# Patient Record
Sex: FEMALE | Race: WHITE | NOT HISPANIC OR LATINO | Employment: OTHER | URBAN - METROPOLITAN AREA
[De-identification: names, ages, dates, MRNs, and addresses within clinical notes are randomized per-mention and may not be internally consistent; named-entity substitution may affect disease eponyms.]

---

## 2017-04-18 ENCOUNTER — OFFICE VISIT (OUTPATIENT)
Dept: CARDIOLOGY | Facility: MEDICAL CENTER | Age: 72
End: 2017-04-18
Payer: MEDICARE

## 2017-04-18 VITALS
HEART RATE: 84 BPM | HEIGHT: 69 IN | WEIGHT: 242 LBS | DIASTOLIC BLOOD PRESSURE: 90 MMHG | BODY MASS INDEX: 35.84 KG/M2 | OXYGEN SATURATION: 93 % | RESPIRATION RATE: 20 BRPM | SYSTOLIC BLOOD PRESSURE: 140 MMHG

## 2017-04-18 DIAGNOSIS — R60.9 EDEMA, UNSPECIFIED TYPE: ICD-10-CM

## 2017-04-18 DIAGNOSIS — R53.83 OTHER FATIGUE: ICD-10-CM

## 2017-04-18 DIAGNOSIS — E78.00 PURE HYPERCHOLESTEROLEMIA: ICD-10-CM

## 2017-04-18 DIAGNOSIS — R06.02 SHORTNESS OF BREATH: ICD-10-CM

## 2017-04-18 DIAGNOSIS — I48.0 PAROXYSMAL ATRIAL FIBRILLATION (HCC): ICD-10-CM

## 2017-04-18 DIAGNOSIS — I51.89 DIASTOLIC DYSFUNCTION: ICD-10-CM

## 2017-04-18 DIAGNOSIS — G47.33 OSA (OBSTRUCTIVE SLEEP APNEA): ICD-10-CM

## 2017-04-18 DIAGNOSIS — E66.9 OBESITY, UNSPECIFIED OBESITY SEVERITY, UNSPECIFIED OBESITY TYPE: ICD-10-CM

## 2017-04-18 DIAGNOSIS — I10 ESSENTIAL HYPERTENSION: ICD-10-CM

## 2017-04-18 PROCEDURE — 1036F TOBACCO NON-USER: CPT | Performed by: NURSE PRACTITIONER

## 2017-04-18 PROCEDURE — 99214 OFFICE O/P EST MOD 30 MIN: CPT | Performed by: NURSE PRACTITIONER

## 2017-04-18 PROCEDURE — 3017F COLORECTAL CA SCREEN DOC REV: CPT | Mod: 8P | Performed by: NURSE PRACTITIONER

## 2017-04-18 PROCEDURE — G8419 CALC BMI OUT NRM PARAM NOF/U: HCPCS | Performed by: NURSE PRACTITIONER

## 2017-04-18 PROCEDURE — 1101F PT FALLS ASSESS-DOCD LE1/YR: CPT | Mod: 8P | Performed by: NURSE PRACTITIONER

## 2017-04-18 PROCEDURE — 4040F PNEUMOC VAC/ADMIN/RCVD: CPT | Mod: 8P | Performed by: NURSE PRACTITIONER

## 2017-04-18 PROCEDURE — 3014F SCREEN MAMMO DOC REV: CPT | Mod: 8P | Performed by: NURSE PRACTITIONER

## 2017-04-18 PROCEDURE — G8432 DEP SCR NOT DOC, RNG: HCPCS | Performed by: NURSE PRACTITIONER

## 2017-04-18 RX ORDER — LOSARTAN POTASSIUM 100 MG/1
100 TABLET ORAL DAILY
Qty: 90 TAB | Refills: 3 | Status: SHIPPED | OUTPATIENT
Start: 2017-04-18 | End: 2018-11-28

## 2017-04-18 RX ORDER — FUROSEMIDE 40 MG/1
40 TABLET ORAL DAILY
Qty: 30 TAB | Refills: 11 | Status: SHIPPED | OUTPATIENT
Start: 2017-04-18 | End: 2017-11-13

## 2017-04-18 ASSESSMENT — PAIN SCALES - GENERAL: PAINLEVEL: NO PAIN

## 2017-04-18 ASSESSMENT — ENCOUNTER SYMPTOMS
PND: 0
PALPITATIONS: 0
MYALGIAS: 0
ABDOMINAL PAIN: 0
SHORTNESS OF BREATH: 1
DIZZINESS: 0
ORTHOPNEA: 0
FEVER: 0
CLAUDICATION: 0
COUGH: 0

## 2017-04-18 NOTE — MR AVS SNAPSHOT
"        Yvette Dailey   2017 1:40 PM   Office Visit   MRN: [id number]    Department:  [facility]   Dept Phone:  [phone]    Description:  Female : 1945   Provider:  BORIS Pham           Reason for Visit     Follow-Up           Allergies as of 2017     Allergen Noted Reactions    Allopurinol 2016       \"everything went haywire!\"      You were diagnosed with     Essential hypertension   [0489852]       Pure hypercholesterolemia   [272.0.ICD-9-CM]       GLORIA (obstructive sleep apnea)   [071782]       Obesity, unspecified obesity severity, unspecified obesity type   [4569394]       Paroxysmal atrial fibrillation (CMS-HCC)   [765784]       Edema, unspecified type   [3114426]       Shortness of breath   [786.05.ICD-9-CM]       Other fatigue   [8204300]       Diastolic dysfunction   [066879]         Vital Signs     Blood Pressure Pulse Respirations Height Weight Body Mass Index    140/90 mmHg 84 20 1.753 m (5' 9\") 109.77 kg (242 lb) 35.72 kg/m2    Oxygen Saturation Breastfeeding? Smoking Status             93% No Former Smoker         Basic Information     Date Of Birth Sex Race Ethnicity Preferred Language    1945 Female White Non- English      Your appointments     2017  8:40 AM   Follow UP with [facility] (--)    [address] [facility] [address]  [place] [id number] [phone]            May 03, 2017 11:00 AM   FOLLOW UP with [facility] (--)    [address]  [place] [id number] [phone]            May 16, 2017 10:15 AM   ECHO with ECHO [facility] EXAM 10   ECHOCARDIOLOGY [facility] ([facility])    [address] [facility] [address]   [phone]           No prep              Problem List              ICD-10-CM Priority Class Noted - Resolved    HTN (hypertension) I10 Medium  2015 - Present    Hyperlipidemia " E78.5 Medium  5/19/2015 - Present    Psoriasis L40.9 Low  5/19/2015 - Present    Paroxysmal atrial fibrillation (CMS-HCC) I48.0 Medium  7/1/2015 - Present    Gout M10.9 Low  5/4/2016 - Present    GLORIA (obstructive sleep apnea) G47.33 Medium  7/27/2016 - Present    Obesity E66.9 Medium  8/17/2016 - Present    Edema R60.9 Medium  4/18/2017 - Present    Shortness of breath R06.02 Medium  4/18/2017 - Present    Fatigue R53.83 Medium  4/18/2017 - Present    Diastolic dysfunction I51.9 Medium  4/18/2017 - Present      Health Maintenance        Date Due Completion Dates    IMM DTaP/Tdap/Td Vaccine (1 - Tdap) 8/22/1965 ---    PAP SMEAR 8/22/1967 ---    MAMMOGRAM 8/22/1986 ---    COLONOSCOPY 8/22/1996 ---    IMM ZOSTER VACCINE 8/22/2006 ---    BONE DENSITY 8/22/2011 ---    IMM PNEUMOCOCCAL 65+ (ADULT) LOW/MEDIUM RISK SERIES (1 of 2 - PCV13) 8/22/2011 ---            Current Immunizations     No immunizations on file.      Below and/or attached are the medications your provider expects you to take. Review all of your home medications and newly ordered medications with your provider and/or pharmacist. Follow medication instructions as directed by your provider and/or pharmacist. Please keep your medication list with you and share with your provider. Update the information when medications are discontinued, doses are changed, or new medications (including over-the-counter products) are added; and carry medication information at all times in the event of emergency situations     Allergies:  ALLOPURINOL - (reactions not documented)               Medications  Valid as of: April 18, 2017 -  2:14 PM    Generic Name Brand Name Tablet Size Instructions for use    Apixaban (Tab) ELIQUIS 5mg Take 1 Tab by mouth 2 Times a Day.        Bilberry (Vaccinium myrtillus) (Cap) Bilberry 60 MG Take  by mouth as needed.        Clobetasol Propionate (Cream) TEMOVATE 0.05 % Apply  to affected area(s) 2 times a day.        CloNIDine HCl (PATCH WEEKLY)  CATAPRES 0.3 MG/24HR Apply 1 Patch to skin as directed every 7 days.        Flecainide Acetate (Tab) TAMBOCOR 50 MG Take 1 Tab by mouth 2 times a day.        Furosemide (Tab) LASIX 40 MG Take 1 Tab by mouth every day.        Losartan Potassium (Tab) COZAAR 100 MG Take 1 Tab by mouth every day.        Metoprolol Tartrate (Tab) LOPRESSOR 100 MG Take 50 mg by mouth 2 times a day.        Simvastatin (Tab) ZOCOR 80 MG Take 80 mg by mouth every evening.        .                 Medicines prescribed today were sent to:     [facility] - [address]    [address] [facility] [address] [facility] [address]    Phone: [phone] Fax: [phone]    Open 24 Hours?: No    [id number] - [place] - [address]    [address] [place] [address]    Phone: [phone] Fax: [phone]    Open 24 Hours?: No    [id number] - [place] - [address]    [address] [place] [address]    Phone: [phone] Fax: [phone]    Open 24 Hours?: No      Medication refill instructions:       If your prescription bottle indicates you have medication refills left, it is not necessary to call your provider’s office. Please contact your pharmacy and they will refill your medication.    If your prescription bottle indicates you do not have any refills left, you may request refills at any time through one of the following ways: The online ScheduleThing system (except Urgent Care), by calling your provider’s office, or by asking your pharmacy to contact your provider’s office with a refill request. Medication refills are processed only during regular business hours and may not be available until the next business day. Your provider may request additional information or to have a follow-up visit with you prior to refilling your medication.   *Please Note: Medication refills are assigned a new Rx number when refilled electronically. Your pharmacy may indicate that no refills were authorized even though a new  prescription for the same medication is available at the pharmacy. Please request the medicine by name with the pharmacy before contacting your provider for a refill.        Your To Do List     Future Labs/Procedures Complete By Expires    BTYPE NATRIURETIC PEPTIDE  As directed 4/19/2018    COMP METABOLIC PANEL  As directed 4/19/2018    ECHOCARDIOGRAM COMP W/O CONT  As directed 4/19/2018         [employer] Access Code: [email or web address]  Expires: 4/30/2017  9:02 AM    [employer]  A secure, online tool to manage your health information     [employer]’s [employer]® is a secure, online tool that connects you to your personalized health information from the privacy of your home -- day or night - making it very easy for you to manage your healthcare. Once the activation process is completed, you can even access your medical information using the [employer] branden, which is available for free in the Apple Branden store or Google Play store.     [employer] provides the following levels of access (as shown below):   My Chart Features   [facility] Doctor [facility]  Specialists [facility]  Urgent  Care [facility]  Primary Care  Doctor   Email your healthcare team securely and privately 24/7 X X X    Manage appointments: schedule your next appointment; view details of past/upcoming appointments X      Request prescription refills. X      View recent personal medical records, including lab and immunizations X X X X   View health record, including health history, allergies, medications X X X X   Read reports about your outpatient visits, procedures, consult and ER notes X X X X   See your discharge summary, which is a recap of your hospital and/or ER visit that includes your diagnosis, lab results, and care plan. X X       How to register for [employer]:  1. Go to  [email or web address].  2. Click on the Sign Up Now box, which takes you to the New Member Sign Up page. You will need to provide the following information:  a. Enter your [employer]  Access Code exactly as it appears at the top of this page. (You will not need to use this code after you’ve completed the sign-up process. If you do not sign up before the expiration date, you must request a new code.)   b. Enter your date of birth.   c. Enter your home email address.   d. Click Submit, and follow the next screen’s instructions.  3. Create a [employer] ID. This will be your [employer] login ID and cannot be changed, so think of one that is secure and easy to remember.  4. Create a taggat password. You can change your password at any time.  5. Enter your Password Reset Question and Answer. This can be used at a later time if you forget your password.   6. Enter your e-mail address. This allows you to receive e-mail notifications when new information is available in [employer].  7. Click Sign Up. You can now view your health information.    For assistance activating your [employer] account, call [phone]

## 2017-04-18 NOTE — Clinical Note
[facility]   [address] [place] [address]  [place] [address]  Phone: [phone]  Fax: [phone]              Yvette Dailey  1945    Encounter Date: 4/18/2017    BORIS Pham          PROGRESS NOTE:  Subjective:   Yvette Dailey is a 70 y.o. female who presents today for follow up on new onset of edema and shortness of breath.    She is a patient of Dr. Chicas in our office. Hx of PAF with previous successful cardioversion in '15 and '16, HLD, HTN, obesity, and gout.    She just recently within the last month has noticed an increase in weight gain, shortness of breath, and edema in her legs.    She hasn't felt her afib lately. She is managing her GLORIA with a CPAP machine now and regularly follows up with pulmonary.     Her PCP took her off her thyroid medication due to it making her feel poorly.    She has had no episodes of chest pain, palpitations, ordizziness/lightheadedness.    Past Medical History   Diagnosis Date   • PAF (paroxysmal atrial fibrillation) (CMS-HCC)    • Hyperlipidemia    • Psoriasis    • Atrial fibrillation (CMS-HCC)    • Hypertension    • Gout    • Dental disorder      upper plate, lower partial   • Sleep apnea      CPAP   • Paroxysmal atrial fibrillation (CMS-HCC)    • Obesity    • Edema      Past Surgical History   Procedure Laterality Date   • Recovery  7/7/2015     Procedure: CATH LAB-WIEDENBECK-ELECTIVE CARDIOVERSION 40192-XCF GROUP FOR ANESTHESIA-AFIB 427.31;  Surgeon: Recoveryonly Surgery;  [place] [facility]  Service:    • Hysterectomy laparoscopy     • Recovery  7/19/2016     Procedure: CATH LAB CARDIOVERSION WITH ANESTHESIA DR. PAYAN;  Surgeon: Recoveryonly Surgery;  [place] [facility]  Service:      Family History   Problem Relation Age of Onset   • Heart Attack Mother 83   • Cancer Father      lung     History   Smoking status   • Former Smoker --  "1.50 packs/day for 45 years   • Types: Cigarettes   • Quit date: 07/01/2013   Smokeless tobacco   • Never Used     Allergies   Allergen Reactions   • Allopurinol      \"everything went haywire!\"     Outpatient Encounter Prescriptions as of 4/18/2017   Medication Sig Dispense Refill   • losartan (COZAAR) 100 MG Tab Take 1 Tab by mouth every day. 90 Tab 3   • furosemide (LASIX) 40 MG Tab Take 1 Tab by mouth every day. 30 Tab 11   • clobetasol (TEMOVATE) 0.05 % Cream Apply  to affected area(s) 2 times a day.     • apixaban (ELIQUIS) 5mg Tab Take 1 Tab by mouth 2 Times a Day. 180 Tab 3   • flecainide (TAMBOCOR) 50 MG tablet Take 1 Tab by mouth 2 times a day. 60 Tab 11   • metoprolol (LOPRESSOR) 100 MG Tab Take 50 mg by mouth 2 times a day.     • simvastatin (ZOCOR) 80 MG tablet Take 80 mg by mouth every evening.     • Bilberry 60 MG CAPS Take  by mouth as needed.     • clonidine (CATAPRES) 0.3 MG/24HR PTWK Apply 1 Patch to skin as directed every 7 days.     • [DISCONTINUED] levothyroxine (SYNTHROID) 50 MCG Tab Take 50 mcg by mouth Every morning on an empty stomach.     • [DISCONTINUED] losartan-hydrochlorothiazide (HYZAAR) 100-25 MG per tablet Take 1 Tab by mouth every day.       No facility-administered encounter medications on file as of 4/18/2017.     Review of Systems   Constitutional: Negative for fever and malaise/fatigue.   Respiratory: Positive for shortness of breath. Negative for cough.         Exertional shortness of breath   Cardiovascular: Positive for leg swelling. Negative for chest pain, palpitations, orthopnea, claudication and PND.        Legs are 2-3+ pitting edema-new onset in the last 3 weeks   Gastrointestinal: Negative for abdominal pain.   Musculoskeletal: Negative for myalgias.   Neurological: Negative for dizziness.   All other systems reviewed and are negative.       Objective:   /90 mmHg  Pulse 84  Resp 20  Ht 1.753 m (5' 9\")  Wt 109.77 kg (242 lb)  BMI 35.72 kg/m2  SpO2 93%  " Breastfeeding? No    Physical Exam   Constitutional: She is oriented to person, place, and time. She appears well-developed. No distress.   Obese     HENT:   Head: Normocephalic and atraumatic.   Eyes: EOM are normal.   Neck: Normal range of motion. No JVD present.   Cardiovascular: Normal rate, regular rhythm, normal heart sounds and intact distal pulses.    No murmur heard.  Pulses:       Dorsalis pedis pulses are 1+ on the right side, and 1+ on the left side.   Pulmonary/Chest: Effort normal and breath sounds normal. No respiratory distress.   Abdominal: Soft. Bowel sounds are normal.   Musculoskeletal: Normal range of motion. She exhibits edema.   Bilateral lower extremity edema 2-3+ pitting   Neurological: She is alert and oriented to person, place, and time.   Skin: Skin is warm and dry.   Psychiatric: She has a normal mood and affect.   Nursing note and vitals reviewed.    2015 ECHO CONCLUSIONS  Normal left ventricular size and function.  Left ventricular ejection fraction is 59% to 61%.  Mild concentric left ventricular hypertrophy.  Mild mitral regurgitation.  Right ventricular systolic pressure is estimated to be 42-47 mmHg.  Right heart pressures are consistent with moderate pulmonary   hypertension.  Moderately dilated left atrium.  Mildly dilated right atrium.    Lab Results   Component Value Date/Time    CHOLESTEROL, 06/28/2016 09:40 AM    LDL 68 06/28/2016 09:40 AM    HDL 49 06/28/2016 09:40 AM    TRIGLYCERIDES 108 06/28/2016 09:40 AM       Lab Results   Component Value Date/Time    SODIUM 138 07/15/2016 09:48 AM    POTASSIUM 3.8 07/15/2016 09:48 AM    CHLORIDE 102 07/15/2016 09:48 AM    CO2 25 07/15/2016 09:48 AM    GLUCOSE 144* 07/15/2016 09:48 AM    BUN 20 07/15/2016 09:48 AM    CREATININE 0.89 07/15/2016 09:48 AM     Lab Results   Component Value Date/Time    ALKALINE PHOSPHATASE 95 06/28/2016 09:40 AM    AST(SGOT) 26 06/28/2016 09:40 AM    ALT(SGPT) 45 06/28/2016 09:40 AM    TOTAL  BILIRUBIN 1.2 06/28/2016 09:40 AM      Lab Results   Component Value Date/Time    WBC 10.7 07/15/2016 09:48 AM    RBC 4.33 07/15/2016 09:48 AM    HEMOGLOBIN 14.2 07/15/2016 09:48 AM    HEMATOCRIT 44.2 07/15/2016 09:48 AM    .1* 07/15/2016 09:48 AM    MCH 32.8 07/15/2016 09:48 AM    MCHC 32.1* 07/15/2016 09:48 AM    MPV 10.3 07/15/2016 09:48 AM      Assessment:     1. Essential hypertension  losartan (COZAAR) 100 MG Tab   2. Pure hypercholesterolemia     3. GLORIA (obstructive sleep apnea)     4. Obesity, unspecified obesity severity, unspecified obesity type     5. Paroxysmal atrial fibrillation (CMS-HCC)     6. Edema, unspecified type  furosemide (LASIX) 40 MG Tab    COMP METABOLIC PANEL    BTYPE NATRIURETIC PEPTIDE    ECHOCARDIOGRAM COMP W/O CONT   7. Shortness of breath  furosemide (LASIX) 40 MG Tab    COMP METABOLIC PANEL    BTYPE NATRIURETIC PEPTIDE    ECHOCARDIOGRAM COMP W/O CONT   8. Other fatigue  COMP METABOLIC PANEL    BTYPE NATRIURETIC PEPTIDE    ECHOCARDIOGRAM COMP W/O CONT   9. Diastolic dysfunction  COMP METABOLIC PANEL    BTYPE NATRIURETIC PEPTIDE    ECHOCARDIOGRAM COMP W/O CONT     Medical Decision Making:  Today's Assessment / Status / Plan:     1. HTN, moderate control but a little higher than normal. Continue metoprolol 50 mg BID, clonidine patch 0.3 mg patch Q7 days, and now changes with splitting up losartan to 100 mg QD and starting furosemide 40 mg QD for worsening edema/fluid overload.    2. PAF, rate controlled and asymptomatic, Continue metoprolol 50 mg BID and Eliquis 5 mg BID. Sleep apnea being managed now, thyroid levels okay with most recent labs-managed by PCP. No bleeding on Eliquis.    3. HLD, statin. No lipid panel this year. FU with next set of labs. LDL goal <100.    4. New onset of SIMMONS, fatigue, and edema, check CMP and BNP, alongside echocardiogram. Start lasix 40 mg QD and FU with symptoms in 2 weeks for review. If worsen, to call our office. Consider lasix PRN if  edema/weight/breathing improve or to cut back on dosing. Watch K on labs with no K supplementation.    FU in clinic in 2 weeks with APRN to review symptoms and medication changes.    Patient verbalizes understanding and agrees with the plan of care.     Collaborating MD: Juan Francisco BORRERO    Spent 45 minutes in face-to-face patient contact in which greater than 50% of the visit was spent in counseling/coordination of care of medication management, symptom management, re-assurance, discussion of afib causes and symptoms, labs and echo to be ordered.          No Recipients

## 2017-04-24 ENCOUNTER — SLEEP CENTER VISIT (OUTPATIENT)
Dept: SLEEP MEDICINE | Facility: MEDICAL CENTER | Age: 72
End: 2017-04-24
Payer: MEDICARE

## 2017-04-24 VITALS
HEART RATE: 89 BPM | DIASTOLIC BLOOD PRESSURE: 80 MMHG | RESPIRATION RATE: 16 BRPM | OXYGEN SATURATION: 96 % | TEMPERATURE: 97.5 F | HEIGHT: 69 IN | BODY MASS INDEX: 35.84 KG/M2 | WEIGHT: 242 LBS | SYSTOLIC BLOOD PRESSURE: 126 MMHG

## 2017-04-24 DIAGNOSIS — I48.0 PAROXYSMAL ATRIAL FIBRILLATION (HCC): ICD-10-CM

## 2017-04-24 DIAGNOSIS — G47.33 OSA (OBSTRUCTIVE SLEEP APNEA): ICD-10-CM

## 2017-04-24 DIAGNOSIS — I10 ESSENTIAL HYPERTENSION: ICD-10-CM

## 2017-04-24 PROCEDURE — 99213 OFFICE O/P EST LOW 20 MIN: CPT | Performed by: NURSE PRACTITIONER

## 2017-04-24 RX ORDER — METHYLPREDNISOLONE 4 MG/1
TABLET ORAL
Refills: 1 | COMMUNITY
Start: 2017-03-13 | End: 2017-04-01

## 2017-04-24 NOTE — MR AVS SNAPSHOT
"        Yvette Dailey   2017 8:40 AM   Sleep Center Visit   MRN: [id number]    Department:  [facility]   Dept Phone:  [phone]    Description:  Female : 1945   Provider:  NISHANT Fajardo           Reason for Visit     Apnea CPAP 10-15      Allergies as of 2017     Allergen Noted Reactions    Allopurinol 2016       \"everything went haywire!\"      You were diagnosed with     GLORIA (obstructive sleep apnea)   [722937]       Paroxysmal atrial fibrillation (CMS-HCC)   [755635]       Essential hypertension   [2651138]       BMI 35.0-35.9,adult   [334078]         Vital Signs     Blood Pressure Pulse Temperature Respirations Height Weight    126/80 mmHg 89 36.4 °C (97.5 °F) 16 1.753 m (5' 9\") 109.77 kg (242 lb)    Body Mass Index Oxygen Saturation Smoking Status             35.72 kg/m2 96% Former Smoker         Basic Information     Date Of Birth Sex Race Ethnicity Preferred Language    1945 Female White Non- English      Your appointments     May 03, 2017 11:00 AM   [facility] ([facility]    [address]  [place] [id number] [phone]            May 16, 2017 10:15 AM   ECHO with ECHO [facility] EXAM 10   ECHOCARDIOLOGY [facility] ([facility])    [address] [facility]  [place] [address]   [phone]           No prep            May 07, 2018  9:00 AM   Follow UP with [facility] (--)    [address]  [place] [address] [phone]              Problem List              ICD-10-CM Priority Class Noted - Resolved    HTN (hypertension) I10 Medium  2015 - Present    Hyperlipidemia E78.5 Medium  2015 - Present    Psoriasis L40.9 Low  2015 - Present    Paroxysmal atrial fibrillation (CMS-HCC) I48.0 Medium  2015 - Present    Gout M10.9 Low  2016 - Present    GLORIA (obstructive sleep apnea) G47.33 " Medium  7/27/2016 - Present    Obesity E66.9 Medium  8/17/2016 - Present    Edema R60.9 Medium  4/18/2017 - Present    Shortness of breath R06.02 Medium  4/18/2017 - Present    Fatigue R53.83 Medium  4/18/2017 - Present    Diastolic dysfunction I51.9 Medium  4/18/2017 - Present      Health Maintenance        Date Due Completion Dates    IMM DTaP/Tdap/Td Vaccine (1 - Tdap) 8/22/1965 ---    PAP SMEAR 8/22/1967 ---    MAMMOGRAM 8/22/1986 ---    COLONOSCOPY 8/22/1996 ---    IMM ZOSTER VACCINE 8/22/2006 ---    BONE DENSITY 8/22/2011 ---    IMM PNEUMOCOCCAL 65+ (ADULT) LOW/MEDIUM RISK SERIES (1 of 2 - PCV13) 8/22/2011 ---            Current Immunizations     No immunizations on file.      Below and/or attached are the medications your provider expects you to take. Review all of your home medications and newly ordered medications with your provider and/or pharmacist. Follow medication instructions as directed by your provider and/or pharmacist. Please keep your medication list with you and share with your provider. Update the information when medications are discontinued, doses are changed, or new medications (including over-the-counter products) are added; and carry medication information at all times in the event of emergency situations     Allergies:  ALLOPURINOL - (reactions not documented)               Medications  Valid as of: April 24, 2017 -  9:14 AM    Generic Name Brand Name Tablet Size Instructions for use    Apixaban (Tab) ELIQUIS 5mg Take 1 Tab by mouth 2 Times a Day.        Bilberry (Vaccinium myrtillus) (Cap) Bilberry 60 MG Take  by mouth as needed.        Clobetasol Propionate (Cream) TEMOVATE 0.05 % Apply  to affected area(s) 2 times a day.        CloNIDine HCl (PATCH WEEKLY) CATAPRES 0.3 MG/24HR Apply 1 Patch to skin as directed every 7 days.        Flecainide Acetate (Tab) TAMBOCOR 50 MG Take 1 Tab by mouth 2 times a day.        Furosemide (Tab) LASIX 40 MG Take 1 Tab by mouth every day.        Losartan  Potassium (Tab) COZAAR 100 MG Take 1 Tab by mouth every day.        Metoprolol Tartrate (Tab) LOPRESSOR 100 MG Take 50 mg by mouth 2 times a day.        Simvastatin (Tab) ZOCOR 80 MG Take 80 mg by mouth every evening.        .                 Medicines prescribed today were sent to:     [facility] - [address]    [address] [facility] [address]    Phone: [phone] Fax: [phone]    Open 24 Hours?: No    [id number] - [place], [place] - [address]    [address] [place] [address]    Phone: [phone] Fax: [phone]    Open 24 Hours?: No    [id number] - [place] - [address]    [address] [place] [address]    Phone: [phone] Fax: [phone]    Open 24 Hours?: No      Medication refill instructions:       If your prescription bottle indicates you have medication refills left, it is not necessary to call your provider’s office. Please contact your pharmacy and they will refill your medication.    If your prescription bottle indicates you do not have any refills left, you may request refills at any time through one of the following ways: The online Scalado system (except Urgent Care), by calling your provider’s office, or by asking your pharmacy to contact your provider’s office with a refill request. Medication refills are processed only during regular business hours and may not be available until the next business day. Your provider may request additional information or to have a follow-up visit with you prior to refilling your medication.   *Please Note: Medication refills are assigned a new Rx number when refilled electronically. Your pharmacy may indicate that no refills were authorized even though a new prescription for the same medication is available at the pharmacy. Please request the medicine by name with the pharmacy before contacting your provider for a refill.           Scalado Access Code: [email or web address]  Expires: 4/30/2017   9:02 AM    [employer]ursht  A secure, online tool to manage your health information     [employer]’s Yer[employer] Software® is a secure, online tool that connects you to your personalized health information from the privacy of your home -- day or night - making it very easy for you to manage your healthcare. Once the activation process is completed, you can even access your medical information using the Yerbabuena Software branden, which is available for free in the Apple Branden store or Google Play store.     Yerbabuena Software provides the following levels of access (as shown below):   My Chart Features   [facility] Doctor [facility]  Specialists [facility]  Urgent  Care Non-[facility]  Primary Care  Doctor   Email your healthcare team securely and privately 24/7 X X X    Manage appointments: schedule your next appointment; view details of past/upcoming appointments X      Request prescription refills. X      View recent personal medical records, including lab and immunizations X X X X   View health record, including health history, allergies, medications X X X X   Read reports about your outpatient visits, procedures, consult and ER notes X X X X   See your discharge summary, which is a recap of your hospital and/or ER visit that includes your diagnosis, lab results, and care plan. X X       How to register for Yerbabuena Software:  1. Go to  [email or web address].  2. Click on the Sign Up Now box, which takes you to the New Member Sign Up page. You will need to provide the following information:  a. Enter your Yerbabuena Software Access Code exactly as it appears at the top of this page. (You will not need to use this code after you’ve completed the sign-up process. If you do not sign up before the expiration date, you must request a new code.)   b. Enter your date of birth.   c. Enter your home email address.   d. Click Submit, and follow the next screen’s instructions.  3. Create a Yerbabuena Software ID. This will be your Yerbabuena Software login ID and cannot be changed, so think of one that is  secure and easy to remember.  4. Create a [employer] password. You can change your password at any time.  5. Enter your Password Reset Question and Answer. This can be used at a later time if you forget your password.   6. Enter your e-mail address. This allows you to receive e-mail notifications when new information is available in [employer].  7. Click Sign Up. You can now view your health information.    For assistance activating your [employer] account, call [phone]

## 2017-04-24 NOTE — PROGRESS NOTES
Chief Complaint   Patient presents with   • Apnea     CPAP 10-15       HPI:  Yvette Dailey is a 71 y.o. year old female here today for follow-up on her obstructive sleep apnea. She has a history of paroxysmal atrial fibrillation. She had an overnight oximetry which demonstrated hypoxemia. She had spent 237 minutes with saturations less than 88%. Her basal saturation was 89.6%. Polysomnogram 8/3/2016 indicates evidence of severe sleep apnea with an AHI of 79.8 with a low 02 saturation of 83%. She had an incomplete titration to CPAP pressures tried of 4-13 CM H20. On a CPAP pressure of 13 CM her AHI was reduced to 13 and her mean 02 saturation was 92.2%. She was started on Auto CPAP 12-18 CM H20. Her compliance card download indicates an AHI of 4.1 with an average use of 6.5 hours at night. She has been the pressures were a little high. She was decreased at her last office visit to 10-15 CM H20. Her repeat compliance card download indicates an AHI of 2.7 with an average use of just under 8 hours at night. She has a nasal mask. She has had some mask comfort issues. She tolerates the pressure. She feels her energy levels have improved. She denies any morning headaches.   She is on Metoprolol and Flecainide. She is followed by Cardiology. She is also on anticoagulation with Eliquis.        Past Medical History   Diagnosis Date   • PAF (paroxysmal atrial fibrillation) (CMS-[facility])    • Hyperlipidemia    • Psoriasis    • Atrial fibrillation (CMS-[facility])    • Hypertension    • Gout    • Dental disorder      upper plate, lower partial   • Sleep apnea      CPAP   • Paroxysmal atrial fibrillation (CMS-[facility])    • Obesity    • Edema        Past Surgical History   Procedure Laterality Date   • Recovery  7/7/2015     Procedure: CATH LAB-WIPRINCEENBECK-ELECTIVE CARDIOVERSION 39464-AOB GROUP FOR ANESTHESIA-AFIB 427.31;  Surgeon: Recoveryonly Surgery;  [place] [facility]  Service:    • Hysterectomy laparoscopy      • Recovery  7/19/2016     Procedure: CATH LAB CARDIOVERSION WITH ANESTHESIA DR. PAYAN;  Surgeon: Recoveryonkevin Surgery;  [place] [facility] [place]  Service:        Family History   Problem Relation Age of Onset   • Heart Attack Mother 83   • Cancer Father      lung       Social History     Social History   • Marital Status:      Spouse Name: N/A   • Number of Children: N/A   • Years of Education: N/A     Occupational History   • Not on file.     Social History Main Topics   • Smoking status: Former Smoker -- 1.50 packs/day for 45 years     Types: Cigarettes     Quit date: 07/01/2013   • Smokeless tobacco: Never Used   • Alcohol Use: 4.2 - 8.4 oz/week     7-14 Shots of liquor per week   • Drug Use: No   • Sexual Activity: Not on file     Other Topics Concern   • Not on file     Social History Narrative       ROS:  Constitutional: Denies fevers, chills, sweats, fatigue, weight loss  Eyes: Denies glasses, vision loss, pain, drainage, double vision  Ears/Nose/Mouth/Throat: Denies rhinitis, nasal congestion, ear ache, difficulty hearing, sore throat, persistent hoarseness, decayed teeth/toothache  Cardiovascular: Denies chest pain, tightness, palpitations, swelling in feet/legs, fainting, difficulty breathing when laying down  Respiratory: Denies shortness of breath, cough, sputum, wheezing, painful breathing, coughing up blood  GI: Denies heartburn, difficulty swallowing, nausea, vomiting, abdominal pain, diarrhea, constipation  : Denies frequent urination, painful urination  Integumentary: Denies rashes, lumps or color changes  MSK: Right foot pain, Gout flare on medication  Neurological: Denies frequent headaches, dizziness, weakness  Sleep: See HPI       Current Outpatient Prescriptions on File Prior to Visit   Medication Sig Dispense Refill   • losartan (COZAAR) 100 MG Tab Take 1 Tab by mouth every day. 90 Tab 3   • furosemide (LASIX) 40 MG Tab Take 1 Tab by mouth every day. 30 Tab 11  "  • clobetasol (TEMOVATE) 0.05 % Cream Apply  to affected area(s) 2 times a day.     • apixaban (ELIQUIS) 5mg Tab Take 1 Tab by mouth 2 Times a Day. 180 Tab 3   • flecainide (TAMBOCOR) 50 MG tablet Take 1 Tab by mouth 2 times a day. 60 Tab 11   • metoprolol (LOPRESSOR) 100 MG Tab Take 50 mg by mouth 2 times a day.     • simvastatin (ZOCOR) 80 MG tablet Take 80 mg by mouth every evening.     • Bilberry 60 MG CAPS Take  by mouth as needed.     • clonidine (CATAPRES) 0.3 MG/24HR PTWK Apply 1 Patch to skin as directed every 7 days.       No current facility-administered medications on file prior to visit.     Allopurinol    Blood pressure 126/80, pulse 89, temperature 36.4 °C (97.5 °F), resp. rate 16, height 1.753 m (5' 9\"), weight 109.77 kg (242 lb), SpO2 96 %.  PE:   Appearance: Well developed, well nourished, no acute distress  Eyes: PERRL, EOM intact, sclera white, conjunctiva moist  Ears: no lesions or deformities  Hearing: grossly intact  Nose: no lesions or deformities  Oropharynx: tongue normal, posterior pharynx without erythema or exudate  Mallampati Classification: class 4  Neck: supple, trachea midline, no masses   Respiratory effort: no intercostal retractions or use of accessory muscles  Lung auscultation: no rales, rhonchi or wheezes  Heart auscultation: no murmur rub or gallop  Extremities: no cyanosis. Bilateral non pitting pedal edema   Abdomen: soft ,non tender, no masses  Gait and Station: normal  Digits and nails: no clubbing, cyanosis, petechiae or nodes.  Cranial nerves: grossly intact  Skin: no rashes, lesions or ulcers noted  Orientation: Oriented to time, person and place  Mood and affect: mood and affect appropriate, normal interaction with examiner  Judgement: Intact          Assessment:  1. GLORIA (obstructive sleep apnea)     2. Paroxysmal atrial fibrillation (CMS-HCC)     3. Essential hypertension     4. BMI 35.0-35.9,adult           Plan:    1) Continue Auto CPAP @ 10-15 CM H20. Offered mask " fit today in the office. She declines as she feels she is finally tolerating with the mask and machine all night. She is encouraged to contact her DME, [employer] if she continues to have comfort issues with her current nasal mask.  Encourage routine cleaning of mask and CPAP equipment.  2) Sleep hygiene discussed.   3) Weight loss recommended.  4) Continue to follow with Cardiology.   5) Follow up on an annual basis with compliance card download, sooner if needed.

## 2017-04-24 NOTE — PATIENT INSTRUCTIONS
Plan:    1) Continue Auto CPAP @ 10-15 CM H20. Offered mask fit today in the office. She declines as she feels she is finally tolerating with the mask and machine all night. She is encouraged to contact her DME, [employer] if she continues to have comfort issues with her current nasal mask.  Encourage routine cleaning of mask and CPAP equipment.  2) Sleep hygiene discussed.   3) Weight loss recommended.  4) Continue to follow with Cardiology.   5) Follow up on an annual basis with compliance card download, sooner if needed.

## 2017-04-28 ENCOUNTER — TELEPHONE (OUTPATIENT)
Dept: CARDIOLOGY | Facility: MEDICAL CENTER | Age: 72
End: 2017-04-28

## 2017-04-28 NOTE — TELEPHONE ENCOUNTER
LVM asking patient to call back into office to find out if she ever had the blood work done that was ordered by [facility] on 4/18/17. Patient has a FV with  on 5/3/17 @ 11:00am*ALIE

## 2017-05-09 DIAGNOSIS — I48.0 PAROXYSMAL ATRIAL FIBRILLATION (HCC): ICD-10-CM

## 2017-05-09 RX ORDER — FLECAINIDE ACETATE 50 MG/1
50 TABLET ORAL 2 TIMES DAILY
Qty: 180 TAB | Refills: 3 | Status: SHIPPED | OUTPATIENT
Start: 2017-05-09 | End: 2017-10-23

## 2017-05-26 DIAGNOSIS — I48.0 PAROXYSMAL ATRIAL FIBRILLATION (HCC): ICD-10-CM

## 2017-06-12 ENCOUNTER — APPOINTMENT (OUTPATIENT)
Dept: CARDIOLOGY | Facility: MEDICAL CENTER | Age: 72
End: 2017-06-12
Attending: NURSE PRACTITIONER
Payer: MEDICARE

## 2017-09-13 ENCOUNTER — TELEPHONE (OUTPATIENT)
Dept: CARDIOLOGY | Facility: MEDICAL CENTER | Age: 72
End: 2017-09-13

## 2017-09-13 NOTE — TELEPHONE ENCOUNTER
Left voicemail for patient to call office back in regards of any recent lab work or cardiac testing done for 9/15/17 appointment.

## 2017-09-15 ENCOUNTER — OFFICE VISIT (OUTPATIENT)
Dept: CARDIOLOGY | Facility: MEDICAL CENTER | Age: 72
End: 2017-09-15
Payer: MEDICARE

## 2017-09-15 VITALS
OXYGEN SATURATION: 90 % | DIASTOLIC BLOOD PRESSURE: 120 MMHG | BODY MASS INDEX: 35.81 KG/M2 | WEIGHT: 241.8 LBS | HEIGHT: 69 IN | SYSTOLIC BLOOD PRESSURE: 158 MMHG | HEART RATE: 84 BPM

## 2017-09-15 DIAGNOSIS — I48.0 PAROXYSMAL ATRIAL FIBRILLATION (HCC): ICD-10-CM

## 2017-09-15 DIAGNOSIS — I51.89 DIASTOLIC DYSFUNCTION: ICD-10-CM

## 2017-09-15 PROBLEM — R06.02 SHORTNESS OF BREATH: Status: RESOLVED | Noted: 2017-04-18 | Resolved: 2017-09-15

## 2017-09-15 PROBLEM — R53.83 FATIGUE: Status: RESOLVED | Noted: 2017-04-18 | Resolved: 2017-09-15

## 2017-09-15 LAB — EKG IMPRESSION: NORMAL

## 2017-09-15 PROCEDURE — 93000 ELECTROCARDIOGRAM COMPLETE: CPT | Performed by: INTERNAL MEDICINE

## 2017-09-15 PROCEDURE — 99214 OFFICE O/P EST MOD 30 MIN: CPT | Performed by: INTERNAL MEDICINE

## 2017-09-15 ASSESSMENT — ENCOUNTER SYMPTOMS
NAUSEA: 0
TINGLING: 0
SPUTUM PRODUCTION: 0
WHEEZING: 0
PND: 0
LOSS OF CONSCIOUSNESS: 0
PHOTOPHOBIA: 0
DEPRESSION: 0
NECK PAIN: 0
SHORTNESS OF BREATH: 0
MEMORY LOSS: 0
HEADACHES: 0
PALPITATIONS: 0
COUGH: 0
DOUBLE VISION: 0
NERVOUS/ANXIOUS: 0
INSOMNIA: 0
BRUISES/BLEEDS EASILY: 0
BLURRED VISION: 0
HEARTBURN: 0
WEIGHT LOSS: 0
DIZZINESS: 0
MYALGIAS: 0

## 2017-09-15 NOTE — LETTER
[facility]   [address]  [place] [address]  Phone: [phone]  Fax: [phone]              Yvette Dailey  1945    Encounter Date: 9/15/2017    Anaya Colunga M.D.          PROGRESS NOTE:  Subjective:   Yvette Dailey is a 72 y.o. female who presents today in f/u in regards To her persistent atrial fibrillation in the setting of sleep apnea, hypertension and obesity    Compliant with her medications including her anticoagulation  Has had 2 falls. She does not think she loses consciousness. They have both been at night after she's had a couple cocktails. Her  worries about her, says her vision is good, she has had a lot of leg swelling. Does not take her diuretic because she has to urinate so much    Compliant with CPAP    Past Medical History:   Diagnosis Date   • Atrial fibrillation (CMS-[facility])    • Dental disorder     upper plate, lower partial   • Edema    • Gout    • Hyperlipidemia    • Hypertension    • Obesity    • PAF (paroxysmal atrial fibrillation) (CMS-HCC)    • Paroxysmal atrial fibrillation (CMS-HCC)    • Psoriasis    • Sleep apnea     CPAP     Past Surgical History:   Procedure Laterality Date   • RECOVERY  7/19/2016    Procedure: CATH LAB CARDIOVERSION WITH ANESTHESIA DR. PAYAN;  Surgeon: Recoveryonly Surgery;  [place] [facility] [place]  Service:    • RECOVERY  7/7/2015    Procedure: CATH LAB-WIEDENBECK-ELECTIVE CARDIOVERSION 80628-WTH GROUP FOR ANESTHESIA-AFIB 427.31;  Surgeon: Recoveryonly Surgery;  [place] [facility] [place]  Service:    • HYSTERECTOMY LAPAROSCOPY       Family History   Problem Relation Age of Onset   • Heart Attack Mother 83   • Cancer Father      lung     History   Smoking Status   • Former Smoker   • Packs/day: 1.50   • Years: 45.00   • Types: Cigarettes   • Quit date: 7/1/2013   Smokeless Tobacco   • Never Used     Allergies   Allergen Reactions   •  "Allopurinol      \"everything went haywire!\"     Outpatient Encounter Prescriptions as of 9/15/2017   Medication Sig Dispense Refill   • apixaban (ELIQUIS) 5mg Tab Take 1 Tab by mouth 2 Times a Day. 180 Tab 3   • flecainide (TAMBOCOR) 50 MG tablet Take 1 Tab by mouth 2 times a day. 180 Tab 3   • losartan (COZAAR) 100 MG Tab Take 1 Tab by mouth every day. 90 Tab 3   • furosemide (LASIX) 40 MG Tab Take 1 Tab by mouth every day. 30 Tab 11   • clobetasol (TEMOVATE) 0.05 % Cream Apply  to affected area(s) 2 times a day.     • metoprolol (LOPRESSOR) 100 MG Tab Take 50 mg by mouth 2 times a day.     • simvastatin (ZOCOR) 80 MG tablet Take 80 mg by mouth every evening.     • Bilberry 60 MG CAPS Take  by mouth as needed.     • clonidine (CATAPRES) 0.3 MG/24HR PTWK Apply 1 Patch to skin as directed every 7 days.       No facility-administered encounter medications on file as of 9/15/2017.      Review of Systems   Constitutional: Positive for malaise/fatigue. Negative for weight loss.   HENT: Negative for hearing loss.    Eyes: Negative for blurred vision, double vision and photophobia.   Respiratory: Negative for cough, sputum production, shortness of breath and wheezing.    Cardiovascular: Positive for leg swelling. Negative for chest pain, palpitations and PND.   Gastrointestinal: Negative for heartburn and nausea.   Genitourinary: Negative.    Musculoskeletal: Negative for myalgias and neck pain.   Skin: Negative for rash.   Neurological: Negative for dizziness, tingling, loss of consciousness and headaches.   Endo/Heme/Allergies: Does not bruise/bleed easily.   Psychiatric/Behavioral: Negative for depression and memory loss. The patient is not nervous/anxious and does not have insomnia.    All other systems reviewed and are negative.       Objective:   /120   Pulse 84   Ht 1.753 m (5' 9\")   Wt 109.7 kg (241 lb 12.8 oz)   SpO2 90%   BMI 35.71 kg/m²      Physical Exam   Constitutional: She is oriented to person, " place, and time.   Plethoric, obese   Eyes: EOM are normal. Pupils are equal, round, and reactive to light. No scleral icterus.   Neck: No JVD present. No thyromegaly present.   Cardiovascular: Normal rate, regular rhythm and intact distal pulses.    No murmur heard.  Pulmonary/Chest: Breath sounds normal. No respiratory distress.   Abdominal: Bowel sounds are normal. She exhibits no distension.   Musculoskeletal: She exhibits edema (rubor, stasis changes - no open sores, 1+ edema). She exhibits no tenderness.   Neurological: She is oriented to person, place, and time. Coordination normal.   Skin: Skin is warm and dry. No rash noted.   Psychiatric: She has a normal mood and affect. Her behavior is normal.       Assessment:     1. Paroxysmal atrial fibrillation (CMS-[facility])  Echocardiogram Comp w/o [facility] EKG (Clinic Performed)   2. Diastolic dysfunction         Medical Decision Making:  Today's Assessment / Status / Plan:       Atrial fibrillation, persistent. Rhythm control strategy with flecainide and metoprolol. She had a normal echo in the past and has no evidence of structural heart disease. Repeat echo. I would have a low threshold to change her antiarrhythmic therapy and do a three-week monitor if she has recurrent episode. She voices understanding. She will continue anticoagulation. We check an annual GFR as well as CBC. The medication is somewhat expensive, I offered her samples at our sample clinic in the new year    Hypertension, compliant on her CPAP. Discuss altering her medications but is persistently high. She states she has been anxious because she never met me before. She will do home monitoring. We discussed goals and diet. I would add a calcium channel blocker if needed    Falling. As above. We also discussed referral to physical therapy and neurology if recurs. She would like to hold off    RTC 6 months, sooner if symptoms. Echo as above. EKG today      Karthikeyan Mack M.D.  [address]  [place] [id number]  VIA Facsimile: [phone]

## 2017-09-15 NOTE — PROGRESS NOTES
"Subjective:   Yvette Dailey is a 72 y.o. female who presents today in f/u in regards To her persistent atrial fibrillation in the setting of sleep apnea, hypertension and obesity    Compliant with her medications including her anticoagulation  Has had 2 falls. She does not think she loses consciousness. They have both been at night after she's had a couple cocktails. Her  worries about her, says her vision is good, she has had a lot of leg swelling. Does not take her diuretic because she has to urinate so much    Compliant with CPAP    Past Medical History:   Diagnosis Date   • Atrial fibrillation (CMS-HCC)    • Dental disorder     upper plate, lower partial   • Edema    • Gout    • Hyperlipidemia    • Hypertension    • Obesity    • PAF (paroxysmal atrial fibrillation) (CMS-HCC)    • Paroxysmal atrial fibrillation (CMS-HCC)    • Psoriasis    • Sleep apnea     CPAP     Past Surgical History:   Procedure Laterality Date   • RECOVERY  7/19/2016    Procedure: CATH LAB CARDIOVERSION WITH ANESTHESIA DR. PAYAN;  Surgeon: Recoveryonly Surgery;  [place] PRE[place] UN[place] [facility] [place];  Service:    • RECOVERY  7/7/2015    Procedure: CATH LAB-[place]-ELECTIVE CARDIOVERSION [id number]-[id number] GROUP FOR ANESTHESIA-AFIB 427.31;  Surgeon: Recoveryonly Surgery;  Location: SURGERY PRE-POST PROC UNIT [facility] [place];  Service:    • HYSTERECTOMY LAPAROSCOPY       Family History   Problem Relation Age of Onset   • Heart Attack Mother 83   • Cancer Father      lung     History   Smoking Status   • Former Smoker   • Packs/day: 1.50   • Years: 45.00   • Types: Cigarettes   • Quit date: 7/1/2013   Smokeless Tobacco   • Never Used     Allergies   Allergen Reactions   • Allopurinol      \"everything went haywire!\"     Outpatient Encounter Prescriptions as of 9/15/2017   Medication Sig Dispense Refill   • apixaban (ELIQUIS) 5mg Tab Take 1 Tab by mouth 2 Times a Day. 180 Tab 3   • flecainide (TAMBOCOR) 50 MG tablet Take 1 Tab by mouth 2 " "times a day. 180 Tab 3   • losartan (COZAAR) 100 MG Tab Take 1 Tab by mouth every day. 90 Tab 3   • furosemide (LASIX) 40 MG Tab Take 1 Tab by mouth every day. 30 Tab 11   • clobetasol (TEMOVATE) 0.05 % Cream Apply  to affected area(s) 2 times a day.     • metoprolol (LOPRESSOR) 100 MG Tab Take 50 mg by mouth 2 times a day.     • simvastatin (ZOCOR) 80 MG tablet Take 80 mg by mouth every evening.     • Bilberry 60 MG CAPS Take  by mouth as needed.     • clonidine (CATAPRES) 0.3 MG/24HR PTWK Apply 1 Patch to skin as directed every 7 days.       No facility-administered encounter medications on file as of 9/15/2017.      Review of Systems   Constitutional: Positive for malaise/fatigue. Negative for weight loss.   HENT: Negative for hearing loss.    Eyes: Negative for blurred vision, double vision and photophobia.   Respiratory: Negative for cough, sputum production, shortness of breath and wheezing.    Cardiovascular: Positive for leg swelling. Negative for chest pain, palpitations and PND.   Gastrointestinal: Negative for heartburn and nausea.   Genitourinary: Negative.    Musculoskeletal: Negative for myalgias and neck pain.   Skin: Negative for rash.   Neurological: Negative for dizziness, tingling, loss of consciousness and headaches.   Endo/Heme/Allergies: Does not bruise/bleed easily.   Psychiatric/Behavioral: Negative for depression and memory loss. The patient is not nervous/anxious and does not have insomnia.    All other systems reviewed and are negative.       Objective:   /120   Pulse 84   Ht 1.753 m (5' 9\")   Wt 109.7 kg (241 lb 12.8 oz)   SpO2 90%   BMI 35.71 kg/m²     Physical Exam   Constitutional: She is oriented to person, place, and time.   Plethoric, obese   Eyes: EOM are normal. Pupils are equal, round, and reactive to light. No scleral icterus.   Neck: No JVD present. No thyromegaly present.   Cardiovascular: Normal rate, regular rhythm and intact distal pulses.    No murmur " heard.  Pulmonary/Chest: Breath sounds normal. No respiratory distress.   Abdominal: Bowel sounds are normal. She exhibits no distension.   Musculoskeletal: She exhibits edema (rubor, stasis changes - no open sores, 1+ edema). She exhibits no tenderness.   Neurological: She is oriented to person, place, and time. Coordination normal.   Skin: Skin is warm and dry. No rash noted.   Psychiatric: She has a normal mood and affect. Her behavior is normal.       Assessment:     1. Paroxysmal atrial fibrillation (CMS-[facility])  Echocardiogram Comp w/o Cont    [facility]   2. Diastolic dysfunction         Medical Decision Making:  Today's Assessment / Status / Plan:       Atrial fibrillation, persistent. I did her EKG today and I read it myself. She is in atrial fibrillation with rates in the 80s.   Her previous cardiologist had her on a rhythm control strategy with flecainide and metoprolol. She had a normal echo in the past and has no evidence of structural heart disease. Repeat echo. I would like to stop the flecainide, we'll look at the echo 1st. She will continue anticoagulation. We check an annual GFR as well as CBC. The medication is somewhat expensive, I offered her samples at our sample clinic in the new year    Hypertension, compliant on her CPAP. Discuss altering her medications but is persistently high. She states she has been anxious because she never met me before. She will do home monitoring. We discussed goals and diet. I would add a calcium channel blocker if needed    Falling. As above. We also discussed referral to physical therapy and neurology if recurs. She would like to hold off    RTC 6 months, sooner if symptoms. Echo as above. EKG today

## 2017-10-04 ENCOUNTER — APPOINTMENT (OUTPATIENT)
Dept: CARDIOLOGY | Facility: MEDICAL CENTER | Age: 72
End: 2017-10-04
Attending: INTERNAL MEDICINE
Payer: MEDICARE

## 2017-10-06 ENCOUNTER — TELEPHONE (OUTPATIENT)
Dept: CARDIOLOGY | Facility: MEDICAL CENTER | Age: 72
End: 2017-10-06

## 2017-10-06 DIAGNOSIS — I48.0 PAROXYSMAL ATRIAL FIBRILLATION (HCC): ICD-10-CM

## 2017-10-06 NOTE — TELEPHONE ENCOUNTER
Called patient. She wanted to ask about Eliquis samples, not a patient assistance program. Patient states that she does not qualify for patient assistance programs due to her income.    Advised patient that samples are now sent to [facility], and they can only supply 30 days worth of medication at a time. Gave patient the pharmacy's address and phone number.    Patient states that she does not want samples yet, but she will call back if she decides that she would like Eliquis samples.    VENECIA LESTER

## 2017-10-06 NOTE — TELEPHONE ENCOUNTER
----- Message from Twila Gu sent at 10/6/2017  9:31 AM PDT -----  Regarding: Patient wants to find out about program for [place]    Patient wants a call back to find out about a program for assistance to get Eliquis and can be reached at [phone].

## 2017-10-20 ENCOUNTER — HOSPITAL ENCOUNTER (OUTPATIENT)
Dept: CARDIOLOGY | Facility: MEDICAL CENTER | Age: 72
End: 2017-10-20
Attending: INTERNAL MEDICINE
Payer: MEDICARE

## 2017-10-20 DIAGNOSIS — I48.0 PAROXYSMAL ATRIAL FIBRILLATION (HCC): ICD-10-CM

## 2017-10-20 PROCEDURE — 93306 TTE W/DOPPLER COMPLETE: CPT

## 2017-10-20 PROCEDURE — 93306 TTE W/DOPPLER COMPLETE: CPT | Mod: 26 | Performed by: INTERNAL MEDICINE

## 2017-10-23 ENCOUNTER — TELEPHONE (OUTPATIENT)
Dept: CARDIOLOGY | Facility: MEDICAL CENTER | Age: 72
End: 2017-10-23

## 2017-10-23 LAB
LV EJECT FRACT  99904: 70
LV EJECT FRACT MOD 2C 99903: 69.03
LV EJECT FRACT MOD 4C 99902: 70.68
LV EJECT FRACT MOD BP 99901: 70.37

## 2017-10-23 NOTE — TELEPHONE ENCOUNTER
----- Message from Anaya Colunga M.D. sent at 10/23/2017 12:15 PM PDT -----  Regarding: RE: echo question  Yes please!  If she has more palps - we can have her check in w EP      ----- Message -----  From: Jenny Rocha R.N.  Sent: 10/23/2017   9:50 AM  To: Anaya Colunga M.D.  Subject: echo question                                    Hi Dr. Colunga,    Can Yvette Dailey stop her Flecainide based on her echocardiogram results, or does she need to continue it?    VENECIA LESTER

## 2017-10-23 NOTE — TELEPHONE ENCOUNTER
Called patient, advised her of Dr. Colunga's echocardiogram interpretation and instructions. Patient verbalized understanding and agrees with plan.    Patient will stop Flecainide, and will call the office if she has palpitations.    VENECIA LESTER

## 2017-10-23 NOTE — TELEPHONE ENCOUNTER
Echocardiogram Comp w/o Cont   Order: [id number]   Status:  Final result   Visible to patient:  No (Not Released) Dx:  Paroxysmal atrial fibrillation (CMS-[facility])   Notes Recorded by Anaya Colunga M.D. on 10/23/2017 at 9:16 AM PDT  Normal heart function on echo  F/u as planned     ================================================================    Left patient a voicemail with instructions to call the office for test results (echocardiogram 10/20/2017).    VENECIA LESTER

## 2017-10-29 ENCOUNTER — HOSPITAL ENCOUNTER (EMERGENCY)
Facility: MEDICAL CENTER | Age: 72
End: 2017-10-30
Attending: EMERGENCY MEDICINE
Payer: MEDICARE

## 2017-10-29 DIAGNOSIS — Z79.01 CHRONIC ANTICOAGULATION: ICD-10-CM

## 2017-10-29 DIAGNOSIS — R04.0 EPISTAXIS: ICD-10-CM

## 2017-10-29 PROCEDURE — 99283 EMERGENCY DEPT VISIT LOW MDM: CPT

## 2017-10-30 VITALS
TEMPERATURE: 99 F | BODY MASS INDEX: 35.25 KG/M2 | DIASTOLIC BLOOD PRESSURE: 78 MMHG | HEART RATE: 85 BPM | RESPIRATION RATE: 16 BRPM | SYSTOLIC BLOOD PRESSURE: 165 MMHG | OXYGEN SATURATION: 91 % | WEIGHT: 238 LBS | HEIGHT: 69 IN

## 2017-10-30 NOTE — ED PROVIDER NOTES
"CHIEF COMPLAINT  Chief Complaint   Patient presents with   • Epistaxis     BIB EMS with above cc. Epistaxis has occured for approx last hour. Nose clamp placed by EMS. VSS. -Dizziness. +blood thinners       [facility]  Yvette Dailey is a 72 y.o. female who presents with epistaxis for the past hour prior to arrival. Patient is on blood thinners, Eliquis for chronic A. fib. States that she had a bloody nose a few days ago that she was able to get under control herself. Had recurrence this morning spontaneously without trauma to the face. No digital trauma either. No blowing of the nose. The patient did blow her nose a few days ago. Also uses C Pap at night.    Denies prior history of epistaxis. Upon my bedside evaluation of the patient, no longer with epistaxis.    REVIEW OF SYSTEMS  See HPI for further details. All other systems are negative.     PAST MEDICAL HISTORY   has a past medical history of Atrial fibrillation (CMS-HCC); Dental disorder; Edema; Gout; Hyperlipidemia; Hypertension; Obesity; PAF (paroxysmal atrial fibrillation) (CMS-HCC); Paroxysmal atrial fibrillation (CMS-HCC); Psoriasis; and Sleep apnea.    SOCIAL HISTORY  Social History     Social History Main Topics   • Smoking status: Former Smoker     Packs/day: 1.50     Years: 45.00     Types: Cigarettes     Quit date: 7/1/2013   • Smokeless tobacco: Never Used   • Alcohol use 4.2 - 8.4 oz/week     7 - 14 Shots of liquor per week   • Drug use: No   • Sexual activity: Not on file       SURGICAL HISTORY   has a past surgical history that includes recovery (7/7/2015); hysterectomy laparoscopy; and recovery (7/19/2016).    CURRENT MEDICATIONS  Home Medications    **Home medications have not yet been reviewed for this encounter**         ALLERGIES  Allergies   Allergen Reactions   • Allopurinol      \"everything went haywire!\"       PHYSICAL EXAM  VITAL SIGNS: BP (!) 165/78   Pulse 100   Resp 16   Ht 1.753 m (5' 9\")   Wt 108 kg (238 lb)   SpO2 93%   BMI " "35.15 kg/m²   Pulse ox interpretation: I interpret this pulse ox as normal.  Constitutional: Alert in no apparent distress.  HENT: No signs of trauma, Bilateral external ears normal, Nose normal.    Eyes: Pupils are equal and reactive, Conjunctiva normal, Non-icteric.    Cardiovascular:Irregularly irregular rate and rhythm  Thorax & Lungs: No respiratory distress  Skin: Warm, Dry, No erythema, No rash.       DIAGNOSTIC STUDIES / PROCEDURES    COURSE & MEDICAL DECISION MAKING  Pertinent Labs & Imaging studies reviewed. (See chart for details)  72 y.o. female presenting with epistaxis. She is on chronic anticoagulation for A. fib. No recent trauma though did have epistaxis a few days ago which she was able to control herself. The patient did have a nasal clamp off prior to my evaluation. When I A arrival at the patient's bedside, she had control of the bleeding. Under direct visualization of the nares, had dried blood in the left.  No obvious source of bleeding that would be amenable to cautery.     Discussed nasal packing with this patient. Given that she has had resolution of her epistaxis, I do not believe it is indicated at this time however it is always an option in case that there is difficulty obtaining hemostasis. The patient was instructed regarding nasal saline sprays to help keep her mucosal surfaces moist and to follow-up with ENT as needed. To return for further difficulty with epistaxis. At that time, will consider nasal packing. The patient reports understanding and is agreeable with the discharge plan.    The patient will return for worsening symptoms or failure of improvement and is stable at the time of discharge. The patient verbalizes understanding in their own words.    BP (!) 165/78   Pulse 85   Temp 37.2 °C (99 °F)   Resp 16   Ht 1.753 m (5' 9\")   Wt 108 kg (238 lb)   SpO2 91%   BMI 35.15 kg/m²     The patient was referred to primary care where they will receive further BP management.  "     Karthikeyan Mack M.D.  [address] [facility] [address]  [phone]          [facility] Emergency Dept  [address] [address] [phone]    As needed, If symptoms worsen    John Kim M.D.  [address] [facility] [address]  [phone]    Schedule an appointment as soon as possible for a visit        FINAL IMPRESSION  1. Epistaxis    2. Chronic anticoagulation            Electronically signed by: Danish Thrasher, 10/29/2017 10:36 PM

## 2017-10-30 NOTE — ED NOTES
Pt discharged home. Assessment complete. Epistaxis has stopped. Pt ambulates self, with steady gait. Pt verbalized discharge instructions.

## 2017-10-30 NOTE — ED NOTES
Chief Complaint   Patient presents with   • Epistaxis     BIB EMS with above cc. Epistaxis has occured for approx last hour. Nose clamp placed by EMS. VSS. -Dizziness. +blood thinners

## 2017-11-13 ENCOUNTER — RESOLUTE PROFESSIONAL BILLING HOSPITAL PROF FEE (OUTPATIENT)
Dept: HOSPITALIST | Facility: MEDICAL CENTER | Age: 72
End: 2017-11-13
Payer: MEDICARE

## 2017-11-13 ENCOUNTER — HOSPITAL ENCOUNTER (OUTPATIENT)
Facility: MEDICAL CENTER | Age: 72
End: 2017-11-15
Attending: EMERGENCY MEDICINE | Admitting: HOSPITALIST
Payer: MEDICARE

## 2017-11-13 DIAGNOSIS — R04.0 EPISTAXIS: ICD-10-CM

## 2017-11-13 LAB
ALBUMIN SERPL BCP-MCNC: 4.5 G/DL (ref 3.2–4.9)
ALBUMIN/GLOB SERPL: 1.5 G/DL
ALP SERPL-CCNC: 74 U/L (ref 30–99)
ALT SERPL-CCNC: 18 U/L (ref 2–50)
ANION GAP SERPL CALC-SCNC: 12 MMOL/L (ref 0–11.9)
APTT PPP: 30.2 SEC (ref 24.7–36)
AST SERPL-CCNC: 26 U/L (ref 12–45)
BASOPHILS # BLD AUTO: 0.5 % (ref 0–1.8)
BASOPHILS # BLD: 0.07 K/UL (ref 0–0.12)
BILIRUB SERPL-MCNC: 0.9 MG/DL (ref 0.1–1.5)
BUN SERPL-MCNC: 22 MG/DL (ref 8–22)
CALCIUM SERPL-MCNC: 10.4 MG/DL (ref 8.5–10.5)
CHLORIDE SERPL-SCNC: 104 MMOL/L (ref 96–112)
CO2 SERPL-SCNC: 21 MMOL/L (ref 20–33)
CREAT SERPL-MCNC: 0.78 MG/DL (ref 0.5–1.4)
EOSINOPHIL # BLD AUTO: 0.1 K/UL (ref 0–0.51)
EOSINOPHIL NFR BLD: 0.8 % (ref 0–6.9)
ERYTHROCYTE [DISTWIDTH] IN BLOOD BY AUTOMATED COUNT: 51 FL (ref 35.9–50)
GFR SERPL CREATININE-BSD FRML MDRD: >60 ML/MIN/1.73 M 2
GLOBULIN SER CALC-MCNC: 3.1 G/DL (ref 1.9–3.5)
GLUCOSE SERPL-MCNC: 176 MG/DL (ref 65–99)
HCT VFR BLD AUTO: 48.6 % (ref 37–47)
HGB BLD-MCNC: 14.3 G/DL (ref 12–16)
HGB BLD-MCNC: 14.9 G/DL (ref 12–16)
HGB BLD-MCNC: 15.8 G/DL (ref 12–16)
IMM GRANULOCYTES # BLD AUTO: 0.1 K/UL (ref 0–0.11)
IMM GRANULOCYTES NFR BLD AUTO: 0.8 % (ref 0–0.9)
INR PPP: 1.02 (ref 0.87–1.13)
LYMPHOCYTES # BLD AUTO: 1.31 K/UL (ref 1–4.8)
LYMPHOCYTES NFR BLD: 10.1 % (ref 22–41)
MCH RBC QN AUTO: 32.8 PG (ref 27–33)
MCHC RBC AUTO-ENTMCNC: 32.5 G/DL (ref 33.6–35)
MCV RBC AUTO: 100.8 FL (ref 81.4–97.8)
MONOCYTES # BLD AUTO: 0.88 K/UL (ref 0–0.85)
MONOCYTES NFR BLD AUTO: 6.8 % (ref 0–13.4)
NEUTROPHILS # BLD AUTO: 10.48 K/UL (ref 2–7.15)
NEUTROPHILS NFR BLD: 81 % (ref 44–72)
NRBC # BLD AUTO: 0 K/UL
NRBC BLD AUTO-RTO: 0 /100 WBC
PLATELET # BLD AUTO: 286 K/UL (ref 164–446)
PMV BLD AUTO: 10.9 FL (ref 9–12.9)
POTASSIUM SERPL-SCNC: 3.7 MMOL/L (ref 3.6–5.5)
PROT SERPL-MCNC: 7.6 G/DL (ref 6–8.2)
PROTHROMBIN TIME: 13.1 SEC (ref 12–14.6)
RBC # BLD AUTO: 4.82 M/UL (ref 4.2–5.4)
SODIUM SERPL-SCNC: 137 MMOL/L (ref 135–145)
WBC # BLD AUTO: 12.9 K/UL (ref 4.8–10.8)

## 2017-11-13 PROCEDURE — 700102 HCHG RX REV CODE 250 W/ 637 OVERRIDE(OP): Performed by: INTERNAL MEDICINE

## 2017-11-13 PROCEDURE — 303620 HCHG EPISTAXIS CONTROL

## 2017-11-13 PROCEDURE — 96376 TX/PRO/DX INJ SAME DRUG ADON: CPT

## 2017-11-13 PROCEDURE — 85018 HEMOGLOBIN: CPT | Mod: 91

## 2017-11-13 PROCEDURE — 700105 HCHG RX REV CODE 258: Performed by: HOSPITALIST

## 2017-11-13 PROCEDURE — 700102 HCHG RX REV CODE 250 W/ 637 OVERRIDE(OP): Performed by: HOSPITALIST

## 2017-11-13 PROCEDURE — 96375 TX/PRO/DX INJ NEW DRUG ADDON: CPT

## 2017-11-13 PROCEDURE — 700101 HCHG RX REV CODE 250: Performed by: FAMILY MEDICINE

## 2017-11-13 PROCEDURE — 80053 COMPREHEN METABOLIC PANEL: CPT

## 2017-11-13 PROCEDURE — 85730 THROMBOPLASTIN TIME PARTIAL: CPT

## 2017-11-13 PROCEDURE — A9270 NON-COVERED ITEM OR SERVICE: HCPCS | Performed by: INTERNAL MEDICINE

## 2017-11-13 PROCEDURE — A9270 NON-COVERED ITEM OR SERVICE: HCPCS | Performed by: EMERGENCY MEDICINE

## 2017-11-13 PROCEDURE — G0378 HOSPITAL OBSERVATION PER HR: HCPCS

## 2017-11-13 PROCEDURE — 96374 THER/PROPH/DIAG INJ IV PUSH: CPT

## 2017-11-13 PROCEDURE — 36415 COLL VENOUS BLD VENIPUNCTURE: CPT

## 2017-11-13 PROCEDURE — 700102 HCHG RX REV CODE 250 W/ 637 OVERRIDE(OP): Performed by: EMERGENCY MEDICINE

## 2017-11-13 PROCEDURE — 700111 HCHG RX REV CODE 636 W/ 250 OVERRIDE (IP): Performed by: EMERGENCY MEDICINE

## 2017-11-13 PROCEDURE — 85025 COMPLETE CBC W/AUTO DIFF WBC: CPT

## 2017-11-13 PROCEDURE — 99285 EMERGENCY DEPT VISIT HI MDM: CPT

## 2017-11-13 PROCEDURE — A9270 NON-COVERED ITEM OR SERVICE: HCPCS | Performed by: HOSPITALIST

## 2017-11-13 PROCEDURE — 85610 PROTHROMBIN TIME: CPT

## 2017-11-13 PROCEDURE — 99220 PR INITIAL OBSERVATION CARE,LEVL III: CPT | Performed by: HOSPITALIST

## 2017-11-13 RX ORDER — SIMVASTATIN 40 MG
80 TABLET ORAL NIGHTLY
Status: DISCONTINUED | OUTPATIENT
Start: 2017-11-13 | End: 2017-11-15 | Stop reason: HOSPADM

## 2017-11-13 RX ORDER — METOPROLOL TARTRATE 50 MG/1
50 TABLET, FILM COATED ORAL 2 TIMES DAILY
Status: DISCONTINUED | OUTPATIENT
Start: 2017-11-13 | End: 2017-11-14

## 2017-11-13 RX ORDER — OXYCODONE HYDROCHLORIDE 5 MG/1
5 TABLET ORAL EVERY 6 HOURS PRN
Status: DISCONTINUED | OUTPATIENT
Start: 2017-11-13 | End: 2017-11-15 | Stop reason: HOSPADM

## 2017-11-13 RX ORDER — MORPHINE SULFATE 4 MG/ML
4 INJECTION, SOLUTION INTRAMUSCULAR; INTRAVENOUS ONCE
Status: COMPLETED | OUTPATIENT
Start: 2017-11-13 | End: 2017-11-13

## 2017-11-13 RX ORDER — AMOXICILLIN 250 MG
2 CAPSULE ORAL 2 TIMES DAILY
Status: DISCONTINUED | OUTPATIENT
Start: 2017-11-13 | End: 2017-11-15 | Stop reason: HOSPADM

## 2017-11-13 RX ORDER — BISACODYL 10 MG
10 SUPPOSITORY, RECTAL RECTAL
Status: DISCONTINUED | OUTPATIENT
Start: 2017-11-13 | End: 2017-11-15 | Stop reason: HOSPADM

## 2017-11-13 RX ORDER — LOSARTAN POTASSIUM 50 MG/1
100 TABLET ORAL DAILY
Status: DISCONTINUED | OUTPATIENT
Start: 2017-11-13 | End: 2017-11-15 | Stop reason: HOSPADM

## 2017-11-13 RX ORDER — METOPROLOL TARTRATE 1 MG/ML
5 INJECTION, SOLUTION INTRAVENOUS
Status: COMPLETED | OUTPATIENT
Start: 2017-11-13 | End: 2017-11-13

## 2017-11-13 RX ORDER — ACETAMINOPHEN 325 MG/1
650 TABLET ORAL EVERY 4 HOURS PRN
Status: DISCONTINUED | OUTPATIENT
Start: 2017-11-13 | End: 2017-11-15 | Stop reason: HOSPADM

## 2017-11-13 RX ORDER — ACETAMINOPHEN 325 MG/1
650 TABLET ORAL ONCE
Status: COMPLETED | OUTPATIENT
Start: 2017-11-13 | End: 2017-11-13

## 2017-11-13 RX ORDER — ONDANSETRON 2 MG/ML
4 INJECTION INTRAMUSCULAR; INTRAVENOUS ONCE
Status: COMPLETED | OUTPATIENT
Start: 2017-11-13 | End: 2017-11-13

## 2017-11-13 RX ORDER — POLYETHYLENE GLYCOL 3350 17 G/17G
1 POWDER, FOR SOLUTION ORAL
Status: DISCONTINUED | OUTPATIENT
Start: 2017-11-13 | End: 2017-11-15 | Stop reason: HOSPADM

## 2017-11-13 RX ORDER — SODIUM CHLORIDE 9 MG/ML
2000 INJECTION, SOLUTION INTRAVENOUS ONCE
Status: COMPLETED | OUTPATIENT
Start: 2017-11-13 | End: 2017-11-13

## 2017-11-13 RX ORDER — CEPHALEXIN 500 MG/1
500 CAPSULE ORAL ONCE
Status: COMPLETED | OUTPATIENT
Start: 2017-11-13 | End: 2017-11-13

## 2017-11-13 RX ORDER — CEPHALEXIN 500 MG/1
500 CAPSULE ORAL 4 TIMES DAILY
Qty: 28 CAP | Refills: 0 | Status: SHIPPED | OUTPATIENT
Start: 2017-11-13 | End: 2018-06-19

## 2017-11-13 RX ADMIN — SIMVASTATIN 80 MG: 40 TABLET, FILM COATED ORAL at 19:46

## 2017-11-13 RX ADMIN — ACETAMINOPHEN 650 MG: 325 TABLET, FILM COATED ORAL at 02:21

## 2017-11-13 RX ADMIN — MORPHINE SULFATE 4 MG: 4 INJECTION INTRAVENOUS at 05:11

## 2017-11-13 RX ADMIN — OXYCODONE HYDROCHLORIDE 5 MG: 5 TABLET ORAL at 11:46

## 2017-11-13 RX ADMIN — METOPROLOL TARTRATE 50 MG: 50 TABLET, FILM COATED ORAL at 06:52

## 2017-11-13 RX ADMIN — OXYCODONE HYDROCHLORIDE 5 MG: 5 TABLET ORAL at 17:44

## 2017-11-13 RX ADMIN — ACETAMINOPHEN 650 MG: 325 TABLET, FILM COATED ORAL at 16:24

## 2017-11-13 RX ADMIN — CEPHALEXIN 500 MG: 500 CAPSULE ORAL at 02:48

## 2017-11-13 RX ADMIN — METOPROLOL TARTRATE 5 MG: 5 INJECTION INTRAVENOUS at 07:28

## 2017-11-13 RX ADMIN — LOSARTAN POTASSIUM 100 MG: 50 TABLET, FILM COATED ORAL at 06:52

## 2017-11-13 RX ADMIN — ONDANSETRON 4 MG: 2 INJECTION INTRAMUSCULAR; INTRAVENOUS at 05:10

## 2017-11-13 RX ADMIN — SODIUM CHLORIDE 2000 ML: 9 INJECTION, SOLUTION INTRAVENOUS at 06:33

## 2017-11-13 RX ADMIN — METOPROLOL TARTRATE 50 MG: 50 TABLET, FILM COATED ORAL at 19:47

## 2017-11-13 RX ADMIN — METOPROLOL TARTRATE 5 MG: 5 INJECTION INTRAVENOUS at 08:00

## 2017-11-13 RX ADMIN — METOPROLOL TARTRATE 5 MG: 5 INJECTION INTRAVENOUS at 06:59

## 2017-11-13 ASSESSMENT — ENCOUNTER SYMPTOMS
COUGH: 0
DIARRHEA: 0
WHEEZING: 0
SHORTNESS OF BREATH: 0
DIZZINESS: 0
TINGLING: 0
PALPITATIONS: 0
NAUSEA: 0
ABDOMINAL PAIN: 0
VOMITING: 0
FEVER: 0
MYALGIAS: 0
PHOTOPHOBIA: 0
SORE THROAT: 0
DEPRESSION: 0
HEADACHES: 0
CHILLS: 0
FOCAL WEAKNESS: 0

## 2017-11-13 ASSESSMENT — PAIN SCALES - GENERAL
PAINLEVEL_OUTOF10: 3
PAINLEVEL_OUTOF10: 7
PAINLEVEL_OUTOF10: 9
PAINLEVEL_OUTOF10: 7
PAINLEVEL_OUTOF10: 0
PAINLEVEL_OUTOF10: 0

## 2017-11-13 ASSESSMENT — LIFESTYLE VARIABLES
TOTAL SCORE: 0
ALCOHOL_USE: YES
EVER FELT BAD OR GUILTY ABOUT YOUR DRINKING: NO
HAVE YOU EVER FELT YOU SHOULD CUT DOWN ON YOUR DRINKING: NO
ON A TYPICAL DAY WHEN YOU DRINK ALCOHOL HOW MANY DRINKS DO YOU HAVE: 2
TOTAL SCORE: 0
HAVE PEOPLE ANNOYED YOU BY CRITICIZING YOUR DRINKING: NO
EVER_SMOKED: YES
TOTAL SCORE: 0
AVERAGE NUMBER OF DAYS PER WEEK YOU HAVE A DRINK CONTAINING ALCOHOL: 3
CONSUMPTION TOTAL: NEGATIVE
HOW MANY TIMES IN THE PAST YEAR HAVE YOU HAD 5 OR MORE DRINKS IN A DAY: 0
EVER HAD A DRINK FIRST THING IN THE MORNING TO STEADY YOUR NERVES TO GET RID OF A HANGOVER: NO

## 2017-11-13 ASSESSMENT — PATIENT HEALTH QUESTIONNAIRE - PHQ9
SUM OF ALL RESPONSES TO PHQ QUESTIONS 1-9: 0
2. FEELING DOWN, DEPRESSED, IRRITABLE, OR HOPELESS: NOT AT ALL
1. LITTLE INTEREST OR PLEASURE IN DOING THINGS: NOT AT ALL
SUM OF ALL RESPONSES TO PHQ9 QUESTIONS 1 AND 2: 0

## 2017-11-13 NOTE — PROGRESS NOTES
Pt resting in bed at this time.  Ambulated pt to the bathroom.  Report received at this time.  Pt requests a cup and cold washcloth.  Assessment is ongoing.

## 2017-11-13 NOTE — PROGRESS NOTES
2 rn skin note. Patient skin is intact. She has scabs to bilateral lower feet. Patient also has calloused areas to bilateral. No redness to buttock clonidine patch to left buttock that patient would not let me remove. No redness to abdominal fold

## 2017-11-13 NOTE — ED NOTES
Patient to ED via EMS from home for nose bleed that started at 2215 this PM. Hx eliquis use. Left nostril is bleeding heavily. Gauze changed multiple times since arrival.

## 2017-11-13 NOTE — CARE PLAN
Problem: Safety  Goal: Will remain free from injury  Outcome: PROGRESSING AS EXPECTED  Pt bed in low locked position.  Alarm armed at this time.  Pt has been educated to call staff before getting out of bed.    Problem: Knowledge Deficit  Goal: Knowledge of disease process/condition, treatment plan, diagnostic tests, and medications will improve  Outcome: PROGRESSING AS EXPECTED  Managing pt pain from balloon in nose.  Pain med's given.  Educated pt on the importance of leaving balloon in place.

## 2017-11-13 NOTE — H&P
" Hospital Medicine History and Physical    Date of Service  11/13/2017    Chief Complaint  Chief Complaint   Patient presents with   • Nose Bleed       History of Presenting Illness  72 y.o. female who presented on 11/13/2017 with Nosebleed. The patient carries a history of paroxysmal atrial fibrillation and has been on eliquis for long-term anticoagulation and stroke risk reduction. She reports that about a week ago, she had a new nosebleed and was seen in the emergency room. At that time, the bleeding was controlled and she was able to be discharged home in stable condition. Since then, she has had at least 2 more bleeds which she controlled on her own at home. She continues to take eliquis.  She was scheduled to see Dr. Kim of ENT on Tuesday morning but she had resumption of her nosebleed. She awoke at around 10:00 overnight with what she called \"a gusher\". Unfortunately she was unable to establish hemostasis on her own therefore she brought herself to the hospital for further evaluation. In the emergency room, the patient was noted to be tachycardic. This has since improved since her left nares was packed and the bleeding has nearly stopped. Otherwise prior to this the patient reports that she was in her usual state of health, no fevers chills, nausea vomiting, no headaches no chest pain no shortness of breath.    Primary Care Physician  Kristofer Cortes M.D.    Consultants  None    Code Status  Full    Review of Systems  Review of Systems   Constitutional: Negative for chills and fever.   HENT: Positive for nosebleeds. Negative for congestion and sore throat.    Eyes: Negative for photophobia.   Respiratory: Negative for cough, shortness of breath and wheezing.    Cardiovascular: Negative for chest pain and palpitations.   Gastrointestinal: Negative for abdominal pain, diarrhea, nausea and vomiting.   Genitourinary: Negative for dysuria.   Musculoskeletal: Negative for myalgias.   Skin: Negative.  "   Neurological: Negative for dizziness, tingling, focal weakness and headaches.   Psychiatric/Behavioral: Negative for depression and suicidal ideas.        Past Medical History  Past Medical History:   Diagnosis Date   • Atrial fibrillation (CMS-HCC)    • Dental disorder     upper plate, lower partial   • Edema    • Gout    • Hyperlipidemia    • Hypertension    • Obesity    • PAF (paroxysmal atrial fibrillation) (CMS-HCC)    • Paroxysmal atrial fibrillation (CMS-HCC)    • Psoriasis    • Sleep apnea     CPAP       Surgical History  Past Surgical History:   Procedure Laterality Date   • RECOVERY  7/19/2016    Procedure: CATH LAB CARDIOVERSION WITH ANESTHESIA DR. PAYAN;  Surgeon: Recoveryonly Surgery;  Location: SURGERY PRE-POST PROC UNIT [facility];  Service:    • RECOVERY  7/7/2015    Procedure: CATH LAB-[facility]-ELECTIVE CARDIOVERSION 39032-[facility] GROUP FOR ANESTHESIA-AFIB 427.31;  Surgeon: Recoveryonly Surgery;  Location: SURGERY PRE-POST PROC UNIT [facility];  Service:    • HYSTERECTOMY LAPAROSCOPY         Medications  No current facility-administered medications on file prior to encounter.      Current Outpatient Prescriptions on File Prior to Encounter   Medication Sig Dispense Refill   • apixaban (ELIQUIS) 5mg Tab Take 1 Tab by mouth 2 Times a Day. 180 Tab 3   • losartan (COZAAR) 100 MG Tab Take 1 Tab by mouth every day. 90 Tab 3   • clobetasol (TEMOVATE) 0.05 % Cream Apply  to affected area(s) 2 times a day.     • metoprolol (LOPRESSOR) 100 MG Tab Take 50 mg by mouth 2 times a day.     • simvastatin (ZOCOR) 80 MG tablet Take 80 mg by mouth every evening.     • Bilberry 60 MG CAPS Take  by mouth as needed.     • clonidine (CATAPRES) 0.3 MG/24HR PTWK Apply 1 Patch to skin as directed every 7 days.         Family History  Family History   Problem Relation Age of Onset   • Heart Attack Mother 83   • Cancer Father      lung       Social History  Social History   Substance Use Topics   • Smoking status: Former Smoker      "Packs/day: 1.50     Years: 45.00     Types: Cigarettes     Quit date: 2013   • Smokeless tobacco: Never Used   • Alcohol use 4.2 - 8.4 oz/week     7 - 14 Shots of liquor per week       Allergies  Allergies   Allergen Reactions   • Allopurinol      \"everything went haywire!\"        Physical Exam  Laboratory   Hemodynamics  Temp (24hrs), Av.6 °C (97.9 °F), Min:36.6 °C (97.9 °F), Max:36.6 °C (97.9 °F)   Temperature: 36.6 °C (97.9 °F)  Pulse  Av  Min: 64  Max: 98    NIBP: (!) 165/97      Respiratory      Respiration: 16, Pulse Oximetry: 96 %             Physical Exam   Constitutional: She is oriented to person, place, and time. No distress.   Obese   HENT:   Head: Normocephalic and atraumatic.   Right Ear: External ear normal.   Left Ear: External ear normal.   Nasal packing in the left nares   Eyes: EOM are normal. Right eye exhibits no discharge. Left eye exhibits no discharge.   Neck: Neck supple. No JVD present.   Cardiovascular: Normal rate, regular rhythm and normal heart sounds.    Pulmonary/Chest: Effort normal and breath sounds normal. No respiratory distress. She exhibits no tenderness.   Abdominal: Soft. Bowel sounds are normal. She exhibits no distension. There is no tenderness.   Musculoskeletal: Normal range of motion. She exhibits no edema.   Neurological: She is alert and oriented to person, place, and time. No cranial nerve deficit.   Skin: Skin is warm and dry. She is not diaphoretic. No erythema.   Psychiatric: She has a normal mood and affect. Her behavior is normal.   Nursing note and vitals reviewed.      Recent Labs      17   WBC  12.9*   RBC  4.82   HEMOGLOBIN  15.8   HEMATOCRIT  48.6*   MCV  100.8*   MCH  32.8   MCHC  32.5*   RDW  51.0*   PLATELETCT  286   MPV  10.9     Recent Labs      17   SODIUM  137   POTASSIUM  3.7   CHLORIDE  104   CO2  21   GLUCOSE  176*   BUN  22   CREATININE  0.78   CALCIUM  10.4     Recent Labs      17   ALTSGPT  18 "   ASTSGOT  26   ALKPHOSPHAT  74   TBILIRUBIN  0.9   GLUCOSE  176*     Recent Labs      17   0150   APTT  30.2   INR  1.02             Lab Results   Component Value Date    TROPONINI <0.01 2016       Imaging  Echocardiogram Comp W/o Cont    Result Date: 10/23/2017  Transthoracic Echo Report Echocardiography Laboratory CONCLUSIONS Hypertension Left ventricular ejection fraction is visually estimated to be 70%. Mild mitral regurgitation. Estimated right ventricular systolic pressure is 60 mmHg. No prior study is available for comparison. MAKENNA JOHNS Exam Date:         10/20/2017                    10:04 Exam Location:     Out Patient Priority:          Routine Ordering Physician:        MARJORIE REYES Referring Physician:       AMY Culp Sonographer:               Azam Umaña RDCS Age:    72     Gender:    F MRN:    [id number] :    1945 BSA:    2.24   Ht (in):    69     Wt (lb):    241 Exam Type:     Complete Indications:     Atrial fibrillation ICD Codes:       427.31 CPT Codes:       95288 BP:   158    /   120    HR: Technical Quality:       Good MEASUREMENTS  (Male / Female) Normal Values 2D ECHO LV Diastolic Diameter PLAX        4.3 cm                4.2 - 5.9 / 3.9 - 5.3 cm LV Systolic Diameter PLAX         3 cm                  2.1 - 4.0 cm IVS Diastolic Thickness           1.6 cm                LVPW Diastolic Thickness          1.8 cm                LVOT Diameter                     2 cm                  RA Diameter                       4.6 cm                Estimated LV Ejection Fraction    70 %                  LV Ejection Fraction MOD BP       70.4 %                >= 55  % LV Ejection Fraction MOD 4C       70.7 %                LV Ejection Fraction MOD 2C       69 %                  LA Volume Index                   40.6 cm³/m²           16 - 28 cm³/m² IVC Diameter                      2.3 cm                M-MODE Aortic Root Diameter MM           3.5 cm                 DOPPLER AV Peak Velocity                  1.1 m/s               AV Peak Gradient                  4.5 mmHg              AV Mean Gradient                  2.4 mmHg              LVOT Peak Velocity                0.56 m/s              AV Area Cont Eq vti               1.7 cm²               TV Peak E Velocity                0.62 m/s              TR Peak Velocity                  312 cm/s              PV Peak Velocity                  1.8 m/s               PV Peak Gradient                  12.9 mmHg             RVOT Peak Velocity                0.45 m/s              * Indicates values subject to auto-interpretation LV EF:  70    % FINDINGS Left Ventricle Normal left ventricular chamber size. Moderate left ventricular hypertrophy. Normal left ventricular systolic function. Left ventricular ejection fraction is visually estimated to be 70%.  Normal regional wall motion. Diastolic function is difficult to assess with atrial fibrillation. Right Ventricle Right ventricle not well visualized. Right Atrium Enlarged right atrium. Dilated inferior vena cava without inspiratory collapse. Left Atrium Mildly dilated left atrium. Left atrial volume index is 40.6   mL/sq m. Mitral Valve Mitral annular calcification. Mild mitral regurgitation. Aortic Valve Aortic sclerosis without stenosis. No aortic insufficiency. Tricuspid Valve Mild tricuspid regurgitation. Estimated right ventricular systolic pressure is 60 mmHg. Pulmonic Valve The pulmonic valve is not well visualized. Pericardium No effusion. Aorta Aortic root not well visualized. Anaya Colunga M.D. (Electronically Signed) Final Date:     23 October 2017                 09:00     Assessment/Plan     Anticipate that patient will needLess than 2 midnights for management of the discussed medical issues.    This dictation was created using voice recognition software. The accuracy of the dictation is limited to the abilities of the software. I expect there may be some errors of  grammar and possibly content.    * Epistaxis- (present on admission)   Assessment & Plan    Patient is on long-term anticoagulation for history of atrial fibrillation. She has had multiple episodes of epistaxis in the last several weeks. A anterior nasal packing has been aced by the ER physician and bleeding has primarily stopped although she still has occasional tripping. She will be monitored closely with serial hemoglobin levels to ensure that she does not require any transfusion. Should her hemoglobin dropped below 7, then we will plan to transfuse packed red blood cells. I will place her on IV fluids for volume expansion. We will plan to consult ENT in the morning to see this patient is a candidate for cauterization in light of her frequent nosebleeds in the past several weeks or if she can be followed up on an outpatient basis.        Paroxysmal atrial fibrillation (CMS-HCC)- (present on admission)   Assessment & Plan    Review of the monitors at bedside showed that the patient is in sinus rhythm although her rates are variable from 60s to 110s. She is not sustaining heart rates greater than 130. I will continue her home metoprolol but I'm holding her Raghav was for now. We will resume once her bleeding has definitively stopped        Hyperlipidemia- (present on admission)   Assessment & Plan    This is chronic and stable, continue home Zocor.        HTN (hypertension)- (present on admission)   Assessment & Plan    This is chronic and stable, continue home losartan, clonidine, and metoprolol.          Prophylaxis: Sequential compression devices for DVT prophylaxis, no PPI indicated, stool softeners ordered

## 2017-11-13 NOTE — PROGRESS NOTES
Pt seen and examined admitted early this Am by Dr. Lechuga. For epistaxis, holding her eliquis. Also she was supposed to see Dr. kim for her frequent epistaxis episode. Consulted ENT,discussed with Dr. Kim who will , Dr. Gamboa who is  oncall know to see pt while inpatient appreciate rec.

## 2017-11-13 NOTE — ED PROVIDER NOTES
ED Provider Note    ED Provider Note      Primary care provider: Kristofer Cortes M.D.    CHIEF COMPLAINT  Chief Complaint   Patient presents with   • Nose Bleed       HPI  Yvette Dailey is a 72 y.o. female who presents to the Emergency Department chief complaint of epistaxis. Patient was at this facility 2 weeks ago for the same complaint. She does take a look was for atrial fibrillation. This evening she had acute onset of profuse bleeding from the left nares and also some oozing from the right nares. She states that since the last visit here she's had intermittent nosebleeds however they seem to resolve fairly quickly tonight's is continued on for several hours and she concerned with the amount of blood loss. She reports minor headache and states some minor feeling of weakness. She states no hemoptysis no hematemesis no dysuria hematuria no melena no hematochezia no other abnormal bleeding. She is scheduled to see otolaryngologist tomorrow morning.    REVIEW OF SYSTEMS  10 systems reviewed and otherwise negative, pertinent positives and negatives listed in the history of present illness.    PAST MEDICAL HISTORY   has a past medical history of Atrial fibrillation (CMS-HCC); Dental disorder; Edema; Gout; Hyperlipidemia; Hypertension; Obesity; PAF (paroxysmal atrial fibrillation) (CMS-HCC); Paroxysmal atrial fibrillation (CMS-HCC); Psoriasis; and Sleep apnea.    SURGICAL HISTORY   has a past surgical history that includes recovery (7/7/2015); hysterectomy laparoscopy; and recovery (7/19/2016).    SOCIAL HISTORY  Social History   Substance Use Topics   • Smoking status: Former Smoker     Packs/day: 1.50     Years: 45.00     Types: Cigarettes     Quit date: 7/1/2013   • Smokeless tobacco: Never Used   • Alcohol use 4.2 - 8.4 oz/week     7 - 14 Shots of liquor per week      History   Drug Use No       FAMILY HISTORY  Non-Contributory    CURRENT MEDICATIONS  Home Medications     Reviewed by Kelin Yuen  "GEETA (Registered Nurse) on 11/13/17 at 0018  Med List Status: Complete   Medication Last Dose Status   apixaban (ELIQUIS) 5mg Tab  Active   Bilberry 60 MG CAPS prn for gout Active   clobetasol (TEMOVATE) 0.05 % Cream  Active   clonidine (CATAPRES) 0.3 MG/24HR PTWK  Active   losartan (COZAAR) 100 MG Tab  Active   metoprolol (LOPRESSOR) 100 MG Tab  Active   simvastatin (ZOCOR) 80 MG tablet  Active                ALLERGIES  Allergies   Allergen Reactions   • Allopurinol      \"everything went haywire!\"       PHYSICAL EXAM  VITAL SIGNS: Pulse 98   Temp 36.6 °C (97.9 °F)   Resp 16   Ht 1.753 m (5' 9\")   Wt 108 kg (238 lb)   BMI 35.15 kg/m²   Pulse ox interpretation: I interpret this pulse ox as normal.  Constitutional: Alert and oriented x 3,Moderate Distress  HEENT: Atraumatic normocephalic, pupils are equal round reactive to light extraocular movements are intact. Shows temporary packing placed by EMS patient's bleeding around this packing. Last night Strohl greater than right.  Packing removed revealing brisk bleeding from the left nares and using from the right nares no identifiable vessel, external ears are normal, mouth shows moist mucous membranes  Neck: Supple, no JVD no tracheal deviation  Cardiovascular: Tachycardic no murmur rub or gallop 2+ pulses peripherally x4  Thorax & Lungs: No respiratory distress, no wheezes rales or rhonchi, No chest tenderness.   GI: Soft nontender nondistended positive bowel sounds, no peritoneal signs  Skin: Warm dry no acute rash or lesion  Musculoskeletal: Moving all extremities with full range and 5 of 5 strength, no acute  deformity  Neurologic: Cranial nerves III through XII are grossly intact, no sensory deficit, no cerebellar dysfunction   Psychiatric: Appropriate affect for situation at this time      DIAGNOSTIC STUDIES / PROCEDURES  LABS  Results for orders placed or performed during the hospital encounter of 11/13/17   CBC WITH DIFFERENTIAL   Result Value Ref Range    " WBC 12.9 (H) 4.8 - 10.8 K/uL    RBC 4.82 4.20 - 5.40 M/uL    Hemoglobin 15.8 12.0 - 16.0 g/dL    Hematocrit 48.6 (H) 37.0 - 47.0 %    .8 (H) 81.4 - 97.8 fL    MCH 32.8 27.0 - 33.0 pg    MCHC 32.5 (L) 33.6 - 35.0 g/dL    RDW 51.0 (H) 35.9 - 50.0 fL    Platelet Count 286 164 - 446 K/uL    MPV 10.9 9.0 - 12.9 fL    Neutrophils-Polys 81.00 (H) 44.00 - 72.00 %    Lymphocytes 10.10 (L) 22.00 - 41.00 %    Monocytes 6.80 0.00 - 13.40 %    Eosinophils 0.80 0.00 - 6.90 %    Basophils 0.50 0.00 - 1.80 %    Immature Granulocytes 0.80 0.00 - 0.90 %    Nucleated RBC 0.00 /100 WBC    Neutrophils (Absolute) 10.48 (H) 2.00 - 7.15 K/uL    Lymphs (Absolute) 1.31 1.00 - 4.80 K/uL    Monos (Absolute) 0.88 (H) 0.00 - 0.85 K/uL    Eos (Absolute) 0.10 0.00 - 0.51 K/uL    Baso (Absolute) 0.07 0.00 - 0.12 K/uL    Immature Granulocytes (abs) 0.10 0.00 - 0.11 K/uL    NRBC (Absolute) 0.00 K/uL   COMP METABOLIC PANEL   Result Value Ref Range    Sodium 137 135 - 145 mmol/L    Potassium 3.7 3.6 - 5.5 mmol/L    Chloride 104 96 - 112 mmol/L    Co2 21 20 - 33 mmol/L    Anion Gap 12.0 (H) 0.0 - 11.9    Glucose 176 (H) 65 - 99 mg/dL    Bun 22 8 - 22 mg/dL    Creatinine 0.78 0.50 - 1.40 mg/dL    Calcium 10.4 8.5 - 10.5 mg/dL    AST(SGOT) 26 12 - 45 U/L    ALT(SGPT) 18 2 - 50 U/L    Alkaline Phosphatase 74 30 - 99 U/L    Total Bilirubin 0.9 0.1 - 1.5 mg/dL    Albumin 4.5 3.2 - 4.9 g/dL    Total Protein 7.6 6.0 - 8.2 g/dL    Globulin 3.1 1.9 - 3.5 g/dL    A-G Ratio 1.5 g/dL   APTT   Result Value Ref Range    APTT 30.2 24.7 - 36.0 sec   PROTHROMBIN TIME   Result Value Ref Range    PT 13.1 12.0 - 14.6 sec    INR 1.02 0.87 - 1.13   ESTIMATED GFR   Result Value Ref Range    GFR If African American >60 >60 mL/min/1.73 m 2    GFR If Non African American >60 >60 mL/min/1.73 m 2       All labs reviewed by me.        COURSE & MEDICAL DECISION MAKING  Pertinent Labs & Imaging studies reviewed. (See chart for details)    12:57 AM - Patient seen and examined  "at bedside.     Epistaxis Procedure Note    Indication: Bleeding    Pre-medication: afrin    Procedure: The patient was positioned appropriately and the nares were cleared as well as possible. The bleeding site was localized to the left nare unidentifiable region and tamponaded with an anterior rapid Rhino nasal pack .  Hemostasis was obtained.    The patient tolerated the procedure well.    Complications: None      Medical Decision Making: Patient presents with brisk nasal bleeding. Procedure performed as above and hemostasis was obtained however on repeat examinations patient's having minor oozing around the anterior left nasal packing. She's had recurrence of these nasal with several times a day and feels is appropriate to discharge at this time will be admitted for further evaluation and observation possible ENT consultation in the a.m. if oozing continues. Discussed with hospitalist  her help with this patient is greatly appreciated.  /92   Pulse 64   Temp 36.7 °C (98 °F)   Resp 18   Ht 1.753 m (5' 9.02\")   Wt 107.1 kg (236 lb 1.8 oz)   SpO2 96%   Breastfeeding? No   BMI 34.85 kg/m²       FINAL IMPRESSION  1. Epistaxis           This dictation has been created using voice recognition software and/or scribes. The accuracy of the dictation is limited by the abilities of the software and the expertise of the scribes. I expect there may be some errors of grammar and possibly content. I made every attempt to manually correct the errors within my dictation. However, errors related to voice recognition software and/or scribes may still exist and should be interpreted within the appropriate context.            "

## 2017-11-14 PROBLEM — D72.829 LEUKOCYTOSIS: Status: ACTIVE | Noted: 2017-11-14

## 2017-11-14 PROBLEM — N28.9 RENAL INSUFFICIENCY: Status: ACTIVE | Noted: 2017-11-14

## 2017-11-14 PROBLEM — E87.6 HYPOKALEMIA: Status: ACTIVE | Noted: 2017-11-14

## 2017-11-14 LAB
ANION GAP SERPL CALC-SCNC: 12 MMOL/L (ref 0–11.9)
BUN SERPL-MCNC: 32 MG/DL (ref 8–22)
CALCIUM SERPL-MCNC: 9.6 MG/DL (ref 8.5–10.5)
CHLORIDE SERPL-SCNC: 104 MMOL/L (ref 96–112)
CO2 SERPL-SCNC: 23 MMOL/L (ref 20–33)
CREAT SERPL-MCNC: 1.06 MG/DL (ref 0.5–1.4)
GFR SERPL CREATININE-BSD FRML MDRD: 51 ML/MIN/1.73 M 2
GLUCOSE SERPL-MCNC: 149 MG/DL (ref 65–99)
HGB BLD-MCNC: 13.9 G/DL (ref 12–16)
POTASSIUM SERPL-SCNC: 3.4 MMOL/L (ref 3.6–5.5)
SODIUM SERPL-SCNC: 139 MMOL/L (ref 135–145)

## 2017-11-14 PROCEDURE — A9270 NON-COVERED ITEM OR SERVICE: HCPCS | Performed by: FAMILY MEDICINE

## 2017-11-14 PROCEDURE — 700102 HCHG RX REV CODE 250 W/ 637 OVERRIDE(OP): Performed by: INTERNAL MEDICINE

## 2017-11-14 PROCEDURE — 96376 TX/PRO/DX INJ SAME DRUG ADON: CPT

## 2017-11-14 PROCEDURE — 99226 PR SUBSEQUENT OBSERVATION CARE,LEVEL III: CPT | Performed by: FAMILY MEDICINE

## 2017-11-14 PROCEDURE — 700102 HCHG RX REV CODE 250 W/ 637 OVERRIDE(OP): Performed by: FAMILY MEDICINE

## 2017-11-14 PROCEDURE — 700105 HCHG RX REV CODE 258: Performed by: FAMILY MEDICINE

## 2017-11-14 PROCEDURE — G0378 HOSPITAL OBSERVATION PER HR: HCPCS

## 2017-11-14 PROCEDURE — 36415 COLL VENOUS BLD VENIPUNCTURE: CPT

## 2017-11-14 PROCEDURE — A9270 NON-COVERED ITEM OR SERVICE: HCPCS | Performed by: INTERNAL MEDICINE

## 2017-11-14 PROCEDURE — 85018 HEMOGLOBIN: CPT

## 2017-11-14 PROCEDURE — 700101 HCHG RX REV CODE 250: Performed by: FAMILY MEDICINE

## 2017-11-14 PROCEDURE — 80048 BASIC METABOLIC PNL TOTAL CA: CPT

## 2017-11-14 PROCEDURE — 700102 HCHG RX REV CODE 250 W/ 637 OVERRIDE(OP): Performed by: HOSPITALIST

## 2017-11-14 PROCEDURE — A9270 NON-COVERED ITEM OR SERVICE: HCPCS | Performed by: HOSPITALIST

## 2017-11-14 RX ORDER — METOPROLOL TARTRATE 1 MG/ML
2.5 INJECTION, SOLUTION INTRAVENOUS ONCE
Status: COMPLETED | OUTPATIENT
Start: 2017-11-14 | End: 2017-11-14

## 2017-11-14 RX ORDER — CEPHALEXIN 500 MG/1
500 CAPSULE ORAL EVERY 6 HOURS
Status: DISCONTINUED | OUTPATIENT
Start: 2017-11-14 | End: 2017-11-15 | Stop reason: HOSPADM

## 2017-11-14 RX ORDER — SODIUM CHLORIDE 9 MG/ML
250 INJECTION, SOLUTION INTRAVENOUS ONCE
Status: DISCONTINUED | OUTPATIENT
Start: 2017-11-14 | End: 2017-11-14

## 2017-11-14 RX ORDER — METOPROLOL TARTRATE 50 MG/1
100 TABLET, FILM COATED ORAL 2 TIMES DAILY
Status: DISCONTINUED | OUTPATIENT
Start: 2017-11-14 | End: 2017-11-15 | Stop reason: HOSPADM

## 2017-11-14 RX ORDER — METOPROLOL TARTRATE 1 MG/ML
INJECTION, SOLUTION INTRAVENOUS
Status: ACTIVE
Start: 2017-11-14 | End: 2017-11-14

## 2017-11-14 RX ORDER — SODIUM CHLORIDE 9 MG/ML
INJECTION, SOLUTION INTRAVENOUS CONTINUOUS
Status: DISCONTINUED | OUTPATIENT
Start: 2017-11-14 | End: 2017-11-15

## 2017-11-14 RX ORDER — SODIUM CHLORIDE 9 MG/ML
INJECTION, SOLUTION INTRAVENOUS CONTINUOUS
Status: DISCONTINUED | OUTPATIENT
Start: 2017-11-14 | End: 2017-11-14

## 2017-11-14 RX ORDER — SODIUM CHLORIDE 9 MG/ML
INJECTION, SOLUTION INTRAVENOUS
Status: ACTIVE
Start: 2017-11-14 | End: 2017-11-14

## 2017-11-14 RX ADMIN — SODIUM CHLORIDE: 9 INJECTION, SOLUTION INTRAVENOUS at 04:14

## 2017-11-14 RX ADMIN — METOPROLOL TARTRATE 50 MG: 50 TABLET, FILM COATED ORAL at 10:25

## 2017-11-14 RX ADMIN — METOPROLOL TARTRATE 100 MG: 50 TABLET, FILM COATED ORAL at 19:25

## 2017-11-14 RX ADMIN — OXYCODONE HYDROCHLORIDE 5 MG: 5 TABLET ORAL at 00:35

## 2017-11-14 RX ADMIN — CEPHALEXIN 500 MG: 500 CAPSULE ORAL at 17:27

## 2017-11-14 RX ADMIN — SODIUM CHLORIDE: 9 INJECTION, SOLUTION INTRAVENOUS at 17:26

## 2017-11-14 RX ADMIN — SIMVASTATIN 80 MG: 40 TABLET, FILM COATED ORAL at 19:25

## 2017-11-14 RX ADMIN — OXYCODONE HYDROCHLORIDE 5 MG: 5 TABLET ORAL at 12:31

## 2017-11-14 RX ADMIN — METOPROLOL TARTRATE 2.5 MG: 5 INJECTION INTRAVENOUS at 04:14

## 2017-11-14 RX ADMIN — LOSARTAN POTASSIUM 100 MG: 50 TABLET, FILM COATED ORAL at 10:25

## 2017-11-14 RX ADMIN — OXYCODONE HYDROCHLORIDE 5 MG: 5 TABLET ORAL at 18:40

## 2017-11-14 RX ADMIN — OXYCODONE HYDROCHLORIDE 5 MG: 5 TABLET ORAL at 06:17

## 2017-11-14 RX ADMIN — STANDARDIZED SENNA CONCENTRATE AND DOCUSATE SODIUM 2 TABLET: 8.6; 5 TABLET, FILM COATED ORAL at 19:25

## 2017-11-14 ASSESSMENT — ENCOUNTER SYMPTOMS
HEADACHES: 0
CHILLS: 0
BLURRED VISION: 0
WHEEZING: 0
HEARTBURN: 0
COUGH: 0
SHORTNESS OF BREATH: 0
VOMITING: 0
NAUSEA: 0
FEVER: 0
SORE THROAT: 0
ABDOMINAL PAIN: 0
DIZZINESS: 0
NERVOUS/ANXIOUS: 1
DIARRHEA: 0

## 2017-11-14 ASSESSMENT — PAIN SCALES - GENERAL
PAINLEVEL_OUTOF10: 8
PAINLEVEL_OUTOF10: 8
PAINLEVEL_OUTOF10: 2
PAINLEVEL_OUTOF10: 0
PAINLEVEL_OUTOF10: 1
PAINLEVEL_OUTOF10: 0

## 2017-11-14 NOTE — PROGRESS NOTES
[facility] Hospitalist Progress Note    Date of Service: 2017    Chief Complaint  72 y.o. female admitted 2017 with Epistaxis, known Paroxysmal Afib.     Interval Problem Update  Epistaxis - still with some bleeding  Afib - rate controlled  HTN - mostly controlled    Consultants/Specialty  ENT - [place]    Disposition  Observe bleeding        Review of Systems   Constitutional: Negative for chills and fever.   HENT: Positive for nosebleeds. Negative for sore throat.    Eyes: Negative for blurred vision.   Respiratory: Negative for cough, shortness of breath and wheezing.    Cardiovascular: Negative for chest pain and leg swelling.   Gastrointestinal: Negative for abdominal pain, diarrhea, heartburn, nausea and vomiting.   Genitourinary: Negative for dysuria.   Neurological: Negative for dizziness and headaches.   Psychiatric/Behavioral: The patient is nervous/anxious.       Physical Exam  Laboratory/Imaging   Hemodynamics  Temp (24hrs), Av.3 °C (97.4 °F), Min:36.1 °C (97 °F), Max:36.6 °C (97.8 °F)   Temperature: 36.6 °C (97.8 °F)  Pulse  Av.4  Min: 51  Max: 134    Blood Pressure : 128/85      Respiratory      Respiration: 18, Pulse Oximetry: 92 %             Fluids    Intake/Output Summary (Last 24 hours) at 17 1300  Last data filed at 17 1700   Gross per 24 hour   Intake                0 ml   Output              200 ml   Net             -200 ml       Nutrition  Orders Placed This Encounter   Procedures   • Diet Order     Standing Status:   Standing     Number of Occurrences:   1     Order Specific Question:   Diet:     Answer:   Regular [1]     Physical Exam   Constitutional: She is oriented to person, place, and time. She appears well-developed and well-nourished.   HENT:   Head: Normocephalic and atraumatic.   Nasal packing on left   Eyes: Conjunctivae are normal. Pupils are equal, round, and reactive to light.   Neck: No tracheal deviation present. No thyromegaly present.    Cardiovascular: An irregularly irregular rhythm present.   Pulmonary/Chest: Effort normal and breath sounds normal.   Abdominal: Soft. Bowel sounds are normal. She exhibits no distension. There is no tenderness.   Lymphadenopathy:     She has no cervical adenopathy.   Neurological: She is alert and oriented to person, place, and time.   Skin: Skin is warm and dry.   Nursing note and vitals reviewed.      Recent Labs      11/13/17   0150  11/13/17   1139  11/13/17   1808  11/14/17   0010   WBC  12.9*   --    --    --    RBC  4.82   --    --    --    HEMOGLOBIN  15.8  14.9  14.3  13.9   HEMATOCRIT  48.6*   --    --    --    MCV  100.8*   --    --    --    MCH  32.8   --    --    --    MCHC  32.5*   --    --    --    RDW  51.0*   --    --    --    PLATELETCT  286   --    --    --    MPV  10.9   --    --    --      Recent Labs      11/13/17   0150  11/14/17   0010   SODIUM  137  139   POTASSIUM  3.7  3.4*   CHLORIDE  104  104   CO2  21  23   GLUCOSE  176*  149*   BUN  22  32*   CREATININE  0.78  1.06   CALCIUM  10.4  9.6     Recent Labs      11/13/17   0150   APTT  30.2   INR  1.02                  Assessment/Plan     * Epistaxis- (present on admission)   Assessment & Plan    ENT consulted  Recommendations: Afrin, Keflex        Hypokalemia- (present on admission)   Assessment & Plan    Start Kdur, follow bmp, Mg, Ph        Leukocytosis- (present on admission)   Assessment & Plan    Reactive? follow cbc        Renal insufficiency   Assessment & Plan    IVF NS, follow bmp, check PTH        GLORIA (obstructive sleep apnea)- (present on admission)   Assessment & Plan    Unable to use CPAP due to Epistaxis        Paroxysmal atrial fibrillation (CMS-HCC)- (present on admission)   Assessment & Plan    Metoprolol, hold Eliquis        HTN (hypertension)- (present on admission)   Assessment & Plan    Increase Metoprolol, continue Losartan, stop clonidine         Hyperlipidemia- (present on admission)   Assessment & Plan     Zocor.            Reviewed items::  EKG reviewed, Radiology images reviewed, Labs reviewed and Medications reviewed  Orellana catheter::  No Orellana  DVT prophylaxis pharmacological::  Contraindicated - High bleeding risk  Ulcer Prophylaxis::  No

## 2017-11-14 NOTE — CARE PLAN
Problem: Safety  Goal: Will remain free from injury  Outcome: PROGRESSING AS EXPECTED    Intervention: Provide assistance with mobility  Patient uses call light for assistance. Gait steady, denies dizziness        Problem: Knowledge Deficit  Goal: Knowledge of the prescribed therapeutic regimen will improve  Outcome: PROGRESSING AS EXPECTED  Educated on plan of care, encouraged to ask questions and make suggestions

## 2017-11-14 NOTE — CARE PLAN
Problem: Communication  Goal: The ability to communicate needs accurately and effectively will improve  Outcome: PROGRESSING AS EXPECTED  Pt resting in bed at this time.  Able to communicate without difficulty    Problem: Safety  Goal: Will remain free from injury  Outcome: PROGRESSING AS EXPECTED  No falls this shift.  Bed in low locked position.  Assessment is ongoing

## 2017-11-14 NOTE — PROGRESS NOTES
Called and spoke with Dr Gamboa for ENT.  Dr advised that is pt is not currently bleeding there is not need to see her at this time.  Pt can be seen out pt in 3-5 days unless conations worsen.      If primary physician wishes to contact Dr Christensen home number is   [phone]    Dr Bolden notified of this and advised that if pt h&h stable over night pt will be discharged in the AM.      Assessment is ongoing.

## 2017-11-14 NOTE — PROGRESS NOTES
Patient heart rate went up to 160s, went back down into the 120s-130s, patient was sitting up at bedside, denies any chest pain, 02 sat initially was 90% on room air. It dropped down to 83%. Md notified. Patient now on 1.5 liters o2 via oxy mask. 250 ml bolus ordered because patient BP 84/67. 2.5 mg iv lopressor ordered. Assessment ongoing    0427- heart rate afib 103-110, patient resting quietly at this time

## 2017-11-15 ENCOUNTER — PATIENT OUTREACH (OUTPATIENT)
Dept: HEALTH INFORMATION MANAGEMENT | Facility: OTHER | Age: 72
End: 2017-11-15

## 2017-11-15 VITALS
DIASTOLIC BLOOD PRESSURE: 73 MMHG | RESPIRATION RATE: 18 BRPM | SYSTOLIC BLOOD PRESSURE: 127 MMHG | BODY MASS INDEX: 35.82 KG/M2 | TEMPERATURE: 97.9 F | WEIGHT: 241.84 LBS | OXYGEN SATURATION: 94 % | HEART RATE: 90 BPM | HEIGHT: 69 IN

## 2017-11-15 LAB
ANION GAP SERPL CALC-SCNC: 10 MMOL/L (ref 0–11.9)
BASOPHILS # BLD AUTO: 0.4 % (ref 0–1.8)
BASOPHILS # BLD: 0.04 K/UL (ref 0–0.12)
BUN SERPL-MCNC: 30 MG/DL (ref 8–22)
CALCIUM SERPL-MCNC: 8.9 MG/DL (ref 8.5–10.5)
CHLORIDE SERPL-SCNC: 104 MMOL/L (ref 96–112)
CO2 SERPL-SCNC: 25 MMOL/L (ref 20–33)
CREAT SERPL-MCNC: 1.05 MG/DL (ref 0.5–1.4)
EOSINOPHIL # BLD AUTO: 0.13 K/UL (ref 0–0.51)
EOSINOPHIL NFR BLD: 1.2 % (ref 0–6.9)
ERYTHROCYTE [DISTWIDTH] IN BLOOD BY AUTOMATED COUNT: 55.4 FL (ref 35.9–50)
GFR SERPL CREATININE-BSD FRML MDRD: 52 ML/MIN/1.73 M 2
GLUCOSE SERPL-MCNC: 123 MG/DL (ref 65–99)
HCT VFR BLD AUTO: 41.4 % (ref 37–47)
HGB BLD-MCNC: 12.9 G/DL (ref 12–16)
IMM GRANULOCYTES # BLD AUTO: 0.04 K/UL (ref 0–0.11)
IMM GRANULOCYTES NFR BLD AUTO: 0.4 % (ref 0–0.9)
LYMPHOCYTES # BLD AUTO: 1.5 K/UL (ref 1–4.8)
LYMPHOCYTES NFR BLD: 13.5 % (ref 22–41)
MAGNESIUM SERPL-MCNC: 1.4 MG/DL (ref 1.5–2.5)
MCH RBC QN AUTO: 32.6 PG (ref 27–33)
MCHC RBC AUTO-ENTMCNC: 31.2 G/DL (ref 33.6–35)
MCV RBC AUTO: 104.5 FL (ref 81.4–97.8)
MONOCYTES # BLD AUTO: 1 K/UL (ref 0–0.85)
MONOCYTES NFR BLD AUTO: 9 % (ref 0–13.4)
NEUTROPHILS # BLD AUTO: 8.41 K/UL (ref 2–7.15)
NEUTROPHILS NFR BLD: 75.5 % (ref 44–72)
NRBC # BLD AUTO: 0 K/UL
NRBC BLD AUTO-RTO: 0 /100 WBC
PHOSPHATE SERPL-MCNC: 3.4 MG/DL (ref 2.5–4.5)
PLATELET # BLD AUTO: 249 K/UL (ref 164–446)
PMV BLD AUTO: 11.2 FL (ref 9–12.9)
POTASSIUM SERPL-SCNC: 3.7 MMOL/L (ref 3.6–5.5)
PTH-INTACT SERPL-MCNC: 209.6 PG/ML (ref 14–72)
RBC # BLD AUTO: 3.96 M/UL (ref 4.2–5.4)
SODIUM SERPL-SCNC: 139 MMOL/L (ref 135–145)
WBC # BLD AUTO: 11.1 K/UL (ref 4.8–10.8)

## 2017-11-15 PROCEDURE — 85025 COMPLETE CBC W/AUTO DIFF WBC: CPT

## 2017-11-15 PROCEDURE — 80048 BASIC METABOLIC PNL TOTAL CA: CPT

## 2017-11-15 PROCEDURE — 83970 ASSAY OF PARATHORMONE: CPT

## 2017-11-15 PROCEDURE — 700102 HCHG RX REV CODE 250 W/ 637 OVERRIDE(OP): Performed by: FAMILY MEDICINE

## 2017-11-15 PROCEDURE — A9270 NON-COVERED ITEM OR SERVICE: HCPCS | Performed by: FAMILY MEDICINE

## 2017-11-15 PROCEDURE — A9270 NON-COVERED ITEM OR SERVICE: HCPCS | Performed by: INTERNAL MEDICINE

## 2017-11-15 PROCEDURE — 700102 HCHG RX REV CODE 250 W/ 637 OVERRIDE(OP): Performed by: HOSPITALIST

## 2017-11-15 PROCEDURE — A9270 NON-COVERED ITEM OR SERVICE: HCPCS | Performed by: HOSPITALIST

## 2017-11-15 PROCEDURE — G0378 HOSPITAL OBSERVATION PER HR: HCPCS

## 2017-11-15 PROCEDURE — 700102 HCHG RX REV CODE 250 W/ 637 OVERRIDE(OP): Performed by: INTERNAL MEDICINE

## 2017-11-15 PROCEDURE — 83735 ASSAY OF MAGNESIUM: CPT

## 2017-11-15 PROCEDURE — 99217 PR OBSERVATION CARE DISCHARGE: CPT | Performed by: FAMILY MEDICINE

## 2017-11-15 PROCEDURE — 36415 COLL VENOUS BLD VENIPUNCTURE: CPT

## 2017-11-15 PROCEDURE — 84100 ASSAY OF PHOSPHORUS: CPT

## 2017-11-15 RX ORDER — OXYMETAZOLINE HYDROCHLORIDE 0.05 G/100ML
1 SPRAY NASAL 2 TIMES DAILY
Qty: 1 BOTTLE | Refills: 0 | Status: SHIPPED | OUTPATIENT
Start: 2017-11-15 | End: 2018-06-19

## 2017-11-15 RX ORDER — CEPHALEXIN 500 MG/1
500 CAPSULE ORAL EVERY 6 HOURS
Qty: 30 CAP | Refills: 0 | Status: SHIPPED | OUTPATIENT
Start: 2017-11-15 | End: 2018-06-19

## 2017-11-15 RX ORDER — METOPROLOL TARTRATE 100 MG/1
100 TABLET ORAL 2 TIMES DAILY
Qty: 60 TAB | Refills: 1 | Status: SHIPPED | OUTPATIENT
Start: 2017-11-15 | End: 2018-11-30 | Stop reason: SDUPTHER

## 2017-11-15 RX ORDER — OXYCODONE HYDROCHLORIDE 5 MG/1
5 TABLET ORAL EVERY 6 HOURS PRN
Qty: 30 TAB | Refills: 0 | Status: SHIPPED | OUTPATIENT
Start: 2017-11-15 | End: 2018-06-19

## 2017-11-15 RX ADMIN — METOPROLOL TARTRATE 100 MG: 50 TABLET, FILM COATED ORAL at 08:59

## 2017-11-15 RX ADMIN — MAGNESIUM GLUCONATE 500 MG ORAL TABLET 400 MG: 500 TABLET ORAL at 09:00

## 2017-11-15 RX ADMIN — CEPHALEXIN 500 MG: 500 CAPSULE ORAL at 06:20

## 2017-11-15 RX ADMIN — OXYCODONE HYDROCHLORIDE 5 MG: 5 TABLET ORAL at 11:39

## 2017-11-15 RX ADMIN — CEPHALEXIN 500 MG: 500 CAPSULE ORAL at 11:31

## 2017-11-15 RX ADMIN — OXYCODONE HYDROCHLORIDE 5 MG: 5 TABLET ORAL at 06:20

## 2017-11-15 RX ADMIN — OXYCODONE HYDROCHLORIDE 5 MG: 5 TABLET ORAL at 00:27

## 2017-11-15 RX ADMIN — CEPHALEXIN 500 MG: 500 CAPSULE ORAL at 00:27

## 2017-11-15 RX ADMIN — LOSARTAN POTASSIUM 100 MG: 50 TABLET, FILM COATED ORAL at 09:00

## 2017-11-15 ASSESSMENT — PAIN SCALES - GENERAL
PAINLEVEL_OUTOF10: 7
PAINLEVEL_OUTOF10: 5
PAINLEVEL_OUTOF10: 1

## 2017-11-15 NOTE — DISCHARGE INSTRUCTIONS
Nosebleed  Nosebleeds are common. They are due to a crack in the inside lining of your nose (mucous membrane) or from a small blood vessel that starts to bleed. Nosebleeds can be caused by many conditions, such as injury, infections, dry mucous membranes or dry climate, medicines, nose picking, and home heating and cooling systems. Most nosebleeds come from blood vessels in the front of your nose.  HOME CARE INSTRUCTIONS   · Try controlling your nosebleed by pinching your nostrils gently and continuously for at least 10 minutes.  · Avoid blowing or sniffing your nose for a number of hours after having a nosebleed.  · Do not put gauze inside your nose yourself. If your nose was packed by your health care provider, try to maintain the pack inside of your nose until your health care provider removes it.  ¨ If a gauze pack was used and it starts to fall out, gently replace it or cut off the end of it.  ¨ If a balloon catheter was used to pack your nose, do not cut or remove it unless your health care provider has instructed you to do that.  · Avoid lying down while you are having a nosebleed. Sit up and lean forward.  · Use a nasal spray decongestant to help with a nosebleed as directed by your health care provider.  · Do not use petroleum jelly or mineral oil in your nose. These can drip into your lungs.  · Maintain humidity in your home by using less air conditioning or by using a humidifier.  · Aspirin and blood thinners make bleeding more likely. If you are prescribed these medicines and you suffer from nosebleeds, ask your health care provider if you should stop taking the medicines or adjust the dose. Do not stop medicines unless directed by your health care provider  · Resume your normal activities as you are able, but avoid straining, lifting, or bending at the waist for several days.  · If your nosebleed was caused by dry mucous membranes, use over-the-counter saline nasal spray or gel. This will keep the  mucous membranes moist and allow them to heal. If you must use a lubricant, choose the water-soluble variety. Use it only sparingly, and do not use it within several hours of lying down.  · Keep all follow-up visits as directed by your health care provider. This is important.  SEEK MEDICAL CARE IF:  · You have a fever.  · You get frequent nosebleeds.  · You are getting nosebleeds more often.  SEEK IMMEDIATE MEDICAL CARE IF:  · Your nosebleed lasts longer than 20 minutes.  · Your nosebleed occurs after an injury to your face, and your nose looks crooked or broken.  · You have unusual bleeding from other parts of your body.  · You have unusual bruising on other parts of your body.  · You feel light-headed or you faint.  · You become sweaty.  · You vomit blood.  · Your nosebleed occurs after a head injury.     This information is not intended to replace advice given to you by your health care provider. Make sure you discuss any questions you have with your health care provider.     Document Released: 09/27/2006 Document Revised: 01/08/2016 Document Reviewed: 08/03/2015  [employer] Interactive Patient Education ©2016 [employer]  Discharge Instructions    Discharged to home by car with relative. Discharged via wheelchair, hospital escort: Yes.  Special equipment needed: Not Applicable    Be sure to schedule a follow-up appointment with your primary care doctor or any specialists as instructed.     Discharge Plan:   Diet Plan: Discussed  Activity Level: Discussed  Confirmed Follow up Appointment: Appointment Scheduled  Confirmed Symptoms Management: Discussed  Medication Reconciliation Updated: Yes  Influenza Vaccine Indication: Patient Refuses    I understand that a diet low in cholesterol, fat, and sodium is recommended for good health. Unless I have been given specific instructions below for another diet, I accept this instruction as my diet prescription.   Other diet: Cardiac, low sodium diet    Special Instructions:  None    · Is patient discharged on Warfarin / Coumadin?   No     · Is patient Post Blood Transfusion?  No    Depression / Suicide Risk    As you are discharged from this [facility] facility, it is important to learn how to keep safe from harming yourself.    Recognize the warning signs:  · Abrupt changes in personality, positive or negative- including increase in energy   · Giving away possessions  · Change in eating patterns- significant weight changes-  positive or negative  · Change in sleeping patterns- unable to sleep or sleeping all the time   · Unwillingness or inability to communicate  · Depression  · Unusual sadness, discouragement and loneliness  · Talk of wanting to die  · Neglect of personal appearance   · Rebelliousness- reckless behavior  · Withdrawal from people/activities they love  · Confusion- inability to concentrate     If you or a loved one observes any of these behaviors or has concerns about self-harm, here's what you can do:  · Talk about it- your feelings and reasons for harming yourself  · Remove any means that you might use to hurt yourself (examples: pills, rope, extension cords, firearm)  · Get professional help from the community (Mental Health, Substance Abuse, psychological counseling)  · Do not be alone:Call your Safe Contact- someone whom you trust who will be there for you.  · Call your local CRISIS HOTLINE 105-2343 or 387-052-4260  · Call your local Children's Mobile Crisis Response Team [place] (504) 886-2857 or www.[employer]  · Call the toll free National Suicide Prevention Hotlines   · National Suicide Prevention Lifeline 220-672-XWXH (2613)  · National Hope Line Network 800-SUICIDE (286-9405)

## 2017-11-15 NOTE — CARE PLAN
Problem: Infection  Goal: Will remain free from infection  Outcome: PROGRESSING AS EXPECTED    Intervention: Assess signs and symptoms of infection  Patient on antibiotic therapy by mouth, will continue to monitor labs and vss for any changes

## 2017-11-15 NOTE — PROGRESS NOTES
Discharge instructions given and explained to pt. No further questions. Med scripts given to pt. IV and Tele monitor taken off. All belongings with pt. RN transported pt down to car via wheelchair.  taking pt home.

## 2017-11-15 NOTE — PROGRESS NOTES
Pt observed. Pt c/o slight headache pain at this time  Pt AAOx4. Nasal balloon in place. Minimal/small amount blood when pt wipes her nose on tissue. No other signs or symptoms of distress, fall precautions in place, call light within reach, all questions answered, will continue to monitor.

## 2017-11-15 NOTE — PROGRESS NOTES
Bedside shift report completed.no needs at this time. Bed low locked, SRx2, call bell within reach, Non skid socks on, Bed alarm activated.

## 2017-11-15 NOTE — PROGRESS NOTES
Patient sitting up eating dinner, denies needs at this time, balloon  In left nostril, patient in no distress at this time, no sob, was called by monitor room because patient heart rate frequently goes into the 130s.  P.O metoprolol has been increased. Patient medicated accordingly. Denies chest pain or sob

## 2017-11-15 NOTE — CONSULTS
DATE OF SERVICE:  11/14/2017    REQUESTING PHYSICIAN:  Chino Holder MD    CHIEF COMPLAINT:  Epistaxis.    HISTORY OF PRESENT ILLNESS:  The patient is a 72-year-old female with a   history of AFib, on Eliquis, who has struggled recently with recurrent   nosebleeds.  She had a significant episode of epistaxis 2 days ago requiring   presentation to the emergency room and a Rhino Rocket was placed at that time.    She has been admitted since and has been taken off her Eliquis; however, she   has had some intermittent epistaxis since then and I was asked to evaluate   her for this continued nosebleed.  She has had some dark blood that she is   able to wipe off with the anterior pack and has coughed up intermittent clots   since the pack has been placed.  She did have 1 episode early this morning   where she had some blood drip down her nose, but the bleeding has been rather   Intermittent and mild in nature.    PAST MEDICAL HISTORY:  Significant for AFib on anticoagulation and sleep apnea   on CPAP.    PAST SURGICAL HISTORY: Reviewed and noncontributory.    FAMILY HISTORY:  Reviewed and noncontributory.    SOCIAL HISTORY:  Reviewed and noncontributory.    PHYSICAL EXAMINATION:  GENERAL:  Reveals a well-appearing female in no apparent distress.  VITAL SIGNS:  Reviewed and are stable.  She is breathing comfortably on room   air without any stridor or stertor.  HEAD AND NECK:  Her neck is flat without any masses or lymphadenopathy.  Her   voice is strong without any hoarseness.  Her oral cavity and oropharynx are   clear.  There is no blood dripping down her posterior pharynx.  There is a   Rhino Rocket in place in the left naris without any active bleeding from the   pack.  There is a small clot sitting on the anterior edge of the pack that was   suctioned and the underlying pack does not appear to be saturated with blood   nor is there any active bleeding coming from around this.  The right nasal   cavity was  examined carefully and there is no evidence of blood within the   nasal cavity.  Her bilateral pinna are abnormal appearance and her cranial   nerves are intact.    LABORATORY DATA:  Laboratory results were reviewed showing a mild   leukocytosis.  Hemoglobin of 15.8 and platelets of 286.    ASSESSMENT AND PLAN:  Epistaxis on anticoagulation.  I feel that her pack is   actually adequate given the minimal amount of bleeding that has occurred   around this and given it has largely been blood clots.  We did discuss that it is not worrisome to have some intermittent   oozing from the pack itself, particularly given she still may have the effects of   Eliquis in her system.  I do   recommend the use of Afrin nasal spray to the pack as needed for any recurrent   oozing.  I would like to avoid recurrent trauma to this area by replacing the   pack at this time.  I also recommend the addition of antibiotics to prevent   the toxic shock syndrome.  As long as her bleeding continues to be stable, she   is okay for discharge home from an ENT standpoint and would recommend to   followup with me on Friday for removal of the pack and evaluation of the nose.    If she continues to have difficulty with nasal bleeding, please feel free to   call.    Thank you very much for the kind referral.       ____________________________________     MD MARIANA Fritz / JASBIR    DD:  11/14/2017 17:49:11  DT:  11/14/2017 20:07:38    D#:  [id number]  Job#:  [id number]

## 2017-11-15 NOTE — CARE PLAN
Problem: Safety  Goal: Will remain free from falls  Outcome: PROGRESSING AS EXPECTED  Patient uses call light for assistance out of bed, able to voice needs. Steady on feet. Denies history of falls

## 2017-11-15 NOTE — CARE PLAN
Problem: Safety  Goal: Will remain free from injury  Outcome: PROGRESSING AS EXPECTED  No falls or injury during shift. Universal fall precautions maintained.    Problem: Infection  Goal: Will remain free from infection  Outcome: PROGRESSING AS EXPECTED  No new signs or symptoms of infection during shift

## 2017-11-16 NOTE — DISCHARGE SUMMARY
Hospital Medicine Discharge Note     Admit Date:  11/13/2017       Discharge Date:   11/15/2017    Attending Physician:  Chino Holder     Diagnoses (includes active and resolved):   Principal Problem:    Epistaxis POA: Yes  Active Problems:    HTN (hypertension) POA: Yes    Paroxysmal atrial fibrillation (CMS-HCC) POA: Yes    GLORIA (obstructive sleep apnea) POA: Yes    Renal insufficiency POA: No    Leukocytosis POA: Yes    Hypokalemia POA: Yes    Hyperlipidemia POA: Yes  Resolved Problems:    * No resolved hospital problems. *       Hospital Summary (Brief Narrative):       72-year-old white female who came in with epistaxis. Nasal packing was done of the left nostril at the emergency room. She has known history of paroxysmal atrial fibrillation and is on anticoagulation with L Fay. Adequacies was already stopped however she continues to have some mild bleeding. ENT was consulted. She will follow-up with episodic patient. Recommendation is to continue holding eloquence. Patient understands the reason why alkalosis being held. For hypertension was noted that she was on clonidine she was weaned off this her metoprolol was increased with good blood pressure control. Her losartan was continued. ENT also recommended that she be placed on Keflex while the nasal packing was in place.     Consultants:      ENT - [place]    Procedures:        None    Discharge Medications:        Medication Reconciliation Completed     Medication List      START taking these medications      Instructions   * cephALEXin 500 MG Caps  Commonly known as:  KEFLEX   Take 1 Cap by mouth 4 times a day.  Dose:  500 mg     * cephALEXin 500 MG Caps  Commonly known as:  KEFLEX   Take 1 Cap by mouth every 6 hours.  Dose:  500 mg     magnesium oxide 400 (241.3 Mg) MG Tabs tablet  Commonly known as:  MAG-OX   Take 1 Tab by mouth 2 Times a Day.  Dose:  400 mg     oxycodone immediate-release 5 MG Tabs  Commonly known as:  ROXICODONE   Take 1 Tab by mouth  every 6 hours as needed.  Dose:  5 mg     oxymetazoline 0.05 % Soln  Commonly known as:  AFRIN NASAL SPRAY   [place] 1 Spray in nose 2 times a day.  Dose:  1 Spray        * This list has 2 medication(s) that are the same as other medications prescribed for you. Read the directions carefully, and ask your doctor or other care provider to review them with you.            CHANGE how you take these medications      Instructions   metoprolol 100 MG Tabs  What changed:  · how much to take  · when to take this  Commonly known as:  LOPRESSOR   Take 1 Tab by mouth 2 Times a Day.  Dose:  100 mg        CONTINUE taking these medications      Instructions   clobetasol 0.05 % Crea  Commonly known as:  TEMOVATE   Apply  to affected area(s) 2 times a day.     losartan 100 MG Tabs  Commonly known as:  COZAAR   Take 1 Tab by mouth every day.  Dose:  100 mg     simvastatin 80 MG tablet  Commonly known as:  ZOCOR   Take 80 mg by mouth every evening.  Dose:  80 mg        STOP taking these medications    apixaban 5mg Tabs  Commonly known as:  ELIQUIS     Bil[place] 60 MG Caps     clonidine 0.3 MG/24HR Ptwk  Commonly known as:  CATAPRES              Disposition:  Home    Diet:   Regular    Activity:   As tolerated    Code status:  Full    Primary Care Provider:    Kristofer Cortes M.D.    Follow up appointment details :        [facility], Emergency Dept  [facility] [facility] [address] [phone]    immediately if symptoms worsen    Swapna Gamboa M.D.  [address] [place] [id number]  [phone]    Go on 11/17/2017  Please arrive at 11:00 am for a 11:15 am appointment. Thank you.    Future Appointments  Date Time Provider Department Center   5/1/2018 9:00 AM NISHANT Fajardo [facility] None       Pending Studies:        None        #################################################      Most Recent Labs:    Lab Results   Component Value Date/Time    WBC 11.1 (H) 11/15/2017 02:57 AM    RBC 3.96 (L) 11/15/2017  02:57 AM    HEMOGLOBIN 12.9 11/15/2017 02:57 AM    HEMATOCRIT 41.4 11/15/2017 02:57 AM    .5 (H) 11/15/2017 02:57 AM    MCH 32.6 11/15/2017 02:57 AM    MCHC 31.2 (L) 11/15/2017 02:57 AM    MPV 11.2 11/15/2017 02:57 AM    NEUTSPOLYS 75.50 (H) 11/15/2017 02:57 AM    LYMPHOCYTES 13.50 (L) 11/15/2017 02:57 AM    MONOCYTES 9.00 11/15/2017 02:57 AM    EOSINOPHILS 1.20 11/15/2017 02:57 AM    BASOPHILS 0.40 11/15/2017 02:57 AM      Lab Results   Component Value Date/Time    SODIUM 139 11/15/2017 02:57 AM    POTASSIUM 3.7 11/15/2017 02:57 AM    CHLORIDE 104 11/15/2017 02:57 AM    CO2 25 11/15/2017 02:57 AM    GLUCOSE 123 (H) 11/15/2017 02:57 AM    BUN 30 (H) 11/15/2017 02:57 AM    CREATININE 1.05 11/15/2017 02:57 AM      Lab Results   Component Value Date/Time    ALTSGPT 18 11/13/2017 01:50 AM    ASTSGOT 26 11/13/2017 01:50 AM    ALKPHOSPHAT 74 11/13/2017 01:50 AM    TBILIRUBIN 0.9 11/13/2017 01:50 AM    ALBUMIN 4.5 11/13/2017 01:50 AM    GLOBULIN 3.1 11/13/2017 01:50 AM    INR 1.02 11/13/2017 01:50 AM     Lab Results   Component Value Date/Time    PROTHROMBTM 13.1 11/13/2017 01:50 AM    INR 1.02 11/13/2017 01:50 AM        Imaging/ Testing:      No orders to display       Instructions:      The patient was instructed to return to the ER in the event of worsening symptoms. I have counseled the patient on the importance of compliance and the patient has agreed to proceed with all medical recommendations and follow up plan indicated above.   The patient understands that all medications come with benefits and risks. Risks may include permanent injury or death and these risks can be minimized with close reassessment and monitoring.

## 2017-11-17 ENCOUNTER — TELEPHONE (OUTPATIENT)
Dept: CARDIOLOGY | Facility: MEDICAL CENTER | Age: 72
End: 2017-11-17

## 2017-11-18 NOTE — TELEPHONE ENCOUNTER
----- Message from Cleo Almaguer sent at 11/17/2017  3:58 PM PST -----  Regarding: Discuss recent ER visit   Contact: [phone]  LA/Miryam Mallory is calling to discuss recent ER visit. Please call [phone] for all details.

## 2017-11-18 NOTE — TELEPHONE ENCOUNTER
She has paroxysmal fibrillation and guidelines tell her she needs anticoagulation because of this. If she is safe, she should go back on this. However if she is having falls or terrible nosebleeds consistently, we need to talk to her again. She can make an appointment with the nurse practitioner if needed. Thanks again, chart reviewed

## 2017-11-18 NOTE — TELEPHONE ENCOUNTER
S/w pt, pt reports she was hospitalized for severe nose bleed 11/13/17-11/15/17 and she had a balloon/packing on it until today when she had FV with ENT and packing was removed,  during the hosp. She was obviously taken off Eliquis and was told by ENT doctor that she can restart Eliquis 48hrs from today, but she was instructed by them also to check with Dr Colunga if she really needs to be on it, she would like to let Dr Colunga know that the hospitalist-Dr Holder, took her off Clonidine patch and increased her Metoprolol to 100mg PO BID.    To Dr Colunga for advice

## 2017-11-20 NOTE — TELEPHONE ENCOUNTER
Yodit Elias, Med Ass't  Miryam Mckeon R.N.   Phone Number: [phone]             Yvette Kruger called and had some questions. She did not elaborate on what it was about.     She would like a call back at: [phone].     Thank you so much.      =================================================================================    S/w pt, she really preferred not to be started on Eliquis again and would prefer discussing this w/ Dr Colunga as she don't want to have a major nose bleed again that would cause her to go back in the hospital to be admitted, pt reports as this happened more than once, despite of extensive education provided to pt, pt request to schedule appt to be seen in the clinic to discussed this further.     To Dr Colunga

## 2017-11-20 NOTE — TELEPHONE ENCOUNTER
S/w pt, discussed Dr Vallecillo recommendations, pt agreed and verbalizes understanding.    Pt wants to know also if she should continue Mag Oxide prescribed by her when she got discharged from the hosp, they've only given her 10 tabs and completely out of it, last Mag level noted on the chart dated 11/14/17, Mag-1.4    To Dr Vallecillo

## 2017-11-20 NOTE — TELEPHONE ENCOUNTER
Anaya Colunga M.D.  Miryam Mckeon R.N.   Caller: Unspecified (3 days ago,  4:06 PM)             thats fine   And thx    Previous Messages      ----- Message -----   From: Miryam Mckeon R.N.   Sent: 11/20/2017   1:17 PM   To: Anaya Colunga M.D.     ----- Message from Miryam Mckeon R.N. sent at 11/20/2017  1:17 PM PST -----   Hi Dr Colunga,     You have opening tomorrow at 1130, are you okay if I schedule Yvette?     Thanks!           =============================================================================    Pt scheduled to see Dr Colunga tomorrow 11/21/17 at 1130.

## 2017-11-21 ENCOUNTER — OFFICE VISIT (OUTPATIENT)
Dept: CARDIOLOGY | Facility: MEDICAL CENTER | Age: 72
End: 2017-11-21
Payer: MEDICARE

## 2017-11-21 VITALS
HEIGHT: 69 IN | DIASTOLIC BLOOD PRESSURE: 110 MMHG | BODY MASS INDEX: 34.24 KG/M2 | WEIGHT: 231.2 LBS | SYSTOLIC BLOOD PRESSURE: 158 MMHG | HEART RATE: 114 BPM | OXYGEN SATURATION: 96 %

## 2017-11-21 DIAGNOSIS — R29.6 FALLS FREQUENTLY: ICD-10-CM

## 2017-11-21 DIAGNOSIS — R04.0 EPISTAXIS: ICD-10-CM

## 2017-11-21 PROBLEM — R60.9 EDEMA: Status: RESOLVED | Noted: 2017-04-18 | Resolved: 2017-11-21

## 2017-11-21 PROCEDURE — 99214 OFFICE O/P EST MOD 30 MIN: CPT | Performed by: INTERNAL MEDICINE

## 2017-11-21 ASSESSMENT — ENCOUNTER SYMPTOMS
HEARTBURN: 0
NERVOUS/ANXIOUS: 0
LOSS OF CONSCIOUSNESS: 0
HEADACHES: 1
SHORTNESS OF BREATH: 0
NAUSEA: 0
EYES NEGATIVE: 1
DIZZINESS: 0
DEPRESSION: 0
INSOMNIA: 0
COUGH: 0
PALPITATIONS: 1
SEIZURES: 0
MUSCULOSKELETAL NEGATIVE: 1
WEIGHT LOSS: 0

## 2017-11-21 NOTE — LETTER
"     [facility]   [address]  [place] [address]  Phone: [phone]  Fax: [phone]              Yvette Dailey  1945    Encounter Date: 11/21/2017    Anaya Colunga M.D.          PROGRESS NOTE:  Subjective:   Yvette Dailey is a 72 y.o. female who presents today On an urgent basis after her hospitalization last week for nosebleeds    Very frustrated. Wishes somebody would do something. Couldn't get into her primary off the blood thinner, does not want to have a stroke. Blood pressures are too high. They stopped her clonidine and increased her beta blocker    Went to see the ENT, no procedures planned    Past Medical History:   Diagnosis Date   • Atrial fibrillation (CMS-HCC)    • Dental disorder     upper plate, lower partial   • Edema    • Gout    • Hyperlipidemia    • Hypertension    • Obesity    • PAF (paroxysmal atrial fibrillation) (CMS-HCC)    • Paroxysmal atrial fibrillation (CMS-HCC)    • Psoriasis    • Sleep apnea     CPAP     Past Surgical History:   Procedure Laterality Date   • RECOVERY  7/19/2016    Procedure: CATH LAB CARDIOVERSION WITH ANESTHESIA DR. PAYAN;  Surgeon: Recoveryonly Surgery;  Location[place] SURG[place] PRE[place] [facility] [place]  Service:    • RECOVERY  7/7/2015    Procedure: CATH LAB-WIEDENBECK-ELECTIVE CARDIOVERSION 85319-PFE GROUP FOR ANESTHESIA-AFIB 427.31;  Surgeon: Recoveryonly Surgery;  Location: SURGERY PRE-POST PROC [place] [facility] [place];  Service:    • HYSTERECTOMY LAPAROSCOPY       Family History   Problem Relation Age of Onset   • Heart Attack Mother 83   • Cancer Father      lung     History   Smoking Status   • Former Smoker   • Packs/day: 1.50   • Years: 45.00   • Types: Cigarettes   • Quit date: 7/1/2013   Smokeless Tobacco   • Never Used     Allergies   Allergen Reactions   • Allopurinol      \"everything went haywire!\"     Outpatient Encounter Prescriptions as of 11/21/2017   Medication Sig Dispense Refill  " "  • metoprolol (LOPRESSOR) 100 MG Tab Take 1 Tab by mouth 2 Times a Day. 60 Tab 1   • oxymetazoline (AFRIN NASAL SPRAY) 0.05 % Solution Spray 1 Spray in nose 2 times a day. 1 Bottle 0   • losartan (COZAAR) 100 MG Tab Take 1 Tab by mouth every day. 90 Tab 3   • clobetasol (TEMOVATE) 0.05 % Cream Apply  to affected area(s) 2 times a day.     • simvastatin (ZOCOR) 80 MG tablet Take 80 mg by mouth every evening.     • oxycodone immediate-release (ROXICODONE) 5 MG Tab Take 1 Tab by mouth every 6 hours as needed. 30 Tab 0   • cephALEXin (KEFLEX) 500 MG Cap Take 1 Cap by mouth every 6 hours. 30 Cap 0   • magnesium oxide (MAG-OX) 400 (241.3 Mg) MG Tab tablet Take 1 Tab by mouth 2 Times a Day. 10 Tab 0   • cephALEXin (KEFLEX) 500 MG Cap Take 1 Cap by mouth 4 times a day. 28 Cap 0     No facility-administered encounter medications on file as of 11/21/2017.      Review of Systems   Constitutional: Negative for malaise/fatigue and weight loss.   HENT: Positive for nosebleeds. Negative for ear pain, hearing loss and tinnitus.    Eyes: Negative.    Respiratory: Negative for cough and shortness of breath.    Cardiovascular: Positive for palpitations. Negative for chest pain and leg swelling.   Gastrointestinal: Negative for heartburn and nausea.   Musculoskeletal: Negative.    Neurological: Positive for headaches. Negative for dizziness, seizures and loss of consciousness.   Psychiatric/Behavioral: Negative for depression. The patient is not nervous/anxious and does not have insomnia.    All other systems reviewed and are negative.       Objective:   /110   Pulse (!) 114   Ht 1.753 m (5' 9\")   Wt 104.9 kg (231 lb 3.2 oz)   SpO2 96%   BMI 34.14 kg/m²      Physical Exam   Constitutional: She is oriented to person, place, and time.   Plethoric, obese   Eyes: EOM are normal. Pupils are equal, round, and reactive to light. No scleral icterus.   Neck: No JVD present. No thyromegaly present.   Cardiovascular: Normal rate, " regular rhythm and intact distal pulses.    No murmur heard.  Pulmonary/Chest: Breath sounds normal. No respiratory distress.   Abdominal: Bowel sounds are normal. She exhibits no distension.   Musculoskeletal: She exhibits edema (rubor, stasis changes - no open sores, 1+ edema). She exhibits no tenderness.   Neurological: She is oriented to person, place, and time. Coordination normal.   Skin: Skin is warm and dry. No rash noted.   Psychiatric: She has a normal mood and affect. Her behavior is normal.       Assessment:     1. Falls frequently     2. Epistaxis         Medical Decision Making:  Today's Assessment / Status / Plan:       We reviewed her hospital course, I understand her frustration and I told her cell    Nosebleeds, per ENT she can resume her anticoagulation. She is not anemic. I told her again if she needs the surgery unfortunately I do not have decision-making power for this.     Atrial fibrillation/palpitations  I absolutely did not like her on flecainide, she was kept on this by Dr. Chicas. She was in fibrillation on the EKG when I saw her in September and on the monitor at the hospital  We discussed rhythm control versus rate control  EP referral if needed for symptoms  Anticoagulation as above  She has had no more falls, we discussed this    Hypertension, increasing stressors  Sleep apnea workup as she is done  I would add back in the clonidine, agree with increased beta blocker  We'll call her in Monday, if blood pressures are still slightly high, would add Norvasc 5 mg    RTC 8 week, she must follow-up with her primary is well            Kristofer Cortes M.D.  [address] [id number]  VIA Facsimile: [phone]

## 2017-11-21 NOTE — PROGRESS NOTES
"Subjective:   Yvette Dailey is a 72 y.o. female who presents today On an urgent basis after her hospitalization last week for nosebleeds    Very frustrated. Wishes somebody would do something. Couldn't get into her primary off the blood thinner, does not want to have a stroke. Blood pressures are too high. They stopped her clonidine and increased her beta blocker    Went to see the ENT, no procedures planned    Past Medical History:   Diagnosis Date   • Atrial fibrillation (CMS-HCC)    • Dental disorder     upper plate, lower partial   • Edema    • Gout    • Hyperlipidemia    • Hypertension    • Obesity    • PAF (paroxysmal atrial fibrillation) (CMS-HCC)    • Paroxysmal atrial fibrillation (CMS-HCC)    • Psoriasis    • Sleep apnea     CPAP     Past Surgical History:   Procedure Laterality Date   • RECOVERY  7/19/2016    Procedure: CATH LAB CARDIOVERSION WITH ANESTHESIA DR. PAYAN;  Surgeon: Recoveryonly Surgery;  Location: SURGERY PRE-POST PROC UNIT [facility] [place];  Service:    • RECOVERY  7/7/2015    Procedure: CATH LAB-WIEDENBECK-ELECTIVE CARDIOVERSION 06670-VAX GROUP FOR ANESTHESIA-AFIB 427.31;  Surgeon: Recoveryonly Surgery;  Location: SURGERY PRE-POST PROC UNIT [facility] [place];  Service:    • HYSTERECTOMY LAPAROSCOPY       Family History   Problem Relation Age of Onset   • Heart Attack Mother 83   • Cancer Father      lung     History   Smoking Status   • Former Smoker   • Packs/day: 1.50   • Years: 45.00   • Types: Cigarettes   • Quit date: 7/1/2013   Smokeless Tobacco   • Never Used     Allergies   Allergen Reactions   • Allopurinol      \"everything went haywire!\"     Outpatient Encounter Prescriptions as of 11/21/2017   Medication Sig Dispense Refill   • metoprolol (LOPRESSOR) 100 MG Tab Take 1 Tab by mouth 2 Times a Day. 60 Tab 1   • oxymetazoline (AFRIN NASAL SPRAY) 0.05 % Solution Spray 1 Spray in nose 2 times a day. 1 Bottle 0   • losartan (COZAAR) 100 MG Tab Take 1 Tab by mouth every day. 90 Tab 3   • clobetasol " "(TEMOVATE) 0.05 % Cream Apply  to affected area(s) 2 times a day.     • simvastatin (ZOCOR) 80 MG tablet Take 80 mg by mouth every evening.     • oxycodone immediate-release (ROXICODONE) 5 MG Tab Take 1 Tab by mouth every 6 hours as needed. 30 Tab 0   • cephALEXin (KEFLEX) 500 MG Cap Take 1 Cap by mouth every 6 hours. 30 Cap 0   • magnesium oxide (MAG-OX) 400 (241.3 Mg) MG Tab tablet Take 1 Tab by mouth 2 Times a Day. 10 Tab 0   • cephALEXin (KEFLEX) 500 MG Cap Take 1 Cap by mouth 4 times a day. 28 Cap 0     No facility-administered encounter medications on file as of 11/21/2017.      Review of Systems   Constitutional: Negative for malaise/fatigue and weight loss.   HENT: Positive for nosebleeds. Negative for ear pain, hearing loss and tinnitus.    Eyes: Negative.    Respiratory: Negative for cough and shortness of breath.    Cardiovascular: Positive for palpitations. Negative for chest pain and leg swelling.   Gastrointestinal: Negative for heartburn and nausea.   Musculoskeletal: Negative.    Neurological: Positive for headaches. Negative for dizziness, seizures and loss of consciousness.   Psychiatric/Behavioral: Negative for depression. The patient is not nervous/anxious and does not have insomnia.    All other systems reviewed and are negative.       Objective:   /110   Pulse (!) 114   Ht 1.753 m (5' 9\")   Wt 104.9 kg (231 lb 3.2 oz)   SpO2 96%   BMI 34.14 kg/m²     Physical Exam   Constitutional: She is oriented to person, place, and time.   Plethoric, obese   Eyes: EOM are normal. Pupils are equal, round, and reactive to light. No scleral icterus.   Neck: No JVD present. No thyromegaly present.   Cardiovascular: Normal rate, regular rhythm and intact distal pulses.    No murmur heard.  Pulmonary/Chest: Breath sounds normal. No respiratory distress.   Abdominal: Bowel sounds are normal. She exhibits no distension.   Musculoskeletal: She exhibits edema (rubor, stasis changes - no open sores, 1+ " edema). She exhibits no tenderness.   Neurological: She is oriented to person, place, and time. Coordination normal.   Skin: Skin is warm and dry. No rash noted.   Psychiatric: She has a normal mood and affect. Her behavior is normal.       Assessment:     1. Falls frequently     2. Epistaxis         Medical Decision Making:  Today's Assessment / Status / Plan:       We reviewed her hospital course, I understand her frustration and I told her cell    Nosebleeds, per ENT she can resume her anticoagulation. She is not anemic. I told her again if she needs the surgery unfortunately I do not have decision-making power for this.     Atrial fibrillation/palpitations  I absolutely did not like her on flecainide, she was kept on this by Dr. Chicas. She was in fibrillation on the EKG when I saw her in September and on the monitor at the hospital  We discussed rhythm control versus rate control  EP referral if needed for symptoms  Anticoagulation as above  She has had no more falls, we discussed this    Hypertension, increasing stressors  Sleep apnea workup as she is done  I would add back in the clonidine, agree with increased beta blocker  We'll call her in Monday, if blood pressures are still slightly high, would add Norvasc 5 mg    RTC 8 week, she must follow-up with her primary is well

## 2017-11-27 ENCOUNTER — TELEPHONE (OUTPATIENT)
Dept: CARDIOLOGY | Facility: MEDICAL CENTER | Age: 72
End: 2017-11-27

## 2017-11-27 NOTE — TELEPHONE ENCOUNTER
Not bad at all - if bottom # stays above 80 though - would recc increase clonidine from 0.3 to 0.4 weekly    thx!

## 2017-11-27 NOTE — TELEPHONE ENCOUNTER
Left patient a voicemail with instructions to call the office with an update on her blood pressure readings.    VENECIA LESTER

## 2017-11-27 NOTE — TELEPHONE ENCOUNTER
Called patient again, her blood pressure readings are as follows:    11/22 @ 1503: 136/88, HR 91  11/23 @ 1823: 131/84, HR 85  11/24 @ 1317: 136/87, HR 93  11/25 @ 1446: 132/88, HR 79  11/26 @ 1608: 137/101, HR 82  11/27 @ 0930: 113/79, HR 82    Patient is currently taking Metoprolol Tartrate 100 mg BID, Losartan 100 mg daily, and is also using the Clonidine patch.    Forwarded to Dr. Colunga to review.    VENECIA LESTER

## 2017-11-27 NOTE — TELEPHONE ENCOUNTER
Called patient and advised her of Dr. Anaya Colunga's recommendations. Patient verbalized understanding and agrees with plan.    Patient will call the office in one week with an update on her blood pressure readings.    VENECIA LESTER

## 2017-11-27 NOTE — TELEPHONE ENCOUNTER
----- Message from Anaya Colunga M.D. sent at 11/21/2017 12:13 PM PST -----  Regarding: future task  Please see my note from 11/21

## 2017-12-04 ENCOUNTER — TELEPHONE (OUTPATIENT)
Dept: CARDIOLOGY | Facility: MEDICAL CENTER | Age: 72
End: 2017-12-04

## 2017-12-04 NOTE — TELEPHONE ENCOUNTER
Called patient, her blood pressure readings are as follows:    11/28: 107/79, HR 87  11/29: 132/85, HR 84  11/30: 130/81, HR 80  12/01: 104/87, HR 90  12/02: 149/94, HR 87  12/03: 159/86, HR 82  12/04: 137/92, HR 90    Forwarded to Dr. Colunga to review.    VENECIA RN

## 2017-12-04 NOTE — TELEPHONE ENCOUNTER
Called patient and advised her of Dr. Colunga's note. Patient verbalized understanding and will call the office if her systolic blood pressure is consistently over 140.    VENECIA LESTER

## 2017-12-04 NOTE — TELEPHONE ENCOUNTER
----- Message from Marcy Tracy sent at 12/4/2017 10:21 AM PST -----  Regarding: following up with call about her blood pressure readings  LA/Jenny      Patient spoke to you last week, and you told her to call back in a week about her blood pressure readings. She can be reached at [phone].

## 2018-05-01 ENCOUNTER — APPOINTMENT (OUTPATIENT)
Dept: SLEEP MEDICINE | Facility: MEDICAL CENTER | Age: 73
End: 2018-05-01
Payer: MEDICARE

## 2018-06-04 ENCOUNTER — TELEPHONE (OUTPATIENT)
Dept: CARDIOLOGY | Facility: MEDICAL CENTER | Age: 73
End: 2018-06-04

## 2018-06-04 DIAGNOSIS — I48.0 PAROXYSMAL ATRIAL FIBRILLATION (HCC): ICD-10-CM

## 2018-06-04 NOTE — TELEPHONE ENCOUNTER
----- Message from Aury Duarte, Med Ass't sent at 6/4/2018 11:21 AM PDT -----  Regarding: FW: Rx refill request   Not listed on med list, okay to fill?    ----- Message -----  From: Ian Tejada, Med Ass't  Sent: 6/4/2018   9:26 AM  To: Aury Duarte, Med Ass't  Subject: Rx refill request                                Hi Aury    Pt would like to request a refill for her Eliquis.     Thanks!  Ian [phone]    ===================================================    Upon chart review, pt is previously on Eliquis 5mg PO BID and was held during her hospital stay because of Epistaxis but was seen by Dr Colunga after that also on 11/21/17 and per notes:    Nosebleeds, per ENT she can resume her anticoagulation. She is not anemic.   Anticoagulation as above.     Refill Rx for Eliquis 5mg PO BID sent to [facility].

## 2018-06-19 ENCOUNTER — OFFICE VISIT (OUTPATIENT)
Dept: CARDIOLOGY | Facility: MEDICAL CENTER | Age: 73
End: 2018-06-19
Payer: MEDICARE

## 2018-06-19 ENCOUNTER — TELEPHONE (OUTPATIENT)
Dept: CARDIOLOGY | Facility: MEDICAL CENTER | Age: 73
End: 2018-06-19

## 2018-06-19 VITALS
HEIGHT: 69 IN | HEART RATE: 110 BPM | DIASTOLIC BLOOD PRESSURE: 96 MMHG | WEIGHT: 243 LBS | OXYGEN SATURATION: 92 % | SYSTOLIC BLOOD PRESSURE: 169 MMHG | BODY MASS INDEX: 35.99 KG/M2

## 2018-06-19 DIAGNOSIS — I51.89 DIASTOLIC DYSFUNCTION: ICD-10-CM

## 2018-06-19 DIAGNOSIS — I10 ESSENTIAL HYPERTENSION: ICD-10-CM

## 2018-06-19 DIAGNOSIS — G47.33 OSA (OBSTRUCTIVE SLEEP APNEA): ICD-10-CM

## 2018-06-19 DIAGNOSIS — I48.0 PAROXYSMAL ATRIAL FIBRILLATION (HCC): ICD-10-CM

## 2018-06-19 DIAGNOSIS — E78.00 PURE HYPERCHOLESTEROLEMIA: ICD-10-CM

## 2018-06-19 LAB — EKG IMPRESSION: NORMAL

## 2018-06-19 PROCEDURE — 99214 OFFICE O/P EST MOD 30 MIN: CPT | Performed by: NURSE PRACTITIONER

## 2018-06-19 PROCEDURE — 93000 ELECTROCARDIOGRAM COMPLETE: CPT | Performed by: NURSE PRACTITIONER

## 2018-06-19 RX ORDER — POTASSIUM CHLORIDE 1.5 G/1.58G
20 POWDER, FOR SOLUTION ORAL DAILY
Qty: 30 PACKET | Refills: 11 | Status: SHIPPED | OUTPATIENT
Start: 2018-06-19 | End: 2019-09-24 | Stop reason: SDUPTHER

## 2018-06-19 RX ORDER — POTASSIUM CHLORIDE 20 MEQ/1
20 TABLET, EXTENDED RELEASE ORAL DAILY
Qty: 30 TAB | Refills: 11 | Status: SHIPPED | OUTPATIENT
Start: 2018-06-19 | End: 2018-06-19

## 2018-06-19 RX ORDER — FUROSEMIDE 40 MG/1
40 TABLET ORAL DAILY
Qty: 30 TAB | Refills: 11 | Status: SHIPPED | OUTPATIENT
Start: 2018-06-19 | End: 2019-07-09 | Stop reason: SDUPTHER

## 2018-06-19 ASSESSMENT — ENCOUNTER SYMPTOMS
SHORTNESS OF BREATH: 1
PND: 0
ORTHOPNEA: 0
COUGH: 0
ABDOMINAL PAIN: 0
CLAUDICATION: 0
MYALGIAS: 0
DIZZINESS: 0
PALPITATIONS: 1
FEVER: 0

## 2018-06-19 NOTE — PROGRESS NOTES
"Subjective:   Yvette Dailey is a 70 y.o. female who presents today for follow up on worsening BP readings and edema in her legs alongside shortness of breath.    She is a patient of Dr. Anaya Colunga in our office.     Hx of PAF with prior successful cardioversion in '15 and '16, HLD, HTN, diastolic dysfunction, obesity, and gout.    She just recently within the last month has noticed an increase in weight gain, shortness of breath, and edema in her legs.    She has been feeling her afib coming back these last couple weeks, so she made an apt.    She is not wearing her CPAP machine now due to her history of nosebleeds and is worried about recurrence. We discussed this in detail today. She does follow up regularly with pulmonary.    Past Medical History:   Diagnosis Date   • Atrial fibrillation (HCC)    • Dental disorder     upper plate, lower partial   • Edema    • Gout    • Hyperlipidemia    • Hypertension    • Obesity    • PAF (paroxysmal atrial fibrillation) (HCC)    • Paroxysmal atrial fibrillation (HCC)    • Psoriasis    • Sleep apnea     CPAP     Past Surgical History:   Procedure Laterality Date   • RECOVERY  7/19/2016    Procedure: CATH LAB CARDIOVERSION WITH ANESTHESIA DR. PAYAN;  Surgeon: Recoveryonly Surgery;  Location: SURGERY PRE-POST PROC UNIT [facility] [place];  Service:    • RECOVERY  7/7/2015    Procedure: CATH LAB-WIEDENBECK-ELECTIVE CARDIOVERSION [id number]-BQO GROUP FOR ANESTHESIA-AFIB 427.31;  Surgeon: Recoveryonly Surgery;  Location: SURGERY PRE-POST PROC UNIT [facility] [place];  Service:    • HYSTERECTOMY LAPAROSCOPY       Family History   Problem Relation Age of Onset   • Heart Attack Mother 83   • Cancer Father      lung     History   Smoking Status   • Former Smoker   • Packs/day: 1.50   • Years: 45.00   • Types: Cigarettes   • Quit date: 7/1/2013   Smokeless Tobacco   • Never Used     Allergies   Allergen Reactions   • Allopurinol      \"everything went haywire!\"     Outpatient Encounter Prescriptions as of " 6/19/2018   Medication Sig Dispense Refill   • cloNIDine (CATAPRES) 0.3 MG/24HR PATCH WEEKLY Apply 1 Patch to skin as directed every 7 days. 4 Patch    • furosemide (LASIX) 40 MG Tab Take 1 Tab by mouth every day. 30 Tab 11   • potassium chloride SA (KDUR) 20 MEQ Tab CR Take 1 Tab by mouth every day. 30 Tab 11   • apixaban (ELIQUIS) 5mg Tab Take 1 Tab by mouth 2 Times a Day. 180 Tab 2   • metoprolol (LOPRESSOR) 100 MG Tab Take 1 Tab by mouth 2 Times a Day. 60 Tab 1   • losartan (COZAAR) 100 MG Tab Take 1 Tab by mouth every day. 90 Tab 3   • clobetasol (TEMOVATE) 0.05 % Cream Apply  to affected area(s) 2 times a day.     • simvastatin (ZOCOR) 80 MG tablet Take 80 mg by mouth every evening.     • [DISCONTINUED] clonidine (CATAPRES) 0.3 MG/24HR PATCH WEEKLY Apply 1 Patch to skin as directed every 7 days.     • [DISCONTINUED] oxycodone immediate-release (ROXICODONE) 5 MG Tab Take 1 Tab by mouth every 6 hours as needed. 30 Tab 0   • [DISCONTINUED] cephALEXin (KEFLEX) 500 MG Cap Take 1 Cap by mouth every 6 hours. 30 Cap 0   • [DISCONTINUED] magnesium oxide (MAG-OX) 400 (241.3 Mg) MG Tab tablet Take 1 Tab by mouth 2 Times a Day. 10 Tab 0   • [DISCONTINUED] oxymetazoline (AFRIN NASAL SPRAY) 0.05 % Solution Spray 1 Spray in nose 2 times a day. 1 Bottle 0   • [DISCONTINUED] cephALEXin (KEFLEX) 500 MG Cap Take 1 Cap by mouth 4 times a day. 28 Cap 0     No facility-administered encounter medications on file as of 6/19/2018.      Review of Systems   Constitutional: Positive for malaise/fatigue. Negative for fever.   Respiratory: Positive for shortness of breath. Negative for cough.         Exertional shortness of breath   Cardiovascular: Positive for palpitations and leg swelling. Negative for chest pain, orthopnea, claudication and PND.        Swelling worsening, shoes tight and weight gain of 10 lbs   Gastrointestinal: Negative for abdominal pain.   Musculoskeletal: Negative for myalgias.   Neurological: Negative for  "dizziness.   All other systems reviewed and are negative.       Objective:   BP (!) 169/96   Pulse (!) 110   Ht 1.753 m (5' 9\")   Wt 110.2 kg (243 lb)   SpO2 92%   BMI 35.88 kg/m²     Physical Exam   Constitutional: She is oriented to person, place, and time. She appears well-developed and well-nourished. No distress.   Obese     HENT:   Head: Normocephalic and atraumatic.   Eyes: EOM are normal.   Neck: Normal range of motion. No JVD present.   Cardiovascular: Normal rate, regular rhythm, normal heart sounds and intact distal pulses.    No murmur heard.  Pulses:       Dorsalis pedis pulses are 1+ on the right side, and 1+ on the left side.   Pulmonary/Chest: Effort normal. No respiratory distress. She has decreased breath sounds in the right lower field and the left lower field.   Abdominal: Soft. Bowel sounds are normal.   Musculoskeletal: Normal range of motion. She exhibits edema.   Bilateral lower extremity edema 2-3+ pitting   Neurological: She is alert and oriented to person, place, and time.   Skin: Skin is warm and dry.   Psychiatric: She has a normal mood and affect.   Nursing note and vitals reviewed.    2015 ECHO CONCLUSIONS  Normal left ventricular size and function.  Left ventricular ejection fraction is 59% to 61%.  Mild concentric left ventricular hypertrophy.  Mild mitral regurgitation.  Right ventricular systolic pressure is estimated to be 42-47 mmHg.  Right heart pressures are consistent with moderate pulmonary   hypertension.  Moderately dilated left atrium.  Mildly dilated right atrium.    Lab Results   Component Value Date/Time    CHOLSTRLTOT 139 06/28/2016 09:40 AM    LDL 68 06/28/2016 09:40 AM    HDL 49 06/28/2016 09:40 AM    TRIGLYCERIDE 108 06/28/2016 09:40 AM       Lab Results   Component Value Date/Time    SODIUM 139 11/15/2017 02:57 AM    POTASSIUM 3.7 11/15/2017 02:57 AM    CHLORIDE 104 11/15/2017 02:57 AM    CO2 25 11/15/2017 02:57 AM    GLUCOSE 123 (H) 11/15/2017 02:57 AM    BUN " 30 (H) 11/15/2017 02:57 AM    CREATININE 1.05 11/15/2017 02:57 AM     Lab Results   Component Value Date/Time    ALKPHOSPHAT 74 11/13/2017 01:50 AM    ASTSGOT 26 11/13/2017 01:50 AM    ALTSGPT 18 11/13/2017 01:50 AM    TBILIRUBIN 0.9 11/13/2017 01:50 AM      Lab Results   Component Value Date/Time    WBC 11.1 (H) 11/15/2017 02:57 AM    RBC 3.96 (L) 11/15/2017 02:57 AM    HEMOGLOBIN 12.9 11/15/2017 02:57 AM    HEMATOCRIT 41.4 11/15/2017 02:57 AM    .5 (H) 11/15/2017 02:57 AM    MCH 32.6 11/15/2017 02:57 AM    MCHC 31.2 (L) 11/15/2017 02:57 AM    MPV 11.2 11/15/2017 02:57 AM      Assessment:     1. Paroxysmal atrial fibrillation (CMS-HCC)  EKG   2. GLORIA (obstructive sleep apnea)     3. Class 2 obesity with serious comorbidity and body mass index (BMI) of 35.0 to 35.9 in adult, unspecified obesity type     4. Essential hypertension     5. Diastolic dysfunction     6. Pure hypercholesterolemia       Medical Decision Making:  Today's Assessment / Status / Plan:     1. HTN  -not controlled  -start lasix 40 mg QD  -cont clonidine patch 0.3 mg Q7 days, losartan, and metoprolol    2. PAF with prior DCCV in '16  -rate controlled but now symptomatic with recurrence  -she is feeling afib more now, recently stopped using CPAP  -recommend re-starting CPAP therapy  -cont metoprolol and eliquis   -no bleeding on Eliquis but patient is weary of nosebleeds that is why she stopped her CPAP machine-discussed ways to help prevent nosebleeds    3. HLD  -statin  -cmp and lipid yearly  -LDL goal <100    4. New onset of SIMMONS, fatigue, and edema  -concern for diastolic dysfunction with afib  -start lasix 40 mg QD and potassium 20 mEq QD  -FU with BP reading and weight in 2 weeks  -call for worsening symptoms    FU in clinic in 2 weeks with SC for review of BP, weight, and plan of care with med changes    Patient verbalizes understanding and agrees with the plan of care.     Collaborating MD: Carola BORRERO      Physical Exam    Constitutional: She is oriented to person, place, and time. She appears well-developed and well-nourished. No distress.   Obese     HENT:   Head: Normocephalic and atraumatic.   Eyes: EOM are normal.   Neck: Normal range of motion. No JVD present.   Cardiovascular: Normal rate, regular rhythm, normal heart sounds and intact distal pulses.    No murmur heard.  Pulses:       Dorsalis pedis pulses are 1+ on the right side, and 1+ on the left side.   Pulmonary/Chest: Effort normal. No respiratory distress. She has decreased breath sounds in the right lower field and the left lower field.   Abdominal: Soft. Bowel sounds are normal.   Musculoskeletal: Normal range of motion. She exhibits edema.   Bilateral lower extremity edema 2-3+ pitting   Neurological: She is alert and oriented to person, place, and time.   Skin: Skin is warm and dry.   Psychiatric: She has a normal mood and affect.   Nursing note and vitals reviewed.

## 2018-06-19 NOTE — TELEPHONE ENCOUNTER
New Rx for KCL in powder form or whatever insurance preferred sent to pharmacy.    Called pt and notified, pt verbalizes understanding

## 2018-06-19 NOTE — LETTER
[facility]   [address] [facility] [address]  [place] [address]  Phone: [phone]  Fax: [phone]              Yvette Dailey  1945    Encounter Date: 6/19/2018    BORIS Pham          PROGRESS NOTE:  Subjective:   Yvette Dailey is a 70 y.o. female who presents today for follow up on worsening BP readings and edema in her legs alongside shortness of breath.    She is a patient of Dr. Anaya Colunga in our office.     Hx of PAF with prior successful cardioversion in '15 and '16, HLD, HTN, diastolic dysfunction, obesity, and gout.    She just recently within the last month has noticed an increase in weight gain, shortness of breath, and edema in her legs.    She has been feeling her afib coming back these last couple weeks, so she made an apt.    She is not wearing her CPAP machine now due to her history of nosebleeds and is worried about recurrence. We discussed this in detail today. She does follow up regularly with pulmonary.    Past Medical History:   Diagnosis Date   • Atrial fibrillation (HCC)    • Dental disorder     upper plate, lower partial   • Edema    • Gout    • Hyperlipidemia    • Hypertension    • Obesity    • PAF (paroxysmal atrial fibrillation) (HCC)    • Paroxysmal atrial fibrillation (HCC)    • Psoriasis    • Sleep apnea     CPAP     Past Surgical History:   Procedure Laterality Date   • RECOVERY  7/19/2016    Procedure: CATH LAB CARDIOVERSION WITH ANESTHESIA DR. PAYAN;  Surgeon: Recoveryonly Surgery;  Location: SURGERY PRE-POST PROC UNIT [facility];  Service:    • RECOVERY  7/7/2015    Procedure: CATH LAB-WIEDENBECK-ELECTIVE CARDIOVERSION 50110-UBX GROUP FOR ANESTHESIA-AFIB 427.31;  Surgeon: Recoveryonly Surgery;  Location: SURGERY PRE-POST PROC UNIT [facility];  Service:    • HYSTERECTOMY LAPAROSCOPY       Family History   Problem Relation Age of Onset   • Heart Attack Mother 83   • Cancer Father      lung     History    "  Smoking Status   • Former Smoker   • Packs/day: 1.50   • Years: 45.00   • Types: Cigarettes   • Quit date: 7/1/2013   Smokeless Tobacco   • Never Used     Allergies   Allergen Reactions   • Allopurinol      \"everything went haywire!\"     Outpatient Encounter Prescriptions as of 6/19/2018   Medication Sig Dispense Refill   • cloNIDine (CATAPRES) 0.3 MG/24HR PATCH WEEKLY Apply 1 Patch to skin as directed every 7 days. 4 Patch    • furosemide (LASIX) 40 MG Tab Take 1 Tab by mouth every day. 30 Tab 11   • potassium chloride SA (KDUR) 20 MEQ Tab CR Take 1 Tab by mouth every day. 30 Tab 11   • apixaban (ELIQUIS) 5mg Tab Take 1 Tab by mouth 2 Times a Day. 180 Tab 2   • metoprolol (LOPRESSOR) 100 MG Tab Take 1 Tab by mouth 2 Times a Day. 60 Tab 1   • losartan (COZAAR) 100 MG Tab Take 1 Tab by mouth every day. 90 Tab 3   • clobetasol (TEMOVATE) 0.05 % Cream Apply  to affected area(s) 2 times a day.     • simvastatin (ZOCOR) 80 MG tablet Take 80 mg by mouth every evening.     • [DISCONTINUED] clonidine (CATAPRES) 0.3 MG/24HR PATCH WEEKLY Apply 1 Patch to skin as directed every 7 days.     • [DISCONTINUED] oxycodone immediate-release (ROXICODONE) 5 MG Tab Take 1 Tab by mouth every 6 hours as needed. 30 Tab 0   • [DISCONTINUED] cephALEXin (KEFLEX) 500 MG Cap Take 1 Cap by mouth every 6 hours. 30 Cap 0   • [DISCONTINUED] magnesium oxide (MAG-OX) 400 (241.3 Mg) MG Tab tablet Take 1 Tab by mouth 2 Times a Day. 10 Tab 0   • [DISCONTINUED] oxymetazoline (AFRIN NASAL SPRAY) 0.05 % Solution Spray 1 Spray in nose 2 times a day. 1 Bottle 0   • [DISCONTINUED] cephALEXin (KEFLEX) 500 MG Cap Take 1 Cap by mouth 4 times a day. 28 Cap 0     No facility-administered encounter medications on file as of 6/19/2018.      Review of Systems   Constitutional: Positive for malaise/fatigue. Negative for fever.   Respiratory: Positive for shortness of breath. Negative for cough.         Exertional shortness of breath   Cardiovascular: Positive for " "palpitations and leg swelling. Negative for chest pain, orthopnea, claudication and PND.        Swelling worsening, shoes tight and weight gain of 10 lbs   Gastrointestinal: Negative for abdominal pain.   Musculoskeletal: Negative for myalgias.   Neurological: Negative for dizziness.   All other systems reviewed and are negative.       Objective:   BP (!) 169/96   Pulse (!) 110   Ht 1.753 m (5' 9\")   Wt 110.2 kg (243 lb)   SpO2 92%   BMI 35.88 kg/m²      Physical Exam   Constitutional: She is oriented to person, place, and time. She appears well-developed and well-nourished. No distress.   Obese     HENT:   Head: Normocephalic and atraumatic.   Eyes: EOM are normal.   Neck: Normal range of motion. No JVD present.   Cardiovascular: Normal rate, regular rhythm, normal heart sounds and intact distal pulses.    No murmur heard.  Pulses:       Dorsalis pedis pulses are 1+ on the right side, and 1+ on the left side.   Pulmonary/Chest: Effort normal. No respiratory distress. She has decreased breath sounds in the right lower field and the left lower field.   Abdominal: Soft. Bowel sounds are normal.   Musculoskeletal: Normal range of motion. She exhibits edema.   Bilateral lower extremity edema 2-3+ pitting   Neurological: She is alert and oriented to person, place, and time.   Skin: Skin is warm and dry.   Psychiatric: She has a normal mood and affect.   Nursing note and vitals reviewed.    2015 ECHO CONCLUSIONS  Normal left ventricular size and function.  Left ventricular ejection fraction is 59% to 61%.  Mild concentric left ventricular hypertrophy.  Mild mitral regurgitation.  Right ventricular systolic pressure is estimated to be 42-47 mmHg.  Right heart pressures are consistent with moderate pulmonary   hypertension.  Moderately dilated left atrium.  Mildly dilated right atrium.    Lab Results   Component Value Date/Time    CHOLSTRLTOT 139 06/28/2016 09:40 AM    LDL 68 06/28/2016 09:40 AM    HDL 49 06/28/2016 " 09:40 AM    TRIGLYCERIDE 108 06/28/2016 09:40 AM       Lab Results   Component Value Date/Time    SODIUM 139 11/15/2017 02:57 AM    POTASSIUM 3.7 11/15/2017 02:57 AM    CHLORIDE 104 11/15/2017 02:57 AM    CO2 25 11/15/2017 02:57 AM    GLUCOSE 123 (H) 11/15/2017 02:57 AM    BUN 30 (H) 11/15/2017 02:57 AM    CREATININE 1.05 11/15/2017 02:57 AM     Lab Results   Component Value Date/Time    ALKPHOSPHAT 74 11/13/2017 01:50 AM    ASTSGOT 26 11/13/2017 01:50 AM    ALTSGPT 18 11/13/2017 01:50 AM    TBILIRUBIN 0.9 11/13/2017 01:50 AM      Lab Results   Component Value Date/Time    WBC 11.1 (H) 11/15/2017 02:57 AM    RBC 3.96 (L) 11/15/2017 02:57 AM    HEMOGLOBIN 12.9 11/15/2017 02:57 AM    HEMATOCRIT 41.4 11/15/2017 02:57 AM    .5 (H) 11/15/2017 02:57 AM    MCH 32.6 11/15/2017 02:57 AM    MCHC 31.2 (L) 11/15/2017 02:57 AM    MPV 11.2 11/15/2017 02:57 AM      Assessment:     1. Paroxysmal atrial fibrillation (CMS-HCC)  EKG   2. GLORIA (obstructive sleep apnea)     3. Class 2 obesity with serious comorbidity and body mass index (BMI) of 35.0 to 35.9 in adult, unspecified obesity type     4. Essential hypertension     5. Diastolic dysfunction     6. Pure hypercholesterolemia       Medical Decision Making:  Today's Assessment / Status / Plan:     1. HTN  -not controlled  -start lasix 40 mg QD  -cont clonidine patch 0.3 mg Q7 days, losartan, and metoprolol    2. PAF with prior DCCV in '16  -rate controlled but now symptomatic with recurrence  -she is feeling afib more now, recently stopped using CPAP  -recommend re-starting CPAP therapy  -cont metoprolol and eliquis   -no bleeding on Eliquis but patient is weary of nosebleeds that is why she stopped her CPAP machine-discussed ways to help prevent nosebleeds    3. HLD  -statin  -cmp and lipid yearly  -LDL goal <100    4. New onset of SIMMONS, fatigue, and edema  -concern for diastolic dysfunction with afib  -start lasix 40 mg QD and potassium 20 mEq QD  -FU with BP reading and  weight in 2 weeks  -call for worsening symptoms    FU in clinic in 2 weeks with SC for review of BP, weight, and plan of care with med changes    Patient verbalizes understanding and agrees with the plan of care.     Collaborating MD: Carola BORRERO      Physical Exam   Constitutional: She is oriented to person, place, and time. She appears well-developed and well-nourished. No distress.   Obese     HENT:   Head: Normocephalic and atraumatic.   Eyes: EOM are normal.   Neck: Normal range of motion. No JVD present.   Cardiovascular: Normal rate, regular rhythm, normal heart sounds and intact distal pulses.    No murmur heard.  Pulses:       Dorsalis pedis pulses are 1+ on the right side, and 1+ on the left side.   Pulmonary/Chest: Effort normal. No respiratory distress. She has decreased breath sounds in the right lower field and the left lower field.   Abdominal: Soft. Bowel sounds are normal.   Musculoskeletal: Normal range of motion. She exhibits edema.   Bilateral lower extremity edema 2-3+ pitting   Neurological: She is alert and oriented to person, place, and time.   Skin: Skin is warm and dry.   Psychiatric: She has a normal mood and affect.   Nursing note and vitals reviewed.          No Recipients

## 2018-06-19 NOTE — TELEPHONE ENCOUNTER
----- Message from Marcy Tracy sent at 6/19/2018  1:41 PM PDT -----  Regarding: patient says her Potassium pills are too large to swallow  SC/Miryam      Patient said her Potassium medication is too large to swallow. She can be reached at [phone]    ======================================================================    Called pt, per pt she is unable to swallow the KCL tablet because it is too big for her, informed pt we could ask SC if we could switch her to a powder form, pt would like her Rx to be changed to powder form.     To SC, ok to change? Thank You!

## 2018-07-06 ENCOUNTER — OFFICE VISIT (OUTPATIENT)
Dept: CARDIOLOGY | Facility: MEDICAL CENTER | Age: 73
End: 2018-07-06
Payer: MEDICARE

## 2018-07-06 ENCOUNTER — TELEPHONE (OUTPATIENT)
Dept: CARDIOLOGY | Facility: MEDICAL CENTER | Age: 73
End: 2018-07-06

## 2018-07-06 VITALS
WEIGHT: 237 LBS | BODY MASS INDEX: 35.1 KG/M2 | HEIGHT: 69 IN | DIASTOLIC BLOOD PRESSURE: 82 MMHG | SYSTOLIC BLOOD PRESSURE: 124 MMHG | HEART RATE: 91 BPM | OXYGEN SATURATION: 95 %

## 2018-07-06 DIAGNOSIS — I48.0 PAROXYSMAL ATRIAL FIBRILLATION (HCC): ICD-10-CM

## 2018-07-06 DIAGNOSIS — E78.00 PURE HYPERCHOLESTEROLEMIA: ICD-10-CM

## 2018-07-06 DIAGNOSIS — I51.89 DIASTOLIC DYSFUNCTION: ICD-10-CM

## 2018-07-06 DIAGNOSIS — I10 ESSENTIAL HYPERTENSION: ICD-10-CM

## 2018-07-06 DIAGNOSIS — G47.33 OSA (OBSTRUCTIVE SLEEP APNEA): ICD-10-CM

## 2018-07-06 LAB — EKG IMPRESSION: NORMAL

## 2018-07-06 PROCEDURE — 93000 ELECTROCARDIOGRAM COMPLETE: CPT | Performed by: NURSE PRACTITIONER

## 2018-07-06 PROCEDURE — 99214 OFFICE O/P EST MOD 30 MIN: CPT | Performed by: NURSE PRACTITIONER

## 2018-07-06 ASSESSMENT — ENCOUNTER SYMPTOMS
MYALGIAS: 0
ORTHOPNEA: 0
DIZZINESS: 0
SHORTNESS OF BREATH: 1
PALPITATIONS: 1
FEVER: 0
PND: 0
CLAUDICATION: 0
ABDOMINAL PAIN: 0
COUGH: 0

## 2018-07-06 NOTE — LETTER
[facility]   [address] [facility] [facility] [address]  [place] [address]  Phone: [phone]  Fax: [phone]              Yvette Dailey  1945    Encounter Date: 7/6/2018    BORIS Pham          PROGRESS NOTE:  Subjective:   Yvette Dailey is a 70 y.o. female who presents today for follow up on worsening BP readings and edema in her legs alongside shortness of breath.    She is a patient of Dr. Anaya Colunga in our office.     Hx of PAF with prior successful cardioversion in '15 and '16, HLD, HTN, diastolic dysfunction, obesity, and gout.    Since her last apt, we started her on diuretic therapy and this has improved her edema in her legs, BP readings are now within normal range, and her weight has gone down 10 lbs on her home scale.    She is back in afib today but is asymptomatic to this.    She is now wearing her CPAP machine regularly and feels much less tired throughout the day.    Past Medical History:   Diagnosis Date   • Atrial fibrillation (HCC)    • Dental disorder     upper plate, lower partial   • Edema    • Gout    • Hyperlipidemia    • Hypertension    • Obesity    • PAF (paroxysmal atrial fibrillation) (HCC)    • Paroxysmal atrial fibrillation (HCC)    • Psoriasis    • Sleep apnea     CPAP     Past Surgical History:   Procedure Laterality Date   • RECOVERY  7/19/2016    Procedure: CATH LAB CARDIOVERSION WITH ANESTHESIA DR. PAYAN;  Surgeon: Recoveryonly Surgery;  [facility];  Service:    • RECOVERY  7/7/2015    Procedure: CATH LAB-WIEDENBECK-ELECTIVE CARDIOVERSION [id number]-[id number] GROUP FOR ANESTHESIA-AFIB 427.31;  Surgeon: Recoveryonly Surgery;  Location: [facility] [place];  Service:    • HYSTERECTOMY LAPAROSCOPY       Family History   Problem Relation Age of Onset   • Heart Attack Mother 83   • Cancer Father      lung     History   Smoking Status   • Former Smoker   • Packs/day: 1.50   •  "Years: 45.00   • Types: Cigarettes   • Quit date: 7/1/2013   Smokeless Tobacco   • Never Used     Allergies   Allergen Reactions   • Allopurinol      \"everything went haywire!\"     Outpatient Encounter Prescriptions as of 7/6/2018   Medication Sig Dispense Refill   • cloNIDine (CATAPRES) 0.3 MG/24HR PATCH WEEKLY Apply 1 Patch to skin as directed every 7 days. 4 Patch    • furosemide (LASIX) 40 MG Tab Take 1 Tab by mouth every day. 30 Tab 11   • potassium chloride (KLOR-CON) 20 MEQ Pack Take 1 Packet by mouth every day. 30 Packet 11   • apixaban (ELIQUIS) 5mg Tab Take 1 Tab by mouth 2 Times a Day. 180 Tab 2   • metoprolol (LOPRESSOR) 100 MG Tab Take 1 Tab by mouth 2 Times a Day. 60 Tab 1   • losartan (COZAAR) 100 MG Tab Take 1 Tab by mouth every day. 90 Tab 3   • clobetasol (TEMOVATE) 0.05 % Cream Apply  to affected area(s) 2 times a day.     • simvastatin (ZOCOR) 80 MG tablet Take 80 mg by mouth every evening.       No facility-administered encounter medications on file as of 7/6/2018.      Review of Systems   Constitutional: Positive for malaise/fatigue. Negative for fever.   Respiratory: Positive for shortness of breath. Negative for cough.         Exertional shortness of breath   Cardiovascular: Positive for palpitations and leg swelling. Negative for chest pain, orthopnea, claudication and PND.   Gastrointestinal: Negative for abdominal pain.   Musculoskeletal: Negative for myalgias.   Neurological: Negative for dizziness.   All other systems reviewed and are negative.       Objective:   /82   Pulse 91   Ht 1.753 m (5' 9\")   Wt 107.5 kg (237 lb)   SpO2 95%   BMI 35.00 kg/m²      Physical Exam   Constitutional: She is oriented to person, place, and time. She appears well-developed and well-nourished. No distress.   Obese     HENT:   Head: Normocephalic and atraumatic.   Eyes: EOM are normal.   Neck: Normal range of motion. No JVD present.   Cardiovascular: Normal rate, regular rhythm, normal heart " sounds and intact distal pulses.    No murmur heard.  Pulses:       Dorsalis pedis pulses are 1+ on the right side, and 1+ on the left side.   Pulmonary/Chest: Effort normal. No respiratory distress. She has decreased breath sounds in the right lower field and the left lower field.   Abdominal: Soft. Bowel sounds are normal.   Musculoskeletal: Normal range of motion. She exhibits edema.   Bilateral lower extremity edema 2-3+ pitting   Neurological: She is alert and oriented to person, place, and time.   Skin: Skin is warm and dry.   Psychiatric: She has a normal mood and affect.   Nursing note and vitals reviewed.    2015 ECHO CONCLUSIONS  Normal left ventricular size and function.  Left ventricular ejection fraction is 59% to 61%.  Mild concentric left ventricular hypertrophy.  Mild mitral regurgitation.  Right ventricular systolic pressure is estimated to be 42-47 mmHg.  Right heart pressures are consistent with moderate pulmonary   hypertension.  Moderately dilated left atrium.  Mildly dilated right atrium.    Lab Results   Component Value Date/Time    CHOLSTRLTOT 139 06/28/2016 09:40 AM    LDL 68 06/28/2016 09:40 AM    HDL 49 06/28/2016 09:40 AM    TRIGLYCERIDE 108 06/28/2016 09:40 AM       Lab Results   Component Value Date/Time    SODIUM 139 11/15/2017 02:57 AM    POTASSIUM 3.7 11/15/2017 02:57 AM    CHLORIDE 104 11/15/2017 02:57 AM    CO2 25 11/15/2017 02:57 AM    GLUCOSE 123 (H) 11/15/2017 02:57 AM    BUN 30 (H) 11/15/2017 02:57 AM    CREATININE 1.05 11/15/2017 02:57 AM     Lab Results   Component Value Date/Time    ALKPHOSPHAT 74 11/13/2017 01:50 AM    ASTSGOT 26 11/13/2017 01:50 AM    ALTSGPT 18 11/13/2017 01:50 AM    TBILIRUBIN 0.9 11/13/2017 01:50 AM      Lab Results   Component Value Date/Time    WBC 11.1 (H) 11/15/2017 02:57 AM    RBC 3.96 (L) 11/15/2017 02:57 AM    HEMOGLOBIN 12.9 11/15/2017 02:57 AM    HEMATOCRIT 41.4 11/15/2017 02:57 AM    .5 (H) 11/15/2017 02:57 AM    MCH 32.6 11/15/2017  02:57 AM    MCHC 31.2 (L) 11/15/2017 02:57 AM    MPV 11.2 11/15/2017 02:57 AM      Assessment:     1. Paroxysmal atrial fibrillation (CMS-HCC)  EKG   2. Diastolic dysfunction     3. Essential hypertension     4. Pure hypercholesterolemia     5. Class 2 obesity with serious comorbidity and body mass index (BMI) of 35.0 to 35.9 in adult, unspecified obesity type     6. GLORIA (obstructive sleep apnea)       Medical Decision Making:  Today's Assessment / Status / Plan:     1. HTN  -great control now  -cont lasix 40 mg QD then go to QOD once weight stable for one week  -cont clonidine patch 0.3 mg Q7 days, losartan, and metoprolol    2. PAF with prior DCCV in '16  -now back in afib persistently it seems  -rate controlled and asymptomatic  -cont metoprolol and eliquis     3. HLD  -statin  -cmp and lipid yearly  -LDL goal <100    4. SIMMONS, fatigue, and edema  -concern for diastolic dysfunction with afib  -cont lasix 40 mg QD and potassium 20 mEq QD until weight stable then go to QOD  -FU with BP readings/weight daily  -call for worsening symptoms    FU in clinic in 4 months with LA; labs next year (CMP, lipid)    Patient verbalizes understanding and agrees with the plan of care.     Collaborating MD: Estephanie BORRERO    Physical Exam   Constitutional: She is oriented to person, place, and time. She appears well-developed and well-nourished. No distress.   Obese     HENT:   Head: Normocephalic and atraumatic.   Eyes: EOM are normal.   Neck: Normal range of motion. No JVD present.   Cardiovascular: Normal rate, regular rhythm, normal heart sounds and intact distal pulses.    No murmur heard.  Pulses:       Dorsalis pedis pulses are 1+ on the right side, and 1+ on the left side.   Pulmonary/Chest: Effort normal. No respiratory distress. She has decreased breath sounds in the right lower field and the left lower field.   Abdominal: Soft. Bowel sounds are normal.   Musculoskeletal: Normal range of motion. She exhibits edema.   Bilateral  lower extremity edema 2-3+ pitting   Neurological: She is alert and oriented to person, place, and time.   Skin: Skin is warm and dry.   Psychiatric: She has a normal mood and affect.   Nursing note and vitals reviewed.          No Recipients

## 2018-07-06 NOTE — PROGRESS NOTES
"Subjective:   Yvette Dailey is a 70 y.o. female who presents today for follow up on worsening BP readings and edema in her legs alongside shortness of breath.    She is a patient of Dr. Anaya Colunga in our office.     Hx of PAF with prior successful cardioversion in '15 and '16, HLD, HTN, diastolic dysfunction, obesity, and gout.    Since her last apt, we started her on diuretic therapy and this has improved her edema in her legs, BP readings are now within normal range, and her weight has gone down 10 lbs on her home scale.    She is back in afib today but is asymptomatic to this.    She is now wearing her CPAP machine regularly and feels much less tired throughout the day.    Past Medical History:   Diagnosis Date   • Atrial fibrillation (HCC)    • Dental disorder     upper plate, lower partial   • Edema    • Gout    • Hyperlipidemia    • Hypertension    • Obesity    • PAF (paroxysmal atrial fibrillation) (HCC)    • Paroxysmal atrial fibrillation (HCC)    • Psoriasis    • Sleep apnea     CPAP     Past Surgical History:   Procedure Laterality Date   • RECOVERY  7/19/2016    Procedure: CATH LAB CARDIOVERSION WITH ANESTHESIA DR. PAYAN;  Surgeon: Recoveryonly Surgery;  Location[place] PRE[place] PROC [place] [facility] [place];  Service:    • RECOVERY  7/7/2015    Procedure: CATH LAB-WIEDENBECK-ELECTIVE CARDIOVERSION [id number] GROUP FOR ANESTHESIA-AFIB 427.31;  Surgeon: Recoveryonly Surgery;  Location: SURG[place] PRE-POST PROC UNIT [facility] [place];  Service:    • HYSTERECTOMY LAPAROSCOPY       Family History   Problem Relation Age of Onset   • Heart Attack Mother 83   • Cancer Father      lung     History   Smoking Status   • Former Smoker   • Packs/day: 1.50   • Years: 45.00   • Types: Cigarettes   • Quit date: 7/1/2013   Smokeless Tobacco   • Never Used     Allergies   Allergen Reactions   • Allopurinol      \"everything went haywire!\"     Outpatient Encounter Prescriptions as of 7/6/2018   Medication Sig Dispense Refill   • " "cloNIDine (CATAPRES) 0.3 MG/24HR PATCH WEEKLY Apply 1 Patch to skin as directed every 7 days. 4 Patch    • furosemide (LASIX) 40 MG Tab Take 1 Tab by mouth every day. 30 Tab 11   • potassium chloride (KLOR-CON) 20 MEQ Pack Take 1 Packet by mouth every day. 30 Packet 11   • apixaban (ELIQUIS) 5mg Tab Take 1 Tab by mouth 2 Times a Day. 180 Tab 2   • metoprolol (LOPRESSOR) 100 MG Tab Take 1 Tab by mouth 2 Times a Day. 60 Tab 1   • losartan (COZAAR) 100 MG Tab Take 1 Tab by mouth every day. 90 Tab 3   • clobetasol (TEMOVATE) 0.05 % Cream Apply  to affected area(s) 2 times a day.     • simvastatin (ZOCOR) 80 MG tablet Take 80 mg by mouth every evening.       No facility-administered encounter medications on file as of 7/6/2018.      Review of Systems   Constitutional: Positive for malaise/fatigue. Negative for fever.   Respiratory: Positive for shortness of breath. Negative for cough.         Exertional shortness of breath   Cardiovascular: Positive for palpitations and leg swelling. Negative for chest pain, orthopnea, claudication and PND.   Gastrointestinal: Negative for abdominal pain.   Musculoskeletal: Negative for myalgias.   Neurological: Negative for dizziness.   All other systems reviewed and are negative.       Objective:   /82   Pulse 91   Ht 1.753 m (5' 9\")   Wt 107.5 kg (237 lb)   SpO2 95%   BMI 35.00 kg/m²     Physical Exam   Constitutional: She is oriented to person, place, and time. She appears well-developed and well-nourished. No distress.   Obese     HENT:   Head: Normocephalic and atraumatic.   Eyes: EOM are normal.   Neck: Normal range of motion. No JVD present.   Cardiovascular: Normal rate, regular rhythm, normal heart sounds and intact distal pulses.    No murmur heard.  Pulses:       Dorsalis pedis pulses are 1+ on the right side, and 1+ on the left side.   Pulmonary/Chest: Effort normal. No respiratory distress. She has decreased breath sounds in the right lower field and the left " lower field.   Abdominal: Soft. Bowel sounds are normal.   Musculoskeletal: Normal range of motion. She exhibits edema.   Bilateral lower extremity edema 2-3+ pitting   Neurological: She is alert and oriented to person, place, and time.   Skin: Skin is warm and dry.   Psychiatric: She has a normal mood and affect.   Nursing note and vitals reviewed.    2015 ECHO CONCLUSIONS  Normal left ventricular size and function.  Left ventricular ejection fraction is 59% to 61%.  Mild concentric left ventricular hypertrophy.  Mild mitral regurgitation.  Right ventricular systolic pressure is estimated to be 42-47 mmHg.  Right heart pressures are consistent with moderate pulmonary   hypertension.  Moderately dilated left atrium.  Mildly dilated right atrium.    Lab Results   Component Value Date/Time    CHOLSTRLTOT 139 06/28/2016 09:40 AM    LDL 68 06/28/2016 09:40 AM    HDL 49 06/28/2016 09:40 AM    TRIGLYCERIDE 108 06/28/2016 09:40 AM       Lab Results   Component Value Date/Time    SODIUM 139 11/15/2017 02:57 AM    POTASSIUM 3.7 11/15/2017 02:57 AM    CHLORIDE 104 11/15/2017 02:57 AM    CO2 25 11/15/2017 02:57 AM    GLUCOSE 123 (H) 11/15/2017 02:57 AM    BUN 30 (H) 11/15/2017 02:57 AM    CREATININE 1.05 11/15/2017 02:57 AM     Lab Results   Component Value Date/Time    ALKPHOSPHAT 74 11/13/2017 01:50 AM    ASTSGOT 26 11/13/2017 01:50 AM    ALTSGPT 18 11/13/2017 01:50 AM    TBILIRUBIN 0.9 11/13/2017 01:50 AM      Lab Results   Component Value Date/Time    WBC 11.1 (H) 11/15/2017 02:57 AM    RBC 3.96 (L) 11/15/2017 02:57 AM    HEMOGLOBIN 12.9 11/15/2017 02:57 AM    HEMATOCRIT 41.4 11/15/2017 02:57 AM    .5 (H) 11/15/2017 02:57 AM    MCH 32.6 11/15/2017 02:57 AM    MCHC 31.2 (L) 11/15/2017 02:57 AM    MPV 11.2 11/15/2017 02:57 AM      Assessment:     1. Paroxysmal atrial fibrillation (CMS-HCC)  EKG   2. Diastolic dysfunction     3. Essential hypertension     4. Pure hypercholesterolemia     5. Class 2 obesity with serious  comorbidity and body mass index (BMI) of 35.0 to 35.9 in adult, unspecified obesity type     6. GLORIA (obstructive sleep apnea)       Medical Decision Making:  Today's Assessment / Status / Plan:     1. HTN  -great control now  -cont lasix 40 mg QD then go to QOD once weight stable for one week  -cont clonidine patch 0.3 mg Q7 days, losartan, and metoprolol    2. PAF with prior DCCV in '16  -now back in afib persistently it seems  -rate controlled and asymptomatic  -cont metoprolol and eliquis     3. HLD  -statin  -cmp and lipid yearly  -LDL goal <100    4. SIMMONS, fatigue, and edema  -concern for diastolic dysfunction with afib  -cont lasix 40 mg QD and potassium 20 mEq QD until weight stable then go to QOD  -FU with BP readings/weight daily  -call for worsening symptoms    FU in clinic in 4 months with LA; labs next year (CMP, lipid)    Patient verbalizes understanding and agrees with the plan of care.     Collaborating MD: Estepahnie BORRERO    Physical Exam   Constitutional: She is oriented to person, place, and time. She appears well-developed and well-nourished. No distress.   Obese     HENT:   Head: Normocephalic and atraumatic.   Eyes: EOM are normal.   Neck: Normal range of motion. No JVD present.   Cardiovascular: Normal rate, regular rhythm, normal heart sounds and intact distal pulses.    No murmur heard.  Pulses:       Dorsalis pedis pulses are 1+ on the right side, and 1+ on the left side.   Pulmonary/Chest: Effort normal. No respiratory distress. She has decreased breath sounds in the right lower field and the left lower field.   Abdominal: Soft. Bowel sounds are normal.   Musculoskeletal: Normal range of motion. She exhibits edema.   Bilateral lower extremity edema 2-3+ pitting   Neurological: She is alert and oriented to person, place, and time.   Skin: Skin is warm and dry.   Psychiatric: She has a normal mood and affect.   Nursing note and vitals reviewed.

## 2018-09-17 ENCOUNTER — SLEEP CENTER VISIT (OUTPATIENT)
Dept: SLEEP MEDICINE | Facility: MEDICAL CENTER | Age: 73
End: 2018-09-17
Payer: MEDICARE

## 2018-09-17 VITALS
DIASTOLIC BLOOD PRESSURE: 80 MMHG | TEMPERATURE: 98.2 F | HEART RATE: 96 BPM | SYSTOLIC BLOOD PRESSURE: 122 MMHG | WEIGHT: 240 LBS | RESPIRATION RATE: 18 BRPM | OXYGEN SATURATION: 90 % | BODY MASS INDEX: 35.55 KG/M2 | HEIGHT: 69 IN

## 2018-09-17 DIAGNOSIS — G47.33 OSA (OBSTRUCTIVE SLEEP APNEA): ICD-10-CM

## 2018-09-17 PROCEDURE — 99213 OFFICE O/P EST LOW 20 MIN: CPT | Performed by: NURSE PRACTITIONER

## 2018-09-17 NOTE — PATIENT INSTRUCTIONS
Plan:    1) Continue Auto CPAP @ 10-15 CM H20. RX for new mask and supplies sent to her DME.  2) Sleep hygiene discussed. Recommend keeping a set sleep/wake schedule. Logging enough hours of sleep. Limiting/Avoiding naps. No caffeine after noon and no heavy meals in the evening. Recommend daily exercise.   3) Weight loss recommended.  4) Annual follow up with compliance card download, sooner if needed.

## 2018-09-17 NOTE — PROGRESS NOTES
Chief Complaint   Patient presents with   • Apnea     LAST SEEN 4/24/17       HPI:  Yvette Dailey is a 73 y.o. year old female here today for follow-up on her obstructive sleep apnea. She has a history of paroxysmal atrial fibrillation. She had an overnight oximetry which demonstrated hypoxemia. She had spent 237 minutes with saturations less than 88%. Her basal saturation was 89.6%. Polysomnogram 8/3/2016 indicated evidence of severe sleep apnea with an AHI of 79.8 with a low 02 saturation of 83%. She had an incomplete titration to CPAP pressures tried of 4-13 CM H20. On a CPAP pressure of 13 CM her AHI was reduced to 13 and her mean 02 saturation was 92.2%. She was started on Auto CPAP 12-18 CM H20. However felt the pressures were too high. She was decreased to 10-15 CM H20.   Repeat compliance card download today in the office indicates an AHI of 5.2 with an average use of over 8 hours at night. Her peak average pressure is 11.8. She had an episode of epistaxis in November 2017 requiring packing an ENT consult. She tolerates the pressure well. She has a nasal mask which is comfortable. She does feel she sleeps better on therapy and wakes more refreshed. She denies morning headaches.     She is on Metoprolol. She is followed by Cardiology. She is also on anticoagulation with Eliquis.       Past Medical History:   Diagnosis Date   • Atrial fibrillation (HCC)    • Dental disorder     upper plate, lower partial   • Edema    • Gout    • Hyperlipidemia    • Hypertension    • Obesity    • PAF (paroxysmal atrial fibrillation) (HCC)    • Paroxysmal atrial fibrillation (HCC)    • Psoriasis    • Sleep apnea     CPAP       Past Surgical History:   Procedure Laterality Date   • RECOVERY  7/19/2016    Procedure: CATH LAB CARDIOVERSION WITH ANESTHESIA DR. PAYAN;  Surgeon: Recoveryonly Surgery;  [place] PROC [place] [facility] [place]  Service:    • RECOVERY  7/7/2015    Procedure: CATH LAB-WIEDENBECK-ELECTIVE  CARDIOVERSION [id number] GROUP FOR ANESTHESIA-AFIB 427.31;  Surgeon: Recoveryonly Surgery;  Location: SURGERY PRE-[place] PROC UNIT [facility] [place];  Service:    • HYSTERECTOMY LAPAROSCOPY         Family History   Problem Relation Age of Onset   • Heart Attack Mother 83   • Cancer Father         lung       Social History     Social History   • Marital status:      Spouse name: N/A   • Number of children: N/A   • Years of education: N/A     Occupational History   • Not on file.     Social History Main Topics   • Smoking status: Former Smoker     Packs/day: 1.50     Years: 45.00     Types: Cigarettes     Quit date: 7/1/2013   • Smokeless tobacco: Never Used   • Alcohol use 4.2 - 8.4 oz/week     7 - 14 Shots of liquor per week   • Drug use: No   • Sexual activity: Not on file     Other Topics Concern   • Not on file     Social History Narrative   • No narrative on file         ROS:  Constitutional: Denies fevers, chills, sweats, weight loss  Eyes: Denies glasses, vision loss, pain, drainage, double vision  Ears/Nose/Mouth/Throat: Denies rhinitis, nasal congestion, ear ache, difficulty hearing, sore throat, persistent hoarseness, decayed teeth/toothache  Cardiovascular: Denies chest pain, tightness, palpitations, swelling in feet/legs, fainting, difficulty breathing when laying down  Respiratory: Denies shortness of breath, cough, sputum, wheezing, painful breathing, coughing up blood  GI: Denies heartburn, difficulty swallowing, nausea, vomiting, abdominal pain, diarrhea, constipation  : Denies frequent urination, painful urination  Integumentary: Denies rashes, lumps or color changes  MSK: Denies painful joints, sore muscles, and back pain.   Neurological: Denies frequent headaches, dizziness, weakness  Sleep: See HPI       Current Outpatient Prescriptions   Medication Sig Dispense Refill   • furosemide (LASIX) 40 MG Tab Take 1 Tab by mouth every day. 30 Tab 11   • potassium chloride (KLOR-CON) 20 MEQ Pack Take 1 Packet by  "mouth every day. 30 Packet 11   • apixaban (ELIQUIS) 5mg Tab Take 1 Tab by mouth 2 Times a Day. 180 Tab 2   • metoprolol (LOPRESSOR) 100 MG Tab Take 1 Tab by mouth 2 Times a Day. 60 Tab 1   • losartan (COZAAR) 100 MG Tab Take 1 Tab by mouth every day. 90 Tab 3   • clobetasol (TEMOVATE) 0.05 % Cream Apply  to affected area(s) 2 times a day.     • simvastatin (ZOCOR) 80 MG tablet Take 80 mg by mouth every evening.     • cloNIDine (CATAPRES) 0.3 MG/24HR PATCH WEEKLY Apply 1 Patch to skin as directed every 7 days. 4 Patch      No current facility-administered medications for this visit.        Allergies   Allergen Reactions   • Allopurinol      \"everything went haywire!\"       Blood pressure 122/80, pulse 96, temperature 36.8 °C (98.2 °F), resp. rate 18, height 1.753 m (5' 9\"), weight 108.9 kg (240 lb), SpO2 90 %.    PE:   Appearance: Well developed, well nourished, no acute distress  Eyes: PERRL, EOM intact, sclera white, conjunctiva moist  Ears: no lesions or deformities  Hearing: grossly intact  Nose: no lesions or deformities  Oropharynx: tongue normal, posterior pharynx without erythema or exudate  Mallampati Classification: Class 4  Neck: supple, trachea midline, no masses   Respiratory effort: no intercostal retractions or use of accessory muscles  Lung auscultation: no rales, rhonchi or wheezes  Heart auscultation: no murmur rub or gallop  Extremities: no cyanosis or edema  Abdomen: soft ,non tender, no masses  Gait and Station: normal  Digits and nails: no clubbing, cyanosis, petechiae or nodes.  Cranial nerves: grossly intact  Skin: no rashes, lesions or ulcers noted  Orientation: Oriented to time, person and place  Mood and affect: mood and affect appropriate, normal interaction with examiner  Judgement: Intact          Assessment:    1. GLORIA (obstructive sleep apnea)  DME MASK AND SUPPLIES   2. BMI 35.0-35.9,adult  Patient identified as having weight management issue.  Appropriate orders and counseling given. "         Plan:    1) Continue Auto CPAP @ 10-15 CM H20. RX for new mask and supplies sent to her DME.  2) Sleep hygiene discussed. Recommend keeping a set sleep/wake schedule. Logging enough hours of sleep. Limiting/Avoiding naps. No caffeine after noon and no heavy meals in the evening. Recommend daily exercise.   3) Weight loss recommended.  4) Annual follow up with compliance card download, sooner if needed.

## 2018-11-28 ENCOUNTER — OFFICE VISIT (OUTPATIENT)
Dept: CARDIOLOGY | Facility: MEDICAL CENTER | Age: 73
End: 2018-11-28
Payer: MEDICARE

## 2018-11-28 VITALS
WEIGHT: 245 LBS | SYSTOLIC BLOOD PRESSURE: 160 MMHG | BODY MASS INDEX: 36.29 KG/M2 | HEIGHT: 69 IN | HEART RATE: 80 BPM | DIASTOLIC BLOOD PRESSURE: 80 MMHG | OXYGEN SATURATION: 92 %

## 2018-11-28 DIAGNOSIS — I48.0 PAROXYSMAL ATRIAL FIBRILLATION (HCC): ICD-10-CM

## 2018-11-28 DIAGNOSIS — E78.00 PURE HYPERCHOLESTEROLEMIA: ICD-10-CM

## 2018-11-28 DIAGNOSIS — I10 ESSENTIAL HYPERTENSION: ICD-10-CM

## 2018-11-28 DIAGNOSIS — I51.89 DIASTOLIC DYSFUNCTION: ICD-10-CM

## 2018-11-28 PROCEDURE — 99214 OFFICE O/P EST MOD 30 MIN: CPT | Performed by: INTERNAL MEDICINE

## 2018-11-28 RX ORDER — LOSARTAN POTASSIUM AND HYDROCHLOROTHIAZIDE 25; 100 MG/1; MG/1
1 TABLET ORAL DAILY
COMMUNITY
End: 2020-07-09 | Stop reason: SDUPTHER

## 2018-11-28 ASSESSMENT — ENCOUNTER SYMPTOMS
SHORTNESS OF BREATH: 1
INSOMNIA: 0
BLURRED VISION: 0
CHILLS: 0
MYALGIAS: 0
SPUTUM PRODUCTION: 0
MEMORY LOSS: 0
NERVOUS/ANXIOUS: 0
ABDOMINAL PAIN: 0
WEAKNESS: 1
COUGH: 1
DOUBLE VISION: 0
PND: 0
PALPITATIONS: 0
WEIGHT LOSS: 0
FEVER: 0
DEPRESSION: 0
BACK PAIN: 0
NAUSEA: 0
HEARTBURN: 0
LOSS OF CONSCIOUSNESS: 0
DIZZINESS: 0

## 2018-11-28 NOTE — PROGRESS NOTES
Chief Complaint   Patient presents with   • Atrial Fibrillation     follow up       Subjective:   Yvette Dailey is a 73 y.o. female who presents today in follow-up in regards to her hypertension hyperlipidemia probable permanent or at least persistent atrial fibrillation on systemic anticoagulation in the setting of long-standing sleep apnea    Struggles with her weight.  Tired all the time.  Always has to go to the bathroom because of her Lasix.  She takes it every other day    Remembers when she was only on metoprolol once a day she did not feel so tired.  Off flecainide now for about a year    Also has been has been sick.  She is compliant with her CPAP.  Sister has also been sick    Past Medical History:   Diagnosis Date   • Atrial fibrillation (HCC)    • Dental disorder     upper plate, lower partial   • Edema    • Gout    • Hyperlipidemia    • Hypertension    • Obesity    • PAF (paroxysmal atrial fibrillation) (HCC)    • Paroxysmal atrial fibrillation (HCC)    • Psoriasis    • Sleep apnea     CPAP     Past Surgical History:   Procedure Laterality Date   • RECOVERY  7/19/2016    Procedure: CATH LAB CARDIOVERSION WITH ANESTHESIA DR. PAYAN;  Surgeon: Recoveryonly Surgery;  Location: SURGERY PRE-POST PROC UNIT [facility] [place];  Service:    • RECOVERY  7/7/2015    Procedure: CATH LAB-WIEDENBECK-ELECTIVE CARDIOVERSION [id number] GROUP FOR ANESTHESIA-AFIB 427.31;  Surgeon: Recoveryonly Surgery;  Location: SURGERY PRE-POST PROC UNIT [facility] [place];  Service:    • HYSTERECTOMY LAPAROSCOPY       Family History   Problem Relation Age of Onset   • Heart Attack Mother 83   • Cancer Father         lung     Social History     Social History   • Marital status:      Spouse name: N/A   • Number of children: N/A   • Years of education: N/A     Occupational History   • Not on file.     Social History Main Topics   • Smoking status: Former Smoker     Packs/day: 1.50     Years: 45.00     Types: Cigarettes     Quit date: 7/1/2013   •  "Smokeless tobacco: Never Used   • Alcohol use 4.2 - 8.4 oz/week     7 - 14 Shots of liquor per week   • Drug use: No   • Sexual activity: Not on file     Other Topics Concern   • Not on file     Social History Narrative   • No narrative on file     Allergies   Allergen Reactions   • Allopurinol      \"everything went haywire!\"     Outpatient Encounter Prescriptions as of 11/28/2018   Medication Sig Dispense Refill   • losartan-hydrochlorothiazide (HYZAAR) 100-25 MG per tablet Take 1 Tab by mouth every day.     • cloNIDine (CATAPRES) 0.3 MG/24HR PATCH WEEKLY Apply 1 Patch to skin as directed every 7 days. 4 Patch    • furosemide (LASIX) 40 MG Tab Take 1 Tab by mouth every day. (Patient taking differently: Take 40 mg by mouth every day. EVERY OTHER DAY) 30 Tab 11   • potassium chloride (KLOR-CON) 20 MEQ Pack Take 1 Packet by mouth every day. 30 Packet 11   • apixaban (ELIQUIS) 5mg Tab Take 1 Tab by mouth 2 Times a Day. 180 Tab 2   • metoprolol (LOPRESSOR) 100 MG Tab Take 1 Tab by mouth 2 Times a Day. 60 Tab 1   • clobetasol (TEMOVATE) 0.05 % Cream Apply  to affected area(s) 2 times a day.     • simvastatin (ZOCOR) 80 MG tablet Take 80 mg by mouth every evening.     • [DISCONTINUED] losartan (COZAAR) 100 MG Tab Take 1 Tab by mouth every day. (Patient not taking: Reported on 11/28/2018) 90 Tab 3     No facility-administered encounter medications on file as of 11/28/2018.      Review of Systems   Constitutional: Positive for malaise/fatigue. Negative for chills, fever and weight loss.   HENT: Negative for hearing loss and tinnitus.    Eyes: Negative for blurred vision and double vision.   Respiratory: Positive for cough and shortness of breath. Negative for sputum production.    Cardiovascular: Negative for chest pain, palpitations, leg swelling and PND.   Gastrointestinal: Negative for abdominal pain, heartburn and nausea.   Genitourinary: Positive for dysuria, frequency and urgency.   Musculoskeletal: Negative for back " "pain and myalgias.   Skin: Negative for rash.   Neurological: Positive for weakness. Negative for dizziness and loss of consciousness.   Psychiatric/Behavioral: Negative for depression and memory loss. The patient is not nervous/anxious and does not have insomnia.    All other systems reviewed and are negative.       Objective:   /80 (BP Location: Left arm, Patient Position: Sitting)   Pulse 80   Ht 1.753 m (5' 9\")   Wt 111.1 kg (245 lb)   SpO2 92%   BMI 36.18 kg/m²     Physical Exam   Constitutional: She is oriented to person, place, and time.   Plethoric no acute distress   HENT:   Head: Atraumatic.   Neck: No JVD present. No thyromegaly present.   Cardiovascular: Normal rate and intact distal pulses.    No murmur heard.  Pulmonary/Chest: Breath sounds normal. No respiratory distress. She exhibits no tenderness.   Abdominal: Bowel sounds are normal. She exhibits no distension.   Musculoskeletal: She exhibits edema (1+ rubor no ecchymosis chronic stasis changes warm with 1 out of 2 dorsalis pedis bilaterally).   Lymphadenopathy:     She has no cervical adenopathy.   Neurological: She is alert and oriented to person, place, and time. She exhibits normal muscle tone. Coordination normal.   Skin: Skin is warm and dry.   Psychiatric: She has a normal mood and affect. Her behavior is normal.       Assessment:     1. Paroxysmal atrial fibrillation (CMS-HCC)  BTYPE NATRIURETIC PEPTIDE    COMP METABOLIC PANEL    TSH WITH REFLEX TO FT4    CBC WITH DIFFERENTIAL   2. Pure hypercholesterolemia     3. Essential hypertension     4. Diastolic dysfunction         Medical Decision Making:  Today's Assessment / Status / Plan:     Atrial fibrillation/breathlessness  These are likely correlated, also compounded by sleep apnea  If anything she looks quite dry on exam today  She is handicapped by taking her Lasix.  She will try to take 80 mg if she gains 3 pounds.  She will continue to weigh herself every day.  We looked at " her charts blood pressures have also been good    Pulmonary hypertension  Talked at length about CPAP and sleep apnea  Lasix as above  Would be reasonable to try Bumex if Lasix is not effective  Talked about potassium supplementation  I did order blood work including BNP CBC  Looked at images of echo from October 2017 pulmonary pressures at least 60 with no significant valve disease and normal left heart function    Anticoagulation  CBC as above  We will check GFR again but normal last year and appropriately does  Talked about eventuality of bleeding and progression of issues as above    We will call her in 1 week  Would recommend reducing metoprolol to just once daily and adding digoxin 125 if laboratories all normal and she is still symptomatic

## 2018-11-29 ENCOUNTER — HOSPITAL ENCOUNTER (OUTPATIENT)
Dept: LAB | Facility: MEDICAL CENTER | Age: 73
End: 2018-11-29
Attending: INTERNAL MEDICINE
Payer: MEDICARE

## 2018-11-29 DIAGNOSIS — I48.0 PAROXYSMAL ATRIAL FIBRILLATION (HCC): ICD-10-CM

## 2018-11-29 LAB
ALBUMIN SERPL BCP-MCNC: 4.7 G/DL (ref 3.2–4.9)
ALBUMIN/GLOB SERPL: 1.5 G/DL
ALP SERPL-CCNC: 66 U/L (ref 30–99)
ALT SERPL-CCNC: 16 U/L (ref 2–50)
ANION GAP SERPL CALC-SCNC: 13 MMOL/L (ref 0–11.9)
AST SERPL-CCNC: 19 U/L (ref 12–45)
BASOPHILS # BLD AUTO: 0.7 % (ref 0–1.8)
BASOPHILS # BLD: 0.06 K/UL (ref 0–0.12)
BILIRUB SERPL-MCNC: 1.3 MG/DL (ref 0.1–1.5)
BNP SERPL-MCNC: 236 PG/ML (ref 0–100)
BUN SERPL-MCNC: 32 MG/DL (ref 8–22)
CALCIUM SERPL-MCNC: 9.7 MG/DL (ref 8.5–10.5)
CHLORIDE SERPL-SCNC: 100 MMOL/L (ref 96–112)
CO2 SERPL-SCNC: 26 MMOL/L (ref 20–33)
CREAT SERPL-MCNC: 1.32 MG/DL (ref 0.5–1.4)
EOSINOPHIL # BLD AUTO: 0.11 K/UL (ref 0–0.51)
EOSINOPHIL NFR BLD: 1.2 % (ref 0–6.9)
ERYTHROCYTE [DISTWIDTH] IN BLOOD BY AUTOMATED COUNT: 58.7 FL (ref 35.9–50)
FASTING STATUS PATIENT QL REPORTED: NORMAL
GLOBULIN SER CALC-MCNC: 3.2 G/DL (ref 1.9–3.5)
GLUCOSE SERPL-MCNC: 205 MG/DL (ref 65–99)
HCT VFR BLD AUTO: 46.9 % (ref 37–47)
HGB BLD-MCNC: 14.5 G/DL (ref 12–16)
IMM GRANULOCYTES # BLD AUTO: 0.06 K/UL (ref 0–0.11)
IMM GRANULOCYTES NFR BLD AUTO: 0.7 % (ref 0–0.9)
LYMPHOCYTES # BLD AUTO: 1.39 K/UL (ref 1–4.8)
LYMPHOCYTES NFR BLD: 15.1 % (ref 22–41)
MCH RBC QN AUTO: 33.6 PG (ref 27–33)
MCHC RBC AUTO-ENTMCNC: 30.9 G/DL (ref 33.6–35)
MCV RBC AUTO: 108.6 FL (ref 81.4–97.8)
MONOCYTES # BLD AUTO: 0.71 K/UL (ref 0–0.85)
MONOCYTES NFR BLD AUTO: 7.7 % (ref 0–13.4)
NEUTROPHILS # BLD AUTO: 6.9 K/UL (ref 2–7.15)
NEUTROPHILS NFR BLD: 74.6 % (ref 44–72)
NRBC # BLD AUTO: 0 K/UL
NRBC BLD-RTO: 0 /100 WBC
PLATELET # BLD AUTO: 279 K/UL (ref 164–446)
PMV BLD AUTO: 10.7 FL (ref 9–12.9)
POTASSIUM SERPL-SCNC: 3.9 MMOL/L (ref 3.6–5.5)
PROT SERPL-MCNC: 7.9 G/DL (ref 6–8.2)
RBC # BLD AUTO: 4.32 M/UL (ref 4.2–5.4)
SODIUM SERPL-SCNC: 139 MMOL/L (ref 135–145)
TSH SERPL DL<=0.005 MIU/L-ACNC: 3.98 UIU/ML (ref 0.38–5.33)
WBC # BLD AUTO: 9.2 K/UL (ref 4.8–10.8)

## 2018-11-29 PROCEDURE — 84443 ASSAY THYROID STIM HORMONE: CPT

## 2018-11-29 PROCEDURE — 36415 COLL VENOUS BLD VENIPUNCTURE: CPT | Mod: GA

## 2018-11-29 PROCEDURE — 80053 COMPREHEN METABOLIC PANEL: CPT

## 2018-11-29 PROCEDURE — 83880 ASSAY OF NATRIURETIC PEPTIDE: CPT | Mod: GA

## 2018-11-29 PROCEDURE — 85025 COMPLETE CBC W/AUTO DIFF WBC: CPT

## 2018-11-30 ENCOUNTER — TELEPHONE (OUTPATIENT)
Dept: CARDIOLOGY | Facility: MEDICAL CENTER | Age: 73
End: 2018-11-30

## 2018-11-30 DIAGNOSIS — I48.0 PAROXYSMAL ATRIAL FIBRILLATION (HCC): ICD-10-CM

## 2018-11-30 DIAGNOSIS — I51.89 DIASTOLIC DYSFUNCTION: ICD-10-CM

## 2018-11-30 DIAGNOSIS — Z79.899 ENCOUNTER FOR MONITORING DIGOXIN THERAPY: ICD-10-CM

## 2018-11-30 DIAGNOSIS — Z51.81 ENCOUNTER FOR MONITORING DIGOXIN THERAPY: ICD-10-CM

## 2018-11-30 RX ORDER — METOPROLOL TARTRATE 100 MG/1
100 TABLET ORAL DAILY
COMMUNITY
Start: 2018-11-30 | End: 2019-01-08 | Stop reason: SDUPTHER

## 2018-11-30 RX ORDER — DIGOXIN 125 MCG
125 TABLET ORAL DAILY
Qty: 90 TAB | Refills: 3 | Status: SHIPPED | OUTPATIENT
Start: 2018-11-30 | End: 2019-01-08

## 2018-11-30 NOTE — TELEPHONE ENCOUNTER
"TIME RECORD     Date:03/06/2017    Start Time: 445  Stop Time: 535     PROCEDURES:     TIMED  Procedure Min.    MT 10         TE 40       UNTIMED  Procedure Min.                Total Timed Minutes: 50  Total Timed Units: 3  Total Untimed Units: -  Charges Billed/# of units:TA3     OCCUPATIONAL THERAPY PROGRESS NOTE     Patient Name: Alfie Holly  Physician Name: Collins Dow  Primary Diagnosis: L shoulder dislocation  Treatment Diagnosis: Pain in limb, weakness, instability  Onset Date: 1 month ago  Eval Date: 02/01/2017  Certification Period: 02/01/2017 to 4/1/2017  Past Medical History: No past medical history on file.  Precautions: universal  Prior Therapy: None for shoulder  Signs of Abuse: no  Medications: Alfie Holly currently has no medications in their medication list.  Nutrition: Good  Social Cultural Assessment: Doesn't work  Prior Level of Function: Independent  Social History: Lives with Dad  Place of Residence (steps/adaptations): N/A  Functional Deficits Leading to Referral/Nature of Injury: Pt reports h/o multiple dislocations LUE however the least time he was reaching overhead to pull shirt off to take a shower and dislocated L shoulder and couldn't get it to go back in had to go to the ER; Pt states he thinks this started when he was younger and he was bocing and he went to hit with is LUE and felt a pop. With increased incidence of dislocation after that.   Patient Therapy Goals: To decrease pain and increase functional use LUE  Hand dominance: Right  X-Rays/Tests: There is left anterior glenohumeral dislocation which appears reduced on final transscapular Y-view. Hill-Sachs lesion noted. Acromioclavicular joint is maintained.     Subjective:     Pain: 2/10  "I feel better."    Objective:     Pt on UBE for/rev @120rpms 6 minutes prior to session. MT x 10 consisting of patient supine for L shoulder lateral telescoping, UT STM, pec lift, subscapularis MFR and stretch, PROM with " Pt called back, discussed lab results per Dr Colunga and her recommendations, pt reports she still feeling tired and fatigue, she is sleeping well though but she feels about the same when she see Dr Colunga last week, she agreed on trying to decreased her Metoprolol and adding up Digoxin. Discussed fluid overload w/ pt, she will take her Lasix and KCL if starting to have any symptoms of fluid overload.     New Rx for Digoxin 125 PO daily sent to [facility] per pt's request, MAR updated.     FYI to Dr Colunga    "endrange stretching, GHJ inferior anterior posterior glides grade I-III, gentle shoulder oscillations. Pt participated in OT therex for ROM,  stabilization and strengthening LUE per tx log:  Exercises Date 03/06/2017    Visit #5 FOTO @ 10   PROM (L) Shoulder FLEX/ABD/IR/ER 10x   Supine dowel Flex 5  2/15   SL ABD 3  2/15   SL ER 3  2/15       Sleeper Stretch 3/30"   IR pulley stretch 3/30"   Ball on wall stab 30x ea dir   Cable cross Lats 3#  2/15   Cable cross Rows 3#  2/15   Cable cross IR/ER 3#  2/15   Body blade 30"x 2 ea dir     FOTO:40%    Declined CP this date.     Assessment:     Pt participated well this date. Able to complete all therex in a pain free range and demonstrating good technique as well as tolerate progression of treatment well. No clicking noted this date. Minimal tenderness and tightness noted with STM. No change in FOTO score however pt reports increased functional use LUE. Better endurance noted with therex. Pt motivated, progressing well to goals.  Pt would continue to benefit from skilled OT to address deficits.     Patient Education/Response:     Cont with HEP     Plans and Goals:     Cont with OT POC    Goals to be met in 4 weeks: (3/2/17)  1) Initiate Hep   2) Pt will increase L shoulder AROM by 10 degrees grossly for improved performance with overhead ADL's  3) Pt will report 1/10 pain in (L)shoulder at worst  4) Pt will demonstrate increased MMT to 5/5 grossly L shoulder  5) Patient will be able to achieve less than or equal to 25% demonstrating overall improved functional ability with upper extremity.      Goals to be met by discharge:  1) Independent with HEP  2) Pt will demonstrate (L) shoulder AROM WNL grossly for [place] with ADL's  3) Pt will demonstrate (L) shoulder MMT WNL grossly for [place] with functional activities  4) Independent and pain free with ADL's and IADL's  5) Patient will be able to achieve less than or equal to 19% demonstrating overall improved " functional ability with upper extremity.

## 2018-11-30 NOTE — TELEPHONE ENCOUNTER
FASTING STATUS   Notes recorded by Anaya Colunga M.D. on 11/29/2018 at 10:29 PM PST  No concerning findings on blood work, no anemia.  Fluid blood test shows that she is likely still retaining fluid, see my note for further details… We are planning to call her on Monday I think     Upon chart review last OV notes from Dr Colunga 11/28/18:    We will call her in 1 week  Would recommend reducing metoprolol to just once daily and adding digoxin 125 if laboratories all normal and she is still symptomatic.     Attempted to call pt, no answer, left vm to call back

## 2018-12-01 NOTE — TELEPHONE ENCOUNTER
Anaya Colunga M.D.   You 3 hours ago (1:14 PM)      Thank you !     Can we do a dig level and bnp bmp in1-2w for fu?     Tx!! (Routing comment)        Digoxin and BMP ordered, lab slip mailed to pt.     Called pt and notified, pt verbalizes understanding

## 2018-12-05 NOTE — TELEPHONE ENCOUNTER
Twila Mckeon R.N.             LA/Miryam     Patient received new medication and has some questions about it. She wants a call back at [phone].      Called pt, she wants to know if she needs to take her Digoxin in the morning or at night, advise that she could take it either morning or night but would advise for her to take it different time from her Metoprolol, she also would like to know if she will received her lab order on the mail, confirmed with pt that I've mailed the lab order to her already and should be on her way, pt appreciative of information and verbalizes understanding

## 2019-01-07 ENCOUNTER — TELEPHONE (OUTPATIENT)
Dept: CARDIOLOGY | Facility: MEDICAL CENTER | Age: 74
End: 2019-01-07

## 2019-01-07 DIAGNOSIS — I51.89 DIASTOLIC DYSFUNCTION: ICD-10-CM

## 2019-01-07 DIAGNOSIS — I48.0 PAROXYSMAL ATRIAL FIBRILLATION (HCC): ICD-10-CM

## 2019-01-07 NOTE — TELEPHONE ENCOUNTER
----- Message from Twila Gu sent at 1/7/2019 12:55 PM PST -----  Regarding: Patient wants call back about medication  LA/Katherine    Patient spoke to Miryam on 12/5 about a new medication. She just got back from vacation and wants a call back at [phone] about the medication.

## 2019-01-08 RX ORDER — METOPROLOL TARTRATE 100 MG/1
100 TABLET ORAL 2 TIMES DAILY
Qty: 180 TAB | Refills: 3 | Status: SHIPPED | OUTPATIENT
Start: 2019-01-08 | End: 2020-07-09 | Stop reason: SDUPTHER

## 2019-01-08 NOTE — TELEPHONE ENCOUNTER
Pt called back, pt reports she didn't take the new meds we prescribed-Digoxin, the very reason is she was going on vacation and won't be able to get blood test done, she is calling now to check if she still needs to changed out on this medication, discussed reason why we try to add Digoxin and decreased Metoprolol dose, pt reports tiredness and fatigue is tolerable and she'd rather stay on Metoprolol 100mg PO BID rather than adding a new meds, informed pt that it would not be a problem but will check w/ Dr Colunga, pt appreciative of the call and verbalizes understanding    To Dr Colunga

## 2019-01-08 NOTE — TELEPHONE ENCOUNTER
That sounds okay.  She can stand what she has as long as she is feeling well.  Thank you so much for all that work

## 2019-01-08 NOTE — TELEPHONE ENCOUNTER
GEETA Friedman/Miryam     Patient is returning Katherine's call from yesterday and can be reached at [phone].      Attempted to call pt, no answer, left vm to call back

## 2019-01-09 NOTE — TELEPHONE ENCOUNTER
Attempted to call pt, no answer, detailed vm left regarding Dr Colunga recommendations.    New Rx for Metoprolol 100mg PO BID sent to [facility], MAR updated.

## 2019-02-27 DIAGNOSIS — I48.0 PAROXYSMAL ATRIAL FIBRILLATION (HCC): ICD-10-CM

## 2019-02-28 RX ORDER — APIXABAN 5 MG/1
TABLET, FILM COATED ORAL
Qty: 180 TAB | Refills: 3 | Status: SHIPPED | OUTPATIENT
Start: 2019-02-28 | End: 2020-04-13 | Stop reason: SDUPTHER

## 2019-04-15 ENCOUNTER — HOSPITAL ENCOUNTER (OUTPATIENT)
Dept: LAB | Facility: MEDICAL CENTER | Age: 74
End: 2019-04-15
Attending: FAMILY MEDICINE
Payer: MEDICARE

## 2019-04-15 LAB
ALBUMIN SERPL BCP-MCNC: 5 G/DL (ref 3.2–4.9)
ALBUMIN/GLOB SERPL: 1.8 G/DL
ALP SERPL-CCNC: 62 U/L (ref 30–99)
ALT SERPL-CCNC: 18 U/L (ref 2–50)
ANION GAP SERPL CALC-SCNC: 11 MMOL/L (ref 0–11.9)
AST SERPL-CCNC: 20 U/L (ref 12–45)
BILIRUB SERPL-MCNC: 1.1 MG/DL (ref 0.1–1.5)
BUN SERPL-MCNC: 29 MG/DL (ref 8–22)
CALCIUM SERPL-MCNC: 10.3 MG/DL (ref 8.5–10.5)
CHLORIDE SERPL-SCNC: 103 MMOL/L (ref 96–112)
CHOLEST SERPL-MCNC: 135 MG/DL (ref 100–199)
CO2 SERPL-SCNC: 27 MMOL/L (ref 20–33)
CREAT SERPL-MCNC: 1.18 MG/DL (ref 0.5–1.4)
CREAT UR-MCNC: 123.5 MG/DL
EST. AVERAGE GLUCOSE BLD GHB EST-MCNC: 154 MG/DL
FASTING STATUS PATIENT QL REPORTED: NORMAL
GLOBULIN SER CALC-MCNC: 2.8 G/DL (ref 1.9–3.5)
GLUCOSE SERPL-MCNC: 145 MG/DL (ref 65–99)
HBA1C MFR BLD: 7 % (ref 0–5.6)
HDLC SERPL-MCNC: 51 MG/DL
LDLC SERPL CALC-MCNC: 54 MG/DL
MICROALBUMIN UR-MCNC: 15.3 MG/DL
MICROALBUMIN/CREAT UR: 124 MG/G (ref 0–30)
POTASSIUM SERPL-SCNC: 3.7 MMOL/L (ref 3.6–5.5)
PROT SERPL-MCNC: 7.8 G/DL (ref 6–8.2)
SODIUM SERPL-SCNC: 141 MMOL/L (ref 135–145)
TRIGL SERPL-MCNC: 152 MG/DL (ref 0–149)
URATE SERPL-MCNC: 10.3 MG/DL (ref 1.9–8.2)

## 2019-04-15 PROCEDURE — 83036 HEMOGLOBIN GLYCOSYLATED A1C: CPT | Mod: GA

## 2019-04-15 PROCEDURE — 82043 UR ALBUMIN QUANTITATIVE: CPT

## 2019-04-15 PROCEDURE — 84550 ASSAY OF BLOOD/URIC ACID: CPT

## 2019-04-15 PROCEDURE — 80053 COMPREHEN METABOLIC PANEL: CPT

## 2019-04-15 PROCEDURE — 36415 COLL VENOUS BLD VENIPUNCTURE: CPT | Mod: GA

## 2019-04-15 PROCEDURE — 82570 ASSAY OF URINE CREATININE: CPT

## 2019-04-15 PROCEDURE — 80061 LIPID PANEL: CPT

## 2019-07-03 ENCOUNTER — OFFICE VISIT (OUTPATIENT)
Dept: CARDIOLOGY | Facility: MEDICAL CENTER | Age: 74
End: 2019-07-03
Payer: MEDICARE

## 2019-07-03 VITALS
WEIGHT: 241 LBS | BODY MASS INDEX: 35.7 KG/M2 | OXYGEN SATURATION: 95 % | HEIGHT: 69 IN | SYSTOLIC BLOOD PRESSURE: 132 MMHG | DIASTOLIC BLOOD PRESSURE: 80 MMHG | HEART RATE: 92 BPM

## 2019-07-03 DIAGNOSIS — I15.0 RENOVASCULAR HYPERTENSION: ICD-10-CM

## 2019-07-03 DIAGNOSIS — E78.2 MIXED HYPERLIPIDEMIA: ICD-10-CM

## 2019-07-03 DIAGNOSIS — I48.0 PAROXYSMAL ATRIAL FIBRILLATION (HCC): ICD-10-CM

## 2019-07-03 PROBLEM — I51.89 DIASTOLIC DYSFUNCTION: Status: RESOLVED | Noted: 2017-04-18 | Resolved: 2019-07-03

## 2019-07-03 PROBLEM — R04.0 EPISTAXIS: Status: RESOLVED | Noted: 2017-11-13 | Resolved: 2019-07-03

## 2019-07-03 PROCEDURE — 99214 OFFICE O/P EST MOD 30 MIN: CPT | Performed by: INTERNAL MEDICINE

## 2019-07-03 ASSESSMENT — ENCOUNTER SYMPTOMS
EYE DISCHARGE: 0
LOSS OF CONSCIOUSNESS: 0
BLURRED VISION: 0
WHEEZING: 0
ORTHOPNEA: 0
COUGH: 0
NAUSEA: 0
FEVER: 0
CHILLS: 0
MYALGIAS: 0
DEPRESSION: 0
NERVOUS/ANXIOUS: 0
HEARTBURN: 0
EYE PAIN: 0
WEIGHT LOSS: 1
BRUISES/BLEEDS EASILY: 0
SHORTNESS OF BREATH: 0
PALPITATIONS: 0
DIZZINESS: 0
CLAUDICATION: 0
PND: 0

## 2019-07-03 NOTE — PROGRESS NOTES
Chief Complaint   Patient presents with   • Atrial Fibrillation       Subjective:   Yvette Dailey is a 73 y.o. female who presents today in follow-up in regards to her pulmonary hypertension and atrial fibrillation in the setting of increased BMI and sleep apnea    Doing much better this year.  Had eye surgery which really helped.  Still taking Lasix as needed.  Meticulous about her daily weights.  Intent on weight loss with a diabetic diet    Compliant on her medications including anticoagulation    Past Medical History:   Diagnosis Date   • Atrial fibrillation (HCC)    • Dental disorder     upper plate, lower partial   • Edema    • Gout    • Hyperlipidemia    • Hypertension    • Obesity    • PAF (paroxysmal atrial fibrillation) (HCC)    • Paroxysmal atrial fibrillation (HCC)    • Psoriasis    • Sleep apnea     CPAP     Past Surgical History:   Procedure Laterality Date   • RECOVERY  7/19/2016    Procedure: CATH LAB CARDIOVERSION WITH ANESTHESIA DR. PAYAN;  Surgeon: Recoveryonly Surgery;  [place] [facility] [place]:    • RECOVERY  7/7/2015    Procedure: CATH LAB-WIEDENBECK-ELECTIVE CARDIOVERSION 52753-PDZ GROUP FOR ANESTHESIA-AFIB 427.31;  Surgeon: Recoveryonly Surgery;  [place] [facility] [place]  Service:    • HYSTERECTOMY LAPAROSCOPY       Family History   Problem Relation Age of Onset   • Heart Attack Mother 83   • Cancer Father         lung     Social History     Social History   • Marital status:      Spouse name: N/A   • Number of children: N/A   • Years of education: N/A     Occupational History   • Not on file.     Social History Main Topics   • Smoking status: Former Smoker     Packs/day: 1.50     Years: 45.00     Types: Cigarettes     Quit date: 7/1/2013   • Smokeless tobacco: Never Used   • Alcohol use 4.2 - 8.4 oz/week     7 - 14 Shots of liquor per week   • Drug use: No   • Sexual activity: Not on file     Other Topics Concern   • Not on file  "    Social History Narrative   • No narrative on file     Allergies   Allergen Reactions   • Allopurinol      \"everything went haywire!\"     Outpatient Encounter Prescriptions as of 7/3/2019   Medication Sig Dispense Refill   • ELIQUIS 5 MG Tab TAKE 1 TABLET BY MOUTH TWO  TIMES DAILY 180 Tab 3   • metoprolol (LOPRESSOR) 100 MG Tab Take 1 Tab by mouth 2 times a day. 180 Tab 3   • losartan-hydrochlorothiazide (HYZAAR) 100-25 MG per tablet Take 1 Tab by mouth every day.     • cloNIDine (CATAPRES) 0.3 MG/24HR PATCH WEEKLY Apply 1 Patch to skin as directed every 7 days. 4 Patch    • furosemide (LASIX) 40 MG Tab Take 1 Tab by mouth every day. (Patient taking differently: Take 40 mg by mouth every day. EVERY OTHER DAY) 30 Tab 11   • potassium chloride (KLOR-CON) 20 MEQ Pack Take 1 Packet by mouth every day. 30 Packet 11   • clobetasol (TEMOVATE) 0.05 % Cream Apply  to affected area(s) 2 times a day.     • simvastatin (ZOCOR) 80 MG tablet Take 80 mg by mouth every evening.       No facility-administered encounter medications on file as of 7/3/2019.      Review of Systems   Constitutional: Positive for weight loss. Negative for chills, fever and malaise/fatigue.   HENT: Negative for congestion and hearing loss.    Eyes: Negative for blurred vision, pain and discharge.   Respiratory: Negative for cough, shortness of breath and wheezing.    Cardiovascular: Negative for chest pain, palpitations, orthopnea, claudication and PND.   Gastrointestinal: Negative for heartburn and nausea.   Musculoskeletal: Negative for joint pain and myalgias.   Skin: Negative for itching and rash.   Neurological: Negative for dizziness and loss of consciousness.   Endo/Heme/Allergies: Negative for environmental allergies. Does not bruise/bleed easily.   Psychiatric/Behavioral: Negative for depression. The patient is not nervous/anxious.    All other systems reviewed and are negative.       Objective:   /80 (BP Location: Left arm, Patient " "Position: Sitting, BP Cuff Size: Adult)   Pulse 92   Ht 1.753 m (5' 9\")   Wt 109.3 kg (241 lb)   SpO2 95%   BMI 35.59 kg/m²     Physical Exam   Constitutional: She is oriented to person, place, and time.   … Patient seen and examined again today changes noted.  Still plethoric mildly    HENT:   Head: Atraumatic.   Eyes: No scleral icterus.   Neck: No JVD present. No thyromegaly present.   Cardiovascular: Normal rate and intact distal pulses.    No murmur heard.  Pulmonary/Chest: Breath sounds normal. No respiratory distress.   Abdominal: Bowel sounds are normal. She exhibits no distension.   Musculoskeletal: She exhibits edema (1+ bilaterally, chronic stasis changes warm with 2 out of 2 dorsalis pedis bilaterally).   Neurological: She is alert and oriented to person, place, and time. She exhibits normal muscle tone. Coordination normal.   Skin: Skin is warm and dry.   Psychiatric: She has a normal mood and affect. Her behavior is normal.       Assessment:     1. Renovascular hypertension     2. Mixed hyperlipidemia     3. Paroxysmal atrial fibrillation (CMS-HCC)         Medical Decision Making:  Today's Assessment / Status / Plan:     Pulmonary hypertension  Impressed with her weight loss  Compliance on CPAP  Follows with pulmonary, watches her home oxygen  We looked at the images of her echo from last year  Edema secondary to this no evidence of heart failure    Edema  Talked about weights elevation  With her diabetes talked about follow with her primary care.  Her hemoglobin A1c is 7, she is on no medicines for it    Cardiac risk  Very aware especially with her elevated sugars  Continue high-dose statin, lipids at goal  Reasonable to consider risk stratification with nuclear imaging, discussed    Atrial fibrillation  Asymptomatic and functional  Anticoagulation as above, if dental procedures or surgery would be okay to hold without a bridge discussed    RTC 6 months sooner if concerns  "

## 2019-07-09 DIAGNOSIS — I48.0 PAF (PAROXYSMAL ATRIAL FIBRILLATION) (HCC): ICD-10-CM

## 2019-07-09 DIAGNOSIS — I10 ESSENTIAL HYPERTENSION: ICD-10-CM

## 2019-07-09 DIAGNOSIS — E78.49 OTHER HYPERLIPIDEMIA: ICD-10-CM

## 2019-07-10 DIAGNOSIS — E78.49 OTHER HYPERLIPIDEMIA: ICD-10-CM

## 2019-07-10 DIAGNOSIS — I48.0 PAF (PAROXYSMAL ATRIAL FIBRILLATION) (HCC): ICD-10-CM

## 2019-07-10 DIAGNOSIS — I10 ESSENTIAL HYPERTENSION: ICD-10-CM

## 2019-07-10 RX ORDER — FUROSEMIDE 40 MG/1
40 TABLET ORAL DAILY
Qty: 30 TAB | Refills: 11 | Status: SHIPPED | OUTPATIENT
Start: 2019-07-10 | End: 2019-07-10 | Stop reason: SDUPTHER

## 2019-07-10 RX ORDER — FUROSEMIDE 40 MG/1
40 TABLET ORAL DAILY
Qty: 30 TAB | Refills: 11 | Status: SHIPPED | OUTPATIENT
Start: 2019-07-10 | End: 2020-07-09 | Stop reason: SDUPTHER

## 2019-09-17 ENCOUNTER — APPOINTMENT (OUTPATIENT)
Dept: SLEEP MEDICINE | Facility: MEDICAL CENTER | Age: 74
End: 2019-09-17
Payer: MEDICARE

## 2019-09-24 DIAGNOSIS — I51.89 DIASTOLIC DYSFUNCTION: ICD-10-CM

## 2019-09-26 RX ORDER — POTASSIUM CHLORIDE 1.5 G/1.58G
POWDER, FOR SOLUTION ORAL
Qty: 30 PACKET | Refills: 11 | Status: SHIPPED | OUTPATIENT
Start: 2019-09-26 | End: 2020-01-16 | Stop reason: CLARIF

## 2020-01-16 ENCOUNTER — TELEPHONE (OUTPATIENT)
Dept: CARDIOLOGY | Facility: MEDICAL CENTER | Age: 75
End: 2020-01-16

## 2020-01-16 DIAGNOSIS — I51.89 DIASTOLIC DYSFUNCTION: ICD-10-CM

## 2020-01-16 RX ORDER — POTASSIUM CHLORIDE 20 MEQ/1
20 TABLET, EXTENDED RELEASE ORAL DAILY
Qty: 90 TAB | Refills: 1 | Status: SHIPPED | OUTPATIENT
Start: 2020-01-16 | End: 2020-07-09 | Stop reason: SDUPTHER

## 2020-01-16 NOTE — TELEPHONE ENCOUNTER
Received request from [facility] pharmacy for Rx for potassium 20meq tabs. They are cheaper for pt than currently prescribed packets. New Rx sent.

## 2020-02-03 ENCOUNTER — SLEEP CENTER VISIT (OUTPATIENT)
Dept: SLEEP MEDICINE | Facility: MEDICAL CENTER | Age: 75
End: 2020-02-03
Payer: MEDICARE

## 2020-02-03 VITALS
HEART RATE: 68 BPM | HEIGHT: 69 IN | DIASTOLIC BLOOD PRESSURE: 68 MMHG | BODY MASS INDEX: 35.59 KG/M2 | OXYGEN SATURATION: 95 % | SYSTOLIC BLOOD PRESSURE: 124 MMHG | RESPIRATION RATE: 16 BRPM

## 2020-02-03 DIAGNOSIS — I48.0 PAROXYSMAL ATRIAL FIBRILLATION (HCC): ICD-10-CM

## 2020-02-03 DIAGNOSIS — G47.33 OSA (OBSTRUCTIVE SLEEP APNEA): ICD-10-CM

## 2020-02-03 DIAGNOSIS — Z87.891 FORMER SMOKER: ICD-10-CM

## 2020-02-03 DIAGNOSIS — I15.0 RENOVASCULAR HYPERTENSION: ICD-10-CM

## 2020-02-03 PROCEDURE — 99214 OFFICE O/P EST MOD 30 MIN: CPT | Performed by: NURSE PRACTITIONER

## 2020-02-03 NOTE — PROGRESS NOTES
Chief Complaint   Patient presents with   • Apnea     Last Seen 9/17/18        HPI:  Yvette Dailey is a 74 y.o. year old female here today for follow-up on GLORIA.  Last office visit 9/17/2018 ARIANNE [place].    History of paroxysmal atrial fibrillation remains on Eliquis.  She had overnight oximetry which demonstrated hypoxemia and referred.  BMI 35.    PSG 8/3/2016 indicated severe sleep apnea with an AHI of 79.8/h and a kirstin of 83%.  She had incomplete titration to CPAP.  And a final pressure of CPAP 13 cm she had a reduced AHI of 13/h and a mean SPO2 92.2%.  She was initiated on auto CPAP 12 to 18 cm nightly.  She is currently using auto CPAP 10 to 15 cm nightly.  Compliance card 1/4/2023 2/2/2020 indicates 100% compliance, average nightly use of 9 hours 39 minutes, mean pressure 10.7 cm, moderate mask leak of 11.5 minutes per night with an overall a AHI of 5.1/h.  I reviewed finds with patient.  She tolerates mask and pressure well.  She denies morning headaches.  She feels rested on therapy.  She notes being more sedentary due to her atrial fibrillation.  She is on metoprolol but needs an office visit with a cardiologist to review her current treatment plan.  Advised her to call for a new provider.  She denies respiratory symptoms today.  She has a history of chronic pedal edema but does have Lasix as needed.  Today it is minimal on exam.  She is requesting her supplies to be switched to [employer].        ROS: As per HPI and otherwise negative if not stated.    Past Medical History:   Diagnosis Date   • Atrial fibrillation (HCC)    • Dental disorder     upper plate, lower partial   • Edema    • Gout    • Hyperlipidemia    • Hypertension    • Obesity    • PAF (paroxysmal atrial fibrillation) (HCC)    • Paroxysmal atrial fibrillation (HCC)    • Psoriasis    • Sleep apnea     CPAP       Past Surgical History:   Procedure Laterality Date   • RECOVERY  7/19/2016    Procedure: CATH LAB  CARDIOVERSION WITH ANESTHESIA DR. PAYAN;  Surgeon: Recoveryonly Surgery;  Location: [place] PRE[place] [facility] [place];  Service:    • RECOVERY  2015    Procedure: CATH LAB-WIEDENBECK-ELECTIVE CARDIOVERSION 16095-XYL GROUP FOR ANESTHESIA-AFIB 427.31;  Surgeon: Recoveryonly Surgery;  Location: SURGERY PRE-POST PROC [place] [facility] [place];  Service:    • HYSTERECTOMY LAPAROSCOPY         Family History   Problem Relation Age of Onset   • Heart Attack Mother 83   • Cancer Father         lung       Social History     Socioeconomic History   • Marital status:      Spouse name: Not on file   • Number of children: Not on file   • Years of education: Not on file   • Highest education level: Not on file   Occupational History   • Not on file   Social Needs   • Financial resource strain: Not on file   • Food insecurity:     Worry: Not on file     Inability: Not on file   • Transportation needs:     Medical: Not on file     Non-medical: Not on file   Tobacco Use   • Smoking status: Former Smoker     Packs/day: 1.50     Years: 45.00     Pack years: 67.50     Types: Cigarettes     Last attempt to quit: 2013     Years since quittin.5   • Smokeless tobacco: Never Used   Substance and Sexual Activity   • Alcohol use: Yes     Alcohol/week: 4.2 - 8.4 oz     Types: 7 - 14 Shots of liquor per week   • Drug use: No   • Sexual activity: Not on file   Lifestyle   • Physical activity:     Days per week: Not on file     Minutes per session: Not on file   • Stress: Not on file   Relationships   • Social connections:     Talks on phone: Not on file     Gets together: Not on file     Attends [employer] service: Not on file     Active member of club or organization: Not on file     Attends meetings of clubs or organizations: Not on file     Relationship status: Not on file   • Intimate partner violence:     Fear of current or ex partner: Not on file     Emotionally abused: Not on file     Physically abused: Not on file     Forced sexual  "activity: Not on file   Other Topics Concern   • Not on file   Social History Narrative   • Not on file       Allergies as of 02/03/2020 - Reviewed 02/03/2020   Allergen Reaction Noted   • Allopurinol  04/09/2016        Vitals:  /68 (BP Location: Left arm, Patient Position: Sitting, BP Cuff Size: Adult)   Pulse 68   Resp 16   Ht 1.753 m (5' 9\")   SpO2 95%     Current medications as of today   Current Outpatient Medications   Medication Sig Dispense Refill   • potassium chloride SA (KDUR) 20 MEQ Tab CR Take 1 Tab by mouth every day. 90 Tab 1   • furosemide (LASIX) 40 MG Tab Take 1 Tab by mouth every day. 30 Tab 11   • ELIQUIS 5 MG Tab TAKE 1 TABLET BY MOUTH TWO  TIMES DAILY 180 Tab 3   • metoprolol (LOPRESSOR) 100 MG Tab Take 1 Tab by mouth 2 times a day. 180 Tab 3   • losartan-hydrochlorothiazide (HYZAAR) 100-25 MG per tablet Take 1 Tab by mouth every day.     • cloNIDine (CATAPRES) 0.3 MG/24HR PATCH WEEKLY Apply 1 Patch to skin as directed every 7 days. 4 Patch    • clobetasol (TEMOVATE) 0.05 % Cream Apply  to affected area(s) 2 times a day.     • simvastatin (ZOCOR) 80 MG tablet Take 80 mg by mouth every evening.       No current facility-administered medications for this visit.          Physical Exam:   Gen:           Alert and oriented, No apparent distress. Mood and affect appropriate, normal interaction with examiner.  Eyes:          PERRL, EOM intact, sclere white, conjunctive moist.  Ears:          Not examined.   Hearing:     Grossly intact.  Nose:          Normal, no lesions or deformities.  Dentition:    Good dentition.  Oropharynx:   Tongue normal, posterior pharynx without erythema or exudate.  Mallampati Classification: not examined.  Neck:        Supple, trachea midline, no masses.  Respiratory Effort: No intercostal retractions or use of accessory muscles.   Lung Auscultation:     Diminished t/o; no rales, rhonchi or wheezing.  CV:            A. Fib. No murmurs, rubs or gallops.  Abd:       "     Not examined.   Lymphadenopathy: Not examined.  Gait and Station: Normal.  Digits and Nails: No clubbing, cyanosis, petechiae, or nodes.   Cranial Nerves: II-XII grossly intact.  Skin:        No rashes, lesions or ulcers noted.               Ext:           No cyanosis but mild BLE edema.      Assessment:  1. GLORIA (obstructive sleep apnea)     2. Paroxysmal atrial fibrillation (CMS-HCC)     3. Renovascular hypertension     4. Former smoker     5. BMI 35.0-35.9,adult  Height And Weight       Immunizations:    Flu:recommend  Pneumovax 23:recommend  Prevnar 13:recommend    Plan:  1.  GLORIA is clinically stable.  Continue auto CPAP nightly.  DME mask/supplies.  2.  Discussed sleep hygiene.  3.  Encouraged weight loss.  Encouraged daily weights due to potential fluid retention.  4.  Encourage patient to set up a new cardiology appointment.  Physicians names given to patient.  5.  Follow-up in 1 year's compliance report, sooner if needed.    Please note that this dictation was created using voice recognition software. I have made every reasonable attempt to correct obvious errors, but it is possible there are errors of grammar and possibly content that I did not discover before finalizing the note.

## 2020-02-03 NOTE — LETTER
BORIS Gomes  [facility]   [address] [place] [address]  Phone: [phone] - Fax: [phone]           Encounter Date: 2/3/2020  Provider: BORIS Gomes  Location of Care: [facility]      Patient:   Yvette Dailey   MR Number: [id number]   YOB: 1945     PROGRESS NOTE:  Chief Complaint   Patient presents with   • Apnea     Last Seen 9/17/18        HPI:  Yvette Dailey is a 74 y.o. year old female here today for follow-up on GLORIA.  Last office visit 9/17/2018 ARIANNE Ogden.    History of paroxysmal atrial fibrillation remains on Eliquis.  She had overnight oximetry which demonstrated hypoxemia and referred.  BMI 35.    PSG 8/3/2016 indicated severe sleep apnea with an AHI of 79.8/h and a kirstin of 83%.  She had incomplete titration to CPAP.  And a final pressure of CPAP 13 cm she had a reduced AHI of 13/h and a mean SPO2 92.2%.  She was initiated on auto CPAP 12 to 18 cm nightly.  She is currently using auto CPAP 10 to 15 cm nightly.  Compliance card 1/4/2023 2/2/2020 indicates 100% compliance, average nightly use of 9 hours 39 minutes, mean pressure 10.7 cm, moderate mask leak of 11.5 minutes per night with an overall a AHI of 5.1/h.  I reviewed finds with patient.  She tolerates mask and pressure well.  She denies morning headaches.  She feels rested on therapy.  She notes being more sedentary due to her atrial fibrillation.  She is on metoprolol but needs an office visit with a cardiologist to review her current treatment plan.  Advised her to call for a new provider.  She denies respiratory symptoms today.  She has a history of chronic pedal edema but does have Lasix as needed.  Today it is minimal on exam.  She is requesting her supplies to be switched to [employer].        ROS: As per HPI and otherwise negative if not stated.    Past Medical History:      Diagnosis Date   • Atrial fibrillation (HCC)    • Dental disorder     upper plate, lower partial   • Edema    • Gout    • Hyperlipidemia    • Hypertension    • Obesity    • PAF (paroxysmal atrial fibrillation) (HCC)    • Paroxysmal atrial fibrillation (HCC)    • Psoriasis    • Sleep apnea     CPAP       Past Surgical History:   Procedure Laterality Date   • RECOVERY  2016    Procedure: CATH LAB CARDIOVERSION WITH ANESTHESIA DR. PAYAN;  Surgeon: Recoveryonly Surgery;  Location: SURG[place] PRE-[place] PROC UNIT [facility] [place];  Service:    • RECOVERY  2015    Procedure: CATH LAB-WIEDENBECK-ELECTIVE CARDIOVERSION 10663-[id number] GROUP FOR ANESTHESIA-AFIB 427.31;  Surgeon: Recoveryonly Surgery;  Location: SURGERY PRE-POST PROC UNIT [facility] [place];  Service:    • HYSTERECTOMY LAPAROSCOPY         Family History   Problem Relation Age of Onset   • Heart Attack Mother 83   • Cancer Father         lung       Social History     Socioeconomic History   • Marital status:      Spouse name: Not on file   • Number of children: Not on file   • Years of education: Not on file   • Highest education level: Not on file   Occupational History   • Not on file   Social Needs   • Financial resource strain: Not on file   • Food insecurity:     Worry: Not on file     Inability: Not on file   • Transportation needs:     Medical: Not on file     Non-medical: Not on file   Tobacco Use   • Smoking status: Former Smoker     Packs/day: 1.50     Years: 45.00     Pack years: 67.50     Types: Cigarettes     Last attempt to quit: 2013     Years since quittin.5   • Smokeless tobacco: Never Used   Substance and Sexual Activity   • Alcohol use: Yes     Alcohol/week: 4.2 - 8.4 oz     Types: 7 - 14 Shots of liquor per week   • Drug use: No   • Sexual activity: Not on file   Lifestyle   • Physical activity:     Days per week: Not on file     Minutes per session: Not on file   • Stress: Not on file   Relationships   • Social connections:     Talks on phone: Not  "on file     Gets together: Not on file     Attends Judaism service: Not on file     Active member of club or organization: Not on file     Attends meetings of clubs or organizations: Not on file     Relationship status: Not on file   • Intimate partner violence:     Fear of current or ex partner: Not on file     Emotionally abused: Not on file     Physically abused: Not on file     Forced sexual activity: Not on file   Other Topics Concern   • Not on file   Social History Narrative   • Not on file       Allergies as of 02/03/2020 - Reviewed 02/03/2020   Allergen Reaction Noted   • Allopurinol  04/09/2016        Vitals:  /68 (BP Location: Left arm, Patient Position: Sitting, BP Cuff Size: Adult)   Pulse 68   Resp 16   Ht 1.753 m (5' 9\")   SpO2 95%     Current medications as of today   Current Outpatient Medications   Medication Sig Dispense Refill   • potassium chloride SA (KDUR) 20 MEQ Tab CR Take 1 Tab by mouth every day. 90 Tab 1   • furosemide (LASIX) 40 MG Tab Take 1 Tab by mouth every day. 30 Tab 11   • ELIQUIS 5 MG Tab TAKE 1 TABLET BY MOUTH TWO  TIMES DAILY 180 Tab 3   • metoprolol (LOPRESSOR) 100 MG Tab Take 1 Tab by mouth 2 times a day. 180 Tab 3   • losartan-hydrochlorothiazide (HYZAAR) 100-25 MG per tablet Take 1 Tab by mouth every day.     • cloNIDine (CATAPRES) 0.3 MG/24HR PATCH WEEKLY Apply 1 Patch to skin as directed every 7 days. 4 Patch    • clobetasol (TEMOVATE) 0.05 % Cream Apply  to affected area(s) 2 times a day.     • simvastatin (ZOCOR) 80 MG tablet Take 80 mg by mouth every evening.       No current facility-administered medications for this visit.          Physical Exam:   Gen:           Alert and oriented, No apparent distress. Mood and affect appropriate, normal interaction with examiner.  Eyes:          PERRL, EOM intact, sclere white, conjunctive moist.  Ears:          Not examined.   Hearing:     Grossly intact.  Nose:          Normal, no lesions or deformities.  Dentition:  "   Good dentition.  Oropharynx:   Tongue normal, posterior pharynx without erythema or exudate.  Mallampati Classification: not examined.  Neck:        Supple, trachea midline, no masses.  Respiratory Effort: No intercostal retractions or use of accessory muscles.   Lung Auscultation:     Diminished t/o; no rales, rhonchi or wheezing.  CV:            A. Fib. No murmurs, rubs or gallops.  Abd:           Not examined.   Lymphadenopathy: Not examined.  Gait and Station: Normal.  Digits and Nails: No clubbing, cyanosis, petechiae, or nodes.   Cranial Nerves: II-XII grossly intact.  Skin:        No rashes, lesions or ulcers noted.               Ext:           No cyanosis but mild BLE edema.      Assessment:  1. GLORIA (obstructive sleep apnea)     2. Paroxysmal atrial fibrillation (CMS-HCC)     3. Renovascular hypertension     4. Former smoker     5. BMI 35.0-35.9,adult  Height And Weight       Immunizations:    Flu:recommend  Pneumovax 23:recommend  Prevnar 13:recommend    Plan:  1.  GLORIA is clinically stable.  Continue auto CPAP nightly.  DME mask/supplies.  2.  Discussed sleep hygiene.  3.  Encouraged weight loss.  Encouraged daily weights due to potential fluid retention.  4.  Encourage patient to set up a new cardiology appointment.  Physicians names given to patient.  5.  Follow-up in 1 year's compliance report, sooner if needed.    Please note that this dictation was created using voice recognition software. I have made every reasonable attempt to correct obvious errors, but it is possible there are errors of grammar and possibly content that I did not discover before finalizing the note.      Electronically signed by BARBRA [email or web address]  on 02/03/20    Kristofer Cortes M.D.  [address] [address]  VIA Facsimile: [phone]

## 2020-04-13 DIAGNOSIS — I48.0 PAROXYSMAL ATRIAL FIBRILLATION (HCC): ICD-10-CM

## 2020-04-20 ENCOUNTER — TELEPHONE (OUTPATIENT)
Dept: CARDIOLOGY | Facility: MEDICAL CENTER | Age: 75
End: 2020-04-20

## 2020-04-20 NOTE — TELEPHONE ENCOUNTER
Called pt to schedule 6mos fv (shouldve been January per LA's notes in July. Pt does want to see SC but doesn't want to schedule until after pandemic is over. Let her know to call if she needs us-dl

## 2020-07-09 ENCOUNTER — OFFICE VISIT (OUTPATIENT)
Dept: CARDIOLOGY | Facility: MEDICAL CENTER | Age: 75
End: 2020-07-09
Payer: MEDICARE

## 2020-07-09 VITALS
DIASTOLIC BLOOD PRESSURE: 82 MMHG | SYSTOLIC BLOOD PRESSURE: 140 MMHG | WEIGHT: 246 LBS | HEIGHT: 69 IN | BODY MASS INDEX: 36.43 KG/M2 | OXYGEN SATURATION: 92 % | HEART RATE: 96 BPM

## 2020-07-09 DIAGNOSIS — E78.2 MIXED HYPERLIPIDEMIA: ICD-10-CM

## 2020-07-09 DIAGNOSIS — I10 HTN (HYPERTENSION), MALIGNANT: ICD-10-CM

## 2020-07-09 DIAGNOSIS — I48.11 LONGSTANDING PERSISTENT ATRIAL FIBRILLATION (HCC): ICD-10-CM

## 2020-07-09 DIAGNOSIS — Z79.899 HIGH RISK MEDICATION USE: ICD-10-CM

## 2020-07-09 PROCEDURE — 99214 OFFICE O/P EST MOD 30 MIN: CPT | Performed by: INTERNAL MEDICINE

## 2020-07-09 RX ORDER — LOSARTAN POTASSIUM AND HYDROCHLOROTHIAZIDE 25; 100 MG/1; MG/1
1 TABLET ORAL DAILY
Qty: 90 TAB | Refills: 4 | Status: SHIPPED | OUTPATIENT
Start: 2020-07-09

## 2020-07-09 RX ORDER — METOPROLOL TARTRATE 100 MG/1
100 TABLET ORAL 2 TIMES DAILY
Qty: 180 TAB | Refills: 4 | Status: SHIPPED | OUTPATIENT
Start: 2020-07-09

## 2020-07-09 RX ORDER — POTASSIUM CHLORIDE 20 MEQ/1
20 TABLET, EXTENDED RELEASE ORAL DAILY
Qty: 90 TAB | Refills: 4 | Status: SHIPPED | OUTPATIENT
Start: 2020-07-09 | End: 2020-07-31

## 2020-07-09 RX ORDER — CLONIDINE 0.3 MG/24H
PATCH, EXTENDED RELEASE TRANSDERMAL
COMMUNITY
Start: 2020-06-01 | End: 2020-07-09 | Stop reason: SDUPTHER

## 2020-07-09 RX ORDER — FUROSEMIDE 40 MG/1
40 TABLET ORAL DAILY
Qty: 90 TAB | Refills: 4 | Status: SHIPPED | OUTPATIENT
Start: 2020-07-09 | End: 2020-07-31

## 2020-07-09 RX ORDER — SIMVASTATIN 80 MG
80 TABLET ORAL
Qty: 90 TAB | Refills: 4 | Status: SHIPPED | OUTPATIENT
Start: 2020-07-09

## 2020-07-09 RX ORDER — CLONIDINE 0.3 MG/24H
1 PATCH, EXTENDED RELEASE TRANSDERMAL
Qty: 12 PATCH | Refills: 4 | Status: SHIPPED | OUTPATIENT
Start: 2020-07-09 | End: 2020-07-31

## 2020-07-09 ASSESSMENT — ENCOUNTER SYMPTOMS
EYE DISCHARGE: 0
SPEECH CHANGE: 0
FALLS: 0
VOMITING: 0
MYALGIAS: 0
SENSORY CHANGE: 0
NAUSEA: 0
WEIGHT LOSS: 0
PND: 0
CHILLS: 0
EYE PAIN: 0
BLOOD IN STOOL: 0
HALLUCINATIONS: 0
LOSS OF CONSCIOUSNESS: 0
PALPITATIONS: 0
HEADACHES: 0
ORTHOPNEA: 0
ABDOMINAL PAIN: 0
DEPRESSION: 0
BRUISES/BLEEDS EASILY: 0
COUGH: 0
DIZZINESS: 0
SHORTNESS OF BREATH: 0
FEVER: 0
DOUBLE VISION: 0
CLAUDICATION: 0
BLURRED VISION: 0

## 2020-07-09 ASSESSMENT — FIBROSIS 4 INDEX: FIB4 SCORE: 1.23

## 2020-07-09 NOTE — PROGRESS NOTES
Chief Complaint   Patient presents with   • Atrial Fibrillation   • HTN (Controlled)   • Hyperlipidemia       Subjective:   Yvette Dailey is a 74 y.o. female who presents today for cardiac care of PAF, HTN, HLP.    In the interim, patient has been doing well without having any symptoms. Patient denies having chest pain, dyspnea, palpitation, presyncope, syncope episodes.    Past Medical History:   Diagnosis Date   • Atrial fibrillation (HCC)    • Dental disorder     upper plate, lower partial   • Edema    • Gout    • Hyperlipidemia    • Hypertension    • Obesity    • PAF (paroxysmal atrial fibrillation) (HCC)    • Paroxysmal atrial fibrillation (HCC)    • Psoriasis    • Sleep apnea     CPAP     Past Surgical History:   Procedure Laterality Date   • RECOVERY  2016    Procedure: CATH LAB CARDIOVERSION WITH ANESTHESIA DR. PAYAN;  Surgeon: Recoveryonly Surgery;  Location: SURGERY PRE-POST PROC UNIT [facility] [place];  Service:    • RECOVERY  2015    Procedure: CATH LAB-WIEDENBECK-ELECTIVE CARDIOVERSION 13179-QJU GROUP FOR ANESTHESIA-AFIB 427.31;  Surgeon: Recoveryonly Surgery;  Location: SURGERY PRE-POST PROC UNIT [facility] [place];  Service:    • HYSTERECTOMY LAPAROSCOPY       Family History   Problem Relation Age of Onset   • Heart Attack Mother 83   • Cancer Father         lung     Social History     Socioeconomic History   • Marital status:      Spouse name: Not on file   • Number of children: Not on file   • Years of education: Not on file   • Highest education level: Not on file   Occupational History   • Not on file   Social Needs   • Financial resource strain: Not on file   • Food insecurity     Worry: Not on file     Inability: Not on file   • Transportation needs     Medical: Not on file     Non-medical: Not on file   Tobacco Use   • Smoking status: Former Smoker     Packs/day: 1.50     Years: 45.00     Pack years: 67.50     Types: Cigarettes     Last attempt to quit: 2013     Years since quittin.0  "  • Smokeless tobacco: Never Used   Substance and Sexual Activity   • Alcohol use: Yes     Alcohol/week: 4.2 - 8.4 oz     Types: 7 - 14 Shots of liquor per week   • Drug use: No   • Sexual activity: Not on file   Lifestyle   • Physical activity     Days per week: Not on file     Minutes per session: Not on file   • Stress: Not on file   Relationships   • Social connections     Talks on phone: Not on file     Gets together: Not on file     Attends Holiness service: Not on file     Active member of club or organization: Not on file     Attends meetings of clubs or organizations: Not on file     Relationship status: Not on file   • Intimate partner violence     Fear of current or ex partner: Not on file     Emotionally abused: Not on file     Physically abused: Not on file     Forced sexual activity: Not on file   Other Topics Concern   • Not on file   Social History Narrative   • Not on file     Allergies   Allergen Reactions   • Allopurinol      \"everything went haywire!\"     Outpatient Encounter Medications as of 7/9/2020   Medication Sig Dispense Refill   • apixaban (ELIQUIS) 5mg Tab Take 1 Tab by mouth 2 Times a Day. For further refills please contact new cardiologist. Thank you 180 Tab 4   • metoprolol (LOPRESSOR) 100 MG Tab Take 1 Tab by mouth 2 times a day. 180 Tab 4   • furosemide (LASIX) 40 MG Tab Take 1 Tab by mouth every day. 90 Tab 4   • simvastatin (ZOCOR) 80 MG tablet Take 1 Tab by mouth every bedtime. 90 Tab 4   • potassium chloride SA (KDUR) 20 MEQ Tab CR Take 1 Tab by mouth every day. 90 Tab 4   • losartan-hydrochlorothiazide (HYZAAR) 100-25 MG per tablet Take 1 Tab by mouth every day. 90 Tab 4   • cloNIDine (CATAPRES) 0.3 MG/24HR PATCH WEEKLY patch Apply 1 Patch to skin as directed every 7 days. 12 Patch 4   • cloNIDine (CATAPRES) 0.3 MG/24HR PATCH WEEKLY Apply 1 Patch to skin as directed every 7 days. 4 Patch    • clobetasol (TEMOVATE) 0.05 % Cream Apply  to affected area(s) 2 times a day.     • " "[DISCONTINUED] cloNIDine (CATAPRES) 0.3 MG/24HR PATCH WEEKLY patch APPLY ONE PATCH EVERY WEEK     • [DISCONTINUED] apixaban (ELIQUIS) 5mg Tab Take 1 Tab by mouth 2 Times a Day. For further refills please contact new cardiologist. Thank you 180 Tab 0   • [DISCONTINUED] potassium chloride SA (KDUR) 20 MEQ Tab CR Take 1 Tab by mouth every day. 90 Tab 1   • [DISCONTINUED] furosemide (LASIX) 40 MG Tab Take 1 Tab by mouth every day. 30 Tab 11   • [DISCONTINUED] metoprolol (LOPRESSOR) 100 MG Tab Take 1 Tab by mouth 2 times a day. 180 Tab 3   • [DISCONTINUED] losartan-hydrochlorothiazide (HYZAAR) 100-25 MG per tablet Take 1 Tab by mouth every day.     • [DISCONTINUED] simvastatin (ZOCOR) 80 MG tablet Take 80 mg by mouth every evening.       No facility-administered encounter medications on file as of 7/9/2020.      Review of Systems   Constitutional: Negative for chills, fever, malaise/fatigue and weight loss.   HENT: Negative for ear discharge, ear pain, hearing loss and nosebleeds.    Eyes: Negative for blurred vision, double vision, pain and discharge.   Respiratory: Negative for cough and shortness of breath.    Cardiovascular: Negative for chest pain, palpitations, orthopnea, claudication, leg swelling and PND.   Gastrointestinal: Negative for abdominal pain, blood in stool, melena, nausea and vomiting.   Genitourinary: Negative for dysuria and hematuria.   Musculoskeletal: Negative for falls, joint pain and myalgias.   Skin: Negative for itching and rash.   Neurological: Negative for dizziness, sensory change, speech change, loss of consciousness and headaches.   Endo/Heme/Allergies: Negative for environmental allergies. Does not bruise/bleed easily.   Psychiatric/Behavioral: Negative for depression, hallucinations and suicidal ideas.        Objective:   /82 (BP Location: Left arm, Patient Position: Sitting, BP Cuff Size: Adult)   Pulse 96   Ht 1.753 m (5' 9\")   Wt 111.6 kg (246 lb)   SpO2 92%   BMI 36.33 " kg/m²     Physical Exam   Constitutional: She is oriented to person, place, and time. No distress.   HENT:   Head: Normocephalic and atraumatic.   Right Ear: External ear normal.   Left Ear: External ear normal.   Eyes: Right eye exhibits no discharge. Left eye exhibits no discharge.   Neck: No JVD present. No thyromegaly present.   Cardiovascular: Normal rate, normal heart sounds and intact distal pulses. Exam reveals no gallop and no friction rub.   No murmur heard.  There is presence of an irregularly irregular heartbeats.     Pulmonary/Chest: Breath sounds normal. No respiratory distress.   Abdominal: Bowel sounds are normal. She exhibits no distension. There is no abdominal tenderness.   Musculoskeletal:         General: No tenderness or edema.   Neurological: She is alert and oriented to person, place, and time. No cranial nerve deficit.   Skin: Skin is warm and dry. She is not diaphoretic.   Psychiatric: She has a normal mood and affect. Her behavior is normal.   Nursing note and vitals reviewed.      Assessment:     1. Longstanding persistent atrial fibrillation (HCC)  apixaban (ELIQUIS) 5mg Tab   2. HTN (hypertension), malignant     3. Mixed hyperlipidemia     4. High risk medication use         Medical Decision Making:  Today's Assessment / Status / Plan:   Today, based on physical examination findings, patient is euvolemic. No JVD, lungs are clear to auscultation, no pitting edema in bilateral lower extremities, no ascites.    Dry weight is 246 lbs.    Blood pressure is well controlled.    Continue current medications at current dose as above. Refills were prescribed today and patient was instructed with plan of care.    Rate control for persistent atrial fibrillation. Continue Eliquis 5 mg po bid for stroke risk reduction.

## 2020-07-31 ENCOUNTER — APPOINTMENT (OUTPATIENT)
Dept: RADIOLOGY | Facility: MEDICAL CENTER | Age: 75
DRG: 853 | End: 2020-07-31
Attending: EMERGENCY MEDICINE
Payer: MEDICARE

## 2020-07-31 ENCOUNTER — ANESTHESIA (OUTPATIENT)
Dept: SURGERY | Facility: MEDICAL CENTER | Age: 75
DRG: 853 | End: 2020-07-31
Payer: MEDICARE

## 2020-07-31 ENCOUNTER — HOSPITAL ENCOUNTER (INPATIENT)
Facility: MEDICAL CENTER | Age: 75
LOS: 19 days | DRG: 853 | End: 2020-08-19
Attending: EMERGENCY MEDICINE | Admitting: SURGERY
Payer: MEDICARE

## 2020-07-31 ENCOUNTER — ANESTHESIA EVENT (OUTPATIENT)
Dept: SURGERY | Facility: MEDICAL CENTER | Age: 75
DRG: 853 | End: 2020-07-31
Payer: MEDICARE

## 2020-07-31 ENCOUNTER — APPOINTMENT (OUTPATIENT)
Dept: RADIOLOGY | Facility: MEDICAL CENTER | Age: 75
DRG: 853 | End: 2020-07-31
Attending: SURGERY
Payer: MEDICARE

## 2020-07-31 DIAGNOSIS — Z79.01 ANTICOAGULATED: ICD-10-CM

## 2020-07-31 DIAGNOSIS — I48.20 CHRONIC ATRIAL FIBRILLATION (HCC): ICD-10-CM

## 2020-07-31 DIAGNOSIS — A41.9 SEPSIS WITH ACUTE ORGAN DYSFUNCTION AND SEPTIC SHOCK, DUE TO UNSPECIFIED ORGANISM, UNSPECIFIED TYPE: ICD-10-CM

## 2020-07-31 DIAGNOSIS — R65.21 SEPSIS WITH ACUTE ORGAN DYSFUNCTION AND SEPTIC SHOCK, DUE TO UNSPECIFIED ORGANISM, UNSPECIFIED TYPE: ICD-10-CM

## 2020-07-31 DIAGNOSIS — E87.29 HIGH ANION GAP METABOLIC ACIDOSIS: ICD-10-CM

## 2020-07-31 DIAGNOSIS — R10.31 RIGHT LOWER QUADRANT ABDOMINAL PAIN: ICD-10-CM

## 2020-07-31 DIAGNOSIS — R79.89 ELEVATED TROPONIN: ICD-10-CM

## 2020-07-31 DIAGNOSIS — N28.9 RENAL INSUFFICIENCY: ICD-10-CM

## 2020-07-31 DIAGNOSIS — K35.32 RUPTURED APPENDICITIS: ICD-10-CM

## 2020-07-31 DIAGNOSIS — R74.01 TRANSAMINITIS: ICD-10-CM

## 2020-07-31 PROBLEM — J95.821 RESPIRATORY FAILURE FOLLOWING TRAUMA AND SURGERY (HCC): Status: ACTIVE | Noted: 2020-07-31

## 2020-07-31 LAB
ABO + RH BLD: NORMAL
ABO GROUP BLD: NORMAL
ACTION RANGE TRIGGERED IACRT: NO
ALBUMIN SERPL BCP-MCNC: 3.4 G/DL (ref 3.2–4.9)
ALBUMIN/GLOB SERPL: 0.9 G/DL
ALP SERPL-CCNC: 182 U/L (ref 30–99)
ALT SERPL-CCNC: 53 U/L (ref 2–50)
ANION GAP SERPL CALC-SCNC: 21 MMOL/L (ref 7–16)
ANION GAP SERPL CALC-SCNC: 25 MMOL/L (ref 7–16)
ANISOCYTOSIS BLD QL SMEAR: ABNORMAL
AST SERPL-CCNC: 155 U/L (ref 12–45)
BASE EXCESS BLDA CALC-SCNC: -5 MMOL/L (ref -4–3)
BASOPHILS # BLD AUTO: 0 % (ref 0–1.8)
BASOPHILS # BLD AUTO: 0 % (ref 0–1.8)
BASOPHILS # BLD: 0 K/UL (ref 0–0.12)
BASOPHILS # BLD: 0 K/UL (ref 0–0.12)
BILIRUB SERPL-MCNC: 0.8 MG/DL (ref 0.1–1.5)
BLD GP AB SCN SERPL QL: NORMAL
BLOOD CULTURE HOLD CXBCH: NORMAL
BODY TEMPERATURE: ABNORMAL DEGREES
BUN SERPL-MCNC: 60 MG/DL (ref 8–22)
BUN SERPL-MCNC: 60 MG/DL (ref 8–22)
CALCIUM SERPL-MCNC: 8.1 MG/DL (ref 8.5–10.5)
CALCIUM SERPL-MCNC: 9.3 MG/DL (ref 8.5–10.5)
CHLORIDE SERPL-SCNC: 93 MMOL/L (ref 96–112)
CHLORIDE SERPL-SCNC: 97 MMOL/L (ref 96–112)
CO2 BLDA-SCNC: 22 MMOL/L (ref 20–33)
CO2 SERPL-SCNC: 16 MMOL/L (ref 20–33)
CO2 SERPL-SCNC: 17 MMOL/L (ref 20–33)
COVID ORDER STATUS COVID19: NORMAL
CREAT SERPL-MCNC: 1.57 MG/DL (ref 0.5–1.4)
CREAT SERPL-MCNC: 1.9 MG/DL (ref 0.5–1.4)
EKG IMPRESSION: NORMAL
EOSINOPHIL # BLD AUTO: 0 K/UL (ref 0–0.51)
EOSINOPHIL # BLD AUTO: 0 K/UL (ref 0–0.51)
EOSINOPHIL NFR BLD: 0 % (ref 0–6.9)
EOSINOPHIL NFR BLD: 0 % (ref 0–6.9)
ERYTHROCYTE [DISTWIDTH] IN BLOOD BY AUTOMATED COUNT: 59.9 FL (ref 35.9–50)
ERYTHROCYTE [DISTWIDTH] IN BLOOD BY AUTOMATED COUNT: 60.4 FL (ref 35.9–50)
GIANT PLATELETS BLD QL SMEAR: NORMAL
GLOBULIN SER CALC-MCNC: 3.8 G/DL (ref 1.9–3.5)
GLUCOSE BLD-MCNC: 138 MG/DL (ref 65–99)
GLUCOSE SERPL-MCNC: 157 MG/DL (ref 65–99)
GLUCOSE SERPL-MCNC: 219 MG/DL (ref 65–99)
HCO3 BLDA-SCNC: 20.8 MMOL/L (ref 17–25)
HCT VFR BLD AUTO: 40.9 % (ref 37–47)
HCT VFR BLD AUTO: 46.1 % (ref 37–47)
HGB BLD-MCNC: 12.7 G/DL (ref 12–16)
HGB BLD-MCNC: 14.2 G/DL (ref 12–16)
HOROWITZ INDEX BLDA+IHG-RTO: 391 MM[HG]
INR PPP: 1.81 (ref 0.87–1.13)
INST. QUALIFIED PATIENT IIQPT: YES
LACTATE BLD-SCNC: 1.8 MMOL/L (ref 0.5–2)
LACTATE BLD-SCNC: 2.1 MMOL/L (ref 0.5–2)
LACTATE BLD-SCNC: 2.8 MMOL/L (ref 0.5–2)
LG PLATELETS BLD QL SMEAR: NORMAL
LYMPHOCYTES # BLD AUTO: 0.47 K/UL (ref 1–4.8)
LYMPHOCYTES # BLD AUTO: 0.69 K/UL (ref 1–4.8)
LYMPHOCYTES NFR BLD: 1.7 % (ref 22–41)
LYMPHOCYTES NFR BLD: 3.4 % (ref 22–41)
MACROCYTES BLD QL SMEAR: ABNORMAL
MANUAL DIFF BLD: NORMAL
MANUAL DIFF BLD: NORMAL
MCH RBC QN AUTO: 32.3 PG (ref 27–33)
MCH RBC QN AUTO: 32.6 PG (ref 27–33)
MCHC RBC AUTO-ENTMCNC: 30.8 G/DL (ref 33.6–35)
MCHC RBC AUTO-ENTMCNC: 31.1 G/DL (ref 33.6–35)
MCV RBC AUTO: 105 FL (ref 81.4–97.8)
MCV RBC AUTO: 105.1 FL (ref 81.4–97.8)
MONOCYTES # BLD AUTO: 0.25 K/UL (ref 0–0.85)
MONOCYTES # BLD AUTO: 0.69 K/UL (ref 0–0.85)
MONOCYTES NFR BLD AUTO: 0.9 % (ref 0–13.4)
MONOCYTES NFR BLD AUTO: 3.4 % (ref 0–13.4)
MORPHOLOGY BLD-IMP: NORMAL
MORPHOLOGY BLD-IMP: NORMAL
NEUTROPHILS # BLD AUTO: 18.83 K/UL (ref 2–7.15)
NEUTROPHILS # BLD AUTO: 27.08 K/UL (ref 2–7.15)
NEUTROPHILS NFR BLD: 90.6 % (ref 44–72)
NEUTROPHILS NFR BLD: 97.4 % (ref 44–72)
NEUTS BAND NFR BLD MANUAL: 2.6 % (ref 0–10)
NRBC # BLD AUTO: 0 K/UL
NRBC # BLD AUTO: 0 K/UL
NRBC BLD-RTO: 0 /100 WBC
NRBC BLD-RTO: 0 /100 WBC
O2/TOTAL GAS SETTING VFR VENT: 100 %
PCO2 BLDA: 40.8 MMHG (ref 26–37)
PCO2 TEMP ADJ BLDA: 40.6 MMHG (ref 26–37)
PH BLDA: 7.32 [PH] (ref 7.4–7.5)
PH TEMP ADJ BLDA: 7.32 [PH] (ref 7.4–7.5)
PLATELET # BLD AUTO: 419 K/UL (ref 164–446)
PLATELET # BLD AUTO: 503 K/UL (ref 164–446)
PLATELET BLD QL SMEAR: NORMAL
PLATELET BLD QL SMEAR: NORMAL
PMV BLD AUTO: 10.5 FL (ref 9–12.9)
PMV BLD AUTO: 10.9 FL (ref 9–12.9)
PO2 BLDA: 391 MMHG (ref 64–87)
PO2 TEMP ADJ BLDA: 391 MMHG (ref 64–87)
POTASSIUM SERPL-SCNC: 4.1 MMOL/L (ref 3.6–5.5)
POTASSIUM SERPL-SCNC: 4.4 MMOL/L (ref 3.6–5.5)
PROT SERPL-MCNC: 7.2 G/DL (ref 6–8.2)
PROTHROMBIN TIME: 21.5 SEC (ref 12–14.6)
RBC # BLD AUTO: 3.89 M/UL (ref 4.2–5.4)
RBC # BLD AUTO: 4.39 M/UL (ref 4.2–5.4)
RBC BLD AUTO: PRESENT
RH BLD: NORMAL
SAO2 % BLDA: 100 % (ref 93–99)
SARS-COV-2 RNA RESP QL NAA+PROBE: NOTDETECTED
SODIUM SERPL-SCNC: 134 MMOL/L (ref 135–145)
SODIUM SERPL-SCNC: 135 MMOL/L (ref 135–145)
SPECIMEN DRAWN FROM PATIENT: ABNORMAL
SPECIMEN SOURCE: NORMAL
TRIGL SERPL-MCNC: 118 MG/DL (ref 0–149)
TROPONIN T SERPL-MCNC: 65 NG/L (ref 6–19)
TROPONIN T SERPL-MCNC: 83 NG/L (ref 6–19)
WBC # BLD AUTO: 20.2 K/UL (ref 4.8–10.8)
WBC # BLD AUTO: 27.8 K/UL (ref 4.8–10.8)

## 2020-07-31 PROCEDURE — 501452 HCHG STAPLES, GIA MULTIFIRE 60/80: Performed by: SURGERY

## 2020-07-31 PROCEDURE — 160009 HCHG ANES TIME/MIN: Performed by: SURGERY

## 2020-07-31 PROCEDURE — 700101 HCHG RX REV CODE 250: Performed by: ANESTHESIOLOGY

## 2020-07-31 PROCEDURE — 74177 CT ABD & PELVIS W/CONTRAST: CPT

## 2020-07-31 PROCEDURE — 84478 ASSAY OF TRIGLYCERIDES: CPT

## 2020-07-31 PROCEDURE — 88307 TISSUE EXAM BY PATHOLOGIST: CPT

## 2020-07-31 PROCEDURE — 700111 HCHG RX REV CODE 636 W/ 250 OVERRIDE (IP): Performed by: SURGERY

## 2020-07-31 PROCEDURE — 700105 HCHG RX REV CODE 258: Performed by: ANESTHESIOLOGY

## 2020-07-31 PROCEDURE — 160042 HCHG SURGERY MINUTES - EA ADDL 1 MIN LEVEL 5: Performed by: SURGERY

## 2020-07-31 PROCEDURE — 94760 N-INVAS EAR/PLS OXIMETRY 1: CPT

## 2020-07-31 PROCEDURE — 700105 HCHG RX REV CODE 258: Performed by: EMERGENCY MEDICINE

## 2020-07-31 PROCEDURE — 700101 HCHG RX REV CODE 250: Performed by: EMERGENCY MEDICINE

## 2020-07-31 PROCEDURE — 0DTH0ZZ RESECTION OF CECUM, OPEN APPROACH: ICD-10-PCS | Performed by: SURGERY

## 2020-07-31 PROCEDURE — 83605 ASSAY OF LACTIC ACID: CPT | Mod: 91

## 2020-07-31 PROCEDURE — 93005 ELECTROCARDIOGRAM TRACING: CPT | Performed by: EMERGENCY MEDICINE

## 2020-07-31 PROCEDURE — 700111 HCHG RX REV CODE 636 W/ 250 OVERRIDE (IP): Performed by: EMERGENCY MEDICINE

## 2020-07-31 PROCEDURE — 501435 HCHG STAPLER, LINEAR 60: Performed by: SURGERY

## 2020-07-31 PROCEDURE — 160048 HCHG OR STATISTICAL LEVEL 1-5: Performed by: SURGERY

## 2020-07-31 PROCEDURE — 0DTJ0ZZ RESECTION OF APPENDIX, OPEN APPROACH: ICD-10-PCS | Performed by: SURGERY

## 2020-07-31 PROCEDURE — U0003 INFECTIOUS AGENT DETECTION BY NUCLEIC ACID (DNA OR RNA); SEVERE ACUTE RESPIRATORY SYNDROME CORONAVIRUS 2 (SARS-COV-2) (CORONAVIRUS DISEASE [COVID-19]), AMPLIFIED PROBE TECHNIQUE, MAKING USE OF HIGH THROUGHPUT TECHNOLOGIES AS DESCRIBED BY CMS-2020-01-R: HCPCS

## 2020-07-31 PROCEDURE — P9045 ALBUMIN (HUMAN), 5%, 250 ML: HCPCS | Mod: JG | Performed by: SURGERY

## 2020-07-31 PROCEDURE — 36415 COLL VENOUS BLD VENIPUNCTURE: CPT

## 2020-07-31 PROCEDURE — A6402 STERILE GAUZE <= 16 SQ IN: HCPCS | Performed by: SURGERY

## 2020-07-31 PROCEDURE — 0DBK0ZZ EXCISION OF ASCENDING COLON, OPEN APPROACH: ICD-10-PCS | Performed by: SURGERY

## 2020-07-31 PROCEDURE — 160031 HCHG SURGERY MINUTES - 1ST 30 MINS LEVEL 5: Performed by: SURGERY

## 2020-07-31 PROCEDURE — 700111 HCHG RX REV CODE 636 W/ 250 OVERRIDE (IP): Performed by: ANESTHESIOLOGY

## 2020-07-31 PROCEDURE — 96367 TX/PROPH/DG ADDL SEQ IV INF: CPT

## 2020-07-31 PROCEDURE — 501445 HCHG STAPLER, SKIN DISP: Performed by: SURGERY

## 2020-07-31 PROCEDURE — 700101 HCHG RX REV CODE 250: Performed by: SURGERY

## 2020-07-31 PROCEDURE — 85007 BL SMEAR W/DIFF WBC COUNT: CPT | Mod: 91

## 2020-07-31 PROCEDURE — 99291 CRITICAL CARE FIRST HOUR: CPT | Performed by: SURGERY

## 2020-07-31 PROCEDURE — 86900 BLOOD TYPING SEROLOGIC ABO: CPT

## 2020-07-31 PROCEDURE — 700102 HCHG RX REV CODE 250 W/ 637 OVERRIDE(OP): Performed by: SURGERY

## 2020-07-31 PROCEDURE — 770022 HCHG ROOM/CARE - ICU (200)

## 2020-07-31 PROCEDURE — 501433 HCHG STAPLER, GIA MULTIFIRE 60/80: Performed by: SURGERY

## 2020-07-31 PROCEDURE — 85610 PROTHROMBIN TIME: CPT

## 2020-07-31 PROCEDURE — 700105 HCHG RX REV CODE 258: Performed by: SURGERY

## 2020-07-31 PROCEDURE — 94002 VENT MGMT INPAT INIT DAY: CPT

## 2020-07-31 PROCEDURE — 82803 BLOOD GASES ANY COMBINATION: CPT

## 2020-07-31 PROCEDURE — 86901 BLOOD TYPING SEROLOGIC RH(D): CPT

## 2020-07-31 PROCEDURE — 37799 UNLISTED PX VASCULAR SURGERY: CPT

## 2020-07-31 PROCEDURE — 0DB80ZZ EXCISION OF SMALL INTESTINE, OPEN APPROACH: ICD-10-PCS | Performed by: SURGERY

## 2020-07-31 PROCEDURE — C9803 HOPD COVID-19 SPEC COLLECT: HCPCS | Performed by: EMERGENCY MEDICINE

## 2020-07-31 PROCEDURE — 87641 MR-STAPH DNA AMP PROBE: CPT

## 2020-07-31 PROCEDURE — 94770 HCHG CO2 EXPIRED GAS DETERMINATION: CPT

## 2020-07-31 PROCEDURE — 80053 COMPREHEN METABOLIC PANEL: CPT

## 2020-07-31 PROCEDURE — 80048 BASIC METABOLIC PNL TOTAL CA: CPT

## 2020-07-31 PROCEDURE — 82962 GLUCOSE BLOOD TEST: CPT

## 2020-07-31 PROCEDURE — 500389 HCHG DRAIN, RESERVOIR SUCT JP 100CC: Performed by: SURGERY

## 2020-07-31 PROCEDURE — 502704 HCHG DEVICE, LIGASURE IMPACT: Performed by: SURGERY

## 2020-07-31 PROCEDURE — 96365 THER/PROPH/DIAG IV INF INIT: CPT

## 2020-07-31 PROCEDURE — 700117 HCHG RX CONTRAST REV CODE 255: Performed by: EMERGENCY MEDICINE

## 2020-07-31 PROCEDURE — 71045 X-RAY EXAM CHEST 1 VIEW: CPT

## 2020-07-31 PROCEDURE — 99291 CRITICAL CARE FIRST HOUR: CPT

## 2020-07-31 PROCEDURE — 501838 HCHG SUTURE GENERAL: Performed by: SURGERY

## 2020-07-31 PROCEDURE — 96376 TX/PRO/DX INJ SAME DRUG ADON: CPT

## 2020-07-31 PROCEDURE — 84484 ASSAY OF TROPONIN QUANT: CPT

## 2020-07-31 PROCEDURE — 96368 THER/DIAG CONCURRENT INF: CPT

## 2020-07-31 PROCEDURE — 86850 RBC ANTIBODY SCREEN: CPT

## 2020-07-31 PROCEDURE — 5A1955Z RESPIRATORY VENTILATION, GREATER THAN 96 CONSECUTIVE HOURS: ICD-10-PCS | Performed by: SURGERY

## 2020-07-31 PROCEDURE — 96375 TX/PRO/DX INJ NEW DRUG ADDON: CPT

## 2020-07-31 PROCEDURE — 700111 HCHG RX REV CODE 636 W/ 250 OVERRIDE (IP): Mod: JG | Performed by: SURGERY

## 2020-07-31 PROCEDURE — 85027 COMPLETE CBC AUTOMATED: CPT | Mod: 91

## 2020-07-31 RX ORDER — ACETAMINOPHEN 325 MG/1
650 TABLET ORAL EVERY 6 HOURS
Status: DISPENSED | OUTPATIENT
Start: 2020-07-31 | End: 2020-08-05

## 2020-07-31 RX ORDER — SODIUM CHLORIDE, SODIUM LACTATE, POTASSIUM CHLORIDE, CALCIUM CHLORIDE 600; 310; 30; 20 MG/100ML; MG/100ML; MG/100ML; MG/100ML
1000 INJECTION, SOLUTION INTRAVENOUS ONCE
Status: COMPLETED | OUTPATIENT
Start: 2020-07-31 | End: 2020-07-31

## 2020-07-31 RX ORDER — HYDROMORPHONE HYDROCHLORIDE 1 MG/ML
0.5 INJECTION, SOLUTION INTRAMUSCULAR; INTRAVENOUS; SUBCUTANEOUS
Status: DISCONTINUED | OUTPATIENT
Start: 2020-07-31 | End: 2020-08-18

## 2020-07-31 RX ORDER — SODIUM CHLORIDE 9 MG/ML
1000 INJECTION, SOLUTION INTRAVENOUS ONCE
Status: COMPLETED | OUTPATIENT
Start: 2020-07-31 | End: 2020-07-31

## 2020-07-31 RX ORDER — NOREPINEPHRINE BITARTRATE 0.03 MG/ML
0-30 INJECTION, SOLUTION INTRAVENOUS CONTINUOUS
Status: DISCONTINUED | OUTPATIENT
Start: 2020-07-31 | End: 2020-08-06

## 2020-07-31 RX ORDER — PHENYLEPHRINE HCL IN 0.9% NACL 0.5 MG/5ML
SYRINGE (ML) INTRAVENOUS PRN
Status: DISCONTINUED | OUTPATIENT
Start: 2020-07-31 | End: 2020-07-31 | Stop reason: SURG

## 2020-07-31 RX ORDER — KETOROLAC TROMETHAMINE 30 MG/ML
15 INJECTION, SOLUTION INTRAMUSCULAR; INTRAVENOUS ONCE
Status: COMPLETED | OUTPATIENT
Start: 2020-07-31 | End: 2020-07-31

## 2020-07-31 RX ORDER — ONDANSETRON 2 MG/ML
4 INJECTION INTRAMUSCULAR; INTRAVENOUS EVERY 4 HOURS PRN
Status: DISCONTINUED | OUTPATIENT
Start: 2020-07-31 | End: 2020-08-18

## 2020-07-31 RX ORDER — MORPHINE SULFATE 4 MG/ML
4 INJECTION, SOLUTION INTRAMUSCULAR; INTRAVENOUS ONCE
Status: COMPLETED | OUTPATIENT
Start: 2020-07-31 | End: 2020-07-31

## 2020-07-31 RX ORDER — OXYCODONE HYDROCHLORIDE 10 MG/1
10 TABLET ORAL
Status: DISCONTINUED | OUTPATIENT
Start: 2020-07-31 | End: 2020-08-18

## 2020-07-31 RX ORDER — SODIUM CHLORIDE, SODIUM LACTATE, POTASSIUM CHLORIDE, AND CALCIUM CHLORIDE .6; .31; .03; .02 G/100ML; G/100ML; G/100ML; G/100ML
500 INJECTION, SOLUTION INTRAVENOUS ONCE
Status: DISCONTINUED | OUTPATIENT
Start: 2020-07-31 | End: 2020-07-31

## 2020-07-31 RX ORDER — ROCURONIUM BROMIDE 10 MG/ML
INJECTION, SOLUTION INTRAVENOUS PRN
Status: DISCONTINUED | OUTPATIENT
Start: 2020-07-31 | End: 2020-07-31 | Stop reason: SURG

## 2020-07-31 RX ORDER — FUROSEMIDE 40 MG/1
40 TABLET ORAL
COMMUNITY

## 2020-07-31 RX ORDER — FAMOTIDINE 20 MG/1
20 TABLET, FILM COATED ORAL 2 TIMES DAILY
Status: DISCONTINUED | OUTPATIENT
Start: 2020-07-31 | End: 2020-08-01

## 2020-07-31 RX ORDER — AMOXICILLIN 250 MG
1 CAPSULE ORAL NIGHTLY
Status: DISCONTINUED | OUTPATIENT
Start: 2020-07-31 | End: 2020-08-18

## 2020-07-31 RX ORDER — DOCUSATE SODIUM 50 MG/5ML
100 LIQUID ORAL 2 TIMES DAILY
Status: DISCONTINUED | OUTPATIENT
Start: 2020-07-31 | End: 2020-08-18

## 2020-07-31 RX ORDER — SUCCINYLCHOLINE/SOD CL,ISO/PF 200MG/10ML
SYRINGE (ML) INTRAVENOUS PRN
Status: DISCONTINUED | OUTPATIENT
Start: 2020-07-31 | End: 2020-07-31 | Stop reason: SURG

## 2020-07-31 RX ORDER — ENEMA 19; 7 G/133ML; G/133ML
1 ENEMA RECTAL
Status: DISCONTINUED | OUTPATIENT
Start: 2020-07-31 | End: 2020-08-18

## 2020-07-31 RX ORDER — KETOROLAC TROMETHAMINE 30 MG/ML
INJECTION, SOLUTION INTRAMUSCULAR; INTRAVENOUS
Status: DISCONTINUED
Start: 2020-07-31 | End: 2020-07-31

## 2020-07-31 RX ORDER — POTASSIUM CHLORIDE 1.5 G/1.58G
20 POWDER, FOR SOLUTION ORAL
COMMUNITY

## 2020-07-31 RX ORDER — SODIUM CHLORIDE, SODIUM LACTATE, POTASSIUM CHLORIDE, AND CALCIUM CHLORIDE .6; .31; .03; .02 G/100ML; G/100ML; G/100ML; G/100ML
500 INJECTION, SOLUTION INTRAVENOUS ONCE
Status: COMPLETED | OUTPATIENT
Start: 2020-07-31 | End: 2020-07-31

## 2020-07-31 RX ORDER — AMOXICILLIN 250 MG
1 CAPSULE ORAL
Status: DISCONTINUED | OUTPATIENT
Start: 2020-07-31 | End: 2020-08-18

## 2020-07-31 RX ORDER — ALBUMIN, HUMAN INJ 5% 5 %
12.5 SOLUTION INTRAVENOUS ONCE
Status: COMPLETED | OUTPATIENT
Start: 2020-07-31 | End: 2020-07-31

## 2020-07-31 RX ORDER — CEFOTETAN DISODIUM 2 G/20ML
INJECTION, POWDER, FOR SOLUTION INTRAMUSCULAR; INTRAVENOUS PRN
Status: DISCONTINUED | OUTPATIENT
Start: 2020-07-31 | End: 2020-07-31 | Stop reason: SURG

## 2020-07-31 RX ORDER — SODIUM CHLORIDE, SODIUM LACTATE, POTASSIUM CHLORIDE, CALCIUM CHLORIDE 600; 310; 30; 20 MG/100ML; MG/100ML; MG/100ML; MG/100ML
INJECTION, SOLUTION INTRAVENOUS CONTINUOUS
Status: DISCONTINUED | OUTPATIENT
Start: 2020-07-31 | End: 2020-08-10

## 2020-07-31 RX ORDER — DEXTROSE MONOHYDRATE 25 G/50ML
50 INJECTION, SOLUTION INTRAVENOUS
Status: DISCONTINUED | OUTPATIENT
Start: 2020-07-31 | End: 2020-08-16

## 2020-07-31 RX ORDER — OXYCODONE HYDROCHLORIDE 5 MG/1
5 TABLET ORAL
Status: DISCONTINUED | OUTPATIENT
Start: 2020-07-31 | End: 2020-08-18

## 2020-07-31 RX ORDER — ONDANSETRON 2 MG/ML
4 INJECTION INTRAMUSCULAR; INTRAVENOUS ONCE
Status: COMPLETED | OUTPATIENT
Start: 2020-07-31 | End: 2020-07-31

## 2020-07-31 RX ORDER — BISACODYL 10 MG
10 SUPPOSITORY, RECTAL RECTAL
Status: DISCONTINUED | OUTPATIENT
Start: 2020-07-31 | End: 2020-08-18

## 2020-07-31 RX ORDER — SODIUM CHLORIDE, SODIUM LACTATE, POTASSIUM CHLORIDE, CALCIUM CHLORIDE 600; 310; 30; 20 MG/100ML; MG/100ML; MG/100ML; MG/100ML
INJECTION, SOLUTION INTRAVENOUS
Status: DISCONTINUED | OUTPATIENT
Start: 2020-07-31 | End: 2020-07-31 | Stop reason: SURG

## 2020-07-31 RX ADMIN — FAMOTIDINE 20 MG: 10 INJECTION, SOLUTION INTRAVENOUS at 18:47

## 2020-07-31 RX ADMIN — ONDANSETRON 4 MG: 2 INJECTION INTRAMUSCULAR; INTRAVENOUS at 10:50

## 2020-07-31 RX ADMIN — PIPERACILLIN SODIUM, TAZOBACTAM SODIUM 4.5 G: 4; .5 INJECTION, POWDER, LYOPHILIZED, FOR SOLUTION INTRAVENOUS at 22:16

## 2020-07-31 RX ADMIN — ROCURONIUM BROMIDE 50 MG: 10 INJECTION, SOLUTION INTRAVENOUS at 16:28

## 2020-07-31 RX ADMIN — CEFTRIAXONE SODIUM 2 G: 2 INJECTION, POWDER, FOR SOLUTION INTRAMUSCULAR; INTRAVENOUS at 12:02

## 2020-07-31 RX ADMIN — SODIUM CHLORIDE, POTASSIUM CHLORIDE, SODIUM LACTATE AND CALCIUM CHLORIDE 500 ML: 600; 310; 30; 20 INJECTION, SOLUTION INTRAVENOUS at 18:26

## 2020-07-31 RX ADMIN — MORPHINE SULFATE 4 MG: 4 INJECTION INTRAVENOUS at 10:51

## 2020-07-31 RX ADMIN — FENTANYL CITRATE 50 MCG: 50 INJECTION INTRAMUSCULAR; INTRAVENOUS at 12:35

## 2020-07-31 RX ADMIN — HYDROMORPHONE HYDROCHLORIDE 0.5 MG: 1 INJECTION, SOLUTION INTRAMUSCULAR; INTRAVENOUS; SUBCUTANEOUS at 19:38

## 2020-07-31 RX ADMIN — FENTANYL CITRATE 50 MCG: 50 INJECTION INTRAMUSCULAR; INTRAVENOUS at 16:11

## 2020-07-31 RX ADMIN — FENTANYL CITRATE 50 MCG: 50 INJECTION INTRAMUSCULAR; INTRAVENOUS at 15:22

## 2020-07-31 RX ADMIN — SODIUM CHLORIDE, POTASSIUM CHLORIDE, SODIUM LACTATE AND CALCIUM CHLORIDE: 600; 310; 30; 20 INJECTION, SOLUTION INTRAVENOUS at 16:09

## 2020-07-31 RX ADMIN — FENTANYL CITRATE 100 MCG: 50 INJECTION INTRAMUSCULAR; INTRAVENOUS at 17:13

## 2020-07-31 RX ADMIN — SODIUM CHLORIDE 1000 ML: 9 INJECTION, SOLUTION INTRAVENOUS at 12:26

## 2020-07-31 RX ADMIN — IOHEXOL 100 ML: 350 INJECTION, SOLUTION INTRAVENOUS at 12:21

## 2020-07-31 RX ADMIN — FENTANYL CITRATE 50 MCG: 50 INJECTION INTRAMUSCULAR; INTRAVENOUS at 15:14

## 2020-07-31 RX ADMIN — Medication 100 MCG: at 15:28

## 2020-07-31 RX ADMIN — FENTANYL CITRATE 50 MCG: 50 INJECTION INTRAMUSCULAR; INTRAVENOUS at 16:30

## 2020-07-31 RX ADMIN — FENTANYL CITRATE 50 MCG: 50 INJECTION INTRAMUSCULAR; INTRAVENOUS at 16:49

## 2020-07-31 RX ADMIN — SODIUM CHLORIDE, POTASSIUM CHLORIDE, SODIUM LACTATE AND CALCIUM CHLORIDE 500 ML: 600; 310; 30; 20 INJECTION, SOLUTION INTRAVENOUS at 21:25

## 2020-07-31 RX ADMIN — SODIUM CHLORIDE, POTASSIUM CHLORIDE, SODIUM LACTATE AND CALCIUM CHLORIDE: 600; 310; 30; 20 INJECTION, SOLUTION INTRAVENOUS at 18:27

## 2020-07-31 RX ADMIN — CEFOTETAN DISODIUM 2 G: 2 INJECTION, POWDER, FOR SOLUTION INTRAMUSCULAR; INTRAVENOUS at 15:36

## 2020-07-31 RX ADMIN — NOREPINEPHRINE BITARTRATE 5 MCG/MIN: 1 INJECTION INTRAVENOUS at 20:07

## 2020-07-31 RX ADMIN — PIPERACILLIN AND TAZOBACTAM 4.5 G: 4; .5 INJECTION, POWDER, LYOPHILIZED, FOR SOLUTION INTRAVENOUS; PARENTERAL at 18:31

## 2020-07-31 RX ADMIN — FENTANYL CITRATE 50 MCG: 50 INJECTION INTRAMUSCULAR; INTRAVENOUS at 17:36

## 2020-07-31 RX ADMIN — SODIUM CHLORIDE, POTASSIUM CHLORIDE, SODIUM LACTATE AND CALCIUM CHLORIDE 500 ML: 600; 310; 30; 20 INJECTION, SOLUTION INTRAVENOUS at 19:15

## 2020-07-31 RX ADMIN — SODIUM CHLORIDE, POTASSIUM CHLORIDE, SODIUM LACTATE AND CALCIUM CHLORIDE: 600; 310; 30; 20 INJECTION, SOLUTION INTRAVENOUS at 15:13

## 2020-07-31 RX ADMIN — Medication 150 MG: at 15:25

## 2020-07-31 RX ADMIN — HYDROMORPHONE HYDROCHLORIDE 0.5 MG: 1 INJECTION, SOLUTION INTRAMUSCULAR; INTRAVENOUS; SUBCUTANEOUS at 22:05

## 2020-07-31 RX ADMIN — Medication 100 MCG: at 16:28

## 2020-07-31 RX ADMIN — SODIUM CHLORIDE, POTASSIUM CHLORIDE, SODIUM LACTATE AND CALCIUM CHLORIDE 1000 ML: 600; 310; 30; 20 INJECTION, SOLUTION INTRAVENOUS at 11:58

## 2020-07-31 RX ADMIN — KETOROLAC TROMETHAMINE 15 MG: 30 INJECTION, SOLUTION INTRAMUSCULAR at 12:02

## 2020-07-31 RX ADMIN — METRONIDAZOLE 500 MG: 500 INJECTION, SOLUTION INTRAVENOUS at 12:36

## 2020-07-31 RX ADMIN — ALBUMIN (HUMAN) 12.5 G: 2.5 SOLUTION INTRAVENOUS at 14:39

## 2020-07-31 RX ADMIN — Medication 100 MCG: at 15:31

## 2020-07-31 RX ADMIN — FENTANYL CITRATE 50 MCG: 50 INJECTION INTRAMUSCULAR; INTRAVENOUS at 11:59

## 2020-07-31 RX ADMIN — Medication 100 MCG: at 15:34

## 2020-07-31 RX ADMIN — Medication 100 MCG: at 16:48

## 2020-07-31 RX ADMIN — FENTANYL CITRATE 100 MCG: 50 INJECTION INTRAMUSCULAR; INTRAVENOUS at 15:45

## 2020-07-31 RX ADMIN — SODIUM CHLORIDE, POTASSIUM CHLORIDE, SODIUM LACTATE AND CALCIUM CHLORIDE 1000 ML: 600; 310; 30; 20 INJECTION, SOLUTION INTRAVENOUS at 11:15

## 2020-07-31 RX ADMIN — PROPOFOL 100 MG: 10 INJECTION, EMULSION INTRAVENOUS at 15:25

## 2020-07-31 RX ADMIN — PROPOFOL 10 MCG/KG/MIN: 10 INJECTION, EMULSION INTRAVENOUS at 22:35

## 2020-07-31 RX ADMIN — SODIUM CHLORIDE, POTASSIUM CHLORIDE, SODIUM LACTATE AND CALCIUM CHLORIDE: 600; 310; 30; 20 INJECTION, SOLUTION INTRAVENOUS at 22:15

## 2020-07-31 RX ADMIN — ROCURONIUM BROMIDE 50 MG: 10 INJECTION, SOLUTION INTRAVENOUS at 15:31

## 2020-07-31 RX ADMIN — FENTANYL CITRATE 100 MCG: 50 INJECTION INTRAMUSCULAR; INTRAVENOUS at 15:53

## 2020-07-31 ASSESSMENT — ENCOUNTER SYMPTOMS
ABDOMINAL PAIN: 1
DIZZINESS: 0
VOMITING: 0
NAUSEA: 1
BACK PAIN: 0
COUGH: 0
DIARRHEA: 1
HEADACHES: 0
CONSTIPATION: 0
SHORTNESS OF BREATH: 0
CHILLS: 0
FEVER: 0

## 2020-07-31 ASSESSMENT — FIBROSIS 4 INDEX
FIB4 SCORE: 3.09
FIB4 SCORE: 1.23

## 2020-07-31 NOTE — ANESTHESIA PREPROCEDURE EVALUATION
75 y/o female with perforated bowel/free air with h/o afib, on elaquis, HTN, GLORIA, obesity, now with afib with RVR, elevated WBC, free air on xray, probable perforated appendicitis or diverticulitis.     Relevant Problems   ANESTHESIA   (+) GLORIA (obstructive sleep apnea)      CARDIAC   (+) HTN (hypertension)   (+) Paroxysmal atrial fibrillation (CMS-HCC)       Physical Exam    Airway   Mallampati: II  TM distance: >3 FB  Neck ROM: full       Cardiovascular   Rhythm: irregular  Rate: abnormal  (-) murmur     Dental   (+) upper dentures           Pulmonary - normal exam  Breath sounds clear to auscultation     Abdominal   (+) obese     Neurological - normal exam         Other findings: Dyspnea, SOB, in pain          Anesthesia Plan    ASA 4- EMERGENT   ASA physical status 4 criteria: sepsisASA physical status emergent criteria: acute peritonitis and compromised vital organ, limb or tissue    Plan - general       Airway plan will be ETT  (Possible art line, possible post op intubation, possible ICU)      Induction: intravenous    Postoperative Plan: Postoperative administration of opioids is intended.    Pertinent diagnostic labs and testing reviewed    Informed Consent:  Emergent - Consent given by clinician  Anesthetic plan and risks discussed with patient.

## 2020-07-31 NOTE — ANESTHESIA PROCEDURE NOTES
Arterial Line  Performed by: Jenny Flannery M.D.  Authorized by: Jenny Flannery M.D.     Start Time:  7/31/2020 3:28 PM  End Time:  7/31/2020 3:33 PM  Localization: ultrasound guidance and surface landmarks    Patient Location:  OR  Indication: continuous blood pressure monitoring        Catheter Size:  20 G  Seldinger Technique?: Yes    Laterality:  Left  Site:  Radial artery  Line Secured:  Antimicrobial disc, tape and transparent dressing  Events: patient tolerated procedure well with no complications

## 2020-07-31 NOTE — ED PROVIDER NOTES
ED Provider Note    ED Provider Note    Primary care provider: Kristofer Cortes M.D.  Means of arrival: EMS  History obtained from: patient,   History limited by: None    CHIEF COMPLAINT  Chief Complaint   Patient presents with   • Weakness     pt to ED08 from home via EMS./ pt dc/o weakness x3 weeks denies subjective fevers, CP and dizzinessa    • Atrial Fibrillation     when EMS arrived pt was found to be in Afib RVR with rate in 140s.        WYATT Dailey is a 74 y.o. female who presents to the Emergency Department by EMS with a chief complaint of right lower quadrant abdominal pain.  Patient reports history of chronic atrial fibrillation.  She is on Eliquis and metoprolol.  She states her heart rate is always on the high side, 90-1 100s.  Her pain started a few days ago but worsened in the last few days.  This morning, she was on the toilet, and too weak to get up, prompting a call to 911.  She states she has not had an appetite for about 2 weeks and has had little intake in that time.  Her urine output and her stool has been decreased she attributes to her decreased oral intake.  She reports having diarrhea and some mild nausea.  She has not had a fever.  No cough.  No URI symptoms.  She does not have chest pain or shortness of breath.  Her pain she reports, is not necessarily worsening but just persistent, prompting her ED visit.  Patient presents via EMS, she was given Zofran and fentanyl prior to arrival.  Her blood sugar was 195.  She was noted to be in AF with a rapid rate in route.  Patient also notes a history of hypertension, diabetes that is controlled with diet.  Patient took her blood pressure medication, metoprolol as well as her Eliquis, on the way into the hospital today at approximately 8 AM.    REVIEW OF SYSTEMS  Review of Systems   Constitutional: Negative for chills and fever.   HENT: Negative for congestion.    Respiratory: Negative for cough and shortness of breath.     Cardiovascular: Negative for chest pain.   Gastrointestinal: Positive for abdominal pain, diarrhea and nausea. Negative for constipation and vomiting.   Genitourinary: Negative for dysuria.   Musculoskeletal: Negative for back pain.   Neurological: Negative for dizziness and headaches.   All other systems reviewed and are negative.      PAST MEDICAL HISTORY   has a past medical history of Atrial fibrillation (HCC), Dental disorder, Edema, Gout, Hyperlipidemia, Hypertension, Obesity, PAF (paroxysmal atrial fibrillation) (HCC), Paroxysmal atrial fibrillation (HCC), Psoriasis, and Sleep apnea.    SURGICAL HISTORY   has a past surgical history that includes recovery (2015); hysterectomy laparoscopy; recovery (2016); and exploratory of abdomen (2020).    SOCIAL HISTORY  Social History     Tobacco Use   • Smoking status: Former Smoker     Packs/day: 1.50     Years: 45.00     Pack years: 67.50     Types: Cigarettes     Last attempt to quit: 2013     Years since quittin.0   • Smokeless tobacco: Never Used   Substance Use Topics   • Alcohol use: Yes     Alcohol/week: 4.2 - 8.4 oz     Types: 7 - 14 Shots of liquor per week   • Drug use: No      Social History     Substance and Sexual Activity   Drug Use No       FAMILY HISTORY  Family History   Problem Relation Age of Onset   • Heart Attack Mother 83   • Cancer Father         lung       CURRENT MEDICATIONS  Home Medications     Reviewed by Ayleen Burton R.N. (Registered Nurse) on 20 at 1420  Med List Status: Complete   Medication Last Dose Status   albumin human 5% solution 12.5 g  Active   apixaban (ELIQUIS) 5mg Tab 2020 Active   cefoTEtan (CEFOTAN) 2 g in  mL IVPB  Active   clobetasol (TEMOVATE) 0.05 % Cream PRN Active   cloNIDine (CATAPRES) 0.3 MG/24HR PATCH WEEKLY 2020 Active   fentaNYL (SUBLIMAZE) injection 50 mcg  Active   furosemide (LASIX) 40 MG Tab FEW DAYS AGO Active   losartan-hydrochlorothiazide (HYZAAR) 100-25 MG  "per tablet 7/31/2020 Active   metoprolol (LOPRESSOR) 100 MG Tab 7/31/2020 Active   potassium chloride (KLOR-CON) 20 MEQ Pack FEW DAYS AGO Active   simvastatin (ZOCOR) 80 MG tablet 7/30/2020 Active                ALLERGIES  Allergies   Allergen Reactions   • Allopurinol      \"everything went haywire!\"       PHYSICAL EXAM  VITAL SIGNS: BP (!) 98/56   Pulse (!) 112   Temp 36.6 °C (97.8 °F) (Temporal)   Resp 20   Ht 1.753 m (5' 9\")   Wt 111.3 kg (245 lb 6 oz)   SpO2 91%   BMI 36.24 kg/m²   Vitals reviewed.  Constitutional: Patient is oriented to person, place, and time.  Moderate distress.    Head: Normocephalic and atraumatic.   Ears: Normal external ears bilaterally.   Mouth/Throat: Oropharynx is clear, dry mucous membranes, no exudates.   Eyes: Conjunctivae are normal. Pupils are equal, round, and reactive to light.   Neck: Normal range of motion. Neck supple.  Cardiovascular: Tachycardia with irregularly irregular rate. Normal peripheral pulses.  Pulmonary/Chest: Effort normal and breath sounds normal. No respiratory distress, no wheezes, rhonchi, or rales. No chest wall tenderness.  Abdominal: Soft. Bowel sounds are normal. There is RLQ tenderness. No rebound or guarding, or peritoneal signs. No CVA tenderness.  Musculoskeletal: No edema and no tenderness.   Lymphadenopathy: No cervical adenopathy.   Neurological: No focal deficits.   Skin: Skin is warm and dry. No erythema. No pallor.   Psychiatric: Patient has a normal mood and affect.     LABS  Results for orders placed or performed during the hospital encounter of 07/31/20   CBC w/ Differential   Result Value Ref Range    WBC 27.8 (H) 4.8 - 10.8 K/uL    RBC 4.39 4.20 - 5.40 M/uL    Hemoglobin 14.2 12.0 - 16.0 g/dL    Hematocrit 46.1 37.0 - 47.0 %    .0 (H) 81.4 - 97.8 fL    MCH 32.3 27.0 - 33.0 pg    MCHC 30.8 (L) 33.6 - 35.0 g/dL    RDW 60.4 (H) 35.9 - 50.0 fL    Platelet Count 503 (H) 164 - 446 K/uL    MPV 10.5 9.0 - 12.9 fL    Neutrophils-Polys " 97.40 (H) 44.00 - 72.00 %    Lymphocytes 1.70 (L) 22.00 - 41.00 %    Monocytes 0.90 0.00 - 13.40 %    Eosinophils 0.00 0.00 - 6.90 %    Basophils 0.00 0.00 - 1.80 %    Nucleated RBC 0.00 /100 WBC    Neutrophils (Absolute) 27.08 (H) 2.00 - 7.15 K/uL    Lymphs (Absolute) 0.47 (L) 1.00 - 4.80 K/uL    Monos (Absolute) 0.25 0.00 - 0.85 K/uL    Eos (Absolute) 0.00 0.00 - 0.51 K/uL    Baso (Absolute) 0.00 0.00 - 0.12 K/uL    NRBC (Absolute) 0.00 K/uL   Complete Metabolic Panel (CMP)   Result Value Ref Range    Sodium 135 135 - 145 mmol/L    Potassium 4.4 3.6 - 5.5 mmol/L    Chloride 93 (L) 96 - 112 mmol/L    Co2 17 (L) 20 - 33 mmol/L    Anion Gap 25.0 (H) 7.0 - 16.0    Glucose 219 (H) 65 - 99 mg/dL    Bun 60 (H) 8 - 22 mg/dL    Creatinine 1.57 (H) 0.50 - 1.40 mg/dL    Calcium 9.3 8.5 - 10.5 mg/dL    AST(SGOT) 155 (H) 12 - 45 U/L    ALT(SGPT) 53 (H) 2 - 50 U/L    Alkaline Phosphatase 182 (H) 30 - 99 U/L    Total Bilirubin 0.8 0.1 - 1.5 mg/dL    Albumin 3.4 3.2 - 4.9 g/dL    Total Protein 7.2 6.0 - 8.2 g/dL    Globulin 3.8 (H) 1.9 - 3.5 g/dL    A-G Ratio 0.9 g/dL   Troponin STAT   Result Value Ref Range    Troponin T 65 (H) 6 - 19 ng/L   Blood Culture,Hold   Result Value Ref Range    Blood Culture Hold Collected    DIFFERENTIAL MANUAL   Result Value Ref Range    Manual Diff Status PERFORMED    PERIPHERAL SMEAR REVIEW   Result Value Ref Range    Peripheral Smear Review see below    PLATELET ESTIMATE   Result Value Ref Range    Plt Estimation Increased    ESTIMATED GFR   Result Value Ref Range    GFR If  39 (A) >60 mL/min/1.73 m 2    GFR If Non  32 (A) >60 mL/min/1.73 m 2   Lactic Acid -STAT Once   Result Value Ref Range    Lactic Acid 2.8 (H) 0.5 - 2.0 mmol/L   Lactic Acid Every four hours after STAT order   Result Value Ref Range    Lactic Acid 2.1 (H) 0.5 - 2.0 mmol/L   Prothrombin time (INR)   Result Value Ref Range    PT 21.5 (H) 12.0 - 14.6 sec    INR 1.81 (H) 0.87 - 1.13   TROPONIN    Result Value Ref Range    Troponin T 83 (H) 6 - 19 ng/L   COVID/SARS CoV-2 PCR    Specimen: Nasopharyngeal; Respirate   Result Value Ref Range    COVID Order Status Received    SARS-CoV-2, PCR (In-House)   Result Value Ref Range    SARS-CoV-2 Source NP Swab     SARS-CoV-2 by PCR NotDetected    COD - Adult (Type and Screen)   Result Value Ref Range    ABO Grouping Only A     Rh Grouping Only POS     Antibody Screen-Cod NEG    ABO Rh Confirm   Result Value Ref Range    ABO Rh Confirm A POS    Triglycerides Starting now and then Every 3 Days   Result Value Ref Range    Triglycerides 118 0 - 149 mg/dL   CBC WITH DIFFERENTIAL   Result Value Ref Range    WBC 20.2 (H) 4.8 - 10.8 K/uL    RBC 3.89 (L) 4.20 - 5.40 M/uL    Hemoglobin 12.7 12.0 - 16.0 g/dL    Hematocrit 40.9 37.0 - 47.0 %    .1 (H) 81.4 - 97.8 fL    MCH 32.6 27.0 - 33.0 pg    MCHC 31.1 (L) 33.6 - 35.0 g/dL    RDW 59.9 (H) 35.9 - 50.0 fL    Platelet Count 419 164 - 446 K/uL    MPV 10.9 9.0 - 12.9 fL    Neutrophils-Polys 90.60 (H) 44.00 - 72.00 %    Lymphocytes 3.40 (L) 22.00 - 41.00 %    Monocytes 3.40 0.00 - 13.40 %    Eosinophils 0.00 0.00 - 6.90 %    Basophils 0.00 0.00 - 1.80 %    Nucleated RBC 0.00 /100 WBC    Neutrophils (Absolute) 18.83 (H) 2.00 - 7.15 K/uL    Lymphs (Absolute) 0.69 (L) 1.00 - 4.80 K/uL    Monos (Absolute) 0.69 0.00 - 0.85 K/uL    Eos (Absolute) 0.00 0.00 - 0.51 K/uL    Baso (Absolute) 0.00 0.00 - 0.12 K/uL    NRBC (Absolute) 0.00 K/uL    Anisocytosis 2+     Macrocytosis 2+    Basic Metabolic Panel   Result Value Ref Range    Sodium 134 (L) 135 - 145 mmol/L    Potassium 4.1 3.6 - 5.5 mmol/L    Chloride 97 96 - 112 mmol/L    Co2 16 (L) 20 - 33 mmol/L    Glucose 157 (H) 65 - 99 mg/dL    Bun 60 (H) 8 - 22 mg/dL    Creatinine 1.90 (H) 0.50 - 1.40 mg/dL    Calcium 8.1 (L) 8.5 - 10.5 mg/dL    Anion Gap 21.0 (H) 7.0 - 16.0   LACTIC ACID   Result Value Ref Range    Lactic Acid 1.8 0.5 - 2.0 mmol/L   ESTIMATED GFR   Result Value Ref Range     GFR If  31 (A) >60 mL/min/1.73 m 2    GFR If Non African American 26 (A) >60 mL/min/1.73 m 2   DIFFERENTIAL MANUAL   Result Value Ref Range    Bands-Stabs 2.60 0.00 - 10.00 %    Manual Diff Status PERFORMED    PERIPHERAL SMEAR REVIEW   Result Value Ref Range    Peripheral Smear Review see below    PLATELET ESTIMATE   Result Value Ref Range    Plt Estimation Normal    MORPHOLOGY   Result Value Ref Range    RBC Morphology Present     Large Platelets 1+     Giant Platelets 1+    ACCU-CHEK GLUCOSE   Result Value Ref Range    Glucose - Accu-Ck 138 (H) 65 - 99 mg/dL   ACCU-CHEK GLUCOSE   Result Value Ref Range    Glucose - Accu-Ck 128 (H) 65 - 99 mg/dL   EKG (NOW)   Result Value Ref Range    Report       [facility] Emergency Dept.    Test Date:  2020  Pt Name:    MAKENNA JOHNS               Department: ER  MRN:        [id number]                      Room:       [facility]  Gender:     Female                       Technician: [id number]  :        [id number]                   Requested By:VANDANA SABILLON  Order #:    [id number]                    Reading MD: BAY JURADO DO    Measurements  Intervals                                Axis  Rate:       133                          P:  NE:                                      QRS:        33  QRSD:       86                           T:          239  QT:         332  QTc:        494    Interpretive Statements  ATRIAL FIBRILLATION  VENTRICULAR PREMATURE COMPLEX  BORDERLINE REPOLARIZATION ABNORMALITY  BORDERLINE PROLONGED QT INTERVAL  Compared to ECG 2018 08:58:05  Ventricular premature complex(es) now present  ST (T wave) deviation no longer present  Electronically Signed On 2020 12:58:29 PDT by BAY JURADO DO     ISTAT ARTERIAL BLOOD GAS   Result Value Ref Range    Ph 7.316 (L) 7.400 - 7.500    Pco2 40.8 (H) 26.0 - 37.0 mmHg    Po2 391 (H) 64 - 87 mmHg    Tco2 22 20 - 33 mmol/L    S02 100 (H) 93 - 99 %    Hco3 20.8 17.0 - 25.0 mmol/L     BE -5 (L) -4 - 3 mmol/L    Body Temp 98.4 F degrees    O2 Therapy 100 %    iPF Ratio 391     Ph Temp Miek 7.318 (L) 7.400 - 7.500    Pco2 Temp Co 40.6 (H) 26.0 - 37.0 mmHg    Po2 Temp Cor 391 (H) 64 - 87 mmHg    Specimen Arterial     Action Range Triggered NO     Inst. Qualified Patient YES        All labs reviewed by me.    EKG Interpretation  Interpreted by me    Rhythm: AF with RVR  Rate: 133  Axis: normal  Ectopy: PVCs  Conduction: normal  ST Segments: no acute change  T Waves: no acute change  Q Waves: none    Clinical Impression: Comparison made to prior EKG from July 2018.  At that time, patient also was noted to be in AF with a controlled rate.     RADIOLOGY  DX-CHEST-FOR LINE PLACEMENT Perform procedure in: Patient's Room   Final Result         A right central venous catheter is seen.  The tip projects appropriately over the expected area of the superior vena cava.      Endotracheal tube with tip projecting over the mid thoracic trachea.      Gastric drainage tube courses below diaphragm, tip is not seen.      DX-CHEST-PORTABLE (1 VIEW)   Final Result      Stable cardiac silhouette enlargement without consolidation identified      CT-ABDOMEN-PELVIS WITH   Final Result         1.  Extensive fluid/phlegmon and free intraperitoneal air within the right lower quadrant. The appendix is not visualized. Findings likely related to ruptured appendicitis. Differential considerations would include perforated cecal diverticulitis or    typhlitis. Free air and inflammatory changes extends to the right mid abdomen peripherally. Surgical consultation is recommended.   2.  Colonic diverticulosis.   3.  Severe atherosclerosis.   4.  Cardiomegaly.   5.  Mild hepatomegaly and mild nodularity of the liver margin suggesting morphologic changes of chronic liver disease.   6.  Mild biliary dilatation in this postcholecystectomy patient.                 The radiologist's interpretation of all radiological studies have been  reviewed by me.    COURSE & MEDICAL DECISION MAKING  Pertinent Labs & Imaging studies reviewed. (See chart for details)    Obtained and reviewed past medical records.  Patient's last encounter was an outpatient visit with her cardiologist.  Patient noted to have a history of AF, hypertension and hyperlipidemia.  Last hospital admission in 2017.  Patient was admitted with epistaxis.    10:43 AM - Patient seen and examined at bedside.  This is a very pleasant 74-year-old female.  She presents in atrial fibrillation with rapid ventricular rate.  She has a history of chronic atrial fibrillation and is on Eliquis.  She reports generalized weakness over the last few days, increasing right lower quadrant abdominal pain.  This morning, she was unable to get off the toilet, due to her weakness, prompting a call to 911.  Patient presents to the ED in AF with RVR.  Though her rate is improved.  Patient reports taking her metoprolol this morning.  She has right lower quadrant abdominal pain and for this reason, a CT scan has been ordered to further evaluate her symptoms.  An IV will be established, labs drawn per nursing protocols.  Patient does appear to be dehydrated and will start with IV fluid resuscitation prior to rate control.  She will be kept n.p.o.    The differential diagnoses include but are not limited to: Appendicitis, colitis, diverticulitis, kidney stone dehydration, electrolyte disturbance    1150am called to patient's bedside she is hypotensive, complaining of increased pain.  After IV morphine given, patient's blood pressure decreased markedly.  She is complaining of persistent right sided lower quadrant abdominal pain.  She has numerous lab abnormalities including an elevated white blood cell count of nearly 28.  H&H 14 and 46.  Platelet count is elevated at 503.  There is a neutrophilic predominance.  Chemistry shows a CO2 of 17.  Anion gap is 25.  Her glucose is elevated to 1 9.  Creatinine is elevated at  157, previously noted to be 1.18 in April of last year.  LFTs are also elevated at 155 and 53 respectively for AST and ALT.  Alkaline phosphatase is elevated at 182.  Troponin was 65.  This patient is critically ill.  I ordered sepsis protocol, additional IV fluids as well as additional pain medication.    12:37 AM I have contacted CT.  Patient's condition seems to have worsened.  She seems to be in much more pain and I have requested that CT, expedite her imaging.  Patient now presenting more with peritoneal signs.  In the setting of hypotension this raises concern for ruptured appendicitis.    1:32 PM discussed with radiology, regardinge CT findings consistent with most likely a ruptured appendicitis with phlegmon.  Possibly, a ruptured cecal diverticulum though this is less likely.  General surgery paged.  Patient and  who is at the bedside, are updated on findings as well as need for emergent operative intervention.  Patient is on Eliquis and she did take it this morning at 8 AM.  She is feeling slightly better.  Systolic blood pressure improved at 100.  Patient is awake, alert, talking even joking, despite being very sick.    12:51 PM discussed with Dr. Harris on-call for general surgery.  I relayed to him, patient CT findings consistent with free fluid in the abdomen, phlegmon, likely consistent with ruptured appendicitis although cecal diverticulum, is not excluded.  He recommends at this time, treatment with antibiotics.  If she fails this therapy, would consider operative intervention.  He is made aware of the patient's abnormal labs including an elevated white blood cell count of 27.  He is also made aware, that the patient.    12:57 PM discussed with Dr. Gonda, intensivist, who to admit patient to their service.  I have relayed to him, recommendations of Dr. Harris regarding antibiotic therapy for now and not surgical intervention.  He will also discuss this with Dr. Harris.    Patient's family is  updated on plan of care.  Patient's maintaining her blood pressure in the high 90s low 100s.  Getting pulsed doses of pain medications.    I am told by Dr. Gonda, that Dr. Harris will be taking the patient to the operating room and as a result, patient will go to the surgical ICU and other than his care, will be under the care of Dr. Mckeon in surgical ICU.    The total critical care time on this patient is 90 minutes, resuscitating patient, speaking with admitting physician, and deciphering test results. This 90 minutes is exclusive of separately billable procedures.    FINAL IMPRESSION  1. Ruptured appendicitis    2. Sepsis with acute organ dysfunction and septic shock, due to unspecified organism, unspecified type (HCC)    3. Renal insufficiency    4. Elevated troponin    5. Chronic atrial fibrillation (HCC)    6. Anticoagulated    7. High anion gap metabolic acidosis    8. Transaminitis    9. Right lower quadrant abdominal pain    Critical care time: 90 minutes.

## 2020-07-31 NOTE — ED TRIAGE NOTES
Pt to ED08 via EMS c/o weakness x 3 weeks. When EMS arrived found pt to be in Afib with RVR rate in 140s. EMS gave pt 4mg zofran, 100 fentanyl and 200cc NSS. Upon arrival to ED pt c/o generalized weakness and R lwr abd.

## 2020-07-31 NOTE — H&P
"    CHIEF COMPLAINT: Right lower quadrant pain.     HISTORY OF PRESENT ILLNESS: The patient is a 74 year-old White elderly woman who presents to the Emergency Department with a several- week history of severe and localized right lower quadrant abdominal pain. The pain is associated with nausea and vomiting. The patient denies any recent or intercurrent illness. The patient denies any history of previous abdominal surgery.     PAST MEDICAL HISTORY:  has a past medical history of Atrial fibrillation (HCC), Dental disorder, Edema, Gout, Hyperlipidemia, Hypertension, Obesity, PAF (paroxysmal atrial fibrillation) (HCC), Paroxysmal atrial fibrillation (HCC), Psoriasis, and Sleep apnea.    PAST SURGICAL HISTORY:  has a past surgical history that includes recovery (7/7/2015); hysterectomy laparoscopy; and recovery (7/19/2016).     ALLERGIES:   Allergies   Allergen Reactions   • Allopurinol      \"everything went haywire!\"        CURRENT MEDICATIONS:   Home Medications     Reviewed by Ayleen Burton R.N. (Registered Nurse) on 07/31/20 at 1420  Med List Status: Complete   Medication Last Dose Status   albumin human 5% solution 12.5 g  Active   apixaban (ELIQUIS) 5mg Tab 7/31/2020 Active   cefoTEtan (CEFOTAN) 2 g in  mL IVPB  Active   clobetasol (TEMOVATE) 0.05 % Cream PRN Active   cloNIDine (CATAPRES) 0.3 MG/24HR PATCH WEEKLY 7/27/2020 Active   fentaNYL (SUBLIMAZE) injection 50 mcg  Active   furosemide (LASIX) 40 MG Tab FEW DAYS AGO Active   losartan-hydrochlorothiazide (HYZAAR) 100-25 MG per tablet 7/31/2020 Active   metoprolol (LOPRESSOR) 100 MG Tab 7/31/2020 Active   potassium chloride (KLOR-CON) 20 MEQ Pack FEW DAYS AGO Active   simvastatin (ZOCOR) 80 MG tablet 7/30/2020 Active                FAMILY HISTORY:   Family History   Problem Relation Age of Onset   • Heart Attack Mother 83   • Cancer Father         lung       SOCIAL HISTORY:   Social History     Tobacco Use   • Smoking status: Former Smoker     Packs/day: " "1.50     Years: 45.00     Pack years: 67.50     Types: Cigarettes     Last attempt to quit: 2013     Years since quittin.0   • Smokeless tobacco: Never Used   Substance and Sexual Activity   • Alcohol use: Yes     Alcohol/week: 4.2 - 8.4 oz     Types: 7 - 14 Shots of liquor per week   • Drug use: No   • Sexual activity: Not on file       REVIEW OF SYSTEMS: Comprehensive review of systems is negative with the exception of the aforementioned HPI, PMH, and PSH bullets in accordance with CMS guidelines.     PHYSICAL EXAMINATION:   GENERAL: alert in no acute distress.   VITAL SIGNS: BP (!) 92/90   Pulse (!) 116   Temp 36.6 °C (97.8 °F) (Temporal)   Resp 18   Ht 1.753 m (5' 9\")   Wt 111.6 kg (246 lb)   SpO2 94%   HEAD AND NECK: Demonstrates symmetric, reactive pupils. Extraocular muscles   are intact. Nares and oropharynx are clear.   NECK: Supple. No adenopathy.  CHEST:Clear to auscultation bilaterally.    CARDIOVASCULAR:   Inspection: The skin is warm and dry.  Auscultation: Irregularly irregular.   Peripheral Pulses: Normal.    ABDOMEN: Inspection:   Abdominal inspection reveals no abrasions, contusions, lacerations or penetrating wounds.   Palpation: Palpation is remarkable for severe tenderness in the right lower quadrant region. No abdominal wall hernias.  EXTREMITIES:   Examination of the upper and lower extremities demonstrates no cyanosis edema or clubbing.  NEUROLOGIC:   Alert & oriented to person, time and place. Normal motor function. Normal sensory function. No focal deficits noted.    LABORATORY VALUES:   Recent Labs     20  0955   WBC 27.8*   RBC 4.39   HEMOGLOBIN 14.2   HEMATOCRIT 46.1   .0*   MCH 32.3   MCHC 30.8*   RDW 60.4*   PLATELETCT 503*   MPV 10.5     Recent Labs     20  0955   SODIUM 135   POTASSIUM 4.4   CHLORIDE 93*   CO2 17*   GLUCOSE 219*   BUN 60*   CREATININE 1.57*   CALCIUM 9.3     Recent Labs     20  0955   ASTSGOT 155*   ALTSGPT 53*   TBILIRUBIN 0.8 "   ALKPHOSPHAT 182*   GLOBULIN 3.8*   INR 1.81*     Recent Labs     07/31/20  0955   INR 1.81*        IMAGING:   DX-CHEST-PORTABLE (1 VIEW)   Final Result      Stable cardiac silhouette enlargement without consolidation identified      CT-ABDOMEN-PELVIS WITH   Final Result         1.  Extensive fluid/phlegmon and free intraperitoneal air within the right lower quadrant. The appendix is not visualized. Findings likely related to ruptured appendicitis. Differential considerations would include perforated cecal diverticulitis or    typhlitis. Free air and inflammatory changes extends to the right mid abdomen peripherally. Surgical consultation is recommended.   2.  Colonic diverticulosis.   3.  Severe atherosclerosis.   4.  Cardiomegaly.   5.  Mild hepatomegaly and mild nodularity of the liver margin suggesting morphologic changes of chronic liver disease.   6.  Mild biliary dilatation in this postcholecystectomy patient.                   IMPRESSION AND PLAN:  Preferrated appendicitis with peritonitis and sepsis    Pryor Score  ACS NSQIP Surgical Risk Calculator    The patient will be taken to the operating room for exploratory laparotomy, right hemicolectomy, possible end ostomy, and other procedures as indicated.  The surgical conduct was discussed in detail. Potential complications including, but not limited to, infection, bleeding, damage to adjacent structures, and anesthetic complications were discussed. Questions were elicited and answered to the patient's satisfaction. Operative consent signed.     ____________________________________     Jake Harris M.D.    DD: 7/31/2020  3:09 PM

## 2020-07-31 NOTE — ANESTHESIA PROCEDURE NOTES
Airway    Date/Time: 7/31/2020 3:26 PM  Performed by: Jenny Flannery M.D.  Authorized by: Jenny Flannery M.D.     Location:  OR  Urgency:  Elective  Difficult Airway: No    Indications for Airway Management:  Anesthesia      Spontaneous Ventilation: absent    Sedation Level:  Deep  Preoxygenated: Yes    Patient Position:  Sniffing  Mask Difficulty Assessment:  0 - not attempted  Final Airway Type:  Endotracheal airway  Final Endotracheal Airway:  ETT  Cuffed: Yes    Technique Used for Successful ETT Placement:  Video laryngoscopy  Devices/Methods Used in Placement:  Cricoid pressure and intubating stylet    Insertion Site:  Oral  Blade Type:  Michael  Laryngoscope Blade/Videolaryngoscope Blade Size:  3  ETT Size (mm):  7.0  Measured from:  Gums  ETT to Gums (cm):  19  Placement Verified by: auscultation and capnometry    Cormack-Lehane Classification:  Grade I - full view of glottis  Number of Attempts at Approach:  1   Tube taped at 21 cm, likely mainstem as O2 sat decreased, decreased breath sounds on left, withdrew tube with good breath sounds on left and improved O2 sat

## 2020-07-31 NOTE — OP REPORT
Operative Report    Date: 7/31/2020    Surgeon: Jake Harris M.D.     Assistant: ARIANNE Anderson-BC    Pre-operative Diagnosis: Perforated appendicitis    Post-operative Diagnosis: Perforated appendicitis with retro-colic abscess and involvement of the terminal ileum    Procedure: #1 exploratory laparotomy, #2 ileocecectomy    ASA Classification: IV.    Indications: This is a 74 y.o. female who presented with symptoms of preferred appendicitis with free air peritonitis taken to the OR emergently.    Findings: Perforated appendicitis with retrocecal abscess involvement of the terminal ileum, primary anastomosis    Wound Classification: Class IV, IV, Dirty or Infected..    Procedure in detail: The patient was seen and examined in the preoperative holding area.  The risks benefits and alternatives of the procedure were discussed with the patient who wished to proceed with the procedure as described.  The patient was transferred to the operating room placed in supine position and all pressure points were properly padded.  General endotracheal anesthesia was induced and preoperative antibiotics were given per SCIP protocol.  Patient's abdomen was prepped with ChloraPrep and draped in the normal sterile fashion.  A timeout was performed confirming correct patient, correct procedure, and that all necessary equipment was in the room.      Began the procedure performing a midline laparotomy incision we sharply incised skin use a combination of blunt electrodissection down the level of the fascia.  The fascia was then sharply incised and then the fascia was extended entirely the skin incision.  The Magali self-retaining retractor was placed and the small bowel was ran from the terminal ileum through to the ligament of Treitz the majority of small bowel was without finding however the terminal ileum was well adhered to the right lower quadrant abscess pocket.  We began by mobilizing the right colon entering this abscess  approximately 200 cc of fecal purulent material was released from the abscess pocket.  We mobilized the entirety of the right colon up to the hepatic flexure and broke up all the loculations in this retro-cecal which extended into the retroperitoneum.  We then inspected the small bowel in the last approximately 40 cm of small bowel was edematous and necrotic secondary to being involved with abscess pocket and so we decided to resect this as well.  We resected the ascending colon in an area which was free from induration and edema with a single fire of the 75 mm blue load COLLEEN stapler.  We then transected the small bowel with single fire of the blue load COLLEEN stapler and use the LigaSure device to dissect free the mesentery.  We stayed close to the bowel to avoid the area of retroperitoneal involvement.  We then performed a side-to-side functional end-to-end isoperistaltic anastomosis with a single fire of the 75 mm blue load COLLEEN stapler and closed the common enterotomy with the 60 mm blue load TA stapler.  We then closed the resulting mesenteric defect using running 2-0 Vicryl suture.  We then copiously irrigated the abdomen placed a 24 Syriac Francisco drain in the right lower quadrant along the retroperitoneum.  This was secured to the skin with a 2-0 nylon suture.  We then closed the fascia using 2 separate running 1 PDS suture closed skin with staples and placed a Shaina wound VAC device over this incision.    The patient was awakened from general anesthetic, and was taken to the recovery room in stable condition.    Sponge and needle counts were correct at the end of the case.     Specimen: Ileum and cecum sent to specimen #1    EBL: 100 mL    Dispo: stable, extubated, to PACU    Jake Harris M.D.  [facility]  [phone]

## 2020-08-01 ENCOUNTER — APPOINTMENT (OUTPATIENT)
Dept: RADIOLOGY | Facility: MEDICAL CENTER | Age: 75
DRG: 853 | End: 2020-08-01
Attending: SURGERY
Payer: MEDICARE

## 2020-08-01 PROBLEM — N17.9 ACUTE RENAL INJURY (HCC): Status: ACTIVE | Noted: 2020-08-01

## 2020-08-01 PROBLEM — Z78.9 NO CONTRAINDICATION TO ANTICOAGULATION THERAPY: Status: ACTIVE | Noted: 2020-08-01

## 2020-08-01 PROBLEM — A41.9 SEPTIC SHOCK (HCC): Status: ACTIVE | Noted: 2020-08-01

## 2020-08-01 PROBLEM — R65.21 SEPTIC SHOCK (HCC): Status: ACTIVE | Noted: 2020-08-01

## 2020-08-01 LAB
ACTION RANGE TRIGGERED IACRT: NO
ALBUMIN SERPL BCP-MCNC: 2.1 G/DL (ref 3.2–4.9)
ALBUMIN/GLOB SERPL: 0.7 G/DL
ALP SERPL-CCNC: 405 U/L (ref 30–99)
ALT SERPL-CCNC: 80 U/L (ref 2–50)
ANION GAP SERPL CALC-SCNC: 16 MMOL/L (ref 7–16)
ANISOCYTOSIS BLD QL SMEAR: ABNORMAL
AST SERPL-CCNC: 171 U/L (ref 12–45)
BASE EXCESS BLDA CALC-SCNC: -6 MMOL/L (ref -4–3)
BASOPHILS # BLD AUTO: 0.9 % (ref 0–1.8)
BASOPHILS # BLD: 0.16 K/UL (ref 0–0.12)
BILIRUB SERPL-MCNC: 2 MG/DL (ref 0.1–1.5)
BODY TEMPERATURE: ABNORMAL DEGREES
BUN SERPL-MCNC: 54 MG/DL (ref 8–22)
BURR CELLS BLD QL SMEAR: NORMAL
CALCIUM SERPL-MCNC: 8 MG/DL (ref 8.5–10.5)
CHLORIDE SERPL-SCNC: 100 MMOL/L (ref 96–112)
CO2 BLDA-SCNC: 20 MMOL/L (ref 20–33)
CO2 SERPL-SCNC: 19 MMOL/L (ref 20–33)
CREAT SERPL-MCNC: 1.7 MG/DL (ref 0.5–1.4)
EOSINOPHIL # BLD AUTO: 0 K/UL (ref 0–0.51)
EOSINOPHIL NFR BLD: 0 % (ref 0–6.9)
ERYTHROCYTE [DISTWIDTH] IN BLOOD BY AUTOMATED COUNT: 60.5 FL (ref 35.9–50)
GLOBULIN SER CALC-MCNC: 3.2 G/DL (ref 1.9–3.5)
GLUCOSE BLD-MCNC: 111 MG/DL (ref 65–99)
GLUCOSE BLD-MCNC: 124 MG/DL (ref 65–99)
GLUCOSE BLD-MCNC: 128 MG/DL (ref 65–99)
GLUCOSE BLD-MCNC: 129 MG/DL (ref 65–99)
GLUCOSE SERPL-MCNC: 137 MG/DL (ref 65–99)
HCO3 BLDA-SCNC: 19.3 MMOL/L (ref 17–25)
HCT VFR BLD AUTO: 38.2 % (ref 37–47)
HGB BLD-MCNC: 11.9 G/DL (ref 12–16)
HOROWITZ INDEX BLDA+IHG-RTO: 347 MM[HG]
INST. QUALIFIED PATIENT IIQPT: YES
LYMPHOCYTES # BLD AUTO: 0.16 K/UL (ref 1–4.8)
LYMPHOCYTES NFR BLD: 0.9 % (ref 22–41)
MACROCYTES BLD QL SMEAR: ABNORMAL
MANUAL DIFF BLD: NORMAL
MCH RBC QN AUTO: 32.7 PG (ref 27–33)
MCHC RBC AUTO-ENTMCNC: 31.2 G/DL (ref 33.6–35)
MCV RBC AUTO: 104.9 FL (ref 81.4–97.8)
MONOCYTES # BLD AUTO: 0.46 K/UL (ref 0–0.85)
MONOCYTES NFR BLD AUTO: 2.6 % (ref 0–13.4)
MORPHOLOGY BLD-IMP: NORMAL
NEUTROPHILS # BLD AUTO: 16.84 K/UL (ref 2–7.15)
NEUTROPHILS NFR BLD: 95.7 % (ref 44–72)
NRBC # BLD AUTO: 0 K/UL
NRBC BLD-RTO: 0 /100 WBC
O2/TOTAL GAS SETTING VFR VENT: 60 %
OVALOCYTES BLD QL SMEAR: NORMAL
PCO2 BLDA: 37.1 MMHG (ref 26–37)
PCO2 TEMP ADJ BLDA: 38.3 MMHG (ref 26–37)
PH BLDA: 7.33 [PH] (ref 7.4–7.5)
PH TEMP ADJ BLDA: 7.31 [PH] (ref 7.4–7.5)
PLATELET # BLD AUTO: 424 K/UL (ref 164–446)
PLATELET BLD QL SMEAR: NORMAL
PMV BLD AUTO: 10 FL (ref 9–12.9)
PO2 BLDA: 208 MMHG (ref 64–87)
PO2 TEMP ADJ BLDA: 212 MMHG (ref 64–87)
POIKILOCYTOSIS BLD QL SMEAR: NORMAL
POLYCHROMASIA BLD QL SMEAR: NORMAL
POTASSIUM SERPL-SCNC: 3.4 MMOL/L (ref 3.6–5.5)
PROT SERPL-MCNC: 5.3 G/DL (ref 6–8.2)
RBC # BLD AUTO: 3.64 M/UL (ref 4.2–5.4)
RBC BLD AUTO: PRESENT
SAO2 % BLDA: 100 % (ref 93–99)
SODIUM SERPL-SCNC: 135 MMOL/L (ref 135–145)
SPECIMEN DRAWN FROM PATIENT: ABNORMAL
WBC # BLD AUTO: 17.6 K/UL (ref 4.8–10.8)

## 2020-08-01 PROCEDURE — A9270 NON-COVERED ITEM OR SERVICE: HCPCS | Performed by: SURGERY

## 2020-08-01 PROCEDURE — 700111 HCHG RX REV CODE 636 W/ 250 OVERRIDE (IP): Performed by: SURGERY

## 2020-08-01 PROCEDURE — 94150 VITAL CAPACITY TEST: CPT

## 2020-08-01 PROCEDURE — 80053 COMPREHEN METABOLIC PANEL: CPT

## 2020-08-01 PROCEDURE — 302136 NUTRITION PUMP: Performed by: SURGERY

## 2020-08-01 PROCEDURE — 37799 UNLISTED PX VASCULAR SURGERY: CPT

## 2020-08-01 PROCEDURE — 82962 GLUCOSE BLOOD TEST: CPT

## 2020-08-01 PROCEDURE — 99291 CRITICAL CARE FIRST HOUR: CPT | Performed by: SURGERY

## 2020-08-01 PROCEDURE — 71045 X-RAY EXAM CHEST 1 VIEW: CPT

## 2020-08-01 PROCEDURE — 700101 HCHG RX REV CODE 250: Performed by: SURGERY

## 2020-08-01 PROCEDURE — 94770 HCHG CO2 EXPIRED GAS DETERMINATION: CPT

## 2020-08-01 PROCEDURE — 82803 BLOOD GASES ANY COMBINATION: CPT

## 2020-08-01 PROCEDURE — 99292 CRITICAL CARE ADDL 30 MIN: CPT | Performed by: SURGERY

## 2020-08-01 PROCEDURE — 700105 HCHG RX REV CODE 258: Performed by: SURGERY

## 2020-08-01 PROCEDURE — 85027 COMPLETE CBC AUTOMATED: CPT

## 2020-08-01 PROCEDURE — 700102 HCHG RX REV CODE 250 W/ 637 OVERRIDE(OP): Performed by: SURGERY

## 2020-08-01 PROCEDURE — 770022 HCHG ROOM/CARE - ICU (200)

## 2020-08-01 PROCEDURE — 85007 BL SMEAR W/DIFF WBC COUNT: CPT

## 2020-08-01 PROCEDURE — 700112 HCHG RX REV CODE 229: Performed by: SURGERY

## 2020-08-01 PROCEDURE — 94003 VENT MGMT INPAT SUBQ DAY: CPT

## 2020-08-01 RX ORDER — DIGOXIN 0.25 MG/ML
500 INJECTION INTRAMUSCULAR; INTRAVENOUS ONCE
Status: COMPLETED | OUTPATIENT
Start: 2020-08-01 | End: 2020-08-01

## 2020-08-01 RX ORDER — METOPROLOL TARTRATE 1 MG/ML
10 INJECTION, SOLUTION INTRAVENOUS ONCE
Status: COMPLETED | OUTPATIENT
Start: 2020-08-01 | End: 2020-08-01

## 2020-08-01 RX ORDER — FAMOTIDINE 20 MG/1
20 TABLET, FILM COATED ORAL DAILY
Status: DISCONTINUED | OUTPATIENT
Start: 2020-08-02 | End: 2020-08-07

## 2020-08-01 RX ORDER — POTASSIUM CHLORIDE 7.45 MG/ML
10 INJECTION INTRAVENOUS
Status: DISCONTINUED | OUTPATIENT
Start: 2020-08-01 | End: 2020-08-01

## 2020-08-01 RX ORDER — NYSTATIN 100000 [USP'U]/G
POWDER TOPICAL 2 TIMES DAILY
Status: DISCONTINUED | OUTPATIENT
Start: 2020-08-01 | End: 2020-08-18

## 2020-08-01 RX ORDER — POTASSIUM CHLORIDE 29.8 MG/ML
40 INJECTION INTRAVENOUS ONCE
Status: COMPLETED | OUTPATIENT
Start: 2020-08-01 | End: 2020-08-01

## 2020-08-01 RX ORDER — METOPROLOL TARTRATE 1 MG/ML
10 INJECTION, SOLUTION INTRAVENOUS EVERY 6 HOURS
Status: COMPLETED | OUTPATIENT
Start: 2020-08-01 | End: 2020-08-02

## 2020-08-01 RX ORDER — SODIUM CHLORIDE, SODIUM LACTATE, POTASSIUM CHLORIDE, AND CALCIUM CHLORIDE .6; .31; .03; .02 G/100ML; G/100ML; G/100ML; G/100ML
250 INJECTION, SOLUTION INTRAVENOUS ONCE
Status: COMPLETED | OUTPATIENT
Start: 2020-08-01 | End: 2020-08-01

## 2020-08-01 RX ORDER — DIGOXIN 0.25 MG/ML
250 INJECTION INTRAMUSCULAR; INTRAVENOUS ONCE
Status: COMPLETED | OUTPATIENT
Start: 2020-08-01 | End: 2020-08-01

## 2020-08-01 RX ORDER — DIGOXIN 125 MCG
125 TABLET ORAL DAILY
Status: DISCONTINUED | OUTPATIENT
Start: 2020-08-02 | End: 2020-08-02

## 2020-08-01 RX ADMIN — HYDROMORPHONE HYDROCHLORIDE 0.5 MG: 1 INJECTION, SOLUTION INTRAMUSCULAR; INTRAVENOUS; SUBCUTANEOUS at 00:32

## 2020-08-01 RX ADMIN — PIPERACILLIN SODIUM, TAZOBACTAM SODIUM 4.5 G: 4; .5 INJECTION, POWDER, LYOPHILIZED, FOR SOLUTION INTRAVENOUS at 21:11

## 2020-08-01 RX ADMIN — ACETAMINOPHEN 650 MG: 325 TABLET, FILM COATED ORAL at 09:09

## 2020-08-01 RX ADMIN — DIGOXIN 500 MCG: 0.25 INJECTION INTRAMUSCULAR; INTRAVENOUS at 12:04

## 2020-08-01 RX ADMIN — DIGOXIN 250 MCG: 0.25 INJECTION INTRAMUSCULAR; INTRAVENOUS at 17:18

## 2020-08-01 RX ADMIN — PIPERACILLIN SODIUM, TAZOBACTAM SODIUM 4.5 G: 4; .5 INJECTION, POWDER, LYOPHILIZED, FOR SOLUTION INTRAVENOUS at 13:07

## 2020-08-01 RX ADMIN — NYSTATIN: 100000 POWDER TOPICAL at 21:11

## 2020-08-01 RX ADMIN — ACETAMINOPHEN 650 MG: 325 TABLET, FILM COATED ORAL at 12:04

## 2020-08-01 RX ADMIN — HYDROMORPHONE HYDROCHLORIDE 0.5 MG: 1 INJECTION, SOLUTION INTRAMUSCULAR; INTRAVENOUS; SUBCUTANEOUS at 14:17

## 2020-08-01 RX ADMIN — DOCUSATE SODIUM 100 MG: 50 LIQUID ORAL at 17:23

## 2020-08-01 RX ADMIN — SODIUM CHLORIDE, POTASSIUM CHLORIDE, SODIUM LACTATE AND CALCIUM CHLORIDE: 600; 310; 30; 20 INJECTION, SOLUTION INTRAVENOUS at 17:35

## 2020-08-01 RX ADMIN — SODIUM CHLORIDE, POTASSIUM CHLORIDE, SODIUM LACTATE AND CALCIUM CHLORIDE 250 ML: 600; 310; 30; 20 INJECTION, SOLUTION INTRAVENOUS at 00:00

## 2020-08-01 RX ADMIN — HYDROMORPHONE HYDROCHLORIDE 0.5 MG: 1 INJECTION, SOLUTION INTRAMUSCULAR; INTRAVENOUS; SUBCUTANEOUS at 21:12

## 2020-08-01 RX ADMIN — METOPROLOL TARTRATE 10 MG: 5 INJECTION, SOLUTION INTRAVENOUS at 12:04

## 2020-08-01 RX ADMIN — NOREPINEPHRINE BITARTRATE 15 MCG/MIN: 1 INJECTION INTRAVENOUS at 07:35

## 2020-08-01 RX ADMIN — SODIUM CHLORIDE, POTASSIUM CHLORIDE, SODIUM LACTATE AND CALCIUM CHLORIDE: 600; 310; 30; 20 INJECTION, SOLUTION INTRAVENOUS at 04:10

## 2020-08-01 RX ADMIN — HYDROMORPHONE HYDROCHLORIDE 0.5 MG: 1 INJECTION, SOLUTION INTRAMUSCULAR; INTRAVENOUS; SUBCUTANEOUS at 05:15

## 2020-08-01 RX ADMIN — POTASSIUM CHLORIDE 40 MEQ: 29.8 INJECTION, SOLUTION INTRAVENOUS at 09:09

## 2020-08-01 RX ADMIN — OXYCODONE HYDROCHLORIDE 10 MG: 10 TABLET ORAL at 14:16

## 2020-08-01 RX ADMIN — ENOXAPARIN SODIUM 40 MG: 40 INJECTION SUBCUTANEOUS at 05:15

## 2020-08-01 RX ADMIN — METOPROLOL TARTRATE 10 MG: 5 INJECTION, SOLUTION INTRAVENOUS at 17:17

## 2020-08-01 RX ADMIN — ACETAMINOPHEN 650 MG: 325 TABLET, FILM COATED ORAL at 17:17

## 2020-08-01 RX ADMIN — OXYCODONE HYDROCHLORIDE 10 MG: 10 TABLET ORAL at 09:09

## 2020-08-01 RX ADMIN — OXYCODONE HYDROCHLORIDE 10 MG: 10 TABLET ORAL at 17:17

## 2020-08-01 RX ADMIN — NOREPINEPHRINE BITARTRATE 10 MCG/MIN: 1 INJECTION INTRAVENOUS at 17:17

## 2020-08-01 RX ADMIN — SODIUM CHLORIDE, POTASSIUM CHLORIDE, SODIUM LACTATE AND CALCIUM CHLORIDE: 600; 310; 30; 20 INJECTION, SOLUTION INTRAVENOUS at 06:42

## 2020-08-01 RX ADMIN — HYDROMORPHONE HYDROCHLORIDE 0.5 MG: 1 INJECTION, SOLUTION INTRAMUSCULAR; INTRAVENOUS; SUBCUTANEOUS at 06:42

## 2020-08-01 RX ADMIN — SODIUM CHLORIDE, POTASSIUM CHLORIDE, SODIUM LACTATE AND CALCIUM CHLORIDE: 600; 310; 30; 20 INJECTION, SOLUTION INTRAVENOUS at 12:09

## 2020-08-01 RX ADMIN — DOCUSATE SODIUM 50 MG AND SENNOSIDES 8.6 MG 1 TABLET: 8.6; 5 TABLET, FILM COATED ORAL at 21:11

## 2020-08-01 RX ADMIN — FAMOTIDINE 20 MG: 10 INJECTION, SOLUTION INTRAVENOUS at 05:15

## 2020-08-01 RX ADMIN — HYDROMORPHONE HYDROCHLORIDE 0.5 MG: 1 INJECTION, SOLUTION INTRAMUSCULAR; INTRAVENOUS; SUBCUTANEOUS at 22:46

## 2020-08-01 RX ADMIN — PIPERACILLIN SODIUM, TAZOBACTAM SODIUM 4.5 G: 4; .5 INJECTION, POWDER, LYOPHILIZED, FOR SOLUTION INTRAVENOUS at 05:15

## 2020-08-01 ASSESSMENT — PULMONARY FUNCTION TESTS
FVC: 1000
FVC: .8

## 2020-08-01 ASSESSMENT — FIBROSIS 4 INDEX: FIB4 SCORE: 3.71

## 2020-08-01 NOTE — ANESTHESIA PROCEDURE NOTES
Central Venous Line  Performed by: Jenny Flannery M.D.  Authorized by: Jenny Flannery M.D.     Start Time:  7/31/2020 5:05 PM  End Time:  7/31/2020 5:17 PM  Patient Location:  OR  Indication: central venous access        provider hand hygiene performed prior to central venous catheter insertion, all 5 sterile barriers used (gloves, gown, cap, mask, large sterile drape) during central venous catheter insertion and skin prep agent completely dried prior to procedure    Medical Reason for Not Performing Maximal Sterile Barrier Technique: No    Patient Position:  Trendelenburg  Laterality:  Right  Site:  Internal jugular  Prep:  Chlorhexidine  Catheter Size:  7 Fr  Catheter Length (cm):  15  Number of Lumens:  Triple lumen  target vein identified, needle advanced into vein and blood aspirated and guidewire advanced into vein    Seldinger Technique?: Yes    Ultrasound-Guided: ultrasound-guided  Image captured, interpreted and electronically stored.  Sterile Gel and Probe Cover Used for Ultrasound?: Yes    Intravenous Verification: verified by ultrasound, venous blood return and chest x-ray pending    all ports aspirated, all ports flushed easily, guidewire was removed intact, biopatch was applied, line was sutured in place and dressing was applied    Events: patient tolerated procedure well with no complications

## 2020-08-01 NOTE — PROGRESS NOTES
Continue to MONITOR CLOSELY to determine the need for RETREATMENT and INCREASE/DECREASE in length of time till next follow up visit. OR RN delivered additional belongings bag of patient's clothing. Belongings placed in closet of patient's room (S117).

## 2020-08-01 NOTE — RESPIRATORY CARE
Ventilator Weaning Update    Patient is on vent day 2.  SBT was tolerated for a minimum of 1 Hour hours on settings of 5/8.    Wean parameters for this SBT were:  $ FVC / Vital Capacity (liters) : 1000 (08/01/20 0900)  NIF (cm H2O) : -28 (08/01/20 0900)  Rapid Shallow Breathing Index (RR/VT): 50 (08/01/20 0900)  RR (bpm): 23 (08/01/20 0900)  Spontaneous VE: 11.2 (08/01/20 0900)  Spontaneous VT: 395 (08/01/20 0900)    Recommendation: yes .      Events/Summary/Plan: Placed back on rest settings (08/01/20 0900)

## 2020-08-01 NOTE — ASSESSMENT & PLAN NOTE
7/31 Perforated appendix with retrocolic abscess. Small bowel and right colon resection.   8/4 Returned to surgery for anastamotic leak. Left in discontinuity.  8/7 Washout and drain placement.  8/9 Abdominal washout with primary ileocolic anastamosis.   8/11 Delayed primary fascial closure with drain placement.  Midline wound VAC.  8/14 Second anastomotic leak. Exploratory laparotomy, washout, ileocolic anastomotic revision, and fascial closure. Wound VAC.  8/17 Zosyn day 18 of 21 day treatment course duration.  [facility].

## 2020-08-01 NOTE — ASSESSMENT & PLAN NOTE
Full ventilatory support following initial surgery. Ensure optimal oxygenation and mitigate secondary injury.  8/2 Extubated.  8/11 Return from operating room on ventilator.  8/12 Extubated.  Continue aggressive pulmonary care and hygiene.

## 2020-08-01 NOTE — ANESTHESIA TIME REPORT
Anesthesia Start and Stop Event Times     Date Time Event    7/31/2020 1506 Ready for Procedure     1513 Anesthesia Start     1745 Anesthesia Stop        Responsible Staff  07/31/20    Name Role Begin End    Jenny Flannery M.D. Anesth 1513 1745        Preop Diagnosis (Free Text):  Pre-op Diagnosis     appendicitis with peritonitis and sepsis        Preop Diagnosis (Codes):    Post op Diagnosis  Peritonitis      Premium Reason  A. 3PM - 7AM    Comments:

## 2020-08-01 NOTE — CARE PLAN
Problem: Venous Thromboembolism (VTW)/Deep Vein Thrombosis (DVT) Prevention:  Goal: Patient will participate in Venous Thrombosis (VTE)/Deep Vein Thrombosis (DVT)Prevention Measures  Outcome: PROGRESSING AS EXPECTED  SCDs on     Problem: Pain Management  Goal: Pain level will decrease to patient's comfort goal  Outcome: PROGRESSING AS EXPECTED  Pt's pain will decrease to comfort goal through rest, repositioning, a quiet environment, and PRN pharmacologic analgesia.

## 2020-08-01 NOTE — PROGRESS NOTES
"    DATE: 8/1/2020    Post Operative Day 1 Open right hemicolectomy with drain placement and right retroperitoneum    Interval Events:  Intubated on pressors in ICU    PHYSICAL EXAMINATION:  Vital Signs: /62   Pulse (!) 40   Temp 36.6 °C (97.8 °F) (Temporal)   Resp (!) 22   Ht 1.753 m (5' 9\")   Wt 113.1 kg (249 lb 5.4 oz)   SpO2 (!) 83%     Abdomen soft previna in place drain with serosanguineous output    ASSESSMENT AND PLAN:   Status post open right hemicolectomy for suspected perforated appendicitis    Can advance tube feeds via NG tube and give oral medication at discretion of critical care team.    Appreciate ICU and consulting physician care.       ____________________________________     Jake Harris M.D.    DD: 8/1/2020  9:19 AM    "

## 2020-08-01 NOTE — PROGRESS NOTES
Trauma / Surgical Daily Progress Note    Date of Service  8/1/2020    Chief Complaint  74 y.o. female admitted 7/31/2020 with Perforation bowel (HCC)    Interval Events  7 liter crystalloid resuscitation  Levophed at 15  Dig and metoprolol for rate control  CVP 9  Vent day 2.    UO:  500 last 12  Continue LR at 200  Zosyn  Drizzle tf  Drain  overnight: serosanguinous  Begin lovenox      Review of Systems  Review of Systems     Vital Signs for last 24 hours  Temp:  [37.5 °C (99.5 °F)] 37.5 °C (99.5 °F)  Pulse:  [] 80  Resp:  [20-22] 20  BP: ()/(49-90) 108/57  SpO2:  [89 %-99 %] 93 %    Hemodynamic parameters for last 24 hours  CVP:  [2 MM HG-16 MM HG] 9 MM HG    Respiratory Data     Respiration: 20, Pulse Oximetry: 93 %     Work Of Breathing / Effort: Vented  RUL Breath Sounds: Diminished;Clear, RML Breath Sounds: Diminished, RLL Breath Sounds: Diminished, ELISSA Breath Sounds: Diminished;Clear, LLL Breath Sounds: Diminished    Physical Exam  Physical Exam  HENT:      Head: Normocephalic.   Eyes:      Pupils: Pupils are equal, round, and reactive to light.   Cardiovascular:      Rate and Rhythm: Tachycardia present.   Pulmonary:      Effort: No respiratory distress.      Breath sounds: No wheezing.   Abdominal:      Palpations: Abdomen is soft.      Tenderness: There is abdominal tenderness.      Comments: RLQ drain   Skin:     General: Skin is warm and dry.   Neurological:      General: No focal deficit present.      Mental Status: She is lethargic.         Laboratory  Recent Results (from the past 24 hour(s))   LACTIC ACID    Collection Time: 07/31/20  9:55 PM   Result Value Ref Range    Lactic Acid 1.8 0.5 - 2.0 mmol/L   ACCU-CHEK GLUCOSE    Collection Time: 08/01/20 12:33 AM   Result Value Ref Range    Glucose - Accu-Ck 128 (H) 65 - 99 mg/dL   ISTAT ARTERIAL BLOOD GAS    Collection Time: 08/01/20  4:35 AM   Result Value Ref Range    Ph 7.325 (L) 7.400 - 7.500    Pco2 37.1 (H) 26.0 - 37.0 mmHg     Po2 208 (H) 64 - 87 mmHg    Tco2 20 20 - 33 mmol/L    S02 100 (H) 93 - 99 %    Hco3 19.3 17.0 - 25.0 mmol/L    BE -6 (L) -4 - 3 mmol/L    Body Temp 99.9 F degrees    O2 Therapy 60 %    iPF Ratio 347     Ph Temp Mike 7.314 (L) 7.400 - 7.500    Pco2 Temp Co 38.3 (H) 26.0 - 37.0 mmHg    Po2 Temp Cor 212 (H) 64 - 87 mmHg    Specimen Arterial     Action Range Triggered NO     Inst. Qualified Patient YES    Comp Metabolic Panel (CMP): Tomorrow AM    Collection Time: 08/01/20  5:00 AM   Result Value Ref Range    Sodium 135 135 - 145 mmol/L    Potassium 3.4 (L) 3.6 - 5.5 mmol/L    Chloride 100 96 - 112 mmol/L    Co2 19 (L) 20 - 33 mmol/L    Anion Gap 16.0 7.0 - 16.0    Glucose 137 (H) 65 - 99 mg/dL    Bun 54 (H) 8 - 22 mg/dL    Creatinine 1.70 (H) 0.50 - 1.40 mg/dL    Calcium 8.0 (L) 8.5 - 10.5 mg/dL    AST(SGOT) 171 (H) 12 - 45 U/L    ALT(SGPT) 80 (H) 2 - 50 U/L    Alkaline Phosphatase 405 (H) 30 - 99 U/L    Total Bilirubin 2.0 (H) 0.1 - 1.5 mg/dL    Albumin 2.1 (L) 3.2 - 4.9 g/dL    Total Protein 5.3 (L) 6.0 - 8.2 g/dL    Globulin 3.2 1.9 - 3.5 g/dL    A-G Ratio 0.7 g/dL   CBC with Differential: Tomorrow AM    Collection Time: 08/01/20  5:00 AM   Result Value Ref Range    WBC 17.6 (H) 4.8 - 10.8 K/uL    RBC 3.64 (L) 4.20 - 5.40 M/uL    Hemoglobin 11.9 (L) 12.0 - 16.0 g/dL    Hematocrit 38.2 37.0 - 47.0 %    .9 (H) 81.4 - 97.8 fL    MCH 32.7 27.0 - 33.0 pg    MCHC 31.2 (L) 33.6 - 35.0 g/dL    RDW 60.5 (H) 35.9 - 50.0 fL    Platelet Count 424 164 - 446 K/uL    MPV 10.0 9.0 - 12.9 fL    Neutrophils-Polys 95.70 (H) 44.00 - 72.00 %    Lymphocytes 0.90 (L) 22.00 - 41.00 %    Monocytes 2.60 0.00 - 13.40 %    Eosinophils 0.00 0.00 - 6.90 %    Basophils 0.90 0.00 - 1.80 %    Nucleated RBC 0.00 /100 WBC    Neutrophils (Absolute) 16.84 (H) 2.00 - 7.15 K/uL    Lymphs (Absolute) 0.16 (L) 1.00 - 4.80 K/uL    Monos (Absolute) 0.46 0.00 - 0.85 K/uL    Eos (Absolute) 0.00 0.00 - 0.51 K/uL    Baso (Absolute) 0.16 (H) 0.00 - 0.12  K/uL    NRBC (Absolute) 0.00 K/uL    Anisocytosis 2+     Macrocytosis 2+    ESTIMATED GFR    Collection Time: 08/01/20  5:00 AM   Result Value Ref Range    GFR If  36 (A) >60 mL/min/1.73 m 2    GFR If Non  29 (A) >60 mL/min/1.73 m 2   DIFFERENTIAL MANUAL    Collection Time: 08/01/20  5:00 AM   Result Value Ref Range    Manual Diff Status PERFORMED    PERIPHERAL SMEAR REVIEW    Collection Time: 08/01/20  5:00 AM   Result Value Ref Range    Peripheral Smear Review see below    PLATELET ESTIMATE    Collection Time: 08/01/20  5:00 AM   Result Value Ref Range    Plt Estimation Normal    MORPHOLOGY    Collection Time: 08/01/20  5:00 AM   Result Value Ref Range    RBC Morphology Present     Polychromia 1+     Poikilocytosis 2+     Ovalocytes 2+     Echinocytes 2+    ACCU-CHEK GLUCOSE    Collection Time: 08/01/20  5:03 AM   Result Value Ref Range    Glucose - Accu-Ck 129 (H) 65 - 99 mg/dL   ACCU-CHEK GLUCOSE    Collection Time: 08/01/20  1:09 PM   Result Value Ref Range    Glucose - Accu-Ck 124 (H) 65 - 99 mg/dL   ACCU-CHEK GLUCOSE    Collection Time: 08/01/20  5:26 PM   Result Value Ref Range    Glucose - Accu-Ck 111 (H) 65 - 99 mg/dL       Fluids    Intake/Output Summary (Last 24 hours) at 8/1/2020 1947  Last data filed at 8/1/2020 1800  Gross per 24 hour   Intake 6801.92 ml   Output 1155 ml   Net 5646.92 ml       Core Measures & Quality Metrics  Labs reviewed, Medications reviewed and Radiology images reviewed  Orellana catheter: Critically Ill - Requiring Accurate Measurement of Urinary Output      DVT Prophylaxis: Enoxaparin (Lovenox)  DVT prophylaxis - mechanical: SCDs  Ulcer prophylaxis: Yes  Antibiotics: Treating active infection/contamination beyond 24 hours perioperative coverage      CAITLIN Score  ETOH Screening    Assessment/Plan  Acute renal injury (HCC)  Assessment & Plan  8/1  Marked oliguria post op.  Improved with Creatinine 1.7     Respiratory failure following trauma and surgery  (HCC)- (present on admission)  Assessment & Plan  Full ventilatory support. Ensure optimal oxygenation and mitigate secondary injury.  Trauma respiratory protocol  8/1: Vasoactive drip to support BP.  Not a wean candidate    Perforated appendicitis- (present on admission)  Assessment & Plan  7/31 perforated appendix with retrocolic abscess.  Small bowel and right colon resection.  RLQ drain  Zosyn day 1.    [facility]  Dr. Harris      Paroxysmal atrial fibrillation (CMS-HCC)- (present on admission)  Assessment & Plan  8/1:  Known history.  Digoxin loaded , Metoprolol for rate control    No contraindication to anticoagulation therapy  Assessment & Plan  8/1 Begin Lovenox    Septic shock (HCC)- (present on admission)  Assessment & Plan  7/31: high lactate, oliguric and hypotensive post op  8/1: lactate and urine output improved following 7 liter resuscitation.    Remains on Levophed.  Lactate 1.8  Zosyn day 2      Discussed patient condition with RN, RT and Pharmacy.  CRITICAL CARE TIME EXCLUDING PROCEDURES: 90    Minutes.Decision making of high complexity.  I reviewed clinical labs, trends and orders for follow up.  Review of imaging,reports, consultant documentation .  Utilization of the information in todays decision making.   I evaluated the patient condition at bedside and discussed the daily plan(s) with available nursing staff,  pharmacists on rounds. Managing multiple fluid bolus, vasoactive drip titration, IV antibiotics, atrial fibrillation with rapid rate/vasoactive drugs, acute renal injury, anticoagulation

## 2020-08-01 NOTE — ANESTHESIA POSTPROCEDURE EVALUATION
Patient: Yvette Dailey    Procedure Summary     Date:  07/31/20 Room / Location:  [place] [address] / SURGERY [facility]    Anesthesia Start:  1513 Anesthesia Stop:  1745    Procedure:  LAPAROTOMY, EXPLORATORY (Abdomen) Diagnosis:  (appendicitis with peritonitis and sepsis)    Surgeon:  Jake Harris M.D. Responsible Provider:  Jenny Flannery M.D.    Anesthesia Type:  general ASA Status:  4 - Emergent          Final Anesthesia Type: general  Last vitals  BP   Blood Pressure : (!) 92/90    Temp   36.6 °C (97.8 °F)    Pulse   Pulse: (!) 124   Resp   (!) 31    SpO2   100 %      Anesthesia Post Evaluation    Patient location during evaluation: ICU  Patient participation: complete - patient participated  Post-procedure mental status: sedated / intubated.    Airway patency: patent  Anesthetic complications: no  Cardiovascular status: hemodynamically stable  Respiratory status: acceptable, ETT and ventilator  Hydration status: euvolemic    PONV: none

## 2020-08-01 NOTE — PROGRESS NOTES
2 RN skin check completed with TREVON Lin:    Unable to take any wound photos due to hospital wide Voalte phone malfunction (reported to IT department)    -Generalized integumentary extremely dry, flaky, fragile  -Lower lip redness/dry/cracking - moisturizer applied, repositioning ETT q2hrs  -Left breast fold redness - dry, no moisture  -Midline abdominal incision with prevena wound vac in place  -RLQ HAYDEE drain in place - fenestrated gauze dressing  -Lower extremity dusky, emilia, purple discoloration from shins down to toes, skin cool with delayed cap refill - warm blankets used for warming  -Left 4th toe nail blackened

## 2020-08-01 NOTE — PROGRESS NOTES
SICU Consult    Date of Service  2020    Chief Complaint  74 y.o. female admitted 2020 with Perforation bowel (HCC)    Interval Events  New icu admit directly from OR  Perforated appendix with sepsis.  Small bowel and Right colon resection  Crystalloid resuscitation 3 liters  Mechanical ventilation.  Currently off pressors.     Review of Systems  Review of Systems     Vital Signs for last 24 hours  Temp:  [36.2 °C (97.1 °F)-36.6 °C (97.8 °F)] 36.6 °C (97.8 °F)  Pulse:  [102-148] 124  Resp:  [11-32] 31  BP: ()/(49-90) 92/90  SpO2:  [92 %-100 %] 100 %    Hemodynamic parameters for last 24 hours       Respiratory Data     Respiration: (!) 31, Pulse Oximetry: 100 %     Work Of Breathing / Effort: Vented  RUL Breath Sounds: Diminished, RML Breath Sounds: Diminished, RLL Breath Sounds: Diminished, ELISSA Breath Sounds: Diminished, LLL Breath Sounds: Diminished    Physical Exam  Physical Exam  HENT:      Head: Normocephalic.   Eyes:      Pupils: Pupils are equal, round, and reactive to light.   Cardiovascular:      Rate and Rhythm: Tachycardia present.   Pulmonary:      Effort: No respiratory distress.      Breath sounds: No wheezing.   Abdominal:      Palpations: Abdomen is soft.      Tenderness: There is abdominal tenderness.   Skin:     General: Skin is warm and dry.   Neurological:      General: No focal deficit present.      Mental Status: She is lethargic.         Laboratory  Recent Results (from the past 24 hour(s))   EKG (NOW)    Collection Time: 20  9:45 AM   Result Value Ref Range    Report       [facility] Emergency Dept.    Test Date:  2020  Pt Name:    MAKENNA JOHNS               Department: ER  MRN:        [id number]                      Room:       [facility]  Gender:     Female                       Technician: [id number]  :        [id number]                   Requested By:VANDANA SABILLON  Order #:    [id number]                    Daniel MD: BAY JURADO,  DO    Measurements  Intervals                                Axis  Rate:       133                          P:  AK:                                      QRS:        33  QRSD:       86                           T:          239  QT:         332  QTc:        494    Interpretive Statements  ATRIAL FIBRILLATION  VENTRICULAR PREMATURE COMPLEX  BORDERLINE REPOLARIZATION ABNORMALITY  BORDERLINE PROLONGED QT INTERVAL  Compared to ECG 07/06/2018 08:58:05  Ventricular premature complex(es) now present  ST (T wave) deviation no longer present  Electronically Signed On 7- 12:58:29 PDT by BAY JURADO DO     CBC w/ Differential    Collection Time: 07/31/20  9:55 AM   Result Value Ref Range    WBC 27.8 (H) 4.8 - 10.8 K/uL    RBC 4.39 4.20 - 5.40 M/uL    Hemoglobin 14.2 12.0 - 16.0 g/dL    Hematocrit 46.1 37.0 - 47.0 %    .0 (H) 81.4 - 97.8 fL    MCH 32.3 27.0 - 33.0 pg    MCHC 30.8 (L) 33.6 - 35.0 g/dL    RDW 60.4 (H) 35.9 - 50.0 fL    Platelet Count 503 (H) 164 - 446 K/uL    MPV 10.5 9.0 - 12.9 fL    Neutrophils-Polys 97.40 (H) 44.00 - 72.00 %    Lymphocytes 1.70 (L) 22.00 - 41.00 %    Monocytes 0.90 0.00 - 13.40 %    Eosinophils 0.00 0.00 - 6.90 %    Basophils 0.00 0.00 - 1.80 %    Nucleated RBC 0.00 /100 WBC    Neutrophils (Absolute) 27.08 (H) 2.00 - 7.15 K/uL    Lymphs (Absolute) 0.47 (L) 1.00 - 4.80 K/uL    Monos (Absolute) 0.25 0.00 - 0.85 K/uL    Eos (Absolute) 0.00 0.00 - 0.51 K/uL    Baso (Absolute) 0.00 0.00 - 0.12 K/uL    NRBC (Absolute) 0.00 K/uL   Complete Metabolic Panel (CMP)    Collection Time: 07/31/20  9:55 AM   Result Value Ref Range    Sodium 135 135 - 145 mmol/L    Potassium 4.4 3.6 - 5.5 mmol/L    Chloride 93 (L) 96 - 112 mmol/L    Co2 17 (L) 20 - 33 mmol/L    Anion Gap 25.0 (H) 7.0 - 16.0    Glucose 219 (H) 65 - 99 mg/dL    Bun 60 (H) 8 - 22 mg/dL    Creatinine 1.57 (H) 0.50 - 1.40 mg/dL    Calcium 9.3 8.5 - 10.5 mg/dL    AST(SGOT) 155 (H) 12 - 45 U/L    ALT(SGPT) 53 (H) 2 - 50 U/L     Alkaline Phosphatase 182 (H) 30 - 99 U/L    Total Bilirubin 0.8 0.1 - 1.5 mg/dL    Albumin 3.4 3.2 - 4.9 g/dL    Total Protein 7.2 6.0 - 8.2 g/dL    Globulin 3.8 (H) 1.9 - 3.5 g/dL    A-G Ratio 0.9 g/dL   Troponin STAT    Collection Time: 07/31/20  9:55 AM   Result Value Ref Range    Troponin T 65 (H) 6 - 19 ng/L   Blood Culture,Hold    Collection Time: 07/31/20  9:55 AM   Result Value Ref Range    Blood Culture Hold Collected    DIFFERENTIAL MANUAL    Collection Time: 07/31/20  9:55 AM   Result Value Ref Range    Manual Diff Status PERFORMED    PERIPHERAL SMEAR REVIEW    Collection Time: 07/31/20  9:55 AM   Result Value Ref Range    Peripheral Smear Review see below    PLATELET ESTIMATE    Collection Time: 07/31/20  9:55 AM   Result Value Ref Range    Plt Estimation Increased    ESTIMATED GFR    Collection Time: 07/31/20  9:55 AM   Result Value Ref Range    GFR If  39 (A) >60 mL/min/1.73 m 2    GFR If Non  32 (A) >60 mL/min/1.73 m 2   Prothrombin time (INR)    Collection Time: 07/31/20  9:55 AM   Result Value Ref Range    PT 21.5 (H) 12.0 - 14.6 sec    INR 1.81 (H) 0.87 - 1.13   ABO Rh Confirm    Collection Time: 07/31/20  9:55 AM   Result Value Ref Range    ABO Rh Confirm A POS    Lactic Acid -STAT Once    Collection Time: 07/31/20 12:46 PM   Result Value Ref Range    Lactic Acid 2.8 (H) 0.5 - 2.0 mmol/L   TROPONIN    Collection Time: 07/31/20 12:46 PM   Result Value Ref Range    Troponin T 83 (H) 6 - 19 ng/L   COVID/SARS CoV-2 PCR    Collection Time: 07/31/20  1:06 PM    Specimen: Nasopharyngeal; Respirate   Result Value Ref Range    COVID Order Status Received    SARS-CoV-2, PCR (In-House)    Collection Time: 07/31/20  1:06 PM   Result Value Ref Range    SARS-CoV-2 Source NP Swab     SARS-CoV-2 by PCR NotDetected    COD - Adult (Type and Screen)    Collection Time: 07/31/20  3:38 PM   Result Value Ref Range    ABO Grouping Only A     Rh Grouping Only POS     Antibody Screen-Cod  NEG        Fluids    Intake/Output Summary (Last 24 hours) at 7/31/2020 1739  Last data filed at 7/31/2020 1719  Gross per 24 hour   Intake 2000 ml   Output 100 ml   Net 1900 ml       Core Measures & Quality Metrics  Labs reviewed, Medications reviewed and Radiology images reviewed  Orellana catheter: Critically Ill - Requiring Accurate Measurement of Urinary Output      DVT Prophylaxis: Contraindicated - High bleeding risk  DVT prophylaxis - mechanical: SCDs  Ulcer prophylaxis: Yes  Antibiotics: Treating active infection/contamination beyond 24 hours perioperative coverage      CAITLIN Score  ETOH Screening    Assessment/Plan  Respiratory failure following trauma and surgery (HCC)  Assessment & Plan  Full ventilatory support. Ensure optimal oxygenation and mitigate secondary injury.  Trauma respiratory protocol    Perforated appendicitis  Assessment & Plan  7/31 perforated appendix with retrocolic abscess.  Small bowel and right colon resection  Zosyn day 1.    [facility]  [place]. Harris        Discussed patient condition with RN, RT and Pharmacy.  CRITICAL CARE TIME EXCLUDING PROCEDURES: 40  Minutes.Decision making of high complexity.  I reviewed clinical labs, trends and orders for follow up.  Review of imaging,reports, consultant documentation .  Utilization of the information in todays decision making.   I evaluated the patient condition at bedside and discussed the daily plan(s) with available nursing staff,  pharmacists on rounds. Managing mechanical ventilation, sepsis resuscitation, fluid bolus and intravenous antibiotics.

## 2020-08-01 NOTE — PROGRESS NOTES
Patient admitted to [place] [address] on bagged ventilation and monitor.    Belongings:   One upper set of dentures in denture cup at bedside.     2 RN skin check completed    Bilateral lower extremities cool, mottled, purple, with >3 second capillary refill.   Fingers and hands mottled and cool  Left breast fold red, dry   Midline abdominal incision covered with prevena, unable to visualize skin  RLQ HAYDEE site noted, unable to view insertion site    Devices:   Leads  ETT  [place]  Restraints  BP cuff  SCDs  Orellana catheter  Right subclavian triple lumen   Left nare NG tube

## 2020-08-01 NOTE — ANESTHESIA QCDR
2019 [facility] Clinical Data Registry (for Quality Improvement)     Postoperative nausea/vomiting risk protocol (Adult = 18 yrs and Pediatric 3-17 yrs)- (430 and 463)  General inhalation anesthetic (NOT TIVA) with PONV risk factors: No  Provision of anti-emetic therapy with at least 2 different classes of agents: N/A  Patient DID NOT receive anti-emetic therapy and reason is documented in Medical Record: N/A    Multimodal Pain Management- (477)  Non-emergent surgery AND patient age >= 18: No  Use of Multimodal Pain Management, two or more drugs and/or interventions, NOT including systemic opioids:   Exception: Documented allergy to multiple classes of analgesics:     Smoking Abstinence (404)  Patient is current smoker (cigarette, pipe, e-cig, marijuanna): No  Elective Surgery:   Abstinence instructions provided prior to day of surgery:   Patient abstained from smoking on day of surgery:     Pre-Op Beta-Blocker in Isolated CABG (44)  Isolated CABG AND patient age >= 18: No  Beta-blocker admin within 24 hours of surgical incision:   Exception:of medical reason(s) for not administering beta blocker within 24 hours prior to surgical incision (e.g., not  indicated,other medical reason):     PACU assessment of acute postoperative pain prior to Anesthesia Care End- Applies to Patients Age = 18- (ABG7)  Initial PACU pain score is which of the following: < 7/10  Patient unable to report pain score: N/A    Post-anesthetic transfer of care checklist/protocol to PACU/ICU- (426 and 427)  Upon conclusion of case, patient transferred to which of the following locations: ICU  Use of transfer checklist/protocol: Yes  Exclusion: Service Performed in Patient Hospital Room (and thus did not require transfer): N/A  Unplanned admission to ICU related to anesthesia service up through end of PACU care- (MD51)  Unplanned admission to ICU (not initially anticipated at anesthesia start time): No

## 2020-08-01 NOTE — CARE PLAN
Problem: Respiratory:  Goal: Respiratory status will improve  Outcome: PROGRESSING SLOWER THAN EXPECTED   SPONT breathing trial in progress    Problem: Pain Management  Goal: Pain level will decrease to patient's comfort goal  Outcome: PROGRESSING SLOWER THAN EXPECTED  Dilaudid PRN pain.

## 2020-08-01 NOTE — PROGRESS NOTES
1910 Dr. cMkeon at bedside to assess patient. Updated MD on BP  with patient having been responsive to 500 ml LR bolus previously given. Urine output of 20 ml, HR 150s. MD order to give additional 500ml bolus, give analgesia to treat pain and see if it helps lower heart rate, and start monitoring CVP.  1950 Called and updated Dr. Mckeon on patient's BP of 60s/30s, HR Afib 120-150 CVP 13 after receiving 500 LR bolus, and 0.5 dilaudid for pain. Received order to start norepinephrine and titrate to SBP 90 or MAP 65.

## 2020-08-02 ENCOUNTER — APPOINTMENT (OUTPATIENT)
Dept: RADIOLOGY | Facility: MEDICAL CENTER | Age: 75
DRG: 853 | End: 2020-08-02
Attending: SURGERY
Payer: MEDICARE

## 2020-08-02 PROBLEM — N28.9 ACUTE RENAL INSUFFICIENCY: Status: ACTIVE | Noted: 2020-08-02

## 2020-08-02 PROBLEM — N17.9 ACUTE RENAL INJURY (HCC): Status: RESOLVED | Noted: 2020-08-01 | Resolved: 2020-08-02

## 2020-08-02 LAB
ALBUMIN SERPL BCP-MCNC: 1.8 G/DL (ref 3.2–4.9)
ALBUMIN/GLOB SERPL: 0.6 G/DL
ALP SERPL-CCNC: 382 U/L (ref 30–99)
ALT SERPL-CCNC: 56 U/L (ref 2–50)
ANION GAP SERPL CALC-SCNC: 14 MMOL/L (ref 7–16)
ANISOCYTOSIS BLD QL SMEAR: ABNORMAL
AST SERPL-CCNC: 90 U/L (ref 12–45)
BASOPHILS # BLD AUTO: 0 % (ref 0–1.8)
BASOPHILS # BLD: 0 K/UL (ref 0–0.12)
BILIRUB SERPL-MCNC: 1.9 MG/DL (ref 0.1–1.5)
BUN SERPL-MCNC: 47 MG/DL (ref 8–22)
BURR CELLS BLD QL SMEAR: NORMAL
CALCIUM SERPL-MCNC: 8 MG/DL (ref 8.5–10.5)
CHLORIDE SERPL-SCNC: 104 MMOL/L (ref 96–112)
CO2 SERPL-SCNC: 18 MMOL/L (ref 20–33)
CREAT SERPL-MCNC: 1.36 MG/DL (ref 0.5–1.4)
EOSINOPHIL # BLD AUTO: 0.12 K/UL (ref 0–0.51)
EOSINOPHIL NFR BLD: 0.9 % (ref 0–6.9)
ERYTHROCYTE [DISTWIDTH] IN BLOOD BY AUTOMATED COUNT: 60.7 FL (ref 35.9–50)
GLOBULIN SER CALC-MCNC: 3.2 G/DL (ref 1.9–3.5)
GLUCOSE BLD-MCNC: 106 MG/DL (ref 65–99)
GLUCOSE BLD-MCNC: 115 MG/DL (ref 65–99)
GLUCOSE BLD-MCNC: 119 MG/DL (ref 65–99)
GLUCOSE BLD-MCNC: 127 MG/DL (ref 65–99)
GLUCOSE BLD-MCNC: 77 MG/DL (ref 65–99)
GLUCOSE SERPL-MCNC: 135 MG/DL (ref 65–99)
HCT VFR BLD AUTO: 35.8 % (ref 37–47)
HGB BLD-MCNC: 11.2 G/DL (ref 12–16)
LYMPHOCYTES # BLD AUTO: 0.69 K/UL (ref 1–4.8)
LYMPHOCYTES NFR BLD: 5.2 % (ref 22–41)
MACROCYTES BLD QL SMEAR: ABNORMAL
MANUAL DIFF BLD: NORMAL
MCH RBC QN AUTO: 33 PG (ref 27–33)
MCHC RBC AUTO-ENTMCNC: 31.3 G/DL (ref 33.6–35)
MCV RBC AUTO: 105.6 FL (ref 81.4–97.8)
MICROCYTES BLD QL SMEAR: ABNORMAL
MONOCYTES # BLD AUTO: 0.34 K/UL (ref 0–0.85)
MONOCYTES NFR BLD AUTO: 2.6 % (ref 0–13.4)
MORPHOLOGY BLD-IMP: NORMAL
MRSA DNA SPEC QL NAA+PROBE: NORMAL
NEUTROPHILS # BLD AUTO: 12.05 K/UL (ref 2–7.15)
NEUTROPHILS NFR BLD: 90.4 % (ref 44–72)
NEUTS BAND NFR BLD MANUAL: 0.9 % (ref 0–10)
NRBC # BLD AUTO: 0 K/UL
NRBC BLD-RTO: 0 /100 WBC
OVALOCYTES BLD QL SMEAR: NORMAL
PLATELET # BLD AUTO: 399 K/UL (ref 164–446)
PLATELET BLD QL SMEAR: NORMAL
PMV BLD AUTO: 10.4 FL (ref 9–12.9)
POIKILOCYTOSIS BLD QL SMEAR: NORMAL
POTASSIUM SERPL-SCNC: 4.2 MMOL/L (ref 3.6–5.5)
PROT SERPL-MCNC: 5 G/DL (ref 6–8.2)
RBC # BLD AUTO: 3.39 M/UL (ref 4.2–5.4)
RBC BLD AUTO: PRESENT
SIGNIFICANT IND 70042: NORMAL
SITE SITE: NORMAL
SODIUM SERPL-SCNC: 136 MMOL/L (ref 135–145)
SOURCE SOURCE: NORMAL
TRIGL SERPL-MCNC: 206 MG/DL (ref 0–149)
WBC # BLD AUTO: 13.2 K/UL (ref 4.8–10.8)

## 2020-08-02 PROCEDURE — 94003 VENT MGMT INPAT SUBQ DAY: CPT

## 2020-08-02 PROCEDURE — 94150 VITAL CAPACITY TEST: CPT

## 2020-08-02 PROCEDURE — 71045 X-RAY EXAM CHEST 1 VIEW: CPT

## 2020-08-02 PROCEDURE — A9270 NON-COVERED ITEM OR SERVICE: HCPCS | Performed by: SURGERY

## 2020-08-02 PROCEDURE — 700101 HCHG RX REV CODE 250: Performed by: SURGERY

## 2020-08-02 PROCEDURE — 99291 CRITICAL CARE FIRST HOUR: CPT | Performed by: SURGERY

## 2020-08-02 PROCEDURE — 700105 HCHG RX REV CODE 258: Performed by: SURGERY

## 2020-08-02 PROCEDURE — 700102 HCHG RX REV CODE 250 W/ 637 OVERRIDE(OP): Performed by: SURGERY

## 2020-08-02 PROCEDURE — 80053 COMPREHEN METABOLIC PANEL: CPT

## 2020-08-02 PROCEDURE — 94770 HCHG CO2 EXPIRED GAS DETERMINATION: CPT

## 2020-08-02 PROCEDURE — 99292 CRITICAL CARE ADDL 30 MIN: CPT | Performed by: SURGERY

## 2020-08-02 PROCEDURE — 700112 HCHG RX REV CODE 229: Performed by: SURGERY

## 2020-08-02 PROCEDURE — 700111 HCHG RX REV CODE 636 W/ 250 OVERRIDE (IP): Performed by: SURGERY

## 2020-08-02 PROCEDURE — 770022 HCHG ROOM/CARE - ICU (200)

## 2020-08-02 PROCEDURE — 85007 BL SMEAR W/DIFF WBC COUNT: CPT

## 2020-08-02 PROCEDURE — 84478 ASSAY OF TRIGLYCERIDES: CPT

## 2020-08-02 PROCEDURE — 85027 COMPLETE CBC AUTOMATED: CPT

## 2020-08-02 PROCEDURE — 82962 GLUCOSE BLOOD TEST: CPT

## 2020-08-02 RX ORDER — DIGOXIN 125 MCG
62.5 TABLET ORAL DAILY
Status: DISCONTINUED | OUTPATIENT
Start: 2020-08-02 | End: 2020-08-05

## 2020-08-02 RX ORDER — METOPROLOL TARTRATE 1 MG/ML
10 INJECTION, SOLUTION INTRAVENOUS EVERY 6 HOURS
Status: DISCONTINUED | OUTPATIENT
Start: 2020-08-02 | End: 2020-08-13

## 2020-08-02 RX ADMIN — ACETAMINOPHEN 650 MG: 325 TABLET, FILM COATED ORAL at 18:08

## 2020-08-02 RX ADMIN — OXYCODONE HYDROCHLORIDE 10 MG: 10 TABLET ORAL at 11:51

## 2020-08-02 RX ADMIN — PIPERACILLIN SODIUM, TAZOBACTAM SODIUM 4.5 G: 4; .5 INJECTION, POWDER, LYOPHILIZED, FOR SOLUTION INTRAVENOUS at 22:39

## 2020-08-02 RX ADMIN — PIPERACILLIN SODIUM, TAZOBACTAM SODIUM 4.5 G: 4; .5 INJECTION, POWDER, LYOPHILIZED, FOR SOLUTION INTRAVENOUS at 13:21

## 2020-08-02 RX ADMIN — METOPROLOL TARTRATE 10 MG: 5 INJECTION, SOLUTION INTRAVENOUS at 13:21

## 2020-08-02 RX ADMIN — METOPROLOL TARTRATE 10 MG: 5 INJECTION, SOLUTION INTRAVENOUS at 01:10

## 2020-08-02 RX ADMIN — NOREPINEPHRINE BITARTRATE 5 MCG/MIN: 1 INJECTION INTRAVENOUS at 22:48

## 2020-08-02 RX ADMIN — ACETAMINOPHEN 650 MG: 325 TABLET, FILM COATED ORAL at 01:10

## 2020-08-02 RX ADMIN — NYSTATIN: 100000 POWDER TOPICAL at 05:17

## 2020-08-02 RX ADMIN — ACETAMINOPHEN 650 MG: 325 TABLET, FILM COATED ORAL at 11:51

## 2020-08-02 RX ADMIN — METOPROLOL TARTRATE 10 MG: 5 INJECTION, SOLUTION INTRAVENOUS at 18:08

## 2020-08-02 RX ADMIN — HYDROMORPHONE HYDROCHLORIDE 0.5 MG: 1 INJECTION, SOLUTION INTRAMUSCULAR; INTRAVENOUS; SUBCUTANEOUS at 05:05

## 2020-08-02 RX ADMIN — OXYCODONE HYDROCHLORIDE 10 MG: 10 TABLET ORAL at 18:08

## 2020-08-02 RX ADMIN — HYDROMORPHONE HYDROCHLORIDE 0.5 MG: 1 INJECTION, SOLUTION INTRAMUSCULAR; INTRAVENOUS; SUBCUTANEOUS at 12:02

## 2020-08-02 RX ADMIN — METOPROLOL TARTRATE 10 MG: 5 INJECTION, SOLUTION INTRAVENOUS at 05:30

## 2020-08-02 RX ADMIN — FAMOTIDINE 20 MG: 20 TABLET, FILM COATED ORAL at 05:13

## 2020-08-02 RX ADMIN — METOPROLOL TARTRATE 10 MG: 5 INJECTION, SOLUTION INTRAVENOUS at 23:02

## 2020-08-02 RX ADMIN — SODIUM CHLORIDE, POTASSIUM CHLORIDE, SODIUM LACTATE AND CALCIUM CHLORIDE: 600; 310; 30; 20 INJECTION, SOLUTION INTRAVENOUS at 04:20

## 2020-08-02 RX ADMIN — ACETAMINOPHEN 650 MG: 325 TABLET, FILM COATED ORAL at 05:13

## 2020-08-02 RX ADMIN — DIGOXIN 62.5 MCG: 125 TABLET ORAL at 18:09

## 2020-08-02 RX ADMIN — PIPERACILLIN SODIUM, TAZOBACTAM SODIUM 4.5 G: 4; .5 INJECTION, POWDER, LYOPHILIZED, FOR SOLUTION INTRAVENOUS at 05:13

## 2020-08-02 RX ADMIN — ACETAMINOPHEN 650 MG: 325 TABLET, FILM COATED ORAL at 23:00

## 2020-08-02 RX ADMIN — ENOXAPARIN SODIUM 40 MG: 40 INJECTION SUBCUTANEOUS at 05:13

## 2020-08-02 RX ADMIN — NYSTATIN: 100000 POWDER TOPICAL at 18:09

## 2020-08-02 RX ADMIN — DOCUSATE SODIUM 100 MG: 50 LIQUID ORAL at 18:08

## 2020-08-02 RX ADMIN — SODIUM CHLORIDE, POTASSIUM CHLORIDE, SODIUM LACTATE AND CALCIUM CHLORIDE: 600; 310; 30; 20 INJECTION, SOLUTION INTRAVENOUS at 19:44

## 2020-08-02 RX ADMIN — SODIUM CHLORIDE, POTASSIUM CHLORIDE, SODIUM LACTATE AND CALCIUM CHLORIDE: 600; 310; 30; 20 INJECTION, SOLUTION INTRAVENOUS at 09:20

## 2020-08-02 RX ADMIN — DOCUSATE SODIUM 100 MG: 50 LIQUID ORAL at 05:13

## 2020-08-02 RX ADMIN — OXYCODONE HYDROCHLORIDE 10 MG: 10 TABLET ORAL at 02:10

## 2020-08-02 ASSESSMENT — PULMONARY FUNCTION TESTS: FVC: 1.12

## 2020-08-02 ASSESSMENT — COPD QUESTIONNAIRES
HAVE YOU SMOKED AT LEAST 100 CIGARETTES IN YOUR ENTIRE LIFE: YES
DURING THE PAST 4 WEEKS HOW MUCH DID YOU FEEL SHORT OF BREATH: NONE/LITTLE OF THE TIME
COPD SCREENING SCORE: 4
DO YOU EVER COUGH UP ANY MUCUS OR PHLEGM?: NO/ONLY WITH OCCASIONAL COLDS OR INFECTIONS

## 2020-08-02 ASSESSMENT — FIBROSIS 4 INDEX: FIB4 SCORE: 3.29

## 2020-08-02 ASSESSMENT — LIFESTYLE VARIABLES: EVER_SMOKED: YES

## 2020-08-02 NOTE — PROGRESS NOTES
Trauma / Surgical Daily Progress Note    Date of Service  8/2/2020    Chief Complaint  74 y.o. female admitted 7/31/2020 with Perforation bowel (HCC)    Interval Events  Sepsis resuscitation ongoing. Still requiring vasopressors. UOP improved as have renal indices.   Good performance on SBT today.   Tube feeds via NG at 10 mLs/hr  Antibiotic regimen in place  Seen by consultants    Review of Systems  Review of Systems     Vital Signs for last 24 hours  Pulse:  [] 99  Resp:  [20-52] 52  BP: ()/(45-77) 96/55  SpO2:  [87 %-100 %] 97 %    Hemodynamic parameters for last 24 hours  CVP:  [2 MM HG-23 MM HG] 10 MM HG    Respiratory Data     Respiration: (!) 52(RN and RT at bedside), Pulse Oximetry: 97 %     Work Of Breathing / Effort: Vented  RUL Breath Sounds: Diminished, RML Breath Sounds: Diminished, RLL Breath Sounds: Diminished, ELISSA Breath Sounds: Diminished, LLL Breath Sounds: Diminished    Physical Exam  Physical Exam  Vitals signs and nursing note reviewed.   Constitutional:       Comments: Intubated, NAD   HENT:      Head: Normocephalic and atraumatic.      Right Ear: External ear normal.      Left Ear: External ear normal.      Nose: Nose normal.      Mouth/Throat:      Mouth: Mucous membranes are moist.      Pharynx: Oropharynx is clear.   Eyes:      Extraocular Movements: Extraocular movements intact.      Pupils: Pupils are equal, round, and reactive to light.   Neck:      Musculoskeletal: Normal range of motion and neck supple.   Cardiovascular:      Rate and Rhythm: Normal rate and regular rhythm.      Pulses: Normal pulses.      Heart sounds: Normal heart sounds.   Pulmonary:      Effort: Pulmonary effort is normal.      Breath sounds: Normal breath sounds.   Abdominal:      General: Abdomen is flat.      Palpations: Abdomen is soft.   Genitourinary:     Comments: Orellana in place  Musculoskeletal:      Right lower leg: Edema present.      Left lower leg: Edema present.   Skin:     General: Skin is  warm and dry.      Capillary Refill: Capillary refill takes less than 2 seconds.   Neurological:      Comments: Follows commands   Psychiatric:      Comments: Unable to assess           Laboratory  Recent Results (from the past 24 hour(s))   ACCU-CHEK GLUCOSE    Collection Time: 08/01/20  5:26 PM   Result Value Ref Range    Glucose - Accu-Ck 111 (H) 65 - 99 mg/dL   ACCU-CHEK GLUCOSE    Collection Time: 08/02/20  2:02 AM   Result Value Ref Range    Glucose - Accu-Ck 115 (H) 65 - 99 mg/dL   Triglycerides Starting now and then Every 3 Days    Collection Time: 08/02/20  5:45 AM   Result Value Ref Range    Triglycerides 206 (H) 0 - 149 mg/dL   CBC WITH DIFFERENTIAL    Collection Time: 08/02/20  5:45 AM   Result Value Ref Range    WBC 13.2 (H) 4.8 - 10.8 K/uL    RBC 3.39 (L) 4.20 - 5.40 M/uL    Hemoglobin 11.2 (L) 12.0 - 16.0 g/dL    Hematocrit 35.8 (L) 37.0 - 47.0 %    .6 (H) 81.4 - 97.8 fL    MCH 33.0 27.0 - 33.0 pg    MCHC 31.3 (L) 33.6 - 35.0 g/dL    RDW 60.7 (H) 35.9 - 50.0 fL    Platelet Count 399 164 - 446 K/uL    MPV 10.4 9.0 - 12.9 fL    Neutrophils-Polys 90.40 (H) 44.00 - 72.00 %    Lymphocytes 5.20 (L) 22.00 - 41.00 %    Monocytes 2.60 0.00 - 13.40 %    Eosinophils 0.90 0.00 - 6.90 %    Basophils 0.00 0.00 - 1.80 %    Nucleated RBC 0.00 /100 WBC    Neutrophils (Absolute) 12.05 (H) 2.00 - 7.15 K/uL    Lymphs (Absolute) 0.69 (L) 1.00 - 4.80 K/uL    Monos (Absolute) 0.34 0.00 - 0.85 K/uL    Eos (Absolute) 0.12 0.00 - 0.51 K/uL    Baso (Absolute) 0.00 0.00 - 0.12 K/uL    NRBC (Absolute) 0.00 K/uL    Anisocytosis 2+     Macrocytosis 2+     Microcytosis 1+    Comp Metabolic Panel    Collection Time: 08/02/20  5:45 AM   Result Value Ref Range    Sodium 136 135 - 145 mmol/L    Potassium 4.2 3.6 - 5.5 mmol/L    Chloride 104 96 - 112 mmol/L    Co2 18 (L) 20 - 33 mmol/L    Anion Gap 14.0 7.0 - 16.0    Glucose 135 (H) 65 - 99 mg/dL    Bun 47 (H) 8 - 22 mg/dL    Creatinine 1.36 0.50 - 1.40 mg/dL    Calcium 8.0 (L)  8.5 - 10.5 mg/dL    AST(SGOT) 90 (H) 12 - 45 U/L    ALT(SGPT) 56 (H) 2 - 50 U/L    Alkaline Phosphatase 382 (H) 30 - 99 U/L    Total Bilirubin 1.9 (H) 0.1 - 1.5 mg/dL    Albumin 1.8 (L) 3.2 - 4.9 g/dL    Total Protein 5.0 (L) 6.0 - 8.2 g/dL    Globulin 3.2 1.9 - 3.5 g/dL    A-G Ratio 0.6 g/dL   ESTIMATED GFR    Collection Time: 08/02/20  5:45 AM   Result Value Ref Range    GFR If  46 (A) >60 mL/min/1.73 m 2    GFR If Non  38 (A) >60 mL/min/1.73 m 2   DIFFERENTIAL MANUAL    Collection Time: 08/02/20  5:45 AM   Result Value Ref Range    Bands-Stabs 0.90 0.00 - 10.00 %    Manual Diff Status PERFORMED    PERIPHERAL SMEAR REVIEW    Collection Time: 08/02/20  5:45 AM   Result Value Ref Range    Peripheral Smear Review see below    PLATELET ESTIMATE    Collection Time: 08/02/20  5:45 AM   Result Value Ref Range    Plt Estimation Normal    MORPHOLOGY    Collection Time: 08/02/20  5:45 AM   Result Value Ref Range    RBC Morphology Present     Poikilocytosis 2+     Ovalocytes 1+     Echinocytes 2+    ACCU-CHEK GLUCOSE    Collection Time: 08/02/20  5:49 AM   Result Value Ref Range    Glucose - Accu-Ck 127 (H) 65 - 99 mg/dL   ACCU-CHEK GLUCOSE    Collection Time: 08/02/20 11:54 AM   Result Value Ref Range    Glucose - Accu-Ck 119 (H) 65 - 99 mg/dL       Fluids    Intake/Output Summary (Last 24 hours) at 8/2/2020 1413  Last data filed at 8/2/2020 0600  Gross per 24 hour   Intake 1476.77 ml   Output 865 ml   Net 611.77 ml       Core Measures & Quality Metrics  Core Measures & Quality Metrics  CAITLIN Score  ETOH Screening    Assessment/Plan  Septic shock (HCC)- (present on admission)  Assessment & Plan  7/31: high lactate, oliguric and hypotensive post op  8/1: lactate and urine output improved following 7 liter resuscitation. Levophed to support MAP.  8/2 weaning levophed. UOP  Adequate.    Respiratory failure following trauma and surgery (HCC)- (present on admission)  Assessment & Plan  Full  ventilatory support. Ensure optimal oxygenation and mitigate secondary injury.  Trauma respiratory protocol  Trauma ventilator weaning protocol    Perforated appendicitis- (present on admission)  Assessment & Plan  7/31 perforated appendix with retrocolic abscess.  Small bowel and right colon resection.  RLQ drain.  8/2 zosyn day 3    [facility] [place] [facility]  Dr. Harris      Paroxysmal atrial fibrillation (CMS-HCC)- (present on admission)  Assessment & Plan  8/1:  Known history.  Digoxin loaded , Metoprolol for rate control    Acute renal insufficiency  Assessment & Plan  Admission Cr elevated associated with infection  Improved with resuscitation  Trend renal indices, avoid nephrotoxins    No contraindication to anticoagulation therapy  Assessment & Plan  8/1 Begin Lovenox    HTN (hypertension)- (present on admission)  Assessment & Plan  Takes multiple agents at home for hypertension  Held in acute setting with septic shock  Restart when appropriate    Plan:  Wean ventilator - decrease PEEP and FiO2 and increase spontaneous breathing percentages.  Levophed to support MAP. Crystalloid resuscitation slowing.   Trend UOP and renal indices  Zosyn in place, trend WBC  Defer tube feed management to primary service. Await return of bowel function.  Chemical DVT prophylaxis    Discussed patient condition with RN, RT and Pharmacy.  The patient is/remains critically ill with respiratory failure, septic shock.    I provided the following critical care services: multiple high risk medication management, resuscitation, ventilator management.    Critical care time spent exclusive of procedures: 78 minutes.    Tony Newman MD  [phone]

## 2020-08-02 NOTE — ASSESSMENT & PLAN NOTE
Resolving non oliguric renal insufficiency secondary to shock and ATN.  Trend renal indices. Medications adjusted for renal function.  Avoid nephrotoxins.

## 2020-08-02 NOTE — PROGRESS NOTES
2 RN skin check completed with TREVON Huang:     -Generalized integumentary extremely dry, flaky, fragile  -Lower lip pink/red - moisturizer applied, repositioning ETT q2hrs  -Left breast fold redness - nystain powder ordered  -Midline abdominal incision with prevena wound vac in place  -RLQ HAYDEE drain in place - fenestrated gauze dressing  -Lower extremity dusky, emilia, purple discoloration from shins down to toes, skin cool with delayed cap refill - warm blankets used for warming  -Left 4th toe nail blackened  -Sacrum intact, mepilex in place for prophylaxis

## 2020-08-02 NOTE — ASSESSMENT & PLAN NOTE
7/31 high lactate, oliguric and hypotensive post op  8/1 lactate and urine output improved following 7 liter resuscitation. Levophed to support MAP.  8/2-8/3 weaning levophed. UOP  Adequate. Added steroids 8/3  8/4 Recurrent septic shock associated with leak. Resuscitation ongoing.  8/6 Stabilized off pressors  Cont fluids, low dose steroids  On zosyn

## 2020-08-02 NOTE — PROGRESS NOTES
Skin check completed with nursing student:     -Bilateral lower extremities cool, mottled, purple, with >3 second capillary refill.   -Fingers and hands mottled and cool  -Left breast fold red, dry   -Midline abdominal incision covered with prevena, unable to visualize skin  -RLQ HAYDEE site noted, unable to view insertion site, covered with gauze.  -Lower lip dry, pink.  -mepilex to coccyx for prevnetion    Devices: ETT, holister, ear clip pulse ox probe (unable to obtain sats with finger probe.  Repositioned every 2 hours), EKG leads, SCDs, BP cuff, art line, PIV x2, central line, L. nare NGT, narvaez.

## 2020-08-02 NOTE — FLOWSHEET NOTE
Called to room due to attempted self extubation by pt. Upon entry to room, pt still intubated w/ ETT in same position (20 cm @ lipline). Bilateral BS heard, adequate cuff pressure and no leak heard on ausculation. Adequate Vt's being delivered to pt via ventilator, no leak noted. SpO2 remains stable.  Pt in no resp distress at this time. Will continue to monitor.

## 2020-08-02 NOTE — ASSESSMENT & PLAN NOTE
Chronic afib complicated by RVR  8/1 Digoxin load and metoprolol.  8/7 Recurrent A fib with RVR. Amiodarone infusion started.  8/13 Transitioned to oral amiodarone.

## 2020-08-02 NOTE — PROGRESS NOTES
Skin check completed with Bobbi RN:     -Bilateral lower extremities cool, mottled, purple, with >3 second capillary refill.   -Left breast fold red, dry.  Nystatin powder BID. Interdry placed  -Midline abdominal incision covered with prevena, unable to visualize skin  -RLQ HAYDEE site noted, unable to view insertion site, covered with gauze.  -Lower lip dry, pink.  -mepilex to coccyx for prevnetion     Devices:Nasal cannula (with foam ear pads), EKG leads, SCDs, BP cuff, art line, PIV x2, central line, L. nare NGT, narvaez.

## 2020-08-02 NOTE — CARE PLAN
Problem: Venous Thromboembolism (VTW)/Deep Vein Thrombosis (DVT) Prevention:  Goal: Patient will participate in Venous Thrombosis (VTE)/Deep Vein Thrombosis (DVT)Prevention Measures  Outcome: PROGRESSING AS EXPECTED  Intervention: Ensure patient wears graduated elastic stockings (YANET hose) and/or SCDs, if ordered, when in bed or chair (Remove at least once per shift for skin check)  Flowsheets (Taken 8/1/2020 2012)  SCDs, Sequential Compression Device: On  Note: SCDs in place for mechanical prophylaxis. Lovenox in use for pharmacologic prophylaxis.      Problem: Pain Management  Goal: Pain level will decrease to patient's comfort goal  Outcome: PROGRESSING AS EXPECTED  Intervention: Follow pain managment plan developed in collaboration with patient and Interdisciplinary Team  Note: Assessing pain q2hrs. Providing pain meds per MAR. Assisting patient to rest and reposition for comfort.

## 2020-08-02 NOTE — PROGRESS NOTES
"    DATE: 8/2/2020    Post Operative Day 2 Open right hemicolectomy with drain placement and right retroperitoneum    Interval Events:  Intubated on minimal pressors in ICU    PHYSICAL EXAMINATION:  Vital Signs: /55   Pulse 97   Temp 37.5 °C (99.5 °F) (Bladder)   Resp (!) 52   Ht 1.753 m (5' 9\")   Wt 60.4 kg (133 lb 2.5 oz)   SpO2 94%     Abdomen soft previna in place drain with serosanguineous output    ASSESSMENT AND PLAN:   Status post open right hemicolectomy for suspected perforated appendicitis    Can advance tube feeds via NG tube and give oral medication at discretion of critical care team.    Appreciate ICU and consulting physician care.       ____________________________________     Jake Harris M.D.    DD: 8/1/2020  9:19 AM    "

## 2020-08-02 NOTE — CARE PLAN
Problem: Respiratory:  Goal: Respiratory status will improve  Outcome: PROGRESSING SLOWER THAN EXPECTED   SPONT breathing trial started.     Problem: Pain Management  Goal: Pain level will decrease to patient's comfort goal  Outcome: PROGRESSING SLOWER THAN EXPECTED   PRN oxy and dilaudid for pain management.  Scheduled Tylenol.

## 2020-08-03 PROBLEM — E27.40 ADRENAL INSUFFICIENCY (HCC): Status: ACTIVE | Noted: 2020-08-03

## 2020-08-03 LAB
ALBUMIN SERPL BCP-MCNC: 1.9 G/DL (ref 3.2–4.9)
ALBUMIN/GLOB SERPL: 0.6 G/DL
ALP SERPL-CCNC: 410 U/L (ref 30–99)
ALT SERPL-CCNC: 52 U/L (ref 2–50)
ANION GAP SERPL CALC-SCNC: 15 MMOL/L (ref 7–16)
ANISOCYTOSIS BLD QL SMEAR: ABNORMAL
AST SERPL-CCNC: 85 U/L (ref 12–45)
BASOPHILS # BLD AUTO: 0.9 % (ref 0–1.8)
BASOPHILS # BLD: 0.16 K/UL (ref 0–0.12)
BILIRUB SERPL-MCNC: 2 MG/DL (ref 0.1–1.5)
BUN SERPL-MCNC: 49 MG/DL (ref 8–22)
BURR CELLS BLD QL SMEAR: NORMAL
CALCIUM SERPL-MCNC: 8.1 MG/DL (ref 8.5–10.5)
CHLORIDE SERPL-SCNC: 105 MMOL/L (ref 96–112)
CO2 SERPL-SCNC: 21 MMOL/L (ref 20–33)
CREAT SERPL-MCNC: 1.67 MG/DL (ref 0.5–1.4)
CRP SERPL HS-MCNC: 24.63 MG/DL (ref 0–0.75)
DIGOXIN SERPL-MCNC: 1 NG/ML (ref 0.8–2)
EOSINOPHIL # BLD AUTO: 0.31 K/UL (ref 0–0.51)
EOSINOPHIL NFR BLD: 1.7 % (ref 0–6.9)
ERYTHROCYTE [DISTWIDTH] IN BLOOD BY AUTOMATED COUNT: 60.5 FL (ref 35.9–50)
GLOBULIN SER CALC-MCNC: 3.1 G/DL (ref 1.9–3.5)
GLUCOSE BLD-MCNC: 109 MG/DL (ref 65–99)
GLUCOSE BLD-MCNC: 113 MG/DL (ref 65–99)
GLUCOSE BLD-MCNC: 141 MG/DL (ref 65–99)
GLUCOSE SERPL-MCNC: 125 MG/DL (ref 65–99)
HCT VFR BLD AUTO: 35.7 % (ref 37–47)
HGB BLD-MCNC: 11.2 G/DL (ref 12–16)
LG PLATELETS BLD QL SMEAR: NORMAL
LYMPHOCYTES # BLD AUTO: 0.62 K/UL (ref 1–4.8)
LYMPHOCYTES NFR BLD: 3.4 % (ref 22–41)
MACROCYTES BLD QL SMEAR: ABNORMAL
MAGNESIUM SERPL-MCNC: 1.5 MG/DL (ref 1.5–2.5)
MANUAL DIFF BLD: NORMAL
MCH RBC QN AUTO: 33.3 PG (ref 27–33)
MCHC RBC AUTO-ENTMCNC: 31.4 G/DL (ref 33.6–35)
MCV RBC AUTO: 106.3 FL (ref 81.4–97.8)
MONOCYTES # BLD AUTO: 0.93 K/UL (ref 0–0.85)
MONOCYTES NFR BLD AUTO: 5.1 % (ref 0–13.4)
MORPHOLOGY BLD-IMP: NORMAL
NEUTROPHILS # BLD AUTO: 16.27 K/UL (ref 2–7.15)
NEUTROPHILS NFR BLD: 88.9 % (ref 44–72)
NRBC # BLD AUTO: 0 K/UL
NRBC BLD-RTO: 0 /100 WBC
PATHOLOGY CONSULT NOTE: NORMAL
PHOSPHATE SERPL-MCNC: 3.5 MG/DL (ref 2.5–4.5)
PLATELET # BLD AUTO: 436 K/UL (ref 164–446)
PLATELET BLD QL SMEAR: NORMAL
PMV BLD AUTO: 9.8 FL (ref 9–12.9)
POIKILOCYTOSIS BLD QL SMEAR: NORMAL
POTASSIUM SERPL-SCNC: 3.9 MMOL/L (ref 3.6–5.5)
PROT SERPL-MCNC: 5 G/DL (ref 6–8.2)
RBC # BLD AUTO: 3.36 M/UL (ref 4.2–5.4)
RBC BLD AUTO: PRESENT
SODIUM SERPL-SCNC: 141 MMOL/L (ref 135–145)
WBC # BLD AUTO: 18.3 K/UL (ref 4.8–10.8)

## 2020-08-03 PROCEDURE — 770022 HCHG ROOM/CARE - ICU (200)

## 2020-08-03 PROCEDURE — 92610 EVALUATE SWALLOWING FUNCTION: CPT

## 2020-08-03 PROCEDURE — 85027 COMPLETE CBC AUTOMATED: CPT

## 2020-08-03 PROCEDURE — A9270 NON-COVERED ITEM OR SERVICE: HCPCS | Performed by: SURGERY

## 2020-08-03 PROCEDURE — 80053 COMPREHEN METABOLIC PANEL: CPT

## 2020-08-03 PROCEDURE — 99292 CRITICAL CARE ADDL 30 MIN: CPT | Performed by: SURGERY

## 2020-08-03 PROCEDURE — 700101 HCHG RX REV CODE 250: Performed by: SURGERY

## 2020-08-03 PROCEDURE — 85007 BL SMEAR W/DIFF WBC COUNT: CPT

## 2020-08-03 PROCEDURE — 700112 HCHG RX REV CODE 229: Performed by: SURGERY

## 2020-08-03 PROCEDURE — 700102 HCHG RX REV CODE 250 W/ 637 OVERRIDE(OP): Performed by: SURGERY

## 2020-08-03 PROCEDURE — 83735 ASSAY OF MAGNESIUM: CPT

## 2020-08-03 PROCEDURE — 700111 HCHG RX REV CODE 636 W/ 250 OVERRIDE (IP): Performed by: SURGERY

## 2020-08-03 PROCEDURE — 700105 HCHG RX REV CODE 258: Performed by: SURGERY

## 2020-08-03 PROCEDURE — 84100 ASSAY OF PHOSPHORUS: CPT

## 2020-08-03 PROCEDURE — 80162 ASSAY OF DIGOXIN TOTAL: CPT

## 2020-08-03 PROCEDURE — 99291 CRITICAL CARE FIRST HOUR: CPT | Performed by: SURGERY

## 2020-08-03 PROCEDURE — 82962 GLUCOSE BLOOD TEST: CPT | Mod: 91

## 2020-08-03 PROCEDURE — 86140 C-REACTIVE PROTEIN: CPT

## 2020-08-03 RX ORDER — MAGNESIUM SULFATE HEPTAHYDRATE 40 MG/ML
2 INJECTION, SOLUTION INTRAVENOUS ONCE
Status: COMPLETED | OUTPATIENT
Start: 2020-08-03 | End: 2020-08-03

## 2020-08-03 RX ORDER — MIDAZOLAM HYDROCHLORIDE 1 MG/ML
1-5 INJECTION INTRAMUSCULAR; INTRAVENOUS ONCE
Status: DISCONTINUED | OUTPATIENT
Start: 2020-08-03 | End: 2020-08-03

## 2020-08-03 RX ADMIN — DIGOXIN 62.5 MCG: 125 TABLET ORAL at 17:16

## 2020-08-03 RX ADMIN — ACETAMINOPHEN 650 MG: 325 TABLET, FILM COATED ORAL at 05:33

## 2020-08-03 RX ADMIN — FAMOTIDINE 20 MG: 20 TABLET, FILM COATED ORAL at 05:33

## 2020-08-03 RX ADMIN — SODIUM CHLORIDE, POTASSIUM CHLORIDE, SODIUM LACTATE AND CALCIUM CHLORIDE: 600; 310; 30; 20 INJECTION, SOLUTION INTRAVENOUS at 05:39

## 2020-08-03 RX ADMIN — PIPERACILLIN SODIUM, TAZOBACTAM SODIUM 4.5 G: 4; .5 INJECTION, POWDER, LYOPHILIZED, FOR SOLUTION INTRAVENOUS at 22:00

## 2020-08-03 RX ADMIN — METOPROLOL TARTRATE 10 MG: 5 INJECTION, SOLUTION INTRAVENOUS at 17:15

## 2020-08-03 RX ADMIN — SODIUM CHLORIDE, POTASSIUM CHLORIDE, SODIUM LACTATE AND CALCIUM CHLORIDE: 600; 310; 30; 20 INJECTION, SOLUTION INTRAVENOUS at 15:31

## 2020-08-03 RX ADMIN — ACETAMINOPHEN 650 MG: 325 TABLET, FILM COATED ORAL at 17:14

## 2020-08-03 RX ADMIN — MAGNESIUM SULFATE 2 G: 2 INJECTION INTRAVENOUS at 11:45

## 2020-08-03 RX ADMIN — DOCUSATE SODIUM 100 MG: 50 LIQUID ORAL at 17:15

## 2020-08-03 RX ADMIN — DOCUSATE SODIUM 100 MG: 50 LIQUID ORAL at 05:33

## 2020-08-03 RX ADMIN — PIPERACILLIN SODIUM, TAZOBACTAM SODIUM 4.5 G: 4; .5 INJECTION, POWDER, LYOPHILIZED, FOR SOLUTION INTRAVENOUS at 05:33

## 2020-08-03 RX ADMIN — HYDROCORTISONE SODIUM SUCCINATE 50 MG: 100 INJECTION, POWDER, FOR SOLUTION INTRAMUSCULAR; INTRAVENOUS at 17:14

## 2020-08-03 RX ADMIN — HYDROMORPHONE HYDROCHLORIDE 0.5 MG: 1 INJECTION, SOLUTION INTRAMUSCULAR; INTRAVENOUS; SUBCUTANEOUS at 21:37

## 2020-08-03 RX ADMIN — NYSTATIN: 100000 POWDER TOPICAL at 05:33

## 2020-08-03 RX ADMIN — METOPROLOL TARTRATE 10 MG: 5 INJECTION, SOLUTION INTRAVENOUS at 11:43

## 2020-08-03 RX ADMIN — PIPERACILLIN SODIUM, TAZOBACTAM SODIUM 4.5 G: 4; .5 INJECTION, POWDER, LYOPHILIZED, FOR SOLUTION INTRAVENOUS at 14:00

## 2020-08-03 RX ADMIN — HYDROCORTISONE SODIUM SUCCINATE 50 MG: 100 INJECTION, POWDER, FOR SOLUTION INTRAMUSCULAR; INTRAVENOUS at 11:43

## 2020-08-03 RX ADMIN — NYSTATIN: 100000 POWDER TOPICAL at 17:15

## 2020-08-03 RX ADMIN — METOPROLOL TARTRATE 10 MG: 5 INJECTION, SOLUTION INTRAVENOUS at 05:33

## 2020-08-03 RX ADMIN — ENOXAPARIN SODIUM 40 MG: 40 INJECTION SUBCUTANEOUS at 05:33

## 2020-08-03 ASSESSMENT — FIBROSIS 4 INDEX: FIB4 SCORE: 2.2

## 2020-08-03 NOTE — CARE PLAN
Problem: Communication  Goal: The ability to communicate needs accurately and effectively will improve  Intervention: [place] patient and significant other/support system to call light to alert staff of needs  Note: Pt exhibiting intermittent cooperation vs noncompliance with interventions and assessment questions, thus pt reoriented to surroundings and plan of care/rationale for asking the questions we do. Pt encouraged to participate in basic care, as well as mobility and ROM. Clustering of care performed to best of ability, pt seemingly more agreeable to this as it allows for greater rest periods.      Problem: Safety  Goal: Will remain free from injury  Note: 2+ staff member utilized for edge of bed mobility, and pt oriented to those assisting to promote greater cooperation and understanding. Educated on fall precautions and safety measures during mobility to help prevent accidental injury.

## 2020-08-03 NOTE — DIETARY
"Nutrition Support Assessment     Nutrition services:   Day 3 of admit.  73 yo female with admitting diagnosis: bowel perforation.    Current problem list:  1. Septic shock  2. Respiratory distress, vent, extubated yesterday  3. Perforated appendicitis  4. Acute renal insufficiency  5. History of a-fib, HTN    Assessment:  Estimated Nutritional Needs: based on: height 5'9\", weight 111.3 kg (admit scale wt), IBW 65.772 kg, BMI 36.24 - class 2 obesity.    Calculation/Equation: obesity guidelines for critical illness - based on admit scale wt  Calories/day: 1220 - 1550 kcals (11 - 14 kcals/kg admit wt)  Protein/day: 99 - 132 g (1.5 - 2.0 g/kg IBW)    Evaluation:   1. Pt unable to safely take po diet - tube feedings initated yesterday @ 10 ml/hr and will advance to 30 ml/hr today  2. NGT being used for enteral access  3. Exploratory laparotomy on 7/31  4. hypocaloric high protein feedings appropriate for critically ill obese pt    5. Peptamen Intense with full goal 60 ml/hr will provide 134 g protein, 1152 ml H20/day     Malnutrition Risk: na    Recommendations/Plan:  1. Peptamen Intense to advance to 30 ml/hr today and advance further with MD orders  2. Full goal for Peptamen Intense 60 ml/hr will provide 1440 kcals, 134 g protein, 1152 ml H20/day  3. Po diet when appropriate    "

## 2020-08-03 NOTE — PROGRESS NOTES
2-RN skin check performed with TREVON Huang. Noted as follows:   -Midline abdominal incision (prevena sponge dressing in place) with adjacent RLQ HAYDEE drain, dressed with fenestrated gauze and tape. Abdomen otherwise intact, pink/pale.  -Scattered spots of dark/light purple bruising to BUE. Edema noted to BUE as well.  -Duskiness/mottling to BLE, appears reminiscent of PVD.  -Mildly calloused feet. Feet cleansed of dirt bilaterally to reveal no wounds.   -Small scab to R medial ankle.   -Redness beneath breasts (L > R), Nystatin powder application per MAR.     Elbows (pink and blanching), sacrum, ears intact.     No pressure concerns noted at this time.     Devices in place include: Bilateral SCD's, BP cuff, SpO2 finger probe, EKG leads and electrode stickers, Orellana catheter, CVC, PIV x1, HAYDEE (RLQ) x1, Prevena vac.     Interventions in place include: See those listed specifically above under areas of concern; additionally:  -Q2h turns + use of pillows to elevate all four extremities.  -Frequent checks beneath and around tubing/medical devices to ensure no prolonged or unnecessary skin contact.   -Periodic checks in at-risk areas for moisture (e.g. posterior perineum, beneath abd pannus, beneath breasts) for incontinence/breakdown.  -Discussion regarding mobility with pt and it's benefits where skin integrity and shifting off pressure points is concerned.   -Tube feeds at trickle rate per MD to enhance nutrition and skin healing.

## 2020-08-03 NOTE — CARE PLAN
Problem: Ventilation Defect:  Goal: Ability to achieve and maintain unassisted ventilation or tolerate decreased levels of ventilator support  Outcome: MET   PT extubated @ 0975  PT refused to perform IS

## 2020-08-03 NOTE — PROGRESS NOTES
The patient is refusing skin assessment and mobility.    Education provided but continues to refuse it.    Bilateral feet mottled  Midline abdominal incision with prevena and HAYDEE drain  Generalized bruising      1600  Pt continues to be paranoid and noncompliant with nursing care/assessment.

## 2020-08-03 NOTE — ASSESSMENT & PLAN NOTE
Failure to fully wean from vasopressors despite adequate resuscitation.  8/3 Empiric steroids started with liberation from vasopressors.  8/14 Steroids weaned off.

## 2020-08-03 NOTE — PROGRESS NOTES
Pt pulled her NG tube out and acting very confused.  She is paranoid and agitated.    Dr. Newman updated.  Pending swallow eval.  If fails swallow eval, will replace melissarak.  Restraints applied for pt safety.

## 2020-08-03 NOTE — PROGRESS NOTES
Patient's , Florencio at bedside to visit patient.  Florencio brought her iphone, ipad,  x 2, gold wrist watch and bravo in a plastic bag.  Bag labeled with patient sticker and placed on bedside table.  Patient requested to have her teeth in.   Upper dentures placed.  Patient is agitated with her  and staff.

## 2020-08-03 NOTE — PROGRESS NOTES
Trauma / Surgical Daily Progress Note    Date of Service  8/3/2020    Chief Complaint  74 y.o. female admitted 7/31/2020 with Perforation bowel (HCC)    Interval Events  Extubated yesterday. High oxygen requirements and poor reserve.  Multiple small BMs overnight, passing flatus.  Delirium this morning  Remains dependent on vasopressors    Review of Systems  Review of Systems       Vital Signs for last 24 hours  Temp:  [36.1 °C (96.9 °F)] 36.1 °C (96.9 °F)  Pulse:  [] 98  Resp:  [8-22] 17  BP: ()/(43-84) 117/58  SpO2:  [75 %-100 %] 92 %    Hemodynamic parameters for last 24 hours  CVP:  [5 MM HG-20 MM HG] 12 MM HG    Respiratory Data     Respiration: 17, Pulse Oximetry: 92 %     Work Of Breathing / Effort: Mild;Moderate  RUL Breath Sounds: Clear, RML Breath Sounds: Diminished, RLL Breath Sounds: Diminished, ELISSA Breath Sounds: Clear, LLL Breath Sounds: Diminished    Physical Exam  Physical Exam  Vitals signs and nursing note reviewed.   Constitutional:       Comments: Appears chronically ill   HENT:      Head: Normocephalic and atraumatic.      Right Ear: External ear normal.      Left Ear: External ear normal.      Nose: Nose normal.      Mouth/Throat:      Mouth: Mucous membranes are moist.      Pharynx: Oropharynx is clear.   Eyes:      Extraocular Movements: Extraocular movements intact.      Pupils: Pupils are equal, round, and reactive to light.   Neck:      Musculoskeletal: Normal range of motion and neck supple.   Cardiovascular:      Rate and Rhythm: Normal rate and regular rhythm.      Pulses: Normal pulses.      Heart sounds: Normal heart sounds.   Pulmonary:      Effort: Pulmonary effort is normal.      Breath sounds: Normal breath sounds.   Abdominal:      General: Abdomen is flat.      Palpations: Abdomen is soft.   Genitourinary:     Comments: Orellana in place  Musculoskeletal:      Right lower leg: Edema present.      Left lower leg: Edema present.   Skin:     General: Skin is warm and dry.       Capillary Refill: Capillary refill takes less than 2 seconds.   Neurological:      Comments: Follows commands    Psychiatric:      Comments: A bit paranoid, impulsive         Laboratory  Recent Results (from the past 24 hour(s))   ACCU-CHEK GLUCOSE    Collection Time: 08/02/20  6:13 PM   Result Value Ref Range    Glucose - Accu-Ck 77 65 - 99 mg/dL   ACCU-CHEK GLUCOSE    Collection Time: 08/02/20 10:43 PM   Result Value Ref Range    Glucose - Accu-Ck 106 (H) 65 - 99 mg/dL   Comp Metabolic Panel    Collection Time: 08/03/20  3:50 AM   Result Value Ref Range    Sodium 141 135 - 145 mmol/L    Potassium 3.9 3.6 - 5.5 mmol/L    Chloride 105 96 - 112 mmol/L    Co2 21 20 - 33 mmol/L    Anion Gap 15.0 7.0 - 16.0    Glucose 125 (H) 65 - 99 mg/dL    Bun 49 (H) 8 - 22 mg/dL    Creatinine 1.67 (H) 0.50 - 1.40 mg/dL    Calcium 8.1 (L) 8.5 - 10.5 mg/dL    AST(SGOT) 85 (H) 12 - 45 U/L    ALT(SGPT) 52 (H) 2 - 50 U/L    Alkaline Phosphatase 410 (H) 30 - 99 U/L    Total Bilirubin 2.0 (H) 0.1 - 1.5 mg/dL    Albumin 1.9 (L) 3.2 - 4.9 g/dL    Total Protein 5.0 (L) 6.0 - 8.2 g/dL    Globulin 3.1 1.9 - 3.5 g/dL    A-G Ratio 0.6 g/dL   CBC WITH DIFFERENTIAL    Collection Time: 08/03/20  3:50 AM   Result Value Ref Range    WBC 18.3 (H) 4.8 - 10.8 K/uL    RBC 3.36 (L) 4.20 - 5.40 M/uL    Hemoglobin 11.2 (L) 12.0 - 16.0 g/dL    Hematocrit 35.7 (L) 37.0 - 47.0 %    .3 (H) 81.4 - 97.8 fL    MCH 33.3 (H) 27.0 - 33.0 pg    MCHC 31.4 (L) 33.6 - 35.0 g/dL    RDW 60.5 (H) 35.9 - 50.0 fL    Platelet Count 436 164 - 446 K/uL    MPV 9.8 9.0 - 12.9 fL    Neutrophils-Polys 88.90 (H) 44.00 - 72.00 %    Lymphocytes 3.40 (L) 22.00 - 41.00 %    Monocytes 5.10 0.00 - 13.40 %    Eosinophils 1.70 0.00 - 6.90 %    Basophils 0.90 0.00 - 1.80 %    Nucleated RBC 0.00 /100 WBC    Neutrophils (Absolute) 16.27 (H) 2.00 - 7.15 K/uL    Lymphs (Absolute) 0.62 (L) 1.00 - 4.80 K/uL    Monos (Absolute) 0.93 (H) 0.00 - 0.85 K/uL    Eos (Absolute) 0.31 0.00 - 0.51  K/uL    Baso (Absolute) 0.16 (H) 0.00 - 0.12 K/uL    NRBC (Absolute) 0.00 K/uL    Anisocytosis 2+     Macrocytosis 2+    MAGNESIUM    Collection Time: 08/03/20  3:50 AM   Result Value Ref Range    Magnesium 1.5 1.5 - 2.5 mg/dL   PHOSPHORUS    Collection Time: 08/03/20  3:50 AM   Result Value Ref Range    Phosphorus 3.5 2.5 - 4.5 mg/dL   ESTIMATED GFR    Collection Time: 08/03/20  3:50 AM   Result Value Ref Range    GFR If  36 (A) >60 mL/min/1.73 m 2    GFR If Non African American 30 (A) >60 mL/min/1.73 m 2   DIFFERENTIAL MANUAL    Collection Time: 08/03/20  3:50 AM   Result Value Ref Range    Manual Diff Status PERFORMED    PERIPHERAL SMEAR REVIEW    Collection Time: 08/03/20  3:50 AM   Result Value Ref Range    Peripheral Smear Review see below    PLATELET ESTIMATE    Collection Time: 08/03/20  3:50 AM   Result Value Ref Range    Plt Estimation Normal    MORPHOLOGY    Collection Time: 08/03/20  3:50 AM   Result Value Ref Range    RBC Morphology Present     Large Platelets 1+     Poikilocytosis 1+     Echinocytes 1+    ACCU-CHEK GLUCOSE    Collection Time: 08/03/20  5:43 AM   Result Value Ref Range    Glucose - Accu-Ck 109 (H) 65 - 99 mg/dL   Histology Request    Collection Time: 08/03/20  7:09 AM   Result Value Ref Range    Pathology Request Sent to Histo    ACCU-CHEK GLUCOSE    Collection Time: 08/03/20 11:41 AM   Result Value Ref Range    Glucose - Accu-Ck 113 (H) 65 - 99 mg/dL   CRP Quantitive (Non-Cardiac)    Collection Time: 08/03/20 12:52 PM   Result Value Ref Range    Stat C-Reactive Protein 24.63 (H) 0.00 - 0.75 mg/dL       Fluids    Intake/Output Summary (Last 24 hours) at 8/3/2020 1341  Last data filed at 8/3/2020 1200  Gross per 24 hour   Intake 3029.06 ml   Output 950 ml   Net 2079.06 ml       Core Measures & Quality Metrics  Labs reviewed, Medications reviewed and Radiology images reviewed  Orellana catheter: Critically Ill - Requiring Accurate Measurement of Urinary Output  Central line  in place: Vasopressors    DVT Prophylaxis: Enoxaparin (Lovenox)  DVT prophylaxis - mechanical: SCDs  Ulcer prophylaxis: Yes  Antibiotics: Treating active infection/contamination beyond 24 hours perioperative coverage      CAITLIN Score    ETOH Screening      Assessment/Plan  Septic shock (HCC)- (present on admission)  Assessment & Plan  7/31: high lactate, oliguric and hypotensive post op  8/1: lactate and urine output improved following 7 liter resuscitation. Levophed to support MAP.  8/2 - 8/3 weaning levophed. UOP  Adequate. Added steroids 8/3    Respiratory failure following trauma and surgery (HCC)- (present on admission)  Assessment & Plan  Full ventilatory support. Ensure optimal oxygenation and mitigate secondary injury.  Trauma respiratory protocol  8/2 extubated.   8/3 high risk for reintubation    Perforated appendicitis- (present on admission)  Assessment & Plan  7/31 perforated appendix with retrocolic abscess.  Small bowel and right colon resection.  RLQ drain.  8/2 zosyn day 3    [facility]  Dr. Harris      Paroxysmal atrial fibrillation (CMS-HCC)- (present on admission)  Assessment & Plan  Chronic afib complicated by RVR  8/1 Digoxin load and metoprolol  8/3 digoxin level pending.  Hold NOAC.     Adrenal insufficiency (HCC)  Assessment & Plan  Failure to fully wean from vasopressors despite adequate resuscitation  8/3 empiric steroids started; anticipate rapid wean.    Acute renal insufficiency  Assessment & Plan  Admission Cr elevated associated with infection  Improved with resuscitation  Trend renal indices, avoid nephrotoxins    No contraindication to anticoagulation therapy  Assessment & Plan  8/1 Begin Lovenox    HTN (hypertension)- (present on admission)  Assessment & Plan  Takes multiple agents at home for hypertension  Held in acute setting with septic shock  Restart when appropriate    Plan:  Aggressive pulmonary toilette. Patient is at high risk for decompensation with the threat of  imminent deterioration, life threatening deterioration, and loss of vital organ function.  Levophed to support MAP. She fits the description for adrenal insufficiency so will start empiric hydrocortisone today with anticipated rapid wean.  Trend UOP and renal indices  Zosyn in place, trend WBC  Increase tube feeds today as GI function is returning  DVT prophylaxis in place.     Discussed patient condition with RN, RT and Pharmacy.  The patient is/remains critically ill with respiratory failure, septic shock, delirium, adrenal insufficiency.    I provided the following critical care services: multiple high risk medication management, resuscitation.    Critical care time spent exclusive of procedures: 82 minutes.    Tony Newman MD  [phone]

## 2020-08-03 NOTE — THERAPY
"Speech Language Pathology   Clinical Swallow Evaluation     Patient Name: Yvette Dailey  AGE:  74 y.o., SEX:  female  Medical Record #: [id number]  Today's Date: 8/3/2020     Precautions  Precautions: (P) Swallow Precautions ( See Comments)    Assessment    75 y/o admitted on 7/31 for severe localized R lower quadrant abdominal pain + N&V. Intubated 7/31 -8/2. Dx chest found \"Diffuse interstitial prominence, Patchy left basilar opacities.\" Not seen by SLP at [facility].    Pt seen on this date for a clinical swallow evaluation. Pt A&Ox3 with disorientation to date and reluctant to participate during session. Educated pt on purpose of clinical swallow evaluation and how she currently has no source of nutrition, however, pt declined PO trials and denied pulling out TF. No gross asymmetry appreciated during the oral motor evaluation and slight raspy voice appreciated which pt reports is not baseline. After a few minutes of discussing PLOF, pt agreed to PO trials. No overt s/sx of aspiration appreciated with 2 single ice chips and 1/3 teaspoon of MTL x1, however, pt declined further PO despite education and encouragement. Pt reported that she would only eat/drink if provided by her doctor and educated pt that the clinical swallow evaluation was ordered by doctor, however, continued to refuse. Given poor participation, cannot provide safe oral diet at this time so would recommend NPO with consideration for replacement of non-oral source of nutrition.     4:20PM  SLP asked to return by RN as pt agreed to cooperate for swallow evaluation. Pt expressed multiple times while in room that she didn't \"trust\" her nurse and would feel more comfortable if a doctor was present. Pt agreed to trials 4oz of MTL, ~2oz of apple sauce, and 2 bites of pudding and no overt s/sx of aspiration appreciated. She declined further PO and stated that it was all too sweet for her. Attempted to educate pt on role of SLP and need to trial textures to " advance diet, however, pt refused education. Pt with very poor insight into current medical status and role of RN/SLP. Recommend pt begin an LQ3/MT2 diet with direct supervision during meals and implementation of swallowing strategies (small bites/sips, up at 90* during meals, slow rate). Please discontinue PO with any overt s/sx of aspiration    Plan    Revised after re-evaluation, okay for pt to begin an LQ3/MT2 diet with direct supervision during meals and implementation of compensatory strategies as stated above. Please discontinue PO with any overt s/sx of aspiration.    Recommend Speech Therapy 3 times per week until therapy goals are met for the following treatments:  Dysphagia Training and Patient / Family / Caregiver Education.    Discharge recommendations:  Recommend inpatient transitional care services for continued speech therapy services.           Objective       08/03/20 1354   Precautions   Precautions Swallow Precautions ( See Comments)   Vitals   O2 (LPM) 1   O2 Delivery Device Silicone Nasal Cannula   Pain 0 - 10 Group   Therapist Pain Assessment Post Activity Pain Same as Prior to Activity;Nurse Notified;0   Prior Living Situation   Housing / Facility Other (Comments)  (reports that she is a vagabond )   Lives with - Patient's Self Care Capacity Spouse   Prior Level Of Function   Communication Unknown   Swallow Within Functional Limits   Dentition Poor Quality   (missing ~6 back teeth)   Dentures None   Hearing Within Functional Limits for Evaluation   Vision Within Functional Limits for Evaluation   Patient's Primary Language English   Occupation (Pre-Hospital Vocational) 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)   Oral Motor Eval    Is Patient Able to Complete Oral Motor Eval Yes, Within Normal Limits   Laryngeal Function   Voice Quality Minimal  (slightly hoarse; pt reports not baseline)   Volutional Cough Not Tested   Excursion Upon Swallow   (did not palpate)   Oral Food Presentation    Ice Chips Within Functional Limits  (x2 single ice chips; refused further trials)   Single Swallow Mildly Thick (2) - (Nectar Thick)  Within Functional Limits  (1/3 teaspoon MTL x1; refused further trials)   Liquidised (3)   (declined)   Pureed (4)   (declined)   Dysphagia Strategies / Recommendations   Strategies / Interventions Recommended (Yes / No) Yes   Compensatory Strategies To Be Assessed   Diet / Liquid Recommendation NPO;Pre-Feeding Trials with SLP Only   Medication Administration  Via Gastric Tube   Therapy Interventions Dysphagia Therapy By Speech Language Pathologist;Oral Motor Exercises;Pharyngeal / Laryngeal Exercises   Short Term Goals   Short Term Goal # 1 Pt will consume prefeeding trials with no overt s/sx of aspiration

## 2020-08-03 NOTE — CARE PLAN
Problem: Safety  Goal: Will remain free from injury  Note: Pt room near nursing station and bedalarm activated to ensure pt safety.     Problem: Skin Integrity  Goal: Risk for impaired skin integrity will decrease  Note: Pt turned every 2hrs and pressure points floated using pillows.

## 2020-08-03 NOTE — PROGRESS NOTES
"    DATE: 8/3/2020    Post Operative Day  3 Open right hemicolectomy with drain placement and right retroperitoneum.    Interval Events:    Off all pressors this evening  Extubated but tenuous respiratory status  Zosyn D#4    PHYSICAL EXAMINATION:  Vital Signs: /63   Pulse 92   Temp 36.1 °C (96.9 °F) (Temporal)   Resp 13   Ht 1.753 m (5' 9\")   Wt 120.8 kg (266 lb 5.1 oz)   SpO2 97%     Abdomen soft previna in place drain with serosanguineous output    Laboratory Values:   Recent Labs     08/01/20  0500 08/02/20  0545 08/03/20  0350   WBC 17.6* 13.2* 18.3*   RBC 3.64* 3.39* 3.36*   HEMOGLOBIN 11.9* 11.2* 11.2*   HEMATOCRIT 38.2 35.8* 35.7*   .9* 105.6* 106.3*   MCH 32.7 33.0 33.3*   MCHC 31.2* 31.3* 31.4*   RDW 60.5* 60.7* 60.5*   PLATELETCT 424 399 436   MPV 10.0 10.4 9.8     Recent Labs     08/01/20  0500 08/02/20  0545 08/03/20  0350   SODIUM 135 136 141   POTASSIUM 3.4* 4.2 3.9   CHLORIDE 100 104 105   CO2 19* 18* 21   GLUCOSE 137* 135* 125*   BUN 54* 47* 49*   CREATININE 1.70* 1.36 1.67*   CALCIUM 8.0* 8.0* 8.1*     Recent Labs     07/31/20  0955 08/01/20  0500 08/02/20  0545 08/03/20  0350   ASTSGOT 155* 171* 90* 85*   ALTSGPT 53* 80* 56* 52*   TBILIRUBIN 0.8 2.0* 1.9* 2.0*   ALKPHOSPHAT 182* 405* 382* 410*   GLOBULIN 3.8* 3.2 3.2 3.1   INR 1.81*  --   --   --      Recent Labs     07/31/20  0955   INR 1.81*        Imaging:   DX-CHEST-LIMITED (1 VIEW)   Final Result         No significant interval change.      DX-CHEST-PORTABLE (1 VIEW)   Final Result         1.  Pulmonary edema and/or infiltrates are identified, which are stable since the prior exam.   2.  Cardiomegaly   3.  Atherosclerosis      DX-CHEST-FOR LINE PLACEMENT Perform procedure in: Patient's Room   Final Result         A right central venous catheter is seen.  The tip projects appropriately over the expected area of the superior vena cava.      Endotracheal tube with tip projecting over the mid thoracic trachea.      Gastric " drainage tube courses below diaphragm, tip is not seen.      DX-CHEST-PORTABLE (1 VIEW)   Final Result      Stable cardiac silhouette enlargement without consolidation identified      CT-ABDOMEN-PELVIS WITH   Final Result         1.  Extensive fluid/phlegmon and free intraperitoneal air within the right lower quadrant. The appendix is not visualized. Findings likely related to ruptured appendicitis. Differential considerations would include perforated cecal diverticulitis or    typhlitis. Free air and inflammatory changes extends to the right mid abdomen peripherally. Surgical consultation is recommended.   2.  Colonic diverticulosis.   3.  Severe atherosclerosis.   4.  Cardiomegaly.   5.  Mild hepatomegaly and mild nodularity of the liver margin suggesting morphologic changes of chronic liver disease.   6.  Mild biliary dilatation in this postcholecystectomy patient.                   ASSESSMENT AND PLAN:     Status post open right hemicolectomy for suspected perforated appendicitis     Can advance tube feeds via NG tube and give oral medication at discretion of critical care team.  High risk for deterioration     Appreciate ICU and consulting physician care.       ____________________________________     Karthikeyan Llanos M.D.    DD: 8/3/2020  9:09 AM

## 2020-08-04 ENCOUNTER — ANESTHESIA (OUTPATIENT)
Dept: SURGERY | Facility: MEDICAL CENTER | Age: 75
DRG: 853 | End: 2020-08-04
Payer: MEDICARE

## 2020-08-04 ENCOUNTER — APPOINTMENT (OUTPATIENT)
Dept: RADIOLOGY | Facility: MEDICAL CENTER | Age: 75
DRG: 853 | End: 2020-08-04
Attending: SURGERY
Payer: MEDICARE

## 2020-08-04 ENCOUNTER — ANESTHESIA EVENT (OUTPATIENT)
Dept: SURGERY | Facility: MEDICAL CENTER | Age: 75
DRG: 853 | End: 2020-08-04
Payer: MEDICARE

## 2020-08-04 LAB
ABO GROUP BLD: NORMAL
ACTION RANGE TRIGGERED IACRT: NO
ACTION RANGE TRIGGERED IACRT: NO
ALBUMIN SERPL BCP-MCNC: 1.7 G/DL (ref 3.2–4.9)
ALBUMIN/GLOB SERPL: 0.5 G/DL
ALP SERPL-CCNC: 314 U/L (ref 30–99)
ALT SERPL-CCNC: 41 U/L (ref 2–50)
ANION GAP SERPL CALC-SCNC: 10 MMOL/L (ref 7–16)
ANION GAP SERPL CALC-SCNC: 18 MMOL/L (ref 7–16)
ANISOCYTOSIS BLD QL SMEAR: ABNORMAL
AST SERPL-CCNC: 60 U/L (ref 12–45)
BARCODED ABORH UBTYP: 6200
BARCODED ABORH UBTYP: 6200
BARCODED PRD CODE UBPRD: NORMAL
BARCODED PRD CODE UBPRD: NORMAL
BARCODED UNIT NUM UBUNT: NORMAL
BARCODED UNIT NUM UBUNT: NORMAL
BASE EXCESS BLDA CALC-SCNC: -8 MMOL/L (ref -4–3)
BASE EXCESS BLDA CALC-SCNC: -9 MMOL/L (ref -4–3)
BASOPHILS # BLD AUTO: 0 % (ref 0–1.8)
BASOPHILS # BLD AUTO: 0.3 % (ref 0–1.8)
BASOPHILS # BLD AUTO: 0.4 % (ref 0–1.8)
BASOPHILS # BLD: 0 K/UL (ref 0–0.12)
BASOPHILS # BLD: 0.06 K/UL (ref 0–0.12)
BASOPHILS # BLD: 0.07 K/UL (ref 0–0.12)
BILIRUB SERPL-MCNC: 0.9 MG/DL (ref 0.1–1.5)
BLD GP AB SCN SERPL QL: NORMAL
BODY TEMPERATURE: ABNORMAL DEGREES
BODY TEMPERATURE: ABNORMAL DEGREES
BUN SERPL-MCNC: 51 MG/DL (ref 8–22)
BUN SERPL-MCNC: 70 MG/DL (ref 8–22)
BURR CELLS BLD QL SMEAR: NORMAL
CALCIUM SERPL-MCNC: 7.8 MG/DL (ref 8.5–10.5)
CALCIUM SERPL-MCNC: 8 MG/DL (ref 8.5–10.5)
CFT BLD TEG: 6 MIN (ref 5–10)
CFT P HPASE BLD TEG: 5.8 MIN (ref 5–10)
CHLORIDE SERPL-SCNC: 106 MMOL/L (ref 96–112)
CHLORIDE SERPL-SCNC: 110 MMOL/L (ref 96–112)
CLOT ANGLE BLD TEG: 78 DEGREES (ref 53–72)
CLOT ANGLE P HPASE BLD TEG: 76.7 DEGREES (ref 53–72)
CLOT INIT P HPASE BLD TEG: 1 MIN (ref 1–3)
CLOT LYSIS 30M P MA LENFR BLD TEG: 0 % (ref 0–8)
CLOT LYSIS 30M P MA LENFR BLD TEG: 0.1 % (ref 0–8)
CO2 BLDA-SCNC: 17 MMOL/L (ref 20–33)
CO2 BLDA-SCNC: 18 MMOL/L (ref 20–33)
CO2 SERPL-SCNC: 16 MMOL/L (ref 20–33)
CO2 SERPL-SCNC: 23 MMOL/L (ref 20–33)
COMPONENT R 8504R: NORMAL
COMPONENT R 8504R: NORMAL
CREAT SERPL-MCNC: 1.3 MG/DL (ref 0.5–1.4)
CREAT SERPL-MCNC: 1.72 MG/DL (ref 0.5–1.4)
CRP SERPL HS-MCNC: 14.18 MG/DL (ref 0–0.75)
CT.EXTRINSIC BLD ROTEM: 0.9 MIN (ref 1–3)
DIGOXIN SERPL-MCNC: 1 NG/ML (ref 0.8–2)
EOSINOPHIL # BLD AUTO: 0 K/UL (ref 0–0.51)
EOSINOPHIL NFR BLD: 0 % (ref 0–6.9)
ERYTHROCYTE [DISTWIDTH] IN BLOOD BY AUTOMATED COUNT: 59.1 FL (ref 35.9–50)
ERYTHROCYTE [DISTWIDTH] IN BLOOD BY AUTOMATED COUNT: 62.4 FL (ref 35.9–50)
ERYTHROCYTE [DISTWIDTH] IN BLOOD BY AUTOMATED COUNT: 69.6 FL (ref 35.9–50)
GIANT PLATELETS BLD QL SMEAR: NORMAL
GLOBULIN SER CALC-MCNC: 3.1 G/DL (ref 1.9–3.5)
GLUCOSE BLD-MCNC: 117 MG/DL (ref 65–99)
GLUCOSE BLD-MCNC: 142 MG/DL (ref 65–99)
GLUCOSE BLD-MCNC: 160 MG/DL (ref 65–99)
GLUCOSE BLD-MCNC: 191 MG/DL (ref 65–99)
GLUCOSE SERPL-MCNC: 161 MG/DL (ref 65–99)
GLUCOSE SERPL-MCNC: 168 MG/DL (ref 65–99)
HCO3 BLDA-SCNC: 15.8 MMOL/L (ref 17–25)
HCO3 BLDA-SCNC: 17.1 MMOL/L (ref 17–25)
HCT VFR BLD AUTO: 26.6 % (ref 37–47)
HCT VFR BLD AUTO: 28.8 % (ref 37–47)
HCT VFR BLD AUTO: 31.5 % (ref 37–47)
HGB BLD-MCNC: 8.1 G/DL (ref 12–16)
HGB BLD-MCNC: 8.8 G/DL (ref 12–16)
HGB BLD-MCNC: 9.9 G/DL (ref 12–16)
HOROWITZ INDEX BLDA+IHG-RTO: 1067 MM[HG]
HOROWITZ INDEX BLDA+IHG-RTO: 297 MM[HG]
IMM GRANULOCYTES # BLD AUTO: 0.4 K/UL (ref 0–0.11)
IMM GRANULOCYTES # BLD AUTO: 0.95 K/UL (ref 0–0.11)
IMM GRANULOCYTES NFR BLD AUTO: 2.1 % (ref 0–0.9)
IMM GRANULOCYTES NFR BLD AUTO: 4 % (ref 0–0.9)
INR PPP: 1.42 (ref 0.87–1.13)
INST. QUALIFIED PATIENT IIQPT: YES
INST. QUALIFIED PATIENT IIQPT: YES
LACTATE BLD-SCNC: 4.7 MMOL/L (ref 0.5–2)
LYMPHOCYTES # BLD AUTO: 0.49 K/UL (ref 1–4.8)
LYMPHOCYTES # BLD AUTO: 0.6 K/UL (ref 1–4.8)
LYMPHOCYTES # BLD AUTO: 1.01 K/UL (ref 1–4.8)
LYMPHOCYTES NFR BLD: 1.7 % (ref 22–41)
LYMPHOCYTES NFR BLD: 3.1 % (ref 22–41)
LYMPHOCYTES NFR BLD: 4.2 % (ref 22–41)
MACROCYTES BLD QL SMEAR: ABNORMAL
MANUAL DIFF BLD: NORMAL
MCF BLD TEG: 82.8 MM (ref 50–70)
MCF P HPASE BLD TEG: 75.9 MM (ref 50–70)
MCH RBC QN AUTO: 32.7 PG (ref 27–33)
MCH RBC QN AUTO: 33.1 PG (ref 27–33)
MCH RBC QN AUTO: 33.1 PG (ref 27–33)
MCHC RBC AUTO-ENTMCNC: 30.5 G/DL (ref 33.6–35)
MCHC RBC AUTO-ENTMCNC: 30.6 G/DL (ref 33.6–35)
MCHC RBC AUTO-ENTMCNC: 31.4 G/DL (ref 33.6–35)
MCV RBC AUTO: 104 FL (ref 81.4–97.8)
MCV RBC AUTO: 108.3 FL (ref 81.4–97.8)
MCV RBC AUTO: 108.6 FL (ref 81.4–97.8)
MONOCYTES # BLD AUTO: 0.79 K/UL (ref 0–0.85)
MONOCYTES # BLD AUTO: 1.75 K/UL (ref 0–0.85)
MONOCYTES # BLD AUTO: 1.93 K/UL (ref 0–0.85)
MONOCYTES NFR BLD AUTO: 4.1 % (ref 0–13.4)
MONOCYTES NFR BLD AUTO: 6.1 % (ref 0–13.4)
MONOCYTES NFR BLD AUTO: 8.1 % (ref 0–13.4)
MORPHOLOGY BLD-IMP: NORMAL
MYELOCYTES NFR BLD MANUAL: 0.9 %
NEUTROPHILS # BLD AUTO: 17.62 K/UL (ref 2–7.15)
NEUTROPHILS # BLD AUTO: 19.84 K/UL (ref 2–7.15)
NEUTROPHILS # BLD AUTO: 26.2 K/UL (ref 2–7.15)
NEUTROPHILS NFR BLD: 83.4 % (ref 44–72)
NEUTROPHILS NFR BLD: 89.6 % (ref 44–72)
NEUTROPHILS NFR BLD: 90.3 % (ref 44–72)
NEUTS BAND NFR BLD MANUAL: 1.7 % (ref 0–10)
NRBC # BLD AUTO: 0 K/UL
NRBC # BLD AUTO: 0 K/UL
NRBC # BLD AUTO: 0.03 K/UL
NRBC BLD-RTO: 0 /100 WBC
NRBC BLD-RTO: 0 /100 WBC
NRBC BLD-RTO: 0.1 /100 WBC
O2/TOTAL GAS SETTING VFR VENT: 33 %
O2/TOTAL GAS SETTING VFR VENT: 6 %
OVALOCYTES BLD QL SMEAR: NORMAL
PA AA BLD-ACNC: 53 %
PA ADP BLD-ACNC: 50.6 %
PCO2 BLDA: 27.4 MMHG (ref 26–37)
PCO2 BLDA: 32.2 MMHG (ref 26–37)
PCO2 TEMP ADJ BLDA: 28.3 MMHG (ref 26–37)
PCO2 TEMP ADJ BLDA: 31.9 MMHG (ref 26–37)
PH BLDA: 7.33 [PH] (ref 7.4–7.5)
PH BLDA: 7.37 [PH] (ref 7.4–7.5)
PH TEMP ADJ BLDA: 7.34 [PH] (ref 7.4–7.5)
PH TEMP ADJ BLDA: 7.36 [PH] (ref 7.4–7.5)
PLATELET # BLD AUTO: 421 K/UL (ref 164–446)
PLATELET # BLD AUTO: 474 K/UL (ref 164–446)
PLATELET # BLD AUTO: 580 K/UL (ref 164–446)
PLATELET BLD QL SMEAR: NORMAL
PMV BLD AUTO: 9.5 FL (ref 9–12.9)
PMV BLD AUTO: 9.6 FL (ref 9–12.9)
PMV BLD AUTO: 9.7 FL (ref 9–12.9)
PO2 BLDA: 64 MMHG (ref 64–87)
PO2 BLDA: 98 MMHG (ref 64–87)
PO2 TEMP ADJ BLDA: 68 MMHG (ref 64–87)
PO2 TEMP ADJ BLDA: 97 MMHG (ref 64–87)
POIKILOCYTOSIS BLD QL SMEAR: NORMAL
POLYCHROMASIA BLD QL SMEAR: NORMAL
POTASSIUM SERPL-SCNC: 4 MMOL/L (ref 3.6–5.5)
POTASSIUM SERPL-SCNC: 4.5 MMOL/L (ref 3.6–5.5)
PRODUCT TYPE UPROD: NORMAL
PRODUCT TYPE UPROD: NORMAL
PROT SERPL-MCNC: 4.8 G/DL (ref 6–8.2)
PROTHROMBIN TIME: 17.8 SEC (ref 12–14.6)
RBC # BLD AUTO: 2.45 M/UL (ref 4.2–5.4)
RBC # BLD AUTO: 2.66 M/UL (ref 4.2–5.4)
RBC # BLD AUTO: 3.03 M/UL (ref 4.2–5.4)
RBC BLD AUTO: PRESENT
RH BLD: NORMAL
SAO2 % BLDA: 92 % (ref 93–99)
SAO2 % BLDA: 97 % (ref 93–99)
SODIUM SERPL-SCNC: 139 MMOL/L (ref 135–145)
SODIUM SERPL-SCNC: 144 MMOL/L (ref 135–145)
SPECIMEN DRAWN FROM PATIENT: ABNORMAL
SPECIMEN DRAWN FROM PATIENT: ABNORMAL
TEG ALGORITHM TGALG: ABNORMAL
UNIT STATUS USTAT: NORMAL
UNIT STATUS USTAT: NORMAL
WBC # BLD AUTO: 19.5 K/UL (ref 4.8–10.8)
WBC # BLD AUTO: 23.8 K/UL (ref 4.8–10.8)
WBC # BLD AUTO: 28.7 K/UL (ref 4.8–10.8)

## 2020-08-04 PROCEDURE — 51798 US URINE CAPACITY MEASURE: CPT

## 2020-08-04 PROCEDURE — 85007 BL SMEAR W/DIFF WBC COUNT: CPT

## 2020-08-04 PROCEDURE — 501452 HCHG STAPLES, GIA MULTIFIRE 60/80: Performed by: SURGERY

## 2020-08-04 PROCEDURE — 86900 BLOOD TYPING SEROLOGIC ABO: CPT

## 2020-08-04 PROCEDURE — 700102 HCHG RX REV CODE 250 W/ 637 OVERRIDE(OP): Performed by: SURGERY

## 2020-08-04 PROCEDURE — 160031 HCHG SURGERY MINUTES - 1ST 30 MINS LEVEL 5: Performed by: SURGERY

## 2020-08-04 PROCEDURE — 82962 GLUCOSE BLOOD TEST: CPT

## 2020-08-04 PROCEDURE — 86923 COMPATIBILITY TEST ELECTRIC: CPT

## 2020-08-04 PROCEDURE — A9270 NON-COVERED ITEM OR SERVICE: HCPCS | Performed by: SURGERY

## 2020-08-04 PROCEDURE — 99291 CRITICAL CARE FIRST HOUR: CPT | Performed by: SURGERY

## 2020-08-04 PROCEDURE — 770022 HCHG ROOM/CARE - ICU (200)

## 2020-08-04 PROCEDURE — 36430 TRANSFUSION BLD/BLD COMPNT: CPT

## 2020-08-04 PROCEDURE — 86901 BLOOD TYPING SEROLOGIC RH(D): CPT

## 2020-08-04 PROCEDURE — 160042 HCHG SURGERY MINUTES - EA ADDL 1 MIN LEVEL 5: Performed by: SURGERY

## 2020-08-04 PROCEDURE — 86850 RBC ANTIBODY SCREEN: CPT

## 2020-08-04 PROCEDURE — 700101 HCHG RX REV CODE 250: Performed by: STUDENT IN AN ORGANIZED HEALTH CARE EDUCATION/TRAINING PROGRAM

## 2020-08-04 PROCEDURE — 85025 COMPLETE CBC W/AUTO DIFF WBC: CPT

## 2020-08-04 PROCEDURE — 36600 WITHDRAWAL OF ARTERIAL BLOOD: CPT

## 2020-08-04 PROCEDURE — 92526 ORAL FUNCTION THERAPY: CPT

## 2020-08-04 PROCEDURE — 502704 HCHG DEVICE, LIGASURE IMPACT: Performed by: SURGERY

## 2020-08-04 PROCEDURE — 85576 BLOOD PLATELET AGGREGATION: CPT | Mod: 91

## 2020-08-04 PROCEDURE — 80048 BASIC METABOLIC PNL TOTAL CA: CPT

## 2020-08-04 PROCEDURE — 30233N1 TRANSFUSION OF NONAUTOLOGOUS RED BLOOD CELLS INTO PERIPHERAL VEIN, PERCUTANEOUS APPROACH: ICD-10-PCS | Performed by: SURGERY

## 2020-08-04 PROCEDURE — 160048 HCHG OR STATISTICAL LEVEL 1-5: Performed by: SURGERY

## 2020-08-04 PROCEDURE — 85027 COMPLETE CBC AUTOMATED: CPT

## 2020-08-04 PROCEDURE — 700105 HCHG RX REV CODE 258: Performed by: SURGERY

## 2020-08-04 PROCEDURE — 700111 HCHG RX REV CODE 636 W/ 250 OVERRIDE (IP): Performed by: STUDENT IN AN ORGANIZED HEALTH CARE EDUCATION/TRAINING PROGRAM

## 2020-08-04 PROCEDURE — 85610 PROTHROMBIN TIME: CPT

## 2020-08-04 PROCEDURE — 80162 ASSAY OF DIGOXIN TOTAL: CPT

## 2020-08-04 PROCEDURE — 86140 C-REACTIVE PROTEIN: CPT

## 2020-08-04 PROCEDURE — 501445 HCHG STAPLER, SKIN DISP: Performed by: SURGERY

## 2020-08-04 PROCEDURE — 80053 COMPREHEN METABOLIC PANEL: CPT

## 2020-08-04 PROCEDURE — 83605 ASSAY OF LACTIC ACID: CPT

## 2020-08-04 PROCEDURE — 700101 HCHG RX REV CODE 250: Performed by: SURGERY

## 2020-08-04 PROCEDURE — 700111 HCHG RX REV CODE 636 W/ 250 OVERRIDE (IP): Performed by: SURGERY

## 2020-08-04 PROCEDURE — 501433 HCHG STAPLER, GIA MULTIFIRE 60/80: Performed by: SURGERY

## 2020-08-04 PROCEDURE — 160009 HCHG ANES TIME/MIN: Performed by: SURGERY

## 2020-08-04 PROCEDURE — 85384 FIBRINOGEN ACTIVITY: CPT | Mod: 91

## 2020-08-04 PROCEDURE — 501838 HCHG SUTURE GENERAL: Performed by: SURGERY

## 2020-08-04 PROCEDURE — 82803 BLOOD GASES ANY COMBINATION: CPT

## 2020-08-04 PROCEDURE — P9016 RBC LEUKOCYTES REDUCED: HCPCS

## 2020-08-04 PROCEDURE — 85347 COAGULATION TIME ACTIVATED: CPT

## 2020-08-04 RX ADMIN — OXYCODONE 5 MG: 5 TABLET ORAL at 14:40

## 2020-08-04 RX ADMIN — CEFOTETAN DISODIUM 2 G: 2 INJECTION, POWDER, FOR SOLUTION INTRAMUSCULAR; INTRAVENOUS at 23:58

## 2020-08-04 RX ADMIN — FAMOTIDINE 20 MG: 20 TABLET, FILM COATED ORAL at 05:23

## 2020-08-04 RX ADMIN — METOPROLOL TARTRATE 10 MG: 5 INJECTION, SOLUTION INTRAVENOUS at 18:31

## 2020-08-04 RX ADMIN — ENOXAPARIN SODIUM 40 MG: 40 INJECTION SUBCUTANEOUS at 05:22

## 2020-08-04 RX ADMIN — HYDROCORTISONE SODIUM SUCCINATE 50 MG: 100 INJECTION, POWDER, FOR SOLUTION INTRAMUSCULAR; INTRAVENOUS at 05:23

## 2020-08-04 RX ADMIN — HYDROMORPHONE HYDROCHLORIDE 0.5 MG: 1 INJECTION, SOLUTION INTRAMUSCULAR; INTRAVENOUS; SUBCUTANEOUS at 17:08

## 2020-08-04 RX ADMIN — PROPOFOL 120 MG: 10 INJECTION, EMULSION INTRAVENOUS at 23:50

## 2020-08-04 RX ADMIN — ACETAMINOPHEN 650 MG: 325 TABLET, FILM COATED ORAL at 12:16

## 2020-08-04 RX ADMIN — HYDROCORTISONE SODIUM SUCCINATE 50 MG: 100 INJECTION, POWDER, FOR SOLUTION INTRAMUSCULAR; INTRAVENOUS at 18:31

## 2020-08-04 RX ADMIN — DIGOXIN 62.5 MCG: 125 TABLET ORAL at 18:37

## 2020-08-04 RX ADMIN — PIPERACILLIN SODIUM, TAZOBACTAM SODIUM 4.5 G: 4; .5 INJECTION, POWDER, LYOPHILIZED, FOR SOLUTION INTRAVENOUS at 05:24

## 2020-08-04 RX ADMIN — HYDROMORPHONE HYDROCHLORIDE 0.5 MG: 1 INJECTION, SOLUTION INTRAMUSCULAR; INTRAVENOUS; SUBCUTANEOUS at 07:39

## 2020-08-04 RX ADMIN — FENTANYL CITRATE 100 MCG: 50 INJECTION, SOLUTION INTRAMUSCULAR; INTRAVENOUS at 23:49

## 2020-08-04 RX ADMIN — PIPERACILLIN SODIUM, TAZOBACTAM SODIUM 4.5 G: 4; .5 INJECTION, POWDER, LYOPHILIZED, FOR SOLUTION INTRAVENOUS at 12:17

## 2020-08-04 RX ADMIN — INSULIN HUMAN 1 UNITS: 100 INJECTION, SOLUTION PARENTERAL at 18:18

## 2020-08-04 RX ADMIN — HYDROCORTISONE SODIUM SUCCINATE 50 MG: 100 INJECTION, POWDER, FOR SOLUTION INTRAMUSCULAR; INTRAVENOUS at 00:44

## 2020-08-04 RX ADMIN — METOPROLOL TARTRATE 10 MG: 5 INJECTION, SOLUTION INTRAVENOUS at 01:04

## 2020-08-04 RX ADMIN — METOPROLOL TARTRATE 10 MG: 5 INJECTION, SOLUTION INTRAVENOUS at 05:22

## 2020-08-04 RX ADMIN — NYSTATIN: 100000 POWDER TOPICAL at 05:24

## 2020-08-04 RX ADMIN — NYSTATIN: 100000 POWDER TOPICAL at 18:18

## 2020-08-04 RX ADMIN — HYDROMORPHONE HYDROCHLORIDE 0.5 MG: 1 INJECTION, SOLUTION INTRAMUSCULAR; INTRAVENOUS; SUBCUTANEOUS at 15:53

## 2020-08-04 RX ADMIN — OXYCODONE 5 MG: 5 TABLET ORAL at 07:22

## 2020-08-04 RX ADMIN — ROCURONIUM BROMIDE 100 MG: 10 INJECTION, SOLUTION INTRAVENOUS at 23:50

## 2020-08-04 RX ADMIN — HYDROCORTISONE SODIUM SUCCINATE 50 MG: 100 INJECTION, POWDER, FOR SOLUTION INTRAMUSCULAR; INTRAVENOUS at 12:16

## 2020-08-04 RX ADMIN — METOPROLOL TARTRATE 10 MG: 5 INJECTION, SOLUTION INTRAVENOUS at 12:16

## 2020-08-04 RX ADMIN — HYDROMORPHONE HYDROCHLORIDE 0.5 MG: 1 INJECTION, SOLUTION INTRAMUSCULAR; INTRAVENOUS; SUBCUTANEOUS at 22:15

## 2020-08-04 RX ADMIN — PIPERACILLIN SODIUM, TAZOBACTAM SODIUM 4.5 G: 4; .5 INJECTION, POWDER, LYOPHILIZED, FOR SOLUTION INTRAVENOUS at 21:28

## 2020-08-04 RX ADMIN — HYDROMORPHONE HYDROCHLORIDE 0.5 MG: 1 INJECTION, SOLUTION INTRAMUSCULAR; INTRAVENOUS; SUBCUTANEOUS at 19:39

## 2020-08-04 NOTE — THERAPY
"Speech Language Pathology  Daily Treatment     Patient Name: Yvette Dailey  Age:  74 y.o., Sex:  female  Medical Record #: [id number]  Today's Date: 8/4/2020     Precautions  Precautions: (P) Fall Risk, Swallow Precautions ( See Comments)    Assessment    Pt was seen as a high f/u dysphagia tx today. Pt was moaning out restless in bed, though unable to rate or report where/if she felt any pain. Pt was agreeable to sit upright and try to eat/drink a little. Per RN, she has barely eaten anything today and had just received pain meds. Pt was given MTL via tsp/cup (~1.5 oz) with longer breaks in between sips as pt continued to moan and need repositioning. Anterior loss of bolus occurred x1 as pt had a delayed oral response and moaned. Otherwise, wwallow response was generally timely and hyolayrngeal excursion was palpated as complete. No overt s/sx of aspiration occurred with PO intake. Pt is at risk of not meeting nutritional needs via PO route alone. If PO intake continues to be poor, the medical team may want to consider a non-oral supplemental source of nutrition/hydration. Ok to continue Liquidised Diet (LQ3) with Mildly Thick Liquids (MT2) (clears per MD) with STRICT 1:1 feeding. Please hold PO with any overt s/sx of aspiration/change in status.     Plan    1. Ok to continue Liquidised Diet (LQ3) with Mildly Thick Liquids (MT2) (clears per MD) with STRICT 1:1 feeding. Crush meds in puree.   2. Hold PO with any overt s/sx of aspiration/change in status.   3. Consider a non-oral supplemental source of nutrition/hydration if PO intake does not improve.       Continue current treatment plan.    Discharge recommendations:  Recommend inpatient transitional care services for continued speech therapy services.        Subjective    \"Oh mama.\"   \"I can't do this.\"  \"Haijade singh.\"     Objective       08/04/20 4787   Dysphagia    Positioning / Behavior Modification Modulate Rate or Bite Size;Self Monitoring   Other " Treatments MTL via tsp/cup   Diet / Liquid Recommendation Liquidised (3) - (Nectar Thick Full Liquid);Mildly Thick (2) - (Nectar Thick)  (clears per MD)   Recommended Route of Medication Administration   Medication Administration  Crush all Medications in Puree   Short Term Goals   Short Term Goal # 1 B  Pt will consume an LQ3/MT2 diet with no overt s/sx of aspiration  (Progressing slower than expected)

## 2020-08-04 NOTE — RESPIRATORY CARE
RT attempted to perform IS/PEP with pt.  Pt still refuses, even with education on importance of therapy.  Explained that therapy would improve pt's breathing status, but pt refused multiple times.

## 2020-08-04 NOTE — PROGRESS NOTES
Trauma / Surgical Daily Progress Note    Date of Service  8/4/2020    Chief Complaint  74 y.o. female admitted 7/31/2020 with Perforation bowel (HCC)    Interval Events  High oxygen requirements and poor reserve.  Moving bowels  Intermittent delirium  Vasopressors weaned off with addition of steroids    Review of Systems  Review of Systems       Vital Signs for last 24 hours  Pulse:  [] 126  Resp:  [6-31] 13  BP: ()/() 156/70  SpO2:  [89 %-99 %] 97 %    Hemodynamic parameters for last 24 hours  CVP:  [166 MM HG] 166 MM HG    Respiratory Data     Respiration: 13, Pulse Oximetry: 97 %     Work Of Breathing / Effort: Mild;Moderate  RUL Breath Sounds: Clear, RML Breath Sounds: Diminished, RLL Breath Sounds: Diminished, ELISSA Breath Sounds: Clear, LLL Breath Sounds: Diminished    Physical Exam  Physical Exam  Vitals signs and nursing note reviewed.   Constitutional:       Comments: Appears chronically ill   HENT:      Head: Normocephalic and atraumatic.      Right Ear: External ear normal.      Left Ear: External ear normal.      Nose: Nose normal.      Mouth/Throat:      Mouth: Mucous membranes are moist.      Pharynx: Oropharynx is clear.   Eyes:      Extraocular Movements: Extraocular movements intact.      Pupils: Pupils are equal, round, and reactive to light.   Neck:      Musculoskeletal: Normal range of motion and neck supple.   Cardiovascular:      Rate and Rhythm: Normal rate and regular rhythm.      Pulses: Normal pulses.      Heart sounds: Normal heart sounds.   Pulmonary:      Effort: Pulmonary effort is normal.      Breath sounds: Normal breath sounds.   Abdominal:      General: Abdomen is flat.      Palpations: Abdomen is soft.   Genitourinary:     Comments: Orellana in place  Musculoskeletal:      Right lower leg: Edema present.      Left lower leg: Edema present.   Skin:     General: Skin is warm and dry.      Capillary Refill: Capillary refill takes less than 2 seconds.   Neurological:       Comments: Follows commands    Psychiatric:      Comments: A bit paranoid, impulsive         Laboratory  Recent Results (from the past 24 hour(s))   ACCU-CHEK GLUCOSE    Collection Time: 08/03/20  5:07 PM   Result Value Ref Range    Glucose - Accu-Ck 141 (H) 65 - 99 mg/dL   ACCU-CHEK GLUCOSE    Collection Time: 08/04/20 12:42 AM   Result Value Ref Range    Glucose - Accu-Ck 117 (H) 65 - 99 mg/dL   Comp Metabolic Panel    Collection Time: 08/04/20  5:05 AM   Result Value Ref Range    Sodium 139 135 - 145 mmol/L    Potassium 4.0 3.6 - 5.5 mmol/L    Chloride 106 96 - 112 mmol/L    Co2 23 20 - 33 mmol/L    Anion Gap 10.0 7.0 - 16.0    Glucose 161 (H) 65 - 99 mg/dL    Bun 51 (H) 8 - 22 mg/dL    Creatinine 1.30 0.50 - 1.40 mg/dL    Calcium 8.0 (L) 8.5 - 10.5 mg/dL    AST(SGOT) 60 (H) 12 - 45 U/L    ALT(SGPT) 41 2 - 50 U/L    Alkaline Phosphatase 314 (H) 30 - 99 U/L    Total Bilirubin 0.9 0.1 - 1.5 mg/dL    Albumin 1.7 (L) 3.2 - 4.9 g/dL    Total Protein 4.8 (L) 6.0 - 8.2 g/dL    Globulin 3.1 1.9 - 3.5 g/dL    A-G Ratio 0.5 g/dL   CBC WITH DIFFERENTIAL    Collection Time: 08/04/20  5:05 AM   Result Value Ref Range    WBC 19.5 (H) 4.8 - 10.8 K/uL    RBC 3.03 (L) 4.20 - 5.40 M/uL    Hemoglobin 9.9 (L) 12.0 - 16.0 g/dL    Hematocrit 31.5 (L) 37.0 - 47.0 %    .0 (H) 81.4 - 97.8 fL    MCH 32.7 27.0 - 33.0 pg    MCHC 31.4 (L) 33.6 - 35.0 g/dL    RDW 59.1 (H) 35.9 - 50.0 fL    Platelet Count 421 164 - 446 K/uL    MPV 9.5 9.0 - 12.9 fL    Neutrophils-Polys 90.30 (H) 44.00 - 72.00 %    Lymphocytes 3.10 (L) 22.00 - 41.00 %    Monocytes 4.10 0.00 - 13.40 %    Eosinophils 0.00 0.00 - 6.90 %    Basophils 0.40 0.00 - 1.80 %    Immature Granulocytes 2.10 (H) 0.00 - 0.90 %    Nucleated RBC 0.00 /100 WBC    Neutrophils (Absolute) 17.62 (H) 2.00 - 7.15 K/uL    Lymphs (Absolute) 0.60 (L) 1.00 - 4.80 K/uL    Monos (Absolute) 0.79 0.00 - 0.85 K/uL    Eos (Absolute) 0.00 0.00 - 0.51 K/uL    Baso (Absolute) 0.07 0.00 - 0.12 K/uL     Immature Granulocytes (abs) 0.40 (H) 0.00 - 0.11 K/uL    NRBC (Absolute) 0.00 K/uL   CRP QUANTITIVE (NON-CARDIAC)    Collection Time: 08/04/20  5:05 AM   Result Value Ref Range    Stat C-Reactive Protein 14.18 (H) 0.00 - 0.75 mg/dL   ESTIMATED GFR    Collection Time: 08/04/20  5:05 AM   Result Value Ref Range    GFR If  48 (A) >60 mL/min/1.73 m 2    GFR If Non African American 40 (A) >60 mL/min/1.73 m 2   ACCU-CHEK GLUCOSE    Collection Time: 08/04/20  6:27 AM   Result Value Ref Range    Glucose - Accu-Ck 142 (H) 65 - 99 mg/dL   ACCU-CHEK GLUCOSE    Collection Time: 08/04/20 12:24 PM   Result Value Ref Range    Glucose - Accu-Ck 160 (H) 65 - 99 mg/dL       Fluids    Intake/Output Summary (Last 24 hours) at 8/4/2020 1308  Last data filed at 8/4/2020 1200  Gross per 24 hour   Intake 2823.75 ml   Output 1330 ml   Net 1493.75 ml       Core Measures & Quality Metrics  Labs reviewed, Medications reviewed and Radiology images reviewed  Orellana catheter: Critically Ill - Requiring Accurate Measurement of Urinary Output  Central line in place: Vasopressors    DVT Prophylaxis: Enoxaparin (Lovenox)  DVT prophylaxis - mechanical: SCDs  Ulcer prophylaxis: Yes  Antibiotics: Treating active infection/contamination beyond 24 hours perioperative coverage      CAITLIN Score    ETOH Screening      Assessment/Plan  Septic shock (HCC)- (present on admission)  Assessment & Plan  7/31: high lactate, oliguric and hypotensive post op  8/1: lactate and urine output improved following 7 liter resuscitation. Levophed to support MAP.  8/2 - 8/3 weaning levophed. UOP  Adequate. Added steroids 8/3    Respiratory failure following trauma and surgery (HCC)- (present on admission)  Assessment & Plan  Full ventilatory support. Ensure optimal oxygenation and mitigate secondary injury.  Trauma respiratory protocol  8/2 extubated.   8/3 high risk for reintubation    Perforated appendicitis- (present on admission)  Assessment & Plan  7/31  perforated appendix with retrocolic abscess.  Small bowel and right colon resection.  RLQ drain.  8/2 zosyn day 3    [facility] [place] [facility]  Dr. Harris      Paroxysmal atrial fibrillation (CMS-HCC)- (present on admission)  Assessment & Plan  Chronic afib complicated by RVR  8/1 Digoxin load and metoprolol  8/3 digoxin level appropriate.  Hold NOAC.     Adrenal insufficiency (HCC)  Assessment & Plan  Failure to fully wean from vasopressors despite adequate resuscitation  8/3 empiric steroids started; anticipate rapid wean.    Acute renal insufficiency  Assessment & Plan  Admission Cr elevated associated with infection  Improved with resuscitation  Trend renal indices, avoid nephrotoxins    HTN (hypertension)- (present on admission)  Assessment & Plan  Takes multiple agents at home for hypertension  Held in acute setting with septic shock  Restart when appropriate    No contraindication to anticoagulation therapy  Assessment & Plan  8/1 Begin Lovenox    GLORIA (obstructive sleep apnea)- (present on admission)  Assessment & Plan  Refusing CPAP until she gets formal sleep study.     Plan:  Aggressive pulmonary toilette. Patient is at high risk for decompensation with the threat of imminent deterioration, life threatening deterioration, and loss of vital organ function.  Trend bp, steroid wean  Trend UOP and renal indices  Zosyn in place, trend WBC. AM CXR.   Nutritional support  DVT prophylaxis in place.     Discussed patient condition with RN, RT and Pharmacy.  The patient is/remains critically ill with respiratory failure, septic shock, delirium, adrenal insufficiency.    I provided the following critical care services: multiple high risk medication management, resuscitation.    Critical care time spent exclusive of procedures: 38 minutes.    Tony Newman MD  [phone]

## 2020-08-04 NOTE — PROGRESS NOTES
"    DATE: 8/4/2020    Post Operative Day 4 open right hemicolectomy with drain placement and right retroperitoneum.    Interval Events:    Sleepy but arousable  High oxygen requirements and poor reserve.  Vasopressors remain off with addition of steroids  Intermittent delirium  Poor po due to mental status  BM- loose dark    Surgery and course discussed with     PHYSICAL EXAMINATION:  Vital Signs: /70   Pulse (!) 126   Temp 36.1 °C (96.9 °F) (Temporal)   Resp 13   Ht 1.753 m (5' 9\")   Wt 120.8 kg (266 lb 5.1 oz)   SpO2 97%     Abdomen - incisional tenderness, moderate from right drain    ASSESSMENT AND PLAN:      Status post open right hemicolectomy for  perforated appendicitis with abscess     Tube feeds / po medication at discretion of critical care team.  Remains on IB abx  High risk for deterioration     Appreciate ICU and consulting physician care         ____________________________________     Karthikeyan Llanos M.D.    DD: 8/4/2020  1:51 PM    "

## 2020-08-04 NOTE — CARE PLAN
Problem: Communication  Goal: The ability to communicate needs accurately and effectively will improve  Outcome: PROGRESSING AS EXPECTED  Note: Updated patient and  on POC all questions. Paged dr. Harris to answered husbands questions regarding the surgery.      Problem: Safety  Goal: Will remain free from injury  Outcome: PROGRESSING AS EXPECTED     Problem: Venous Thromboembolism (VTW)/Deep Vein Thrombosis (DVT) Prevention:  Goal: Patient will participate in Venous Thrombosis (VTE)/Deep Vein Thrombosis (DVT)Prevention Measures  Outcome: PROGRESSING AS EXPECTED     Problem: Bowel/Gastric:  Goal: Normal bowel function is maintained or improved  Outcome: PROGRESSING SLOWER THAN EXPECTED  Note: Patient had 2 loose watery black bowel movements today.

## 2020-08-04 NOTE — CARE PLAN
Problem: Infection  Goal: Will remain free from infection  Outcome: PROGRESSING AS EXPECTED  Note: Assessing for signs and symptoms of infection, standard precautions in place, antibiotics administered per MAR      Problem: Communication  Goal: The ability to communicate needs accurately and effectively will improve  Outcome: PROGRESSING SLOWER THAN EXPECTED  Note: Patient educated on plan and goals of care and disease process. Education provided on medications, procedures, and equipment. Will continue to re-inforce education when required. All questions and concerns answered at this time.  Pt noncompliant with instruction and continues to refuse care at times.

## 2020-08-04 NOTE — THERAPY
Missed Therapy     Patient Name: Yvette Dailey  Age:  74 y.o., Sex:  female  Medical Record #: [id number]  Today's Date: 8/4/2020    Discussed missed therapy with TREVON Oneil.    Pt was not arousable to participate in dysphagia tx with breakfast meal this morning as she had just gotten pain medications. SLP will reattempt when pt is able to participate in dysphagia tx.

## 2020-08-04 NOTE — DIETARY
Nutrition services: Tube feeding discontinued and nectar thick clear liquid diet started.      Recommendations/Plan:   1. Advance from nectar thick clear liquid diet as appropriate  2. If pt is taking less than 50% of most meals consider ordering supplements with meals

## 2020-08-04 NOTE — PROGRESS NOTES
2-RN skin check performed with TREVON Sprague. Noted as follows:   -Midline abdominal incision (prevena sponge dressing in place) with adjacent RLQ HAYDEE drain, dressed with fenestrated gauze and tape. Abdomen otherwise intact, pink/pale.  -Scattered spots of dark/light purple bruising to BUE. Edema noted to BUE as well.  -Duskiness/mottling to BLE, appears reminiscent of PVD.  -Mildly calloused feet. Feet cleansed of dirt bilaterally to reveal no wounds.   -Small scab to R medial ankle.   -Redness beneath breasts (L > R), Nystatin powder application per MAR.      Elbows (pink and blanching), sacrum, ears intact.      No pressure concerns noted at this time.      Devices in place include: Bilateral SCD's, BP cuff, SpO2 finger probe, EKG leads and electrode stickers, Orellana catheter, CVC, PIV x1, HAYDEE (RLQ) x1, Prevena vac.      Interventions in place include: See those listed specifically above under areas of concern; additionally:  -Q2h turns + use of pillows to elevate all four extremities.  -Frequent checks beneath and around tubing/medical devices to ensure no prolonged or unnecessary skin contact.   -Periodic checks in at-risk areas for moisture (e.g. posterior perineum, beneath abd pannus, beneath breasts) for incontinence/breakdown.  -Discussion regarding mobility with pt and it's benefits where skin integrity and shifting off pressure points is concerned.

## 2020-08-04 NOTE — CARE PLAN
Problem: Nutritional:  Goal: Nutrition support tolerated and meeting greater than 85% of estimated needs  Outcome: MET     Tube feeding discontinued  Wallis thick clear liquids started

## 2020-08-05 ENCOUNTER — APPOINTMENT (OUTPATIENT)
Dept: RADIOLOGY | Facility: MEDICAL CENTER | Age: 75
DRG: 853 | End: 2020-08-05
Attending: SURGERY
Payer: MEDICARE

## 2020-08-05 PROBLEM — K63.1 LARGE BOWEL PERFORATION (HCC): Status: ACTIVE | Noted: 2020-08-05

## 2020-08-05 LAB
ACTION RANGE TRIGGERED IACRT: YES
ALBUMIN SERPL BCP-MCNC: 1.7 G/DL (ref 3.2–4.9)
ALBUMIN/GLOB SERPL: 0.6 G/DL
ALP SERPL-CCNC: 229 U/L (ref 30–99)
ALT SERPL-CCNC: 120 U/L (ref 2–50)
ANION GAP SERPL CALC-SCNC: 18 MMOL/L (ref 7–16)
ANION GAP SERPL CALC-SCNC: 19 MMOL/L (ref 7–16)
ANION GAP SERPL CALC-SCNC: 20 MMOL/L (ref 7–16)
ANISOCYTOSIS BLD QL SMEAR: ABNORMAL
AST SERPL-CCNC: 328 U/L (ref 12–45)
BASE EXCESS BLDA CALC-SCNC: -6 MMOL/L (ref -4–3)
BASOPHILS # BLD AUTO: 0 % (ref 0–1.8)
BASOPHILS # BLD: 0 K/UL (ref 0–0.12)
BILIRUB SERPL-MCNC: 1.1 MG/DL (ref 0.1–1.5)
BODY TEMPERATURE: ABNORMAL DEGREES
BUN SERPL-MCNC: 77 MG/DL (ref 8–22)
BUN SERPL-MCNC: 79 MG/DL (ref 8–22)
BUN SERPL-MCNC: 97 MG/DL (ref 8–22)
BURR CELLS BLD QL SMEAR: NORMAL
CALCIUM SERPL-MCNC: 6.9 MG/DL (ref 8.5–10.5)
CALCIUM SERPL-MCNC: 7.8 MG/DL (ref 8.5–10.5)
CALCIUM SERPL-MCNC: 8.2 MG/DL (ref 8.5–10.5)
CHLORIDE SERPL-SCNC: 107 MMOL/L (ref 96–112)
CHLORIDE SERPL-SCNC: 108 MMOL/L (ref 96–112)
CHLORIDE SERPL-SCNC: 114 MMOL/L (ref 96–112)
CO2 BLDA-SCNC: 22 MMOL/L (ref 20–33)
CO2 SERPL-SCNC: 14 MMOL/L (ref 20–33)
CO2 SERPL-SCNC: 17 MMOL/L (ref 20–33)
CO2 SERPL-SCNC: 18 MMOL/L (ref 20–33)
CREAT SERPL-MCNC: 1.91 MG/DL (ref 0.5–1.4)
CREAT SERPL-MCNC: 2.11 MG/DL (ref 0.5–1.4)
CREAT SERPL-MCNC: 2.49 MG/DL (ref 0.5–1.4)
EOSINOPHIL # BLD AUTO: 0 K/UL (ref 0–0.51)
EOSINOPHIL NFR BLD: 0 % (ref 0–6.9)
ERYTHROCYTE [DISTWIDTH] IN BLOOD BY AUTOMATED COUNT: 67.9 FL (ref 35.9–50)
GLOBULIN SER CALC-MCNC: 2.9 G/DL (ref 1.9–3.5)
GLUCOSE BLD-MCNC: 121 MG/DL (ref 65–99)
GLUCOSE BLD-MCNC: 138 MG/DL (ref 65–99)
GLUCOSE BLD-MCNC: 147 MG/DL (ref 65–99)
GLUCOSE BLD-MCNC: 152 MG/DL (ref 65–99)
GLUCOSE SERPL-MCNC: 127 MG/DL (ref 65–99)
GLUCOSE SERPL-MCNC: 159 MG/DL (ref 65–99)
GLUCOSE SERPL-MCNC: 178 MG/DL (ref 65–99)
HCO3 BLDA-SCNC: 20.8 MMOL/L (ref 17–25)
HCT VFR BLD AUTO: 29.9 % (ref 37–47)
HGB BLD-MCNC: 9.4 G/DL (ref 12–16)
HOROWITZ INDEX BLDA+IHG-RTO: 158 MM[HG]
INST. QUALIFIED PATIENT IIQPT: YES
LACTATE BLD-SCNC: 3 MMOL/L (ref 0.5–2)
LYMPHOCYTES # BLD AUTO: 0.71 K/UL (ref 1–4.8)
LYMPHOCYTES NFR BLD: 2.6 % (ref 22–41)
MACROCYTES BLD QL SMEAR: ABNORMAL
MANUAL DIFF BLD: NORMAL
MCH RBC QN AUTO: 32.5 PG (ref 27–33)
MCHC RBC AUTO-ENTMCNC: 31.4 G/DL (ref 33.6–35)
MCV RBC AUTO: 103.5 FL (ref 81.4–97.8)
MONOCYTES # BLD AUTO: 1.41 K/UL (ref 0–0.85)
MONOCYTES NFR BLD AUTO: 5.2 % (ref 0–13.4)
MORPHOLOGY BLD-IMP: NORMAL
MYELOCYTES NFR BLD MANUAL: 0.9 %
NEUTROPHILS # BLD AUTO: 24.83 K/UL (ref 2–7.15)
NEUTROPHILS NFR BLD: 91.3 % (ref 44–72)
NRBC # BLD AUTO: 0.02 K/UL
NRBC BLD-RTO: 0.1 /100 WBC
O2/TOTAL GAS SETTING VFR VENT: 100 %
OVALOCYTES BLD QL SMEAR: NORMAL
PATHOLOGY CONSULT NOTE: NORMAL
PCO2 BLDA: 48.7 MMHG (ref 26–37)
PCO2 TEMP ADJ BLDA: 48.5 MMHG (ref 26–37)
PH BLDA: 7.24 [PH] (ref 7.4–7.5)
PH TEMP ADJ BLDA: 7.24 [PH] (ref 7.4–7.5)
PLATELET # BLD AUTO: 539 K/UL (ref 164–446)
PLATELET BLD QL SMEAR: NORMAL
PMV BLD AUTO: 9.8 FL (ref 9–12.9)
PO2 BLDA: 158 MMHG (ref 64–87)
PO2 TEMP ADJ BLDA: 157 MMHG (ref 64–87)
POIKILOCYTOSIS BLD QL SMEAR: NORMAL
POLYCHROMASIA BLD QL SMEAR: NORMAL
POTASSIUM SERPL-SCNC: 4.7 MMOL/L (ref 3.6–5.5)
POTASSIUM SERPL-SCNC: 4.8 MMOL/L (ref 3.6–5.5)
POTASSIUM SERPL-SCNC: 5.3 MMOL/L (ref 3.6–5.5)
PROT SERPL-MCNC: 4.6 G/DL (ref 6–8.2)
RBC # BLD AUTO: 2.89 M/UL (ref 4.2–5.4)
RBC BLD AUTO: PRESENT
SAO2 % BLDA: 99 % (ref 93–99)
SODIUM SERPL-SCNC: 142 MMOL/L (ref 135–145)
SODIUM SERPL-SCNC: 146 MMOL/L (ref 135–145)
SODIUM SERPL-SCNC: 147 MMOL/L (ref 135–145)
SPECIMEN DRAWN FROM PATIENT: ABNORMAL
TRIGL SERPL-MCNC: 189 MG/DL (ref 0–149)
WBC # BLD AUTO: 27.2 K/UL (ref 4.8–10.8)

## 2020-08-05 PROCEDURE — 84478 ASSAY OF TRIGLYCERIDES: CPT

## 2020-08-05 PROCEDURE — 85027 COMPLETE CBC AUTOMATED: CPT

## 2020-08-05 PROCEDURE — 88307 TISSUE EXAM BY PATHOLOGIST: CPT

## 2020-08-05 PROCEDURE — 700111 HCHG RX REV CODE 636 W/ 250 OVERRIDE (IP): Performed by: SURGERY

## 2020-08-05 PROCEDURE — 700111 HCHG RX REV CODE 636 W/ 250 OVERRIDE (IP): Mod: JG | Performed by: SURGERY

## 2020-08-05 PROCEDURE — 99292 CRITICAL CARE ADDL 30 MIN: CPT | Performed by: SURGERY

## 2020-08-05 PROCEDURE — 82962 GLUCOSE BLOOD TEST: CPT | Mod: 91

## 2020-08-05 PROCEDURE — 94150 VITAL CAPACITY TEST: CPT

## 2020-08-05 PROCEDURE — 80048 BASIC METABOLIC PNL TOTAL CA: CPT | Mod: 91

## 2020-08-05 PROCEDURE — 700111 HCHG RX REV CODE 636 W/ 250 OVERRIDE (IP): Performed by: NURSE PRACTITIONER

## 2020-08-05 PROCEDURE — 36600 WITHDRAWAL OF ARTERIAL BLOOD: CPT

## 2020-08-05 PROCEDURE — 80053 COMPREHEN METABOLIC PANEL: CPT

## 2020-08-05 PROCEDURE — 700105 HCHG RX REV CODE 258: Performed by: NURSE PRACTITIONER

## 2020-08-05 PROCEDURE — 0DBE0ZZ EXCISION OF LARGE INTESTINE, OPEN APPROACH: ICD-10-PCS | Performed by: SURGERY

## 2020-08-05 PROCEDURE — 94003 VENT MGMT INPAT SUBQ DAY: CPT

## 2020-08-05 PROCEDURE — 82803 BLOOD GASES ANY COMBINATION: CPT

## 2020-08-05 PROCEDURE — P9047 ALBUMIN (HUMAN), 25%, 50ML: HCPCS | Mod: JG | Performed by: SURGERY

## 2020-08-05 PROCEDURE — 700101 HCHG RX REV CODE 250: Performed by: SURGERY

## 2020-08-05 PROCEDURE — 700105 HCHG RX REV CODE 258: Performed by: SURGERY

## 2020-08-05 PROCEDURE — 85007 BL SMEAR W/DIFF WBC COUNT: CPT

## 2020-08-05 PROCEDURE — 83605 ASSAY OF LACTIC ACID: CPT

## 2020-08-05 PROCEDURE — 94002 VENT MGMT INPAT INIT DAY: CPT

## 2020-08-05 PROCEDURE — 71045 X-RAY EXAM CHEST 1 VIEW: CPT

## 2020-08-05 PROCEDURE — 99291 CRITICAL CARE FIRST HOUR: CPT | Performed by: SURGERY

## 2020-08-05 PROCEDURE — 770022 HCHG ROOM/CARE - ICU (200)

## 2020-08-05 PROCEDURE — 700111 HCHG RX REV CODE 636 W/ 250 OVERRIDE (IP): Performed by: STUDENT IN AN ORGANIZED HEALTH CARE EDUCATION/TRAINING PROGRAM

## 2020-08-05 PROCEDURE — 94770 HCHG CO2 EXPIRED GAS DETERMINATION: CPT

## 2020-08-05 RX ORDER — ROCURONIUM BROMIDE 10 MG/ML
INJECTION, SOLUTION INTRAVENOUS PRN
Status: DISCONTINUED | OUTPATIENT
Start: 2020-08-04 | End: 2020-08-05 | Stop reason: SURG

## 2020-08-05 RX ORDER — CEFOTETAN DISODIUM 2 G/20ML
INJECTION, POWDER, FOR SOLUTION INTRAMUSCULAR; INTRAVENOUS PRN
Status: DISCONTINUED | OUTPATIENT
Start: 2020-08-04 | End: 2020-08-05 | Stop reason: SURG

## 2020-08-05 RX ORDER — ALBUMIN (HUMAN) 12.5 G/50ML
25 SOLUTION INTRAVENOUS EVERY 6 HOURS
Status: COMPLETED | OUTPATIENT
Start: 2020-08-05 | End: 2020-08-06

## 2020-08-05 RX ADMIN — ALBUMIN (HUMAN) 25 G: 5 SOLUTION INTRAVENOUS at 17:37

## 2020-08-05 RX ADMIN — FENTANYL CITRATE 50 MCG: 50 INJECTION, SOLUTION INTRAMUSCULAR; INTRAVENOUS at 00:48

## 2020-08-05 RX ADMIN — FAMOTIDINE 20 MG: 10 INJECTION, SOLUTION INTRAVENOUS at 05:21

## 2020-08-05 RX ADMIN — PROPOFOL 5 MCG/KG/MIN: 10 INJECTION, EMULSION INTRAVENOUS at 23:08

## 2020-08-05 RX ADMIN — HYDROMORPHONE HYDROCHLORIDE 0.5 MG: 1 INJECTION, SOLUTION INTRAMUSCULAR; INTRAVENOUS; SUBCUTANEOUS at 19:39

## 2020-08-05 RX ADMIN — HYDROMORPHONE HYDROCHLORIDE 0.5 MG: 1 INJECTION, SOLUTION INTRAMUSCULAR; INTRAVENOUS; SUBCUTANEOUS at 22:58

## 2020-08-05 RX ADMIN — PIPERACILLIN SODIUM, TAZOBACTAM SODIUM 4.5 G: 4; .5 INJECTION, POWDER, LYOPHILIZED, FOR SOLUTION INTRAVENOUS at 12:11

## 2020-08-05 RX ADMIN — HYDROCORTISONE SODIUM SUCCINATE 50 MG: 100 INJECTION, POWDER, FOR SOLUTION INTRAMUSCULAR; INTRAVENOUS at 01:37

## 2020-08-05 RX ADMIN — METOPROLOL TARTRATE 10 MG: 5 INJECTION, SOLUTION INTRAVENOUS at 01:37

## 2020-08-05 RX ADMIN — ALBUMIN (HUMAN) 25 G: 5 SOLUTION INTRAVENOUS at 23:39

## 2020-08-05 RX ADMIN — FENTANYL CITRATE 50 MCG: 50 INJECTION, SOLUTION INTRAMUSCULAR; INTRAVENOUS at 00:59

## 2020-08-05 RX ADMIN — NYSTATIN: 100000 POWDER TOPICAL at 05:20

## 2020-08-05 RX ADMIN — PIPERACILLIN SODIUM, TAZOBACTAM SODIUM 4.5 G: 4; .5 INJECTION, POWDER, LYOPHILIZED, FOR SOLUTION INTRAVENOUS at 05:20

## 2020-08-05 RX ADMIN — METOPROLOL TARTRATE 10 MG: 5 INJECTION, SOLUTION INTRAVENOUS at 11:59

## 2020-08-05 RX ADMIN — NYSTATIN: 100000 POWDER TOPICAL at 17:42

## 2020-08-05 RX ADMIN — HYDROCORTISONE SODIUM SUCCINATE 50 MG: 100 INJECTION, POWDER, FOR SOLUTION INTRAMUSCULAR; INTRAVENOUS at 11:57

## 2020-08-05 RX ADMIN — METOPROLOL TARTRATE 10 MG: 5 INJECTION, SOLUTION INTRAVENOUS at 17:43

## 2020-08-05 RX ADMIN — ALBUMIN (HUMAN) 25 G: 5 SOLUTION INTRAVENOUS at 12:48

## 2020-08-05 RX ADMIN — HYDROCORTISONE SODIUM SUCCINATE 50 MG: 100 INJECTION, POWDER, FOR SOLUTION INTRAMUSCULAR; INTRAVENOUS at 17:44

## 2020-08-05 RX ADMIN — INSULIN HUMAN 1 UNITS: 100 INJECTION, SOLUTION PARENTERAL at 01:37

## 2020-08-05 RX ADMIN — PIPERACILLIN SODIUM, TAZOBACTAM SODIUM 4.5 G: 4; .5 INJECTION, POWDER, LYOPHILIZED, FOR SOLUTION INTRAVENOUS at 23:08

## 2020-08-05 RX ADMIN — HYDROCORTISONE SODIUM SUCCINATE 50 MG: 100 INJECTION, POWDER, FOR SOLUTION INTRAMUSCULAR; INTRAVENOUS at 05:20

## 2020-08-05 RX ADMIN — CALCIUM GLUCONATE 1 G: 98 INJECTION, SOLUTION INTRAVENOUS at 17:31

## 2020-08-05 RX ADMIN — FENTANYL CITRATE 50 MCG: 50 INJECTION, SOLUTION INTRAMUSCULAR; INTRAVENOUS at 00:17

## 2020-08-05 RX ADMIN — PROPOFOL 10 MCG/KG/MIN: 10 INJECTION, EMULSION INTRAVENOUS at 08:23

## 2020-08-05 RX ADMIN — METOPROLOL TARTRATE 10 MG: 5 INJECTION, SOLUTION INTRAVENOUS at 05:20

## 2020-08-05 ASSESSMENT — PULMONARY FUNCTION TESTS: FVC: 0

## 2020-08-05 ASSESSMENT — FIBROSIS 4 INDEX
FIB4 SCORE: 4.06
FIB4 SCORE: 4.06
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
## 2020-08-05 NOTE — PROGRESS NOTES
Updated Md Newman regarding concerns of patient's status, lethargy, labs. Dr. Newamn informed that he will update Dr. Llanos. MD Llanos to bedside to assess patient and updated Dr. Llanos. STAT ABG and Lactic ordered and drawn.

## 2020-08-05 NOTE — PROGRESS NOTES
Trauma / Surgical Daily Progress Note    Date of Service  8/5/2020    Chief Complaint  74 y.o. female admitted 7/31/2020 with Perforation bowel (HCC)    Interval Events  Long day yesterday. Afib with RVR returned followed by bloody bowel movements, change in HAYDEE output color, abdominal pain, oliguria and altered mental status.   Taken back to the OR last night for ex lap - end of colon near anastomosis found to be leaking with feculent peritonitis. Resection with discontinuity, ABthera placed and recurrent sepsis resuscitation overnight. Remains on ventilator.     Review of Systems  Review of Systems       Vital Signs for last 24 hours  Temp:  [38 °C (100.4 °F)] 38 °C (100.4 °F)  Pulse:  [] 111  Resp:  [14-44] 34  BP: ()/(51-75) 118/52  SpO2:  [90 %-100 %] 98 %    Hemodynamic parameters for last 24 hours  CVP:  [6 MM HG-182 MM HG] 13 MM HG    Respiratory Data     Respiration: (!) 34, Pulse Oximetry: 98 %     Work Of Breathing / Effort: Vented  RUL Breath Sounds: Crackles, RML Breath Sounds: Crackles, RLL Breath Sounds: Diminished, ELISSA Breath Sounds: Crackles, LLL Breath Sounds: Diminished    Physical Exam  Physical Exam  Vitals signs and nursing note reviewed.   Constitutional:       Appearance: She is obese.      Comments: Intubated, sedated   HENT:      Head: Normocephalic and atraumatic.      Right Ear: External ear normal.      Left Ear: External ear normal.      Nose: Nose normal.      Mouth/Throat:      Mouth: Mucous membranes are moist.      Pharynx: Oropharynx is clear.   Eyes:      Extraocular Movements: Extraocular movements intact.      Pupils: Pupils are equal, round, and reactive to light.   Neck:      Musculoskeletal: Normal range of motion and neck supple.   Cardiovascular:      Rate and Rhythm: Tachycardia present. Rhythm irregular.      Pulses: Normal pulses.      Heart sounds: Normal heart sounds.   Pulmonary:      Effort: Pulmonary effort is normal.      Breath sounds: Normal breath  sounds.   Abdominal:      General: Abdomen is flat.      Palpations: Abdomen is soft.      Comments: Abthera in place, edges clean   Genitourinary:     Comments: Orellana in place  Musculoskeletal:      Right lower leg: Edema present.      Left lower leg: Edema present.   Skin:     General: Skin is warm and dry.      Capillary Refill: Capillary refill takes less than 2 seconds.   Neurological:      Comments: Sedated, not following commands   Psychiatric:      Comments: Unable to assess           Laboratory  Recent Results (from the past 24 hour(s))   DIGOXIN    Collection Time: 08/04/20  4:10 PM   Result Value Ref Range    Digoxin 1.0 0.8 - 2.0 ng/mL   CBC WITH DIFFERENTIAL    Collection Time: 08/04/20  4:10 PM   Result Value Ref Range    WBC 23.8 (H) 4.8 - 10.8 K/uL    RBC 2.45 (L) 4.20 - 5.40 M/uL    Hemoglobin 8.1 (L) 12.0 - 16.0 g/dL    Hematocrit 26.6 (L) 37.0 - 47.0 %    .6 (H) 81.4 - 97.8 fL    MCH 33.1 (H) 27.0 - 33.0 pg    MCHC 30.5 (L) 33.6 - 35.0 g/dL    RDW 62.4 (H) 35.9 - 50.0 fL    Platelet Count 580 (H) 164 - 446 K/uL    MPV 9.6 9.0 - 12.9 fL    Neutrophils-Polys 83.40 (H) 44.00 - 72.00 %    Lymphocytes 4.20 (L) 22.00 - 41.00 %    Monocytes 8.10 0.00 - 13.40 %    Eosinophils 0.00 0.00 - 6.90 %    Basophils 0.30 0.00 - 1.80 %    Immature Granulocytes 4.00 (H) 0.00 - 0.90 %    Nucleated RBC 0.00 /100 WBC    Neutrophils (Absolute) 19.84 (H) 2.00 - 7.15 K/uL    Lymphs (Absolute) 1.01 1.00 - 4.80 K/uL    Monos (Absolute) 1.93 (H) 0.00 - 0.85 K/uL    Eos (Absolute) 0.00 0.00 - 0.51 K/uL    Baso (Absolute) 0.06 0.00 - 0.12 K/uL    Immature Granulocytes (abs) 0.95 (H) 0.00 - 0.11 K/uL    NRBC (Absolute) 0.00 K/uL   PLATELET MAPPING WITH BASIC TEG    Collection Time: 08/04/20  4:50 PM   Result Value Ref Range    Reaction Time Initial-R 6.0 5.0 - 10.0 min    Clot Kinetics-K 0.9 (L) 1.0 - 3.0 min    Clot Angle-Angle 78.0 (H) 53.0 - 72.0 degrees    Maximum Clot Strength-MA 82.8 (H) 50.0 - 70.0 mm    Lysis 30  minutes-LY30 0.0 0.0 - 8.0 %    % Inhibition ADP 50.6 %    % Inhibition AA 53.0 %    TEG Algorithm Link Algorithm    Prothrombin Time    Collection Time: 08/04/20  4:50 PM   Result Value Ref Range    PT 17.8 (H) 12.0 - 14.6 sec    INR 1.42 (H) 0.87 - 1.13   BASIC TEG W HEPARINASE    Collection Time: 08/04/20  4:50 PM   Result Value Ref Range    React Time Initial Hep 5.8 5.0 - 10.0 min    Clot Kinetics Heparinase 1.0 1.0 - 3.0 min    Clot Angle Heparinase 76.7 (H) 53.0 - 72.0 degrees    Max Clot Heparinase 75.9 (H) 50.0 - 70.0 mm    Lysis 30 min Heparinase 0.1 0.0 - 8.0 %   ISTAT ARTERIAL BLOOD GAS    Collection Time: 08/04/20  5:03 PM   Result Value Ref Range    Ph 7.369 (L) 7.400 - 7.500    Pco2 27.4 26.0 - 37.0 mmHg    Po2 64 64 - 87 mmHg    Tco2 17 (L) 20 - 33 mmol/L    S02 92 (L) 93 - 99 %    Hco3 15.8 (L) 17.0 - 25.0 mmol/L    BE -9 (L) -4 - 3 mmol/L    Body Temp 100.0 F degrees    O2 Therapy 6 %    iPF Ratio 1067     Ph Temp Mike 7.358 (L) 7.400 - 7.500    Pco2 Temp Co 28.3 26.0 - 37.0 mmHg    Po2 Temp Cor 68 64 - 87 mmHg    Specimen Arterial     Action Range Triggered NO     Inst. Qualified Patient YES    ACCU-CHEK GLUCOSE    Collection Time: 08/04/20  6:17 PM   Result Value Ref Range    Glucose - Accu-Ck 191 (H) 65 - 99 mg/dL   CBC WITH DIFFERENTIAL    Collection Time: 08/04/20  9:04 PM   Result Value Ref Range    WBC 28.7 (H) 4.8 - 10.8 K/uL    RBC 2.66 (L) 4.20 - 5.40 M/uL    Hemoglobin 8.8 (L) 12.0 - 16.0 g/dL    Hematocrit 28.8 (L) 37.0 - 47.0 %    .3 (H) 81.4 - 97.8 fL    MCH 33.1 (H) 27.0 - 33.0 pg    MCHC 30.6 (L) 33.6 - 35.0 g/dL    RDW 69.6 (H) 35.9 - 50.0 fL    Platelet Count 474 (H) 164 - 446 K/uL    MPV 9.7 9.0 - 12.9 fL    Neutrophils-Polys 89.60 (H) 44.00 - 72.00 %    Lymphocytes 1.70 (L) 22.00 - 41.00 %    Monocytes 6.10 0.00 - 13.40 %    Eosinophils 0.00 0.00 - 6.90 %    Basophils 0.00 0.00 - 1.80 %    Nucleated RBC 0.10 /100 WBC    Neutrophils (Absolute) 26.20 (H) 2.00 - 7.15 K/uL     Lymphs (Absolute) 0.49 (L) 1.00 - 4.80 K/uL    Monos (Absolute) 1.75 (H) 0.00 - 0.85 K/uL    Eos (Absolute) 0.00 0.00 - 0.51 K/uL    Baso (Absolute) 0.00 0.00 - 0.12 K/uL    NRBC (Absolute) 0.03 K/uL    Anisocytosis 2+     Macrocytosis 2+    Basic Metabolic Panel    Collection Time: 08/04/20  9:04 PM   Result Value Ref Range    Sodium 144 135 - 145 mmol/L    Potassium 4.5 3.6 - 5.5 mmol/L    Chloride 110 96 - 112 mmol/L    Co2 16 (L) 20 - 33 mmol/L    Glucose 168 (H) 65 - 99 mg/dL    Bun 70 (H) 8 - 22 mg/dL    Creatinine 1.72 (H) 0.50 - 1.40 mg/dL    Calcium 7.8 (L) 8.5 - 10.5 mg/dL    Anion Gap 18.0 (H) 7.0 - 16.0   ESTIMATED GFR    Collection Time: 08/04/20  9:04 PM   Result Value Ref Range    GFR If African American 35 (A) >60 mL/min/1.73 m 2    GFR If Non  29 (A) >60 mL/min/1.73 m 2   DIFFERENTIAL MANUAL    Collection Time: 08/04/20  9:04 PM   Result Value Ref Range    Bands-Stabs 1.70 0.00 - 10.00 %    Myelocytes 0.90 %    Manual Diff Status PERFORMED    PERIPHERAL SMEAR REVIEW    Collection Time: 08/04/20  9:04 PM   Result Value Ref Range    Peripheral Smear Review see below    PLATELET ESTIMATE    Collection Time: 08/04/20  9:04 PM   Result Value Ref Range    Plt Estimation See Note    MORPHOLOGY    Collection Time: 08/04/20  9:04 PM   Result Value Ref Range    RBC Morphology Present     Giant Platelets 1+     Polychromia 1+     Poikilocytosis 1+     Ovalocytes 1+     Echinocytes 1+    LACTIC ACID    Collection Time: 08/04/20  9:44 PM   Result Value Ref Range    Lactic Acid 4.7 (HH) 0.5 - 2.0 mmol/L   ISTAT ARTERIAL BLOOD GAS    Collection Time: 08/04/20  9:50 PM   Result Value Ref Range    Ph 7.334 (L) 7.400 - 7.500    Pco2 32.2 26.0 - 37.0 mmHg    Po2 98 (H) 64 - 87 mmHg    Tco2 18 (L) 20 - 33 mmol/L    S02 97 93 - 99 %    Hco3 17.1 17.0 - 25.0 mmol/L    BE -8 (L) -4 - 3 mmol/L    Body Temp 98.2 F degrees    O2 Therapy 33 %    iPF Ratio 297     Ph Temp Mike 7.337 (L) 7.400 - 7.500    Pco2  Temp Co 31.9 26.0 - 37.0 mmHg    Po2 Temp Cor 97 (H) 64 - 87 mmHg    Specimen Arterial     Action Range Triggered NO     Inst. Qualified Patient YES    ACCU-CHEK GLUCOSE    Collection Time: 08/05/20  1:46 AM   Result Value Ref Range    Glucose - Accu-Ck 152 (H) 65 - 99 mg/dL   LACTIC ACID    Collection Time: 08/05/20  1:51 AM   Result Value Ref Range    Lactic Acid 3.0 (H) 0.5 - 2.0 mmol/L   ISTAT ARTERIAL BLOOD GAS    Collection Time: 08/05/20  2:20 AM   Result Value Ref Range    Ph 7.238 (LL) 7.400 - 7.500    Pco2 48.7 (H) 26.0 - 37.0 mmHg    Po2 158 (H) 64 - 87 mmHg    Tco2 22 20 - 33 mmol/L    S02 99 93 - 99 %    Hco3 20.8 17.0 - 25.0 mmol/L    BE -6 (L) -4 - 3 mmol/L    Body Temp 98.4 F degrees    O2 Therapy 100 %    iPF Ratio 158     Ph Temp Mike 7.239 (LL) 7.400 - 7.500    Pco2 Temp Co 48.5 (H) 26.0 - 37.0 mmHg    Po2 Temp Cor 157 (H) 64 - 87 mmHg    Specimen Arterial     Action Range Triggered YES     Inst. Qualified Patient YES    CBC WITH DIFFERENTIAL    Collection Time: 08/05/20  3:34 AM   Result Value Ref Range    WBC 27.2 (H) 4.8 - 10.8 K/uL    RBC 2.89 (L) 4.20 - 5.40 M/uL    Hemoglobin 9.4 (L) 12.0 - 16.0 g/dL    Hematocrit 29.9 (L) 37.0 - 47.0 %    .5 (H) 81.4 - 97.8 fL    MCH 32.5 27.0 - 33.0 pg    MCHC 31.4 (L) 33.6 - 35.0 g/dL    RDW 67.9 (H) 35.9 - 50.0 fL    Platelet Count 539 (H) 164 - 446 K/uL    MPV 9.8 9.0 - 12.9 fL    Neutrophils-Polys 91.30 (H) 44.00 - 72.00 %    Lymphocytes 2.60 (L) 22.00 - 41.00 %    Monocytes 5.20 0.00 - 13.40 %    Eosinophils 0.00 0.00 - 6.90 %    Basophils 0.00 0.00 - 1.80 %    Nucleated RBC 0.10 /100 WBC    Neutrophils (Absolute) 24.83 (H) 2.00 - 7.15 K/uL    Lymphs (Absolute) 0.71 (L) 1.00 - 4.80 K/uL    Monos (Absolute) 1.41 (H) 0.00 - 0.85 K/uL    Eos (Absolute) 0.00 0.00 - 0.51 K/uL    Baso (Absolute) 0.00 0.00 - 0.12 K/uL    NRBC (Absolute) 0.02 K/uL    Anisocytosis 2+     Macrocytosis 2+    Comp Metabolic Panel    Collection Time: 08/05/20  3:34 AM    Result Value Ref Range    Sodium 142 135 - 145 mmol/L    Potassium 5.3 3.6 - 5.5 mmol/L    Chloride 107 96 - 112 mmol/L    Co2 17 (L) 20 - 33 mmol/L    Anion Gap 18.0 (H) 7.0 - 16.0    Glucose 178 (H) 65 - 99 mg/dL    Bun 77 (HH) 8 - 22 mg/dL    Creatinine 1.91 (H) 0.50 - 1.40 mg/dL    Calcium 7.8 (L) 8.5 - 10.5 mg/dL    AST(SGOT) 328 (H) 12 - 45 U/L    ALT(SGPT) 120 (H) 2 - 50 U/L    Alkaline Phosphatase 229 (H) 30 - 99 U/L    Total Bilirubin 1.1 0.1 - 1.5 mg/dL    Albumin 1.7 (L) 3.2 - 4.9 g/dL    Total Protein 4.6 (L) 6.0 - 8.2 g/dL    Globulin 2.9 1.9 - 3.5 g/dL    A-G Ratio 0.6 g/dL   Triglyceride    Collection Time: 08/05/20  3:34 AM   Result Value Ref Range    Triglycerides 189 (H) 0 - 149 mg/dL   ESTIMATED GFR    Collection Time: 08/05/20  3:34 AM   Result Value Ref Range    GFR If  31 (A) >60 mL/min/1.73 m 2    GFR If Non African American 26 (A) >60 mL/min/1.73 m 2   DIFFERENTIAL MANUAL    Collection Time: 08/05/20  3:34 AM   Result Value Ref Range    Myelocytes 0.90 %    Manual Diff Status PERFORMED    PERIPHERAL SMEAR REVIEW    Collection Time: 08/05/20  3:34 AM   Result Value Ref Range    Peripheral Smear Review see below    PLATELET ESTIMATE    Collection Time: 08/05/20  3:34 AM   Result Value Ref Range    Plt Estimation Increased    MORPHOLOGY    Collection Time: 08/05/20  3:34 AM   Result Value Ref Range    RBC Morphology Present     Polychromia 2+     Poikilocytosis 1+     Ovalocytes 1+     Echinocytes 1+    ACCU-CHEK GLUCOSE    Collection Time: 08/05/20  5:27 AM   Result Value Ref Range    Glucose - Accu-Ck 138 (H) 65 - 99 mg/dL   Histology Request    Collection Time: 08/05/20  8:28 AM   Result Value Ref Range    Pathology Request Sent to Histo    ACCU-CHEK GLUCOSE    Collection Time: 08/05/20 11:50 AM   Result Value Ref Range    Glucose - Accu-Ck 121 (H) 65 - 99 mg/dL       Fluids    Intake/Output Summary (Last 24 hours) at 8/5/2020 1254  Last data filed at 8/5/2020 1200  Gross  per 24 hour   Intake 5801.56 ml   Output 1335 ml   Net 4466.56 ml       Core Measures & Quality Metrics  Labs reviewed, Medications reviewed and Radiology images reviewed  Orellana catheter: Critically Ill - Requiring Accurate Measurement of Urinary Output  Central line in place: Vasopressors    DVT Prophylaxis: Enoxaparin (Lovenox)  DVT prophylaxis - mechanical: SCDs  Ulcer prophylaxis: Yes  Antibiotics: Treating active infection/contamination beyond 24 hours perioperative coverage      CAITLIN Score    ETOH Screening      Assessment/Plan  Large bowel perforation (HCC)  Assessment & Plan  8/4 recurrent sepsis. OR for exploration - anastomosis (side-side) intact but end of colon perforated with feculent peritonitits  Resection with discontinuity, Joey Llanos MD - [facility]    Acute renal insufficiency  Assessment & Plan  Admission Cr elevated associated with infection  Improved with resuscitation after initial surgery  8/4 oliguria progressed to anuria despite crystalloid resuscitation  8/5 Cr markedly elevated, electrolytes ok. 24 hours of albumin resuscitation.   Trend renal indices, avoid nephrotoxins    Septic shock (HCC)- (present on admission)  Assessment & Plan  7/31: high lactate, oliguric and hypotensive post op  8/1: lactate and urine output improved following 7 liter resuscitation. Levophed to support MAP.  8/2 - 8/3 weaning levophed. UOP  Adequate. Added steroids 8/3  8/4 - 8/4 recurrent septic shock associated with leak. Resuscitation ongoing.    Respiratory failure following trauma and surgery (HCC)- (present on admission)  Assessment & Plan  Full ventilatory support. Ensure optimal oxygenation and mitigate secondary injury.  Trauma respiratory protocol  8/2 extubated.   8/3 high risk for reintubation  8/4 Remained intubated after 2nd surgery. SICU ventilator protocol and bundle.       Perforated appendicitis- (present on admission)  Assessment & Plan  7/31 perforated  appendix with retrocolic abscess.  Small bowel and right colon resection.  RLQ drain.  8/2 zosyn day 3    [facility]  Dr. Harris      Paroxysmal atrial fibrillation (CMS-HCC)- (present on admission)  Assessment & Plan  Chronic afib complicated by RVR  8/1 Digoxin load and metoprolol  8/3 digoxin level appropriate.  Hold NOAC.     Adrenal insufficiency (HCC)  Assessment & Plan  Failure to fully wean from vasopressors despite adequate resuscitation  8/3 empiric steroids started with liberation from vasopressors  Rapid wean start date TBD given recurrent sepsis    HTN (hypertension)- (present on admission)  Assessment & Plan  Takes multiple agents at home for hypertension  Held in acute setting with septic shock  Restart when appropriate    No contraindication to anticoagulation therapy  Assessment & Plan  8/1 Begin Lovenox    GLORIA (obstructive sleep apnea)- (present on admission)  Assessment & Plan  Refusing CPAP until she gets formal sleep study.     Plan:  Hypotension - 24 hours of albumin resuscitation. Levophed to support BP as needed.  Renal failure - has received a large amount of crystalloid resuscitation since admission. Will give 24 hours of albumin, check serial labs and follow UOP.'  Zosyn in place  Wean ventilator - decrease PEEP and FiO2 and increase spontaneous breathing percentages  Back to OR in coming days (tomorrow?)  Stopped Lovenox yesterday as she was bleeding requiring blood transfusion. Will hold chemical DVT prophylaxis today, readdress daily.  NPO (discontinuity) with NG tube  Guarded condition        Discussed patient condition with RN, RT and Pharmacy.  The patient is/remains critically ill with recurrent respiratory failure, recurrent septic shock, renal failure, bowel leak after R colectomy.    I provided the following critical care services: multiple high risk medication management, resuscitation, bedside communication with consulting physicians, ventilator management    Critical  care time spent exclusive of procedures: 108 minutes thus far.    Tony Newman MD  [phone]

## 2020-08-05 NOTE — ANESTHESIA QCDR
2019 [facility] Clinical Data Registry (for Quality Improvement)     Postoperative nausea/vomiting risk protocol (Adult = 18 yrs and Pediatric 3-17 yrs)- (430 and 463)  General inhalation anesthetic (NOT TIVA) with PONV risk factors: Yes  Provision of anti-emetic therapy with at least 2 different classes of agents: No   Patient DID NOT receive anti-emetic therapy and reason is documented in Medical Record:        Multimodal Pain Management- (477)  Non-emergent surgery AND patient age >= 18: No  Use of Multimodal Pain Management, two or more drugs and/or interventions, NOT including systemic opioids:   Exception: Documented allergy to multiple classes of analgesics:     Smoking Abstinence (404)  Patient is current smoker (cigarette, pipe, e-cig, marijuanna): No  Elective Surgery:   Abstinence instructions provided prior to day of surgery:   Patient abstained from smoking on day of surgery:     Pre-Op Beta-Blocker in Isolated CABG (44)  Isolated CABG AND patient age >= 18: No  Beta-blocker admin within 24 hours of surgical incision:   Exception:of medical reason(s) for not administering beta blocker within 24 hours prior to surgical incision (e.g., not  indicated,other medical reason):     PACU assessment of acute postoperative pain prior to Anesthesia Care End- Applies to Patients Age = 18- (ABG7)  Initial PACU pain score is which of the following: Pain not assessed  Patient unable to report pain score:     Post-anesthetic transfer of care checklist/protocol to PACU/ICU- (426 and 427)  Upon conclusion of case, patient transferred to which of the following locations: ICU  Use of transfer checklist/protocol: Yes  Exclusion: Service Performed in Patient Hospital Room (and thus did not require transfer): N/A  Unplanned admission to ICU related to anesthesia service up through end of PACU care- (MD51)  Unplanned admission to ICU (not initially anticipated at anesthesia start time): No

## 2020-08-05 NOTE — ANESTHESIA PROCEDURE NOTES
Airway    Date/Time: 8/4/2020 11:51 PM  Performed by: Jose Enrique Gagnon M.D.  Authorized by: Jose Enrique Gagnon M.D.     Location:  OR  Urgency:  Elective  Indications for Airway Management:  Anesthesia      Spontaneous Ventilation: absent    Sedation Level:  Deep  Preoxygenated: Yes    Patient Position:  Sniffing  Final Airway Type:  Endotracheal airway  Final Endotracheal Airway:  ETT  Cuffed: Yes    Technique Used for Successful ETT Placement:  Direct laryngoscopy    Insertion Site:  Oral  Blade Type:  Glide  Laryngoscope Blade/Videolaryngoscope Blade Size:  3  ETT Size (mm):  7.5  Measured from:  Teeth  ETT to Teeth (cm):  23  Placement Verified by: auscultation and capnometry    Cormack-Lehane Classification:  Grade I - full view of glottis  Number of Attempts at Approach:  1

## 2020-08-05 NOTE — PROGRESS NOTES
Discussed change of HAYDEE drain output with both Dr bolaños and Dr Newman, cbc collected, 1 unit of RBC's given, will trend CBC Q6H.

## 2020-08-05 NOTE — ANESTHESIA POSTPROCEDURE EVALUATION
Patient: Yvette Dailey    Procedure Summary     Date: 08/04/20 Room / Location: [place] [address] / SURGERY [facility]    Anesthesia Start: 2342 Anesthesia Stop:     Procedure: LAPAROTOMY, EXPLORATORY (Abdomen) Diagnosis:     Surgeon: Karthikeyan Llanos M.D. Responsible Provider: Jose Enrique Gagnon M.D.    Anesthesia Type: general ASA Status: 3 - Emergent          Final Anesthesia Type: general  Last vitals  BP   Blood Pressure : 102/55    Temp   38 °C (100.4 °F)    Pulse   Pulse: (!) 136   Resp   (!) 22    SpO2   98 %      Anesthesia Post Evaluation    Patient location during evaluation: ICU  Level of consciousness: obtunded/minimal responses    Airway patency: patent  Anesthetic complications: no  Cardiovascular status: hemodynamically stable  Respiratory status: acceptable and intubated  Hydration status: euvolemic    PONV: none           Nurse Pain Score: 10 (NPRS)

## 2020-08-05 NOTE — ANESTHESIA TIME REPORT
Anesthesia Start and Stop Event Times     Date Time Event    8/4/2020 2342 Anesthesia Start     2343 Ready for Procedure        Responsible Staff  08/04/20    Name Role Begin End    Jose Enrique Gagnon M.D. Anesth 2342         Preop Diagnosis (Free Text):  Pre-op Diagnosis             Preop Diagnosis (Codes):    Post op Diagnosis  Anastomotic leak of intestine      Premium Reason  A. 3PM - 7AM    Comments:

## 2020-08-05 NOTE — PROGRESS NOTES
Pt transported to surgery via Zoll with ACLS RN 8/4/20 at 2230    Pt returned to [facility] at 0115 via zoll monitor intubated. Verbal orders received from Dr. Llanos and order requisitions placed due to Epic downtime.     2 RN skin assessment complete upon arrival back to unit      Preventative measures in place:  Q 2 hour turns, pillows in place for support and cushioning, heels floated, 2 RN skin assessments, ensuring medical devices/tubing are not under patient, mepilex in place, low air loss mattress, skin assessed under bony prominences and high pressure point areas.    Following areas of concern with interventions listed:   - Open abdomen with wound vac in place  - old HAYDEE site with gauze and tape   - small scab to R medial ankle  - redness under breast folds (nystain ordered and applied per MAR)   - mottled dusky feet   - bruising to BUE

## 2020-08-05 NOTE — CARE PLAN
Problem: Pain Management  Goal: Pain level will decrease to patient's comfort goal  Outcome: PROGRESSING AS EXPECTED  Note: Nonverbal pain scale in use. Administered pain medication when needed per MAR. Non-pharmacological methods for pain control in place such as rest, repositioning, and enforcing a calm and conductive environment.      Problem: Infection  Goal: Will remain free from infection  Outcome: PROGRESSING SLOWER THAN EXPECTED  Note: Assessing for signs and symptoms of infection, standard precautions in place, antibiotics administered per MAR

## 2020-08-05 NOTE — PROGRESS NOTES
"    DATE: 8/4/2020    Post Operative Day  4 open right hemicolectomy with drain placement and right retroperitoneum..    Interval Events:    Significantly deterioration over the last several hours  Has become anuric - currently getting fluid boluses  Requiring vasopressors again    Drain output has changed from serosanguinous to dark thin bilious - findings most consistent with a leak  OR has been notified  Attempting to contact  for consent    Plans to return to the OR for exploratory laparotomy, possible bowel resection, possible temporal abdominal closure.    PHYSICAL EXAMINATION:  Vital Signs: /55   Pulse (!) 53   Temp 38 °C (100.4 °F) (Bladder)   Resp (!) 23   Ht 1.753 m (5' 9\")   Wt 120.8 kg (266 lb 5.1 oz)   SpO2 93%     Confused and incomprehensible words  Abdomen remains mildly tender diffusely  Right lower quadrant drain output now with dark thin bilious material - drain is been emptied several times    Laboratory Values:   Recent Labs     08/04/20  0505 08/04/20  1610 08/04/20  2104   WBC 19.5* 23.8* 28.7*   RBC 3.03* 2.45* 2.66*   HEMOGLOBIN 9.9* 8.1* 8.8*   HEMATOCRIT 31.5* 26.6* 28.8*   .0* 108.6* 108.3*   MCH 32.7 33.1* 33.1*   MCHC 31.4* 30.5* 30.6*   RDW 59.1* 62.4* 69.6*   PLATELETCT 421 580* 474*   MPV 9.5 9.6 9.7     Recent Labs     08/03/20  0350 08/04/20  0505 08/04/20  2104   SODIUM 141 139 144   POTASSIUM 3.9 4.0 4.5   CHLORIDE 105 106 110   CO2 21 23 16*   GLUCOSE 125* 161* 168*   BUN 49* 51* 70*   CREATININE 1.67* 1.30 1.72*   CALCIUM 8.1* 8.0* 7.8*     Recent Labs     08/02/20  0545 08/03/20  0350 08/04/20  0505 08/04/20  1650   ASTSGOT 90* 85* 60*  --    ALTSGPT 56* 52* 41  --    TBILIRUBIN 1.9* 2.0* 0.9  --    ALKPHOSPHAT 382* 410* 314*  --    GLOBULIN 3.2 3.1 3.1  --    INR  --   --   --  1.42*     Recent Labs     08/04/20  1650   INR 1.42*        Imaging:   DX-CHEST-FOR LINE PLACEMENT Perform procedure in: Patient's Room   Final Result            1. A right " central venous catheter with tip projects appropriately over the expected area of the upper superior vena cava.      DX-CHEST-LIMITED (1 VIEW)   Final Result         No significant interval change.      DX-CHEST-PORTABLE (1 VIEW)   Final Result         1.  Pulmonary edema and/or infiltrates are identified, which are stable since the prior exam.   2.  Cardiomegaly   3.  Atherosclerosis      DX-CHEST-FOR LINE PLACEMENT Perform procedure in: Patient's Room   Final Result         A right central venous catheter is seen.  The tip projects appropriately over the expected area of the superior vena cava.      Endotracheal tube with tip projecting over the mid thoracic trachea.      Gastric drainage tube courses below diaphragm, tip is not seen.      DX-CHEST-PORTABLE (1 VIEW)   Final Result      Stable cardiac silhouette enlargement without consolidation identified      CT-ABDOMEN-PELVIS WITH   Final Result         1.  Extensive fluid/phlegmon and free intraperitoneal air within the right lower quadrant. The appendix is not visualized. Findings likely related to ruptured appendicitis. Differential considerations would include perforated cecal diverticulitis or    typhlitis. Free air and inflammatory changes extends to the right mid abdomen peripherally. Surgical consultation is recommended.   2.  Colonic diverticulosis.   3.  Severe atherosclerosis.   4.  Cardiomegaly.   5.  Mild hepatomegaly and mild nodularity of the liver margin suggesting morphologic changes of chronic liver disease.   6.  Mild biliary dilatation in this postcholecystectomy patient.               DX-CHEST-PORTABLE (1 VIEW)    (Results Pending)       ASSESSMENT AND PLAN:     Septic shock (HCC)- (present on admission)  Assessment & Plan  7/31: high lactate, oliguric and hypotensive post op  8/1: lactate and urine output improved following 7 liter resuscitation. Levophed to support MAP.  8/2 - 8/3 weaning levophed. UOP  Adequate. Added steroids  8/3    Respiratory failure following trauma and surgery (HCC)- (present on admission)  Assessment & Plan  Full ventilatory support. Ensure optimal oxygenation and mitigate secondary injury.  Trauma respiratory protocol  8/2 extubated.   8/3 high risk for reintubation    Perforated appendicitis- (present on admission)  Assessment & Plan  7/31 perforated appendix with retrocolic abscess.  Small bowel and right colon resection.  RLQ drain.  8/2 zosyn day 3    [facility] [place] [facility]  Dr. Harris      Paroxysmal atrial fibrillation (CMS-HCC)- (present on admission)  Assessment & Plan  Chronic afib complicated by RVR  8/1 Digoxin load and metoprolol  8/3 digoxin level appropriate.  Hold NOAC.     Adrenal insufficiency ([facility])  Assessment & Plan  Failure to fully wean from vasopressors despite adequate resuscitation  8/3 empiric steroids started; anticipate rapid wean.    Acute renal insufficiency  Assessment & Plan  Admission Cr elevated associated with infection  Improved with resuscitation  Trend renal indices, avoid nephrotoxins    HTN (hypertension)- (present on admission)  Assessment & Plan  Takes multiple agents at home for hypertension  Held in acute setting with septic shock  Restart when appropriate    No contraindication to anticoagulation therapy  Assessment & Plan  8/1 Begin Lovenox    GLORIA (obstructive sleep apnea)- (present on admission)  Assessment & Plan  Refusing CPAP until she gets formal sleep study.     Findings discussed with  and he consents to surgery  Plan procedures been explained.  He understands she will come back to the ICU intubated and most likely with an open abdomen.       ____________________________________     Karthikeyan Llanos M.D.    DD: 8/4/2020  10:36 PM

## 2020-08-05 NOTE — PROGRESS NOTES
2RN Skin check completed with Deysi LESTER.     Following areas of concern noted:  Open abdomen with abthera in place.  Old HAYDEE site with tegaderm and gauze with moderate fluid collection.  Redness under breasts.   Mottled feet  Boggy heels.  Bruising to bilateral arms.       Interventions include:  Low airloss mattress.   Q2h turns.  Heels elevated on pillows.   ETT repositioned q2h.   Skin free of all devices.

## 2020-08-05 NOTE — PROGRESS NOTES
Dr. Dey updated regarding patient's low urine output and trending vital signs. q6 BMP and CBC ordered and infuse 2L LR overnight

## 2020-08-05 NOTE — ASSESSMENT & PLAN NOTE
8/4 recurrent sepsis. OR for exploration - anastomosis (side-side) intact but end of colon perforated with feculent peritonitits  Resection with discontinuity, ABthera  8/7 Washout  8/9 Primary anastamosis but still open  8/11 abdomen closed  Karthikeyan Llanso MD - [facility]

## 2020-08-05 NOTE — OR SURGEON
Immediate Post OP Note    PreOp Diagnosis: Sepsis / perforated viscus - likely at ileocolic anastamosis    PostOp Diagnosis: Sepsis / perforated viscus - leak at the colonic staple line    Procedure(s):  LAPAROTOMY, EXPLORATORY - Wound Class: Dirty or Infected  BOWEL RESECTION - Wound Class: Dirty or Infected  TEMPORARY ABDOMINAL CLOSURE > 50 cm2 - Wound Class: Dirty or Infected    Surgeon(s):  Karthikeyan Llanos M.D.    Anesthesiologist/Type of Anesthesia:  Anesthesiologist: Jose Enrique Gagnon M.D./General    Surgical Staff:  Circulator: Katie Huggins R.N.  Scrub Person: Mane Henry  [place]: Marcy Parrish R.N.    Specimens removed if any:  * No specimens in log *    Estimated Blood Loss: < 50 cc    Findings: significant contamination of abdomen R > L, bowel contents leaking from colonic staple line.  Resection of ileocolonic anastomosis - bowel left in discontinuity  Temporary abdominal closure with ABThera    Complications: none        8/5/2020 9:42 AM Karthikeyan Llanos M.D.

## 2020-08-05 NOTE — ANESTHESIA PREPROCEDURE EVALUATION
S/p perforated appendicitis, hemicolectomy, now deteriorating.    Relevant Problems   ANESTHESIA   (+) GLORIA (obstructive sleep apnea)      CARDIAC   (+) HTN (hypertension)   (+) Paroxysmal atrial fibrillation (CMS-HCC)         (+) Acute renal insufficiency       Physical Exam    Airway   Mallampati: II  TM distance: >3 FB  Neck ROM: full       Cardiovascular - normal exam  Rhythm: regular  Rate: normal  (-) murmur     Dental - normal exam           Pulmonary - normal exam  Breath sounds clear to auscultation     Abdominal    Neurological - normal exam                 Anesthesia Plan    ASA 3- EMERGENT   ASA physical status 3 criteria: respiratory insufficiency or compromiseASA physical status emergent criteria: sepsis    Plan - general             Induction: intravenous    Postoperative Plan: Postoperative administration of opioids is intended.    Pertinent diagnostic labs and testing reviewed    Informed Consent:    Anesthetic plan and risks discussed with patient.    Use of blood products discussed with: patient whom consented to blood products.

## 2020-08-05 NOTE — CARE PLAN
Problem: Ventilation Defect:  Goal: Ability to achieve and maintain unassisted ventilation or tolerate decreased levels of ventilator support  Outcome: PROGRESSING SLOWER THAN EXPECTED   Adult Ventilation Update    Total Vent Days: 1    Patient Lines/Drains/Airways Status      Active Airway       Name: Placement date: Placement time: Site: Days:    Airway ETT Oral 7.5  08/05/20   --   Oral  1                                  $ FVC / Vital Capacity (liters) : 0 (08/05/20 0710)  NIF (cm H2O) : 0 (08/05/20 0710)  Rapid Shallow Breathing Index (RR/VT): 141 (08/05/20 0710)    Length of Weaning Trial Length of Weaning Trial (Hours): 5 minutes (08/05/20 0710)      (ASV) % MIN VOL: 120 (08/05/20 1428)  ASV Inspiratory Pressures-14  % Spontaneous -varies        Events/Summary/Plan: to asv after 5 minutes spont, High rr (08/05/20 0710)

## 2020-08-05 NOTE — PROGRESS NOTES
Edvin from Lab called with critical result of  Calcium 6.9 and BUN 79. Critical lab result read back to RN.   Dr. Newman notified of critical lab result.  Critical lab result read back.  RN updated.

## 2020-08-06 ENCOUNTER — APPOINTMENT (OUTPATIENT)
Dept: RADIOLOGY | Facility: MEDICAL CENTER | Age: 75
DRG: 853 | End: 2020-08-06
Attending: SURGERY
Payer: MEDICARE

## 2020-08-06 PROBLEM — D64.9 ANEMIA: Status: ACTIVE | Noted: 2020-08-06

## 2020-08-06 LAB
ALBUMIN SERPL BCP-MCNC: 2.6 G/DL (ref 3.2–4.9)
ALBUMIN/GLOB SERPL: 1.2 G/DL
ALP SERPL-CCNC: 126 U/L (ref 30–99)
ALT SERPL-CCNC: 273 U/L (ref 2–50)
ANION GAP SERPL CALC-SCNC: 17 MMOL/L (ref 7–16)
ANION GAP SERPL CALC-SCNC: 18 MMOL/L (ref 7–16)
ANISOCYTOSIS BLD QL SMEAR: ABNORMAL
ANISOCYTOSIS BLD QL SMEAR: ABNORMAL
AST SERPL-CCNC: 652 U/L (ref 12–45)
BASOPHILS # BLD AUTO: 0 % (ref 0–1.8)
BASOPHILS # BLD AUTO: 0.9 % (ref 0–1.8)
BASOPHILS # BLD: 0 K/UL (ref 0–0.12)
BASOPHILS # BLD: 0.21 K/UL (ref 0–0.12)
BILIRUB SERPL-MCNC: 1.7 MG/DL (ref 0.1–1.5)
BUN SERPL-MCNC: 102 MG/DL (ref 8–22)
BUN SERPL-MCNC: 103 MG/DL (ref 8–22)
BURR CELLS BLD QL SMEAR: NORMAL
CALCIUM SERPL-MCNC: 8.3 MG/DL (ref 8.5–10.5)
CALCIUM SERPL-MCNC: 8.3 MG/DL (ref 8.5–10.5)
CHLORIDE SERPL-SCNC: 109 MMOL/L (ref 96–112)
CHLORIDE SERPL-SCNC: 110 MMOL/L (ref 96–112)
CO2 SERPL-SCNC: 18 MMOL/L (ref 20–33)
CO2 SERPL-SCNC: 19 MMOL/L (ref 20–33)
CREAT SERPL-MCNC: 2.52 MG/DL (ref 0.5–1.4)
CREAT SERPL-MCNC: 2.62 MG/DL (ref 0.5–1.4)
DACRYOCYTES BLD QL SMEAR: NORMAL
EOSINOPHIL # BLD AUTO: 0 K/UL (ref 0–0.51)
EOSINOPHIL # BLD AUTO: 0 K/UL (ref 0–0.51)
EOSINOPHIL NFR BLD: 0 % (ref 0–6.9)
EOSINOPHIL NFR BLD: 0 % (ref 0–6.9)
ERYTHROCYTE [DISTWIDTH] IN BLOOD BY AUTOMATED COUNT: 62.5 FL (ref 35.9–50)
ERYTHROCYTE [DISTWIDTH] IN BLOOD BY AUTOMATED COUNT: 66.3 FL (ref 35.9–50)
GIANT PLATELETS BLD QL SMEAR: NORMAL
GLOBULIN SER CALC-MCNC: 2.1 G/DL (ref 1.9–3.5)
GLUCOSE BLD-MCNC: 135 MG/DL (ref 65–99)
GLUCOSE BLD-MCNC: 135 MG/DL (ref 65–99)
GLUCOSE BLD-MCNC: 138 MG/DL (ref 65–99)
GLUCOSE BLD-MCNC: 144 MG/DL (ref 65–99)
GLUCOSE SERPL-MCNC: 145 MG/DL (ref 65–99)
GLUCOSE SERPL-MCNC: 152 MG/DL (ref 65–99)
HCT VFR BLD AUTO: 18.6 % (ref 37–47)
HCT VFR BLD AUTO: 22.3 % (ref 37–47)
HGB BLD-MCNC: 5.9 G/DL (ref 12–16)
HGB BLD-MCNC: 7.3 G/DL (ref 12–16)
HYPOCHROMIA BLD QL SMEAR: ABNORMAL
LYMPHOCYTES # BLD AUTO: 1.42 K/UL (ref 1–4.8)
LYMPHOCYTES # BLD AUTO: 1.67 K/UL (ref 1–4.8)
LYMPHOCYTES NFR BLD: 6.1 % (ref 22–41)
LYMPHOCYTES NFR BLD: 8.6 % (ref 22–41)
MACROCYTES BLD QL SMEAR: ABNORMAL
MACROCYTES BLD QL SMEAR: ABNORMAL
MAGNESIUM SERPL-MCNC: 1.9 MG/DL (ref 1.5–2.5)
MANUAL DIFF BLD: NORMAL
MANUAL DIFF BLD: NORMAL
MCH RBC QN AUTO: 33.1 PG (ref 27–33)
MCH RBC QN AUTO: 33.6 PG (ref 27–33)
MCHC RBC AUTO-ENTMCNC: 31.9 G/DL (ref 33.6–35)
MCHC RBC AUTO-ENTMCNC: 32.7 G/DL (ref 33.6–35)
MCV RBC AUTO: 102.8 FL (ref 81.4–97.8)
MCV RBC AUTO: 103.9 FL (ref 81.4–97.8)
METAMYELOCYTES NFR BLD MANUAL: 0.9 %
MONOCYTES # BLD AUTO: 0.33 K/UL (ref 0–0.85)
MONOCYTES # BLD AUTO: 1.21 K/UL (ref 0–0.85)
MONOCYTES NFR BLD AUTO: 1.7 % (ref 0–13.4)
MONOCYTES NFR BLD AUTO: 5.2 % (ref 0–13.4)
MORPHOLOGY BLD-IMP: NORMAL
MORPHOLOGY BLD-IMP: NORMAL
MYELOCYTES NFR BLD MANUAL: 0.9 %
NEUTROPHILS # BLD AUTO: 17.4 K/UL (ref 2–7.15)
NEUTROPHILS # BLD AUTO: 19.98 K/UL (ref 2–7.15)
NEUTROPHILS NFR BLD: 82.8 % (ref 44–72)
NEUTROPHILS NFR BLD: 83.5 % (ref 44–72)
NEUTS BAND NFR BLD MANUAL: 2.6 % (ref 0–10)
NEUTS BAND NFR BLD MANUAL: 6.9 % (ref 0–10)
NRBC # BLD AUTO: 0.04 K/UL
NRBC # BLD AUTO: 0.04 K/UL
NRBC BLD-RTO: 0.2 /100 WBC
NRBC BLD-RTO: 0.2 /100 WBC
OVALOCYTES BLD QL SMEAR: NORMAL
PHOSPHATE SERPL-MCNC: 4.9 MG/DL (ref 2.5–4.5)
PLATELET # BLD AUTO: 315 K/UL (ref 164–446)
PLATELET # BLD AUTO: 348 K/UL (ref 164–446)
PLATELET BLD QL SMEAR: NORMAL
PLATELET BLD QL SMEAR: NORMAL
PMV BLD AUTO: 10.1 FL (ref 9–12.9)
PMV BLD AUTO: 10.2 FL (ref 9–12.9)
POIKILOCYTOSIS BLD QL SMEAR: NORMAL
POIKILOCYTOSIS BLD QL SMEAR: NORMAL
POLYCHROMASIA BLD QL SMEAR: NORMAL
POTASSIUM SERPL-SCNC: 4.7 MMOL/L (ref 3.6–5.5)
POTASSIUM SERPL-SCNC: 4.8 MMOL/L (ref 3.6–5.5)
PROT SERPL-MCNC: 4.7 G/DL (ref 6–8.2)
RBC # BLD AUTO: 1.78 M/UL (ref 4.2–5.4)
RBC # BLD AUTO: 2.17 M/UL (ref 4.2–5.4)
RBC BLD AUTO: PRESENT
RBC BLD AUTO: PRESENT
SODIUM SERPL-SCNC: 145 MMOL/L (ref 135–145)
SODIUM SERPL-SCNC: 146 MMOL/L (ref 135–145)
TOXIC GRANULES BLD QL SMEAR: SLIGHT
WBC # BLD AUTO: 19.4 K/UL (ref 4.8–10.8)
WBC # BLD AUTO: 23.2 K/UL (ref 4.8–10.8)

## 2020-08-06 PROCEDURE — 82962 GLUCOSE BLOOD TEST: CPT | Mod: 91

## 2020-08-06 PROCEDURE — 94003 VENT MGMT INPAT SUBQ DAY: CPT

## 2020-08-06 PROCEDURE — 85027 COMPLETE CBC AUTOMATED: CPT | Mod: 91

## 2020-08-06 PROCEDURE — 36430 TRANSFUSION BLD/BLD COMPNT: CPT

## 2020-08-06 PROCEDURE — 700111 HCHG RX REV CODE 636 W/ 250 OVERRIDE (IP): Performed by: SURGERY

## 2020-08-06 PROCEDURE — 99291 CRITICAL CARE FIRST HOUR: CPT | Performed by: SURGERY

## 2020-08-06 PROCEDURE — 80053 COMPREHEN METABOLIC PANEL: CPT

## 2020-08-06 PROCEDURE — 80048 BASIC METABOLIC PNL TOTAL CA: CPT

## 2020-08-06 PROCEDURE — 85007 BL SMEAR W/DIFF WBC COUNT: CPT | Mod: 91

## 2020-08-06 PROCEDURE — 71045 X-RAY EXAM CHEST 1 VIEW: CPT

## 2020-08-06 PROCEDURE — 700105 HCHG RX REV CODE 258: Performed by: SURGERY

## 2020-08-06 PROCEDURE — 94770 HCHG CO2 EXPIRED GAS DETERMINATION: CPT

## 2020-08-06 PROCEDURE — 83735 ASSAY OF MAGNESIUM: CPT

## 2020-08-06 PROCEDURE — P9047 ALBUMIN (HUMAN), 25%, 50ML: HCPCS | Mod: JG | Performed by: SURGERY

## 2020-08-06 PROCEDURE — 700101 HCHG RX REV CODE 250: Performed by: SURGERY

## 2020-08-06 PROCEDURE — 770022 HCHG ROOM/CARE - ICU (200)

## 2020-08-06 PROCEDURE — 86923 COMPATIBILITY TEST ELECTRIC: CPT

## 2020-08-06 PROCEDURE — P9016 RBC LEUKOCYTES REDUCED: HCPCS

## 2020-08-06 PROCEDURE — 84100 ASSAY OF PHOSPHORUS: CPT

## 2020-08-06 RX ADMIN — HYDROMORPHONE HYDROCHLORIDE 0.5 MG: 1 INJECTION, SOLUTION INTRAMUSCULAR; INTRAVENOUS; SUBCUTANEOUS at 07:26

## 2020-08-06 RX ADMIN — PIPERACILLIN SODIUM, TAZOBACTAM SODIUM 4.5 G: 4; .5 INJECTION, POWDER, LYOPHILIZED, FOR SOLUTION INTRAVENOUS at 05:22

## 2020-08-06 RX ADMIN — HYDROCORTISONE SODIUM SUCCINATE 25 MG: 100 INJECTION, POWDER, FOR SOLUTION INTRAMUSCULAR; INTRAVENOUS at 12:06

## 2020-08-06 RX ADMIN — SODIUM CHLORIDE, POTASSIUM CHLORIDE, SODIUM LACTATE AND CALCIUM CHLORIDE: 600; 310; 30; 20 INJECTION, SOLUTION INTRAVENOUS at 20:14

## 2020-08-06 RX ADMIN — HYDROCORTISONE SODIUM SUCCINATE 25 MG: 100 INJECTION, POWDER, FOR SOLUTION INTRAMUSCULAR; INTRAVENOUS at 23:41

## 2020-08-06 RX ADMIN — METOPROLOL TARTRATE 10 MG: 5 INJECTION, SOLUTION INTRAVENOUS at 17:34

## 2020-08-06 RX ADMIN — SODIUM CHLORIDE, POTASSIUM CHLORIDE, SODIUM LACTATE AND CALCIUM CHLORIDE: 600; 310; 30; 20 INJECTION, SOLUTION INTRAVENOUS at 10:31

## 2020-08-06 RX ADMIN — METOPROLOL TARTRATE 10 MG: 5 INJECTION, SOLUTION INTRAVENOUS at 12:09

## 2020-08-06 RX ADMIN — ALBUMIN (HUMAN) 25 G: 5 SOLUTION INTRAVENOUS at 05:22

## 2020-08-06 RX ADMIN — PIPERACILLIN SODIUM, TAZOBACTAM SODIUM 4.5 G: 4; .5 INJECTION, POWDER, LYOPHILIZED, FOR SOLUTION INTRAVENOUS at 20:16

## 2020-08-06 RX ADMIN — PROPOFOL 20 MCG/KG/MIN: 10 INJECTION, EMULSION INTRAVENOUS at 18:26

## 2020-08-06 RX ADMIN — HYDROCORTISONE SODIUM SUCCINATE 50 MG: 100 INJECTION, POWDER, FOR SOLUTION INTRAMUSCULAR; INTRAVENOUS at 05:21

## 2020-08-06 RX ADMIN — HYDROCORTISONE SODIUM SUCCINATE 25 MG: 100 INJECTION, POWDER, FOR SOLUTION INTRAMUSCULAR; INTRAVENOUS at 17:30

## 2020-08-06 RX ADMIN — HYDROMORPHONE HYDROCHLORIDE 0.5 MG: 1 INJECTION, SOLUTION INTRAMUSCULAR; INTRAVENOUS; SUBCUTANEOUS at 04:45

## 2020-08-06 RX ADMIN — METOPROLOL TARTRATE 10 MG: 5 INJECTION, SOLUTION INTRAVENOUS at 00:11

## 2020-08-06 RX ADMIN — HYDROCORTISONE SODIUM SUCCINATE 50 MG: 100 INJECTION, POWDER, FOR SOLUTION INTRAMUSCULAR; INTRAVENOUS at 00:12

## 2020-08-06 RX ADMIN — METOPROLOL TARTRATE 10 MG: 5 INJECTION, SOLUTION INTRAVENOUS at 23:43

## 2020-08-06 RX ADMIN — PIPERACILLIN SODIUM, TAZOBACTAM SODIUM 4.5 G: 4; .5 INJECTION, POWDER, LYOPHILIZED, FOR SOLUTION INTRAVENOUS at 12:16

## 2020-08-06 RX ADMIN — FAMOTIDINE 20 MG: 10 INJECTION, SOLUTION INTRAVENOUS at 05:22

## 2020-08-06 RX ADMIN — PROPOFOL 30 MCG/KG/MIN: 10 INJECTION, EMULSION INTRAVENOUS at 22:52

## 2020-08-06 RX ADMIN — METOPROLOL TARTRATE 10 MG: 5 INJECTION, SOLUTION INTRAVENOUS at 05:21

## 2020-08-06 RX ADMIN — NYSTATIN: 100000 POWDER TOPICAL at 05:21

## 2020-08-06 RX ADMIN — NYSTATIN: 100000 POWDER TOPICAL at 17:28

## 2020-08-06 ASSESSMENT — FIBROSIS 4 INDEX: FIB4 SCORE: 6.94

## 2020-08-06 NOTE — PROGRESS NOTES
Lab called with critical result of Hgb at 5.9. Critical lab result read back to .   Dr. Newman notified of critical lab.  Critical lab result read back by Dr. Newman. Telephone order received to transfuse 1 unit PRBC

## 2020-08-06 NOTE — ASSESSMENT & PLAN NOTE
Persistent critical anemia associated with critical illness and multiple surgeries.Critical anemia  8/6 Transfused 1 unit of packed red blood cells.  8/7 Transfused 2 units of packed red blood cells.  8/8 Transfused 1 unit of packed red blood cells.  8/9 Transfused 1 unit of packed red blood cells.  8/16 Transfused 1 unit of packed red blood cells.  Continue to trend closely.  Transfuse 1 unit PRBC's for hemoglobin less than 7.

## 2020-08-06 NOTE — PROGRESS NOTES
2 RN skin check complete     Preventative measures in place:  Q 2 hour turns, pillows in place for support and cushioning, heels floated, 2 RN skin assessments, ensuring medical devices/tubing are not under patient, mepilex in place,low air loss mattress,  skin assessed under bony prominences and high pressure point areas, repositioning of devices, interdry to perineal and pannus folds    Following areas of concern with interventions listed:   - Open abdomen with wound vac in place   - old HAYDEE site with gauze and tape (unable to remove dressing due to abthera dressing)  - blanching redness under breast folds, resolving (nystain ordered and applied per MAR)   - mottled dusky feet (warm packs and blankets, heels with mepilex, and floated)  - bruising to BUE (BP cuff rotated)  - weeping site on right hand from old IV

## 2020-08-06 NOTE — PROGRESS NOTES
2RN Skin check completed with Bettie LESTER.      Following areas of concern noted:  Open abdomen with abthera in place.  Old HAYDEE site with tegaderm and gauze with moderate fluid collection.  Redness under breasts.   Pink heels.  Bruising to bilateral arms.         Interventions include:  Low airloss mattress.   Q2h turns.  Heels elevated on pillows.   ETT repositioned q2h.   Skin free of all devices.

## 2020-08-06 NOTE — OP REPORT
DATE OF SERVICE:  08/05/2020    PREOPERATIVE DIAGNOSES:  1. Sepsis.  2. Perforated viscus, likely related to recent surgery with ileocolonic   anastomosis.    POSTOPERATIVE DIAGNOSES:  1. Sepsis.  2. Perforated viscus with leak from colonic staple line.    PROCEDURE PERFORMED:  1. Reexploration of recent laparotomy.  2. Bowel resection segment of distal small bowel as well as proximal colon.  3. Temporary abdominal closure with ABThera greater than 50 cm2.    SURGEON:  Karthikeyan Llanos MD.    ANESTHESIOLOGIST:  Jose Enrique Gagnon MD.    ANESTHESIA:  General endotracheal anesthesia.    SPECIMEN:  Ileocolonic anastomosis.    ESTIMATED BLOOD LOSS:  50 mL    FINDINGS:  Significant contamination, greatest in the right abdomen, noted to   have dark bowel contents leaking from the colonic staple line from the   resection of the ileocolonic anastomosis and the bowel was left in   discontinuity.  Peritoneal cavity was irrigated with approximately 6 L of warm   saline.  Temporary abdominal closure was done with an ABThera.    INDICATIONS:  The patient is a 74-year-old female who initially underwent an   exploration on 07/31/2020, found to have perforated appendicitis with a   retrocolic abscess, underwent a resection of this with an ileocecectomy with   an ileocolonic anastomosis.  Postoperatively, the patient returned to the ICU,   she was on ventilator, did require pressors for several days, ultimately was   weaned from pressors.  The day prior to returning to the OR, the patient had   several bowel movements and was noted to have a change from the drain from   serosanguineous to a dark color, initially bloody and then these become very   thin.  In the meantime, the patient had significantly deteriorated with the   decrease in mental status and requirement of additional fluids and pressors.    DESCRIPTION OF PROCEDURE:  The patient in supine position, general anesthesia   was administered.  Previous midline staples were  removed.  Tissues were    manually with a laparotomy sponge.  The PDS suture was identified.    It was cut extending superiorly to the knot and then inferior to the knot.    The intervening suture was removed.  An Omni retractor was placed.  On   exploration, found large amount of contamination in the right side of the   abdomen.  Due to her size and the amount of edema and contamination of ___, I   extended the incision superiorly.  Scalpel was used followed by cautery.    There was a clear area of leaking of stool from the area of the anastomosis, a   sect proximal to this on the small bowel was chosen, mesentery was opened.    GI stapler was placed across the bowel and fired.  The colon was mobilized ___   placed in the right upper quadrant to bring this more to the midline.  Again,   the mesentery was opened on the transverse colon.  GI stapler was placed   across this and fired.  The mesentery between these 2 sites was taken down   carefully with a LigaSure, finally freeing the bowel.  At this point, it was   visualized that the staple line on the colon had failed as I was able to pass   instruments through this without any resistance.  Specimen was passed off.  At   this time, the mesentery was checked.  There was no evidence of bleeding from   the staple lines or the mesentery.  Fluid in the right side of the abdomen   was evacuated.  Then, the entire peritoneal cavity was thoroughly irrigated   with multiple liters of warm saline until the effluent was clear.  Bowel was   left in discontinuity.  The ABThera dressing was placed full size, peritoneal   cavity to left gutter, right gutter, pelvis and up on to the liver.  Blue   sponge was placed over this, second blue sponge was placed at the level of   skin and secured with staples.  Surrounding skin was clean and dry.  Plastic   dressing was placed over this.  The patient tolerated the procedure well and   was transferred directly back to the  intensive care unit.             ____________________________________     MD REHANA JJ / JASBIR    DD:  08/05/2020 19:22:00  DT:  08/05/2020 20:03:07    D#:  [id number]  Job#:  [id number]

## 2020-08-06 NOTE — CARE PLAN
Problem: Infection  Goal: Will remain free from infection  Outcome: PROGRESSING SLOWER THAN EXPECTED  Note: Assessing for signs and symptoms of infection, standard and VAP precautions in place, antibiotics administered per MAR      Problem: Respiratory:  Goal: Respiratory status will improve  Outcome: PROGRESSING SLOWER THAN EXPECTED  Note:   Collaborating with IDT and RT to meet ventilator goals, assessing and monitoring respiratory status, VAP protocol in place, ETT and oral suction performed as needed

## 2020-08-06 NOTE — PROGRESS NOTES
Trauma / Surgical Daily Progress Note    Date of Service  8/6/2020    Chief Complaint  74 y.o. female admitted 7/31/2020 with Perforation bowel (HCC)    Interval Events  Critically ill. On full vent support. Off pressors. Fluid resuscitation finished with improving renal function. Plan return to OR tomorrow. Cont IV abx. Critical anemia prompting transfusion.    Review of Systems  Review of Systems   Unable to perform ROS: Intubated        Vital Signs for last 24 hours  Temp:  [36.6 °C (97.9 °F)] 36.6 °C (97.9 °F)  Pulse:  [] 74  Resp:  [17-34] 18  BP: ()/(49-80) 91/53  SpO2:  [86 %-99 %] 86 %    Hemodynamic parameters for last 24 hours  CVP:  [13 MM HG-304 MM HG] 288 MM HG    Respiratory Data     Respiration: 18, Pulse Oximetry: (!) 86 %     Work Of Breathing / Effort: Vented  RUL Breath Sounds: Crackles, RML Breath Sounds: Crackles, RLL Breath Sounds: Diminished, ELISSA Breath Sounds: Crackles, LLL Breath Sounds: Diminished    Physical Exam  Physical Exam  Constitutional:       Appearance: She is obese.   Neck:      Musculoskeletal: Neck supple.   Cardiovascular:      Rate and Rhythm: Normal rate.   Pulmonary:      Effort: Pulmonary effort is normal.   Abdominal:      General: There is distension.      Tenderness: There is abdominal tenderness.      Comments: Apthera in place   Genitourinary:     Comments: Orellana to gravity  Musculoskeletal: Normal range of motion.      Left lower leg: Edema present.   Skin:     General: Skin is warm.         Laboratory  Recent Results (from the past 24 hour(s))   ACCU-CHEK GLUCOSE    Collection Time: 08/05/20 11:50 AM   Result Value Ref Range    Glucose - Accu-Ck 121 (H) 65 - 99 mg/dL   Basic Metabolic Panel    Collection Time: 08/05/20 12:40 PM   Result Value Ref Range    Sodium 147 (H) 135 - 145 mmol/L    Potassium 4.7 3.6 - 5.5 mmol/L    Chloride 114 (H) 96 - 112 mmol/L    Co2 14 (L) 20 - 33 mmol/L    Glucose 127 (H) 65 - 99 mg/dL    Bun 79 (HH) 8 - 22 mg/dL     Creatinine 2.11 (H) 0.50 - 1.40 mg/dL    Calcium 6.9 (LL) 8.5 - 10.5 mg/dL    Anion Gap 19.0 (H) 7.0 - 16.0   ESTIMATED GFR    Collection Time: 08/05/20 12:40 PM   Result Value Ref Range    GFR If  28 (A) >60 mL/min/1.73 m 2    GFR If Non African American 23 (A) >60 mL/min/1.73 m 2   ACCU-CHEK GLUCOSE    Collection Time: 08/05/20  5:45 PM   Result Value Ref Range    Glucose - Accu-Ck 147 (H) 65 - 99 mg/dL   Basic Metabolic Panel    Collection Time: 08/05/20  6:10 PM   Result Value Ref Range    Sodium 146 (H) 135 - 145 mmol/L    Potassium 4.8 3.6 - 5.5 mmol/L    Chloride 108 96 - 112 mmol/L    Co2 18 (L) 20 - 33 mmol/L    Glucose 159 (H) 65 - 99 mg/dL    Bun 97 (HH) 8 - 22 mg/dL    Creatinine 2.49 (H) 0.50 - 1.40 mg/dL    Calcium 8.2 (L) 8.5 - 10.5 mg/dL    Anion Gap 20.0 (H) 7.0 - 16.0   ESTIMATED GFR    Collection Time: 08/05/20  6:10 PM   Result Value Ref Range    GFR If  23 (A) >60 mL/min/1.73 m 2    GFR If Non  19 (A) >60 mL/min/1.73 m 2   ACCU-CHEK GLUCOSE    Collection Time: 08/06/20 12:25 AM   Result Value Ref Range    Glucose - Accu-Ck 135 (H) 65 - 99 mg/dL   Magnesium: Every Monday and Thursday AM    Collection Time: 08/06/20 12:26 AM   Result Value Ref Range    Magnesium 1.9 1.5 - 2.5 mg/dL   Phosphorus: Every Monday and Thursday AM    Collection Time: 08/06/20 12:26 AM   Result Value Ref Range    Phosphorus 4.9 (H) 2.5 - 4.5 mg/dL   Basic Metabolic Panel    Collection Time: 08/06/20 12:26 AM   Result Value Ref Range    Sodium 145 135 - 145 mmol/L    Potassium 4.7 3.6 - 5.5 mmol/L    Chloride 109 96 - 112 mmol/L    Co2 18 (L) 20 - 33 mmol/L    Glucose 145 (H) 65 - 99 mg/dL    Bun 103 (HH) 8 - 22 mg/dL    Creatinine 2.62 (H) 0.50 - 1.40 mg/dL    Calcium 8.3 (L) 8.5 - 10.5 mg/dL    Anion Gap 18.0 (H) 7.0 - 16.0   ESTIMATED GFR    Collection Time: 08/06/20 12:26 AM   Result Value Ref Range    GFR If  22 (A) >60 mL/min/1.73 m 2    GFR If Non   18 (A) >60 mL/min/1.73 m 2   CBC WITH DIFFERENTIAL    Collection Time: 08/06/20  1:00 AM   Result Value Ref Range    WBC 19.4 (H) 4.8 - 10.8 K/uL    RBC 1.78 (L) 4.20 - 5.40 M/uL    Hemoglobin 5.9 (LL) 12.0 - 16.0 g/dL    Hematocrit 18.6 (L) 37.0 - 47.0 %    .9 (H) 81.4 - 97.8 fL    MCH 33.1 (H) 27.0 - 33.0 pg    MCHC 31.9 (L) 33.6 - 35.0 g/dL    RDW 66.3 (H) 35.9 - 50.0 fL    Platelet Count 315 164 - 446 K/uL    MPV 10.1 9.0 - 12.9 fL    Neutrophils-Polys 82.80 (H) 44.00 - 72.00 %    Lymphocytes 8.60 (L) 22.00 - 41.00 %    Monocytes 1.70 0.00 - 13.40 %    Eosinophils 0.00 0.00 - 6.90 %    Basophils 0.00 0.00 - 1.80 %    Nucleated RBC 0.20 /100 WBC    Neutrophils (Absolute) 17.40 (H) 2.00 - 7.15 K/uL    Lymphs (Absolute) 1.67 1.00 - 4.80 K/uL    Monos (Absolute) 0.33 0.00 - 0.85 K/uL    Eos (Absolute) 0.00 0.00 - 0.51 K/uL    Baso (Absolute) 0.00 0.00 - 0.12 K/uL    NRBC (Absolute) 0.04 K/uL    Hypochromia 1+     Anisocytosis 2+     Macrocytosis 2+    DIFFERENTIAL MANUAL    Collection Time: 08/06/20  1:00 AM   Result Value Ref Range    Bands-Stabs 6.90 0.00 - 10.00 %    Manual Diff Status PERFORMED    PERIPHERAL SMEAR REVIEW    Collection Time: 08/06/20  1:00 AM   Result Value Ref Range    Peripheral Smear Review see below    PLATELET ESTIMATE    Collection Time: 08/06/20  1:00 AM   Result Value Ref Range    Plt Estimation Normal    MORPHOLOGY    Collection Time: 08/06/20  1:00 AM   Result Value Ref Range    RBC Morphology Present     Giant Platelets 1+     Polychromia 1+     Poikilocytosis 1+     Ovalocytes 1+     Tear Drop Cells 1+    ACCU-CHEK GLUCOSE    Collection Time: 08/06/20  5:18 AM   Result Value Ref Range    Glucose - Accu-Ck 138 (H) 65 - 99 mg/dL   Comp Metabolic Panel    Collection Time: 08/06/20  5:20 AM   Result Value Ref Range    Sodium 146 (H) 135 - 145 mmol/L    Potassium 4.8 3.6 - 5.5 mmol/L    Chloride 110 96 - 112 mmol/L    Co2 19 (L) 20 - 33 mmol/L    Anion Gap 17.0 (H)  7.0 - 16.0    Glucose 152 (H) 65 - 99 mg/dL    Bun 102 (HH) 8 - 22 mg/dL    Creatinine 2.52 (H) 0.50 - 1.40 mg/dL    Calcium 8.3 (L) 8.5 - 10.5 mg/dL    AST(SGOT) 652 (H) 12 - 45 U/L    ALT(SGPT) 273 (H) 2 - 50 U/L    Alkaline Phosphatase 126 (H) 30 - 99 U/L    Total Bilirubin 1.7 (H) 0.1 - 1.5 mg/dL    Albumin 2.6 (L) 3.2 - 4.9 g/dL    Total Protein 4.7 (L) 6.0 - 8.2 g/dL    Globulin 2.1 1.9 - 3.5 g/dL    A-G Ratio 1.2 g/dL   CBC WITH DIFFERENTIAL    Collection Time: 08/06/20  5:20 AM   Result Value Ref Range    WBC 23.2 (H) 4.8 - 10.8 K/uL    RBC 2.17 (L) 4.20 - 5.40 M/uL    Hemoglobin 7.3 (L) 12.0 - 16.0 g/dL    Hematocrit 22.3 (L) 37.0 - 47.0 %    .8 (H) 81.4 - 97.8 fL    MCH 33.6 (H) 27.0 - 33.0 pg    MCHC 32.7 (L) 33.6 - 35.0 g/dL    RDW 62.5 (H) 35.9 - 50.0 fL    Platelet Count 348 164 - 446 K/uL    MPV 10.2 9.0 - 12.9 fL    Neutrophils-Polys 83.50 (H) 44.00 - 72.00 %    Lymphocytes 6.10 (L) 22.00 - 41.00 %    Monocytes 5.20 0.00 - 13.40 %    Eosinophils 0.00 0.00 - 6.90 %    Basophils 0.90 0.00 - 1.80 %    Nucleated RBC 0.20 /100 WBC    Neutrophils (Absolute) 19.98 (H) 2.00 - 7.15 K/uL    Lymphs (Absolute) 1.42 1.00 - 4.80 K/uL    Monos (Absolute) 1.21 (H) 0.00 - 0.85 K/uL    Eos (Absolute) 0.00 0.00 - 0.51 K/uL    Baso (Absolute) 0.21 (H) 0.00 - 0.12 K/uL    NRBC (Absolute) 0.04 K/uL    Anisocytosis 1+     Macrocytosis 1+    ESTIMATED GFR    Collection Time: 08/06/20  5:20 AM   Result Value Ref Range    GFR If  23 (A) >60 mL/min/1.73 m 2    GFR If Non  19 (A) >60 mL/min/1.73 m 2   DIFFERENTIAL MANUAL    Collection Time: 08/06/20  5:20 AM   Result Value Ref Range    Bands-Stabs 2.60 0.00 - 10.00 %    Metamyelocytes 0.90 %    Myelocytes 0.90 %    Manual Diff Status PERFORMED    PERIPHERAL SMEAR REVIEW    Collection Time: 08/06/20  5:20 AM   Result Value Ref Range    Peripheral Smear Review see below    PLATELET ESTIMATE    Collection Time: 08/06/20  5:20 AM   Result  Value Ref Range    Plt Estimation Normal    MORPHOLOGY    Collection Time: 08/06/20  5:20 AM   Result Value Ref Range    RBC Morphology Present     Poikilocytosis 1+     Echinocytes 1+     Toxic Gran Slight        Fluids    Intake/Output Summary (Last 24 hours) at 8/6/2020 1135  Last data filed at 8/6/2020 1000  Gross per 24 hour   Intake 5222.38 ml   Output 2675 ml   Net 2547.38 ml       Core Measures & Quality Metrics  Labs reviewed, Medications reviewed and Radiology images reviewed  Orellana catheter: Critically Ill - Requiring Accurate Measurement of Urinary Output  Central line in place: Need for access    DVT Prophylaxis: Contraindicated - High bleeding risk  DVT prophylaxis - mechanical: SCDs  Ulcer prophylaxis: Yes  Antibiotics: Treating active infection/contamination beyond 24 hours perioperative coverage  Assessed for rehab: Patient unable to tolerate rehabilitation therapeutic regimen    CAITLIN Score  ETOH Screening    Assessment/Plan  Large bowel perforation (HCC)  Assessment & Plan  8/4 recurrent sepsis. OR for exploration - anastomosis (side-side) intact but end of colon perforated with feculent peritonitits  Resection with discontinuity, ABthera  On zosyn  Karthikeyan Llanos MD - [facility] Acute Care Surgery    Acute renal insufficiency  Assessment & Plan  Admission Cr elevated associated with infection  Improved with resuscitation after initial surgery  8/4 oliguria progressed to anuria despite crystalloid resuscitation  8/5 Cr markedly elevated, electrolytes ok. 24 hours of albumin resuscitation.   8/6 Cr trending down with improved UO  Trend renal indices, avoid nephrotoxins    Septic shock (HCC)- (present on admission)  Assessment & Plan  7/31: high lactate, oliguric and hypotensive post op  8/1: lactate and urine output improved following 7 liter resuscitation. Levophed to support MAP.  8/2 - 8/3 weaning levophed. UOP  Adequate. Added steroids 8/3  8/4 - 8/4 recurrent septic shock associated with  leak. Resuscitation ongoing.  8/6 Stabilized off pressors.    Respiratory failure following trauma and surgery (HCC)- (present on admission)  Assessment & Plan  Full ventilatory support. Ensure optimal oxygenation and mitigate secondary injury.  Trauma respiratory protocol  8/2 extubated.   8/3 high risk for reintubation  8/4 Remained intubated after 2nd surgery.   SICU ventilator protocol and bundle.       Perforated appendicitis- (present on admission)  Assessment & Plan  7/31 perforated appendix with retrocolic abscess.  Small bowel and right colon resection.  RLQ drain.  8/4 Returned to OR for anastamotic leak - left in discontinuity  8/6 zosyn day 7 - continue due to leak  Plan return to OR 8/7  [facility]  Dr. Harris      Paroxysmal atrial fibrillation (CMS-[facility])- (present on admission)  Assessment & Plan  Chronic afib complicated by RVR  8/1 Digoxin load and metoprolol  8/3 digoxin level appropriate.      Anemia  Assessment & Plan  Critical anemia  8/6 Hg 5.9 - Transfused 1 unit PRBC  Transfuse for hg < 7    Adrenal insufficiency ([facility])  Assessment & Plan  Failure to fully wean from vasopressors despite adequate resuscitation  8/3 empiric steroids started with liberation from vasopressors  8/6 Start weaning off steroids    HTN (hypertension)- (present on admission)  Assessment & Plan  Takes multiple agents at home for hypertension  Held in acute setting with septic shock  Restart when appropriate    No contraindication to anticoagulation therapy- (present on admission)  Assessment & Plan  8/1 Begin Lovenox  8/5 Held for bleeding  Resume after ex lap on 8/7    GLORIA (obstructive sleep apnea)- (present on admission)  Assessment & Plan  Refusing CPAP until she gets formal sleep study.       Discussed patient condition with RN, RT, Therapies, Pharmacy, Dietary and .  CRITICAL CARE TIME EXCLUDING PROCEDURES: 40    minutes

## 2020-08-07 ENCOUNTER — ANESTHESIA EVENT (OUTPATIENT)
Dept: SURGERY | Facility: MEDICAL CENTER | Age: 75
DRG: 853 | End: 2020-08-07
Payer: MEDICARE

## 2020-08-07 ENCOUNTER — HOSPITAL ENCOUNTER (OUTPATIENT)
Dept: RADIOLOGY | Facility: MEDICAL CENTER | Age: 75
End: 2020-08-07
Attending: SURGERY
Payer: MEDICARE

## 2020-08-07 ENCOUNTER — ANESTHESIA (OUTPATIENT)
Dept: SURGERY | Facility: MEDICAL CENTER | Age: 75
DRG: 853 | End: 2020-08-07
Payer: MEDICARE

## 2020-08-07 ENCOUNTER — APPOINTMENT (OUTPATIENT)
Dept: RADIOLOGY | Facility: MEDICAL CENTER | Age: 75
DRG: 853 | End: 2020-08-07
Attending: SURGERY
Payer: MEDICARE

## 2020-08-07 PROBLEM — I48.91 ATRIAL FIBRILLATION WITH RAPID VENTRICULAR RESPONSE (HCC): Status: ACTIVE | Noted: 2020-08-07

## 2020-08-07 LAB
ABO GROUP BLD: NORMAL
ALBUMIN SERPL BCP-MCNC: 2.2 G/DL (ref 3.2–4.9)
ALBUMIN/GLOB SERPL: 1 G/DL
ALP SERPL-CCNC: 121 U/L (ref 30–99)
ALT SERPL-CCNC: 157 U/L (ref 2–50)
ANION GAP SERPL CALC-SCNC: 18 MMOL/L (ref 7–16)
ANISOCYTOSIS BLD QL SMEAR: ABNORMAL
AST SERPL-CCNC: 270 U/L (ref 12–45)
BARCODED ABORH UBTYP: 6200
BARCODED ABORH UBTYP: 9500
BARCODED PRD CODE UBPRD: NORMAL
BARCODED UNIT NUM UBUNT: NORMAL
BASO STIPL BLD QL SMEAR: NORMAL
BASOPHILS # BLD AUTO: 1.7 % (ref 0–1.8)
BASOPHILS # BLD: 0.53 K/UL (ref 0–0.12)
BILIRUB SERPL-MCNC: 1 MG/DL (ref 0.1–1.5)
BLD GP AB SCN SERPL QL: NORMAL
BUN SERPL-MCNC: 113 MG/DL (ref 8–22)
CALCIUM SERPL-MCNC: 8.1 MG/DL (ref 8.5–10.5)
CHLORIDE SERPL-SCNC: 109 MMOL/L (ref 96–112)
CO2 SERPL-SCNC: 20 MMOL/L (ref 20–33)
COMPONENT R 8504R: NORMAL
COVID ORDER STATUS COVID19: NORMAL
CREAT SERPL-MCNC: 2.46 MG/DL (ref 0.5–1.4)
EOSINOPHIL # BLD AUTO: 0 K/UL (ref 0–0.51)
EOSINOPHIL NFR BLD: 0 % (ref 0–6.9)
ERYTHROCYTE [DISTWIDTH] IN BLOOD BY AUTOMATED COUNT: 62.5 FL (ref 35.9–50)
GLOBULIN SER CALC-MCNC: 2.3 G/DL (ref 1.9–3.5)
GLUCOSE BLD-MCNC: 139 MG/DL (ref 65–99)
GLUCOSE BLD-MCNC: 144 MG/DL (ref 65–99)
GLUCOSE BLD-MCNC: 148 MG/DL (ref 65–99)
GLUCOSE BLD-MCNC: 149 MG/DL (ref 65–99)
GLUCOSE BLD-MCNC: 210 MG/DL (ref 65–99)
GLUCOSE SERPL-MCNC: 168 MG/DL (ref 65–99)
HCT VFR BLD AUTO: 17.4 % (ref 37–47)
HGB BLD-MCNC: 5.4 G/DL (ref 12–16)
HGB BLD-MCNC: 7.6 G/DL (ref 12–16)
HYPOCHROMIA BLD QL SMEAR: ABNORMAL
LYMPHOCYTES # BLD AUTO: 1.07 K/UL (ref 1–4.8)
LYMPHOCYTES NFR BLD: 3.4 % (ref 22–41)
MACROCYTES BLD QL SMEAR: ABNORMAL
MANUAL DIFF BLD: NORMAL
MCH RBC QN AUTO: 32.3 PG (ref 27–33)
MCHC RBC AUTO-ENTMCNC: 31 G/DL (ref 33.6–35)
MCV RBC AUTO: 104.2 FL (ref 81.4–97.8)
METAMYELOCYTES NFR BLD MANUAL: 0.9 %
MONOCYTES # BLD AUTO: 0.28 K/UL (ref 0–0.85)
MONOCYTES NFR BLD AUTO: 0.9 % (ref 0–13.4)
MORPHOLOGY BLD-IMP: NORMAL
MYELOCYTES NFR BLD MANUAL: 0.9 %
NEUTROPHILS # BLD AUTO: 28.73 K/UL (ref 2–7.15)
NEUTROPHILS NFR BLD: 91.5 % (ref 44–72)
NRBC # BLD AUTO: 0.61 K/UL
NRBC BLD-RTO: 1.9 /100 WBC
OVALOCYTES BLD QL SMEAR: NORMAL
PLATELET # BLD AUTO: 353 K/UL (ref 164–446)
PLATELET BLD QL SMEAR: NORMAL
PMV BLD AUTO: 10.4 FL (ref 9–12.9)
POIKILOCYTOSIS BLD QL SMEAR: NORMAL
POLYCHROMASIA BLD QL SMEAR: NORMAL
POTASSIUM SERPL-SCNC: 4.6 MMOL/L (ref 3.6–5.5)
PRODUCT TYPE UPROD: NORMAL
PROMYELOCYTES NFR BLD MANUAL: 0.9 %
PROT SERPL-MCNC: 4.5 G/DL (ref 6–8.2)
RBC # BLD AUTO: 1.67 M/UL (ref 4.2–5.4)
RBC BLD AUTO: PRESENT
RH BLD: NORMAL
SARS-COV-2 RDRP RESP QL NAA+PROBE: NOTDETECTED
SODIUM SERPL-SCNC: 147 MMOL/L (ref 135–145)
SPECIMEN SOURCE: NORMAL
TRIGL SERPL-MCNC: 295 MG/DL (ref 0–149)
UNIT STATUS USTAT: NORMAL
WBC # BLD AUTO: 31.4 K/UL (ref 4.8–10.8)

## 2020-08-07 PROCEDURE — 86850 RBC ANTIBODY SCREEN: CPT

## 2020-08-07 PROCEDURE — 700101 HCHG RX REV CODE 250: Performed by: ANESTHESIOLOGY

## 2020-08-07 PROCEDURE — 700105 HCHG RX REV CODE 258: Performed by: SURGERY

## 2020-08-07 PROCEDURE — 36430 TRANSFUSION BLD/BLD COMPNT: CPT

## 2020-08-07 PROCEDURE — 80053 COMPREHEN METABOLIC PANEL: CPT

## 2020-08-07 PROCEDURE — 700111 HCHG RX REV CODE 636 W/ 250 OVERRIDE (IP): Performed by: ANESTHESIOLOGY

## 2020-08-07 PROCEDURE — 700101 HCHG RX REV CODE 250: Performed by: SURGERY

## 2020-08-07 PROCEDURE — 86923 COMPATIBILITY TEST ELECTRIC: CPT

## 2020-08-07 PROCEDURE — 85027 COMPLETE CBC AUTOMATED: CPT

## 2020-08-07 PROCEDURE — 770022 HCHG ROOM/CARE - ICU (200)

## 2020-08-07 PROCEDURE — 94770 HCHG CO2 EXPIRED GAS DETERMINATION: CPT

## 2020-08-07 PROCEDURE — 160048 HCHG OR STATISTICAL LEVEL 1-5: Performed by: SURGERY

## 2020-08-07 PROCEDURE — 500389 HCHG DRAIN, RESERVOIR SUCT JP 100CC: Performed by: SURGERY

## 2020-08-07 PROCEDURE — 501445 HCHG STAPLER, SKIN DISP: Performed by: SURGERY

## 2020-08-07 PROCEDURE — 160031 HCHG SURGERY MINUTES - 1ST 30 MINS LEVEL 5: Performed by: SURGERY

## 2020-08-07 PROCEDURE — 71045 X-RAY EXAM CHEST 1 VIEW: CPT

## 2020-08-07 PROCEDURE — 501838 HCHG SUTURE GENERAL: Performed by: SURGERY

## 2020-08-07 PROCEDURE — 160009 HCHG ANES TIME/MIN: Performed by: SURGERY

## 2020-08-07 PROCEDURE — 84478 ASSAY OF TRIGLYCERIDES: CPT

## 2020-08-07 PROCEDURE — 93970 EXTREMITY STUDY: CPT

## 2020-08-07 PROCEDURE — U0004 COV-19 TEST NON-CDC HGH THRU: HCPCS

## 2020-08-07 PROCEDURE — 86901 BLOOD TYPING SEROLOGIC RH(D): CPT

## 2020-08-07 PROCEDURE — 94003 VENT MGMT INPAT SUBQ DAY: CPT

## 2020-08-07 PROCEDURE — 160042 HCHG SURGERY MINUTES - EA ADDL 1 MIN LEVEL 5: Performed by: SURGERY

## 2020-08-07 PROCEDURE — 700111 HCHG RX REV CODE 636 W/ 250 OVERRIDE (IP): Performed by: SURGERY

## 2020-08-07 PROCEDURE — 502240 HCHG MISC OR SUPPLY RC 0272: Performed by: SURGERY

## 2020-08-07 PROCEDURE — 82962 GLUCOSE BLOOD TEST: CPT | Mod: 91

## 2020-08-07 PROCEDURE — 85018 HEMOGLOBIN: CPT

## 2020-08-07 PROCEDURE — C9803 HOPD COVID-19 SPEC COLLECT: HCPCS | Performed by: SURGERY

## 2020-08-07 PROCEDURE — 0DJW0ZZ INSPECTION OF PERITONEUM, OPEN APPROACH: ICD-10-PCS | Performed by: SURGERY

## 2020-08-07 PROCEDURE — P9016 RBC LEUKOCYTES REDUCED: HCPCS | Mod: 91

## 2020-08-07 PROCEDURE — 99291 CRITICAL CARE FIRST HOUR: CPT | Performed by: SURGERY

## 2020-08-07 PROCEDURE — 85007 BL SMEAR W/DIFF WBC COUNT: CPT

## 2020-08-07 PROCEDURE — 86900 BLOOD TYPING SEROLOGIC ABO: CPT

## 2020-08-07 RX ORDER — CEFOTETAN DISODIUM 2 G/20ML
INJECTION, POWDER, FOR SOLUTION INTRAMUSCULAR; INTRAVENOUS PRN
Status: DISCONTINUED | OUTPATIENT
Start: 2020-08-07 | End: 2020-08-07 | Stop reason: SURG

## 2020-08-07 RX ORDER — ROCURONIUM BROMIDE 10 MG/ML
INJECTION, SOLUTION INTRAVENOUS PRN
Status: DISCONTINUED | OUTPATIENT
Start: 2020-08-07 | End: 2020-08-07 | Stop reason: SURG

## 2020-08-07 RX ORDER — PHENYLEPHRINE HCL IN 0.9% NACL 0.5 MG/5ML
SYRINGE (ML) INTRAVENOUS PRN
Status: DISCONTINUED | OUTPATIENT
Start: 2020-08-07 | End: 2020-08-07 | Stop reason: SURG

## 2020-08-07 RX ADMIN — HYDROCORTISONE SODIUM SUCCINATE 25 MG: 100 INJECTION, POWDER, FOR SOLUTION INTRAMUSCULAR; INTRAVENOUS at 11:39

## 2020-08-07 RX ADMIN — AMIODARONE HYDROCHLORIDE 1 MG/MIN: 1.8 INJECTION, SOLUTION INTRAVENOUS at 11:24

## 2020-08-07 RX ADMIN — NYSTATIN: 100000 POWDER TOPICAL at 17:38

## 2020-08-07 RX ADMIN — HYDROCORTISONE SODIUM SUCCINATE 25 MG: 100 INJECTION, POWDER, FOR SOLUTION INTRAMUSCULAR; INTRAVENOUS at 05:55

## 2020-08-07 RX ADMIN — HYDROCORTISONE SODIUM SUCCINATE 25 MG: 100 INJECTION, POWDER, FOR SOLUTION INTRAMUSCULAR; INTRAVENOUS at 23:17

## 2020-08-07 RX ADMIN — Medication 100 MCG: at 13:12

## 2020-08-07 RX ADMIN — CEFOTETAN DISODIUM 2 G: 2 INJECTION, POWDER, FOR SOLUTION INTRAMUSCULAR; INTRAVENOUS at 12:17

## 2020-08-07 RX ADMIN — INSULIN HUMAN 2 UNITS: 100 INJECTION, SOLUTION PARENTERAL at 17:50

## 2020-08-07 RX ADMIN — PIPERACILLIN SODIUM, TAZOBACTAM SODIUM 4.5 G: 4; .5 INJECTION, POWDER, LYOPHILIZED, FOR SOLUTION INTRAVENOUS at 21:00

## 2020-08-07 RX ADMIN — SODIUM CHLORIDE, POTASSIUM CHLORIDE, SODIUM LACTATE AND CALCIUM CHLORIDE: 600; 310; 30; 20 INJECTION, SOLUTION INTRAVENOUS at 06:05

## 2020-08-07 RX ADMIN — METOPROLOL TARTRATE 10 MG: 5 INJECTION, SOLUTION INTRAVENOUS at 17:38

## 2020-08-07 RX ADMIN — ROCURONIUM BROMIDE 50 MG: 10 INJECTION, SOLUTION INTRAVENOUS at 12:19

## 2020-08-07 RX ADMIN — PIPERACILLIN SODIUM, TAZOBACTAM SODIUM 4.5 G: 4; .5 INJECTION, POWDER, LYOPHILIZED, FOR SOLUTION INTRAVENOUS at 14:07

## 2020-08-07 RX ADMIN — NYSTATIN: 100000 POWDER TOPICAL at 05:56

## 2020-08-07 RX ADMIN — HYDROCORTISONE SODIUM SUCCINATE 25 MG: 100 INJECTION, POWDER, FOR SOLUTION INTRAMUSCULAR; INTRAVENOUS at 17:38

## 2020-08-07 RX ADMIN — PROPOFOL 10 MCG/KG/MIN: 10 INJECTION, EMULSION INTRAVENOUS at 12:37

## 2020-08-07 RX ADMIN — PIPERACILLIN SODIUM, TAZOBACTAM SODIUM 4.5 G: 4; .5 INJECTION, POWDER, LYOPHILIZED, FOR SOLUTION INTRAVENOUS at 04:20

## 2020-08-07 RX ADMIN — SODIUM CHLORIDE, POTASSIUM CHLORIDE, SODIUM LACTATE AND CALCIUM CHLORIDE: 600; 310; 30; 20 INJECTION, SOLUTION INTRAVENOUS at 19:10

## 2020-08-07 RX ADMIN — METOPROLOL TARTRATE 10 MG: 5 INJECTION, SOLUTION INTRAVENOUS at 23:18

## 2020-08-07 RX ADMIN — AMIODARONE HYDROCHLORIDE 0.5 MG/MIN: 1.8 INJECTION, SOLUTION INTRAVENOUS at 18:05

## 2020-08-07 RX ADMIN — METOPROLOL TARTRATE 10 MG: 5 INJECTION, SOLUTION INTRAVENOUS at 11:39

## 2020-08-07 RX ADMIN — FAMOTIDINE 20 MG: 10 INJECTION, SOLUTION INTRAVENOUS at 06:41

## 2020-08-07 RX ADMIN — AMIODARONE HYDROCHLORIDE 150 MG: 1.5 INJECTION, SOLUTION INTRAVENOUS at 17:38

## 2020-08-07 ASSESSMENT — PAIN SCALES - GENERAL: PAIN_LEVEL: 0

## 2020-08-07 ASSESSMENT — FIBROSIS 4 INDEX: FIB4 SCORE: 8.28

## 2020-08-07 NOTE — OR SURGEON
Immediate Post OP Note    PreOp Diagnosis:   1.  Open abdomen, bowel discontinuity  2.  Ileocolic anastomotic leak, intraabdominal abscess  3.  Perforated appendicitis s/p ileocecectomy  4.  Morbid obesity    PostOp Diagnosis: same    Procedure(s):  1.  LAPAROTOMY, EXPLORATORY - REDO, WASHOUT WITH ABTHERA - Wound Class: Dirty or Infected    Surgeon(s):  Akhil Ta M.D.    Anesthesiologist/Type of Anesthesia:  Anesthesiologist: Oh Cabrera M.D./General    Surgical Staff:  Circulator: Albert De Leon R.N.  Scrub Person: Krystian Harvey    Specimens removed if any:  * No specimens in log *    Estimated Blood Loss: 15mL    Findings: Mildly dilated small bowel.  No evidence of leak from distal small bowel staple line or proximal colon staple line.  Large amount of phlegmonous debris in the right lower quadrant, but no jr pus noted.    Drains: 19 [place] drain in right lower quadrant/right flank    Complications: None noted    Outcome: Transferred back to ICU in stable but critical condition    8/7/2020 1:04 PM Akhil Ta M.D.

## 2020-08-07 NOTE — PROGRESS NOTES
Trauma / Surgical Daily Progress Note    Date of Service  8/7/2020    Chief Complaint  74 y.o. female admitted 7/31/2020 with Perforation bowel (HCC)    Interval Events  Critically ill. On full vent support. Remains off pressors but now in rapid afib. Start amio gtt.  Creatinine improving but worsening azotemia (?absorpiton of blood).  Plan return to OR today. Cont IV abx. Critical anemia prompting transfusion again. Add pepcid.    Review of Systems  Review of Systems   Unable to perform ROS: Intubated        Vital Signs for last 24 hours  Temp:  [36.5 °C (97.7 °F)-36.8 °C (98.2 °F)] 36.6 °C (97.9 °F)  Pulse:  [] 124  Resp:  [17-30] 22  BP: ()/(49-90) 166/80  SpO2:  [90 %-100 %] 100 %    Hemodynamic parameters for last 24 hours  CVP:  [6 MM HG-290 MM HG] 13 MM HG    Respiratory Data     Respiration: (!) 22, Pulse Oximetry: 100 %     Work Of Breathing / Effort: Vented  RUL Breath Sounds: Clear After Suction, RML Breath Sounds: Clear After Suction, RLL Breath Sounds: Clear After Suction, ELISSA Breath Sounds: Clear After Suction, LLL Breath Sounds: Clear After Suction    Physical Exam  Physical Exam  Constitutional:       Appearance: She is obese.   Neck:      Musculoskeletal: Neck supple.   Cardiovascular:      Rate and Rhythm: Tachycardia present. Rhythm irregular.   Pulmonary:      Effort: Pulmonary effort is normal.   Abdominal:      General: There is distension.      Tenderness: There is abdominal tenderness.      Comments: Apthera in place   Genitourinary:     Comments: Orellana to gravity  Musculoskeletal: Normal range of motion.      Left lower leg: Edema present.   Skin:     General: Skin is warm.         Laboratory  Recent Results (from the past 24 hour(s))   ACCU-CHEK GLUCOSE    Collection Time: 08/06/20 11:59 AM   Result Value Ref Range    Glucose - Accu-Ck 135 (H) 65 - 99 mg/dL   ACCU-CHEK GLUCOSE    Collection Time: 08/06/20  5:44 PM   Result Value Ref Range    Glucose - Accu-Ck 144 (H) 65 - 99  mg/dL   ACCU-CHEK GLUCOSE    Collection Time: 08/06/20 11:40 PM   Result Value Ref Range    Glucose - Accu-Ck 148 (H) 65 - 99 mg/dL   ACCU-CHEK GLUCOSE    Collection Time: 08/07/20  4:39 AM   Result Value Ref Range    Glucose - Accu-Ck 144 (H) 65 - 99 mg/dL   Comp Metabolic Panel    Collection Time: 08/07/20  4:45 AM   Result Value Ref Range    Sodium 147 (H) 135 - 145 mmol/L    Potassium 4.6 3.6 - 5.5 mmol/L    Chloride 109 96 - 112 mmol/L    Co2 20 20 - 33 mmol/L    Anion Gap 18.0 (H) 7.0 - 16.0    Glucose 168 (H) 65 - 99 mg/dL    Bun 113 (HH) 8 - 22 mg/dL    Creatinine 2.46 (H) 0.50 - 1.40 mg/dL    Calcium 8.1 (L) 8.5 - 10.5 mg/dL    AST(SGOT) 270 (H) 12 - 45 U/L    ALT(SGPT) 157 (H) 2 - 50 U/L    Alkaline Phosphatase 121 (H) 30 - 99 U/L    Total Bilirubin 1.0 0.1 - 1.5 mg/dL    Albumin 2.2 (L) 3.2 - 4.9 g/dL    Total Protein 4.5 (L) 6.0 - 8.2 g/dL    Globulin 2.3 1.9 - 3.5 g/dL    A-G Ratio 1.0 g/dL   CBC WITH DIFFERENTIAL    Collection Time: 08/07/20  4:45 AM   Result Value Ref Range    WBC 31.4 (HH) 4.8 - 10.8 K/uL    RBC 1.67 (L) 4.20 - 5.40 M/uL    Hemoglobin 5.4 (LL) 12.0 - 16.0 g/dL    Hematocrit 17.4 (LL) 37.0 - 47.0 %    .2 (H) 81.4 - 97.8 fL    MCH 32.3 27.0 - 33.0 pg    MCHC 31.0 (L) 33.6 - 35.0 g/dL    RDW 62.5 (H) 35.9 - 50.0 fL    Platelet Count 353 164 - 446 K/uL    MPV 10.4 9.0 - 12.9 fL    Neutrophils-Polys 91.50 (H) 44.00 - 72.00 %    Lymphocytes 3.40 (L) 22.00 - 41.00 %    Monocytes 0.90 0.00 - 13.40 %    Eosinophils 0.00 0.00 - 6.90 %    Basophils 1.70 0.00 - 1.80 %    Nucleated RBC 1.90 /100 WBC    Neutrophils (Absolute) 28.73 (H) 2.00 - 7.15 K/uL    Lymphs (Absolute) 1.07 1.00 - 4.80 K/uL    Monos (Absolute) 0.28 0.00 - 0.85 K/uL    Eos (Absolute) 0.00 0.00 - 0.51 K/uL    Baso (Absolute) 0.53 (H) 0.00 - 0.12 K/uL    NRBC (Absolute) 0.61 K/uL    Hypochromia 1+     Anisocytosis 1+     Macrocytosis 1+    Triglyceride    Collection Time: 08/07/20  4:45 AM   Result Value Ref Range     Triglycerides 295 (H) 0 - 149 mg/dL   COD - Adult (Type and Screen)    Collection Time: 08/07/20  4:45 AM   Result Value Ref Range    ABO Grouping Only A     Rh Grouping Only POS     Antibody Screen-Cod NEG     Component R       R99                 Red Cells, LR       [id number]   issued       08/07/20   06:21      Product Type R99     Dispense Status issued     Unit Number (Barcoded) [id number]     Product Code (Barcoded) G1018J21     Blood Type (Barcoded) 6200     Component R       R99                 Red Cells, LR       [id number]   issued       08/07/20   07:11      Product Type R99     Dispense Status issued     Unit Number (Barcoded) [id number]     Product Code (Barcoded) T2157U73     Blood Type (Barcoded) 6200    DIFFERENTIAL MANUAL    Collection Time: 08/07/20  4:45 AM   Result Value Ref Range    Metamyelocytes 0.90 %    Myelocytes 0.90 %    Progranulocytes 0.90 %    Manual Diff Status PERFORMED    PERIPHERAL SMEAR REVIEW    Collection Time: 08/07/20  4:45 AM   Result Value Ref Range    Peripheral Smear Review see below    PLATELET ESTIMATE    Collection Time: 08/07/20  4:45 AM   Result Value Ref Range    Plt Estimation Normal    MORPHOLOGY    Collection Time: 08/07/20  4:45 AM   Result Value Ref Range    RBC Morphology Present     Polychromia 1+     Poikilocytosis 1+     Ovalocytes 1+     Basophilic Stippling Few    ESTIMATED GFR    Collection Time: 08/07/20  4:45 AM   Result Value Ref Range    GFR If  23 (A) >60 mL/min/1.73 m 2    GFR If Non  19 (A) >60 mL/min/1.73 m 2   HGB    Collection Time: 08/07/20  8:40 AM   Result Value Ref Range    Hemoglobin 7.6 (L) 12.0 - 16.0 g/dL   COVID/SARS CoV-2 PCR    Collection Time: 08/07/20  9:00 AM    Specimen: Nasal; Respirate   Result Value Ref Range    COVID Order Status Received    SARS-CoV-2, PCR (In-House)    Collection Time: 08/07/20  9:00 AM   Result Value Ref Range    SARS-CoV-2 Source Nasal Swab     SARS-CoV-2 (RdRp  gene) NotDetected        Fluids    Intake/Output Summary (Last 24 hours) at 8/7/2020 1108  Last data filed at 8/7/2020 1000  Gross per 24 hour   Intake 3408.22 ml   Output 4215 ml   Net -806.78 ml       Core Measures & Quality Metrics  Labs reviewed, Medications reviewed and Radiology images reviewed  Orellana catheter: Critically Ill - Requiring Accurate Measurement of Urinary Output  Central line in place: Need for access    DVT Prophylaxis: Contraindicated - High bleeding risk  DVT prophylaxis - mechanical: SCDs  Ulcer prophylaxis: Yes  Antibiotics: Treating active infection/contamination beyond 24 hours perioperative coverage  Assessed for rehab: Patient unable to tolerate rehabilitation therapeutic regimen    CAITLIN Score  ETOH Screening    Assessment/Plan  Atrial fibrillation with rapid ventricular response (HCC)  Assessment & Plan  Rapid a fib  Start amio gtt.    Anemia  Assessment & Plan  Critical anemia  8/6 Hg 5.9 - Transfused 1 unit PRBC  8/7 Hg 5.4 - Transfuse 2 unit PRBC  Transfuse for hg < 7    Large bowel perforation (HCC)  Assessment & Plan  8/4 recurrent sepsis. OR for exploration - anastomosis (side-side) intact but end of colon perforated with feculent peritonitits  Resection with discontinuity, ABthera  On zosyn  Karthikeyan Llanos MD - [facility] Acute Care Surgery    Acute renal insufficiency  Assessment & Plan  Admission Cr elevated associated with infection  Improved with resuscitation after initial surgery  8/4 oliguria progressed to anuria despite crystalloid resuscitation  8/5 Cr markedly elevated, electrolytes ok. 24 hours of albumin resuscitation.   8/6 Cr trending down with improved UO  8/7 BUN climbing but creatinine and UO cont to improve   Trend renal indices, avoid nephrotoxins    Septic shock (HCC)- (present on admission)  Assessment & Plan  7/31 high lactate, oliguric and hypotensive post op  8/1 lactate and urine output improved following 7 liter resuscitation. Levophed to support  MAP.  8/2-8/3 weaning levophed. UOP  Adequate. Added steroids 8/3  8/4 Recurrent septic shock associated with leak. Resuscitation ongoing.  8/6 Stabilized off pressors    Respiratory failure following trauma and surgery (HCC)- (present on admission)  Assessment & Plan  Full ventilatory support. Ensure optimal oxygenation and mitigate secondary injury.  8/2 extubated  8/4 Remained intubated after 2nd surgery  SICU ventilator protocol and bundle.       Perforated appendicitis- (present on admission)  Assessment & Plan  7/31 perforated appendix with retrocolic abscess.  Small bowel and right colon resection.  RLQ drain.  8/4 Returned to OR for anastamotic leak - left in discontinuity  8/7 zosyn day 8 - continued due to leak  Plan return to OR 8/7  [facility]  Dr. Harris      Paroxysmal atrial fibrillation (CMS-[facility])- (present on admission)  Assessment & Plan  Chronic afib complicated by RVR  8/1 Digoxin load and metoprolol  8/3 digoxin level appropriate  Rate controlled    Adrenal insufficiency (HCC)  Assessment & Plan  Failure to fully wean from vasopressors despite adequate resuscitation  8/3 empiric steroids started with liberation from vasopressors  8/6 Started weaning off steroids    HTN (hypertension)- (present on admission)  Assessment & Plan  Takes multiple agents at home for hypertension  Held in acute setting with septic shock  Restart when appropriate    No contraindication to anticoagulation therapy- (present on admission)  Assessment & Plan  8/1 Begin Lovenox  8/5 Held for bleeding  Resume after ex lap on 8/7    GLORIA (obstructive sleep apnea)- (present on admission)  Assessment & Plan  Refusing CPAP until she gets formal sleep study.       Discussed patient condition with RN, RT, Therapies, Pharmacy, Dietary and .  CRITICAL CARE TIME EXCLUDING PROCEDURES: 43    minutes

## 2020-08-07 NOTE — ANESTHESIA QCDR
2019 [facility] Clinical Data Registry (for Quality Improvement)     Postoperative nausea/vomiting risk protocol (Adult = 18 yrs and Pediatric 3-17 yrs)- (430 and 463)  General inhalation anesthetic (NOT TIVA) with PONV risk factors: No  Provision of anti-emetic therapy with at least 2 different classes of agents: N/A  Patient DID NOT receive anti-emetic therapy and reason is documented in Medical Record: N/A    Multimodal Pain Management- (477)  Non-emergent surgery AND patient age >= 18: No  Use of Multimodal Pain Management, two or more drugs and/or interventions, NOT including systemic opioids:   Exception: Documented allergy to multiple classes of analgesics:     Smoking Abstinence (404)  Patient is current smoker (cigarette, pipe, e-cig, marijuanna): No  Elective Surgery:   Abstinence instructions provided prior to day of surgery:   Patient abstained from smoking on day of surgery:     Pre-Op Beta-Blocker in Isolated CABG (44)  Isolated CABG AND patient age >= 18: No  Beta-blocker admin within 24 hours of surgical incision:   Exception:of medical reason(s) for not administering beta blocker within 24 hours prior to surgical incision (e.g., not  indicated,other medical reason):     PACU assessment of acute postoperative pain prior to Anesthesia Care End- Applies to Patients Age = 18- (ABG7)  Initial PACU pain score is which of the following: Pain not assessed  Patient unable to report pain score: Yes (Patient Unable to Report)    Post-anesthetic transfer of care checklist/protocol to PACU/ICU- (426 and 427)  Upon conclusion of case, patient transferred to which of the following locations: ICU  Use of transfer checklist/protocol: Yes  Exclusion: Service Performed in Patient Hospital Room (and thus did not require transfer): N/A  Unplanned admission to ICU related to anesthesia service up through end of PACU care- (MD51)  Unplanned admission to ICU (not initially anticipated at anesthesia start time): No

## 2020-08-07 NOTE — PROGRESS NOTES
Dr. Ta at bedside.  Updated on patient condition and overnight events.  No additional orders received at this time.

## 2020-08-07 NOTE — DIETARY
Nutrition services: Day 7 of admit. 75 yo female admitted with bowel perforation.  Follow up for adequacy of nutrition.    Evaluation:  1. Pt on tube feeding from 8/3 - 8/4. Clear liquid nectar thick diet started and discontinued when pt went to surgery 8/4.  2. Exploratory laparotomy on 7/31, 8/4 and again today. Bowel in discontinuity.  3. Consider nutrition support (TF or TPN) if unable to start diet within next 48 hours.     Malnutrition risk: na    Recommendations/Plan:Start nutrition support or diet as appropriate - currently day 3 NPO.

## 2020-08-07 NOTE — CARE PLAN
Problem: Urinary Elimination:  Goal: Ability to reestablish a normal urinary elimination pattern will improve  Outcome: PROGRESSING AS EXPECTED  Flowsheets (Taken 8/7/2020 0800)  Urinary Elimination: Catheter (Document on LDA)  Note: Urinary catheter in place d/t critically ill patient requiring accurate I&O's.  Measuring output, color, and characteristics Q2h and PRN.     Problem: Mobility  Goal: Risk for activity intolerance will decrease  Outcome: PROGRESSING AS EXPECTED  Note: Patient on bed rest orders at this time.  ROM activities being performed.

## 2020-08-07 NOTE — ANESTHESIA PREPROCEDURE EVALUATION
Relevant Problems   ANESTHESIA   (+) GLORIA (obstructive sleep apnea)      CARDIAC   (+) HTN (hypertension)   (+) Paroxysmal atrial fibrillation (CMS-HCC)         (+) Acute renal insufficiency      Other   (+) Adrenal insufficiency (HCC)   (+) Anemia   (+) Large bowel perforation (HCC)   (+) Obesity   (+) Perforated appendicitis   (+) Respiratory failure following trauma and surgery (HCC)   (+) Septic shock (HCC)       Physical Exam    Airway - unable to assess  TM distance: >3 FB  Neck ROM: full  Patient is intubated/trached     Cardiovascular - normal exam  Rhythm: regular  Rate: normal  (-) murmur     Dental - normal exam           Pulmonary - normal exam  Breath sounds clear to auscultation     Abdominal    Neurological - normal exam                 Anesthesia Plan    ASA 4   ASA physical status 4 criteria: respiratory failure    Plan - general       Airway plan will be ETT        Induction: intravenous      Pertinent diagnostic labs and testing reviewed    Informed Consent:    Anesthetic plan and risks discussed with patient.    Use of blood products discussed with: patient whom consented to blood products.

## 2020-08-07 NOTE — OR NURSING
Patient rolled through preop without stopping in a room, consents and identity verified. ICU RN gave report to anesthesiologist prior to patient rolling back to surgery.

## 2020-08-07 NOTE — PROGRESS NOTES
2 RN skin check complete with TREVON Galan.  Devices in place ett, og, right subclavian central line, bp cuff, pulse ox, ekg leads, scds, heel float boots, narvaez catheter, mid abd wound vac.  Preventative measures in place including q2h turns, sacral mepilex, heel mepilexes, low air loss mattress, rotating ett q2h, and continuously ensuring patient is not laying on any medical devices.  Following areas of concern:   · Mid abdomen abthera, old HAYDEE site to RLQ with gauze and tegaderm intact, pink heels blanching, bilateral FA/ac bruising, left bicep possible pressure injury with linear purple bruising    Wound consult placedYES/NO: yes    Wound reported YES/NO: yes  Appropriate LDAs opened YES/NO: yes

## 2020-08-07 NOTE — ANESTHESIA TIME REPORT
Anesthesia Start and Stop Event Times     Date Time Event    8/7/2020 1200 Ready for Procedure     1204 Anesthesia Start     1322 Anesthesia Stop        Responsible Staff  08/07/20    Name Role Begin End    Oh Cabrera M.D. Anesth 1204 1322        Preop Diagnosis (Free Text):  Pre-op Diagnosis     OPEN ABDOMEN, ANASTEMOTIC LEAK        Preop Diagnosis (Codes):    Post op Diagnosis  Perforated bowel (HCC)  Anastamotic leak, open abdomen    Premium Reason  Non-Premium    Comments:

## 2020-08-07 NOTE — PROGRESS NOTES
"    Progress Note:  8/7/2020, 8:28 AM    S:   WBC up  Persistent anemia, Transfusion running  Coffee ground NG output, PPI restarted  Vented. Off pressors    O:  BP (!) 172/67   Pulse (!) 105   Temp 36.6 °C (97.9 °F) (Bladder)   Resp 18   Ht 1.753 m (5' 9\")   Wt (!) 125.4 kg (276 lb 7.3 oz)   SpO2 100%     NAD, awake, sedated  Abdomen obese, soft, Abthera in place, functioning        A:   Active Hospital Problems    Diagnosis   • Large bowel perforation (HCC) [K63.1]     Priority: High   • Acute renal insufficiency [N28.9]     Priority: High   • Septic shock (HCC) [A41.9, R65.21]     Priority: High   • Perforated appendicitis [K35.32]     Priority: High   • Respiratory failure following trauma and surgery (HCC) [J95.821]     Priority: High   • Paroxysmal atrial fibrillation (CMS-HCC) [I48.0]     Priority: High   • Anemia [D64.9]     Priority: Medium   • Adrenal insufficiency (HCC) [E27.40]     Priority: Medium   • HTN (hypertension) [I10]     Priority: Medium   • No contraindication to anticoagulation therapy [Z78.9]     Priority: Low   • Obesity [E66.9]     Priority: Low   • GLORIA (obstructive sleep apnea) [G47.33]     Priority: Low         P:   For OR today, relook laparotomy, washout, Abthera replacement  Discussed with Dr. Llanos, appreciate SICU management    Akhil Ta M.D.  [facility]  [phone]         ____________________________________     Akhil Ta M.D.    DD: 8/7/2020  8:27 AM      "

## 2020-08-07 NOTE — PROGRESS NOTES
"    DATE: 8/6/2020    Post Operative Day 1 exploratory laparotomy with resection of ileocolonic anastamosis    Interval Events:    Remains on ventilator  Off all vasopressors  ~ 1500 cc out of ABThera    PHYSICAL EXAMINATION:  Vital Signs: /57   Pulse 83   Temp 36.6 °C (97.9 °F) (Bladder)   Resp 19   Ht 1.753 m (5' 9\")   Wt (!) 125.7 kg (277 lb 1.9 oz)   SpO2 99%     Eyes open and nodding  Abdomen - soft, no peritoneal signs  ABThera dressing intact      ASSESSMENT AND PLAN:   Perforated viscus - S/P resection of ileocolonic anastamosis.    Recommend return to OR tomorrow for possible anastomosis / wash out / possible closure.  Discussed with Dr Llanos    Appreciate ICU and consulting physician care.       ____________________________________     Karthikeyan Llanos M.D.    DD: 8/6/2020  8:03 PM    "

## 2020-08-07 NOTE — PROGRESS NOTES
0615 awaiting COD results to release PRBCs. additional 1200 black OG output when patient was laid flat for linen change, Dr. Llanos updated. MD also ordered IV pepcid. MD order to recheck hgb 30 min after 2nd unit of PRBC finishes infusing.    0620 COD completed     0625 1st unit of PRBCs infusing

## 2020-08-07 NOTE — CARE PLAN
Problem: Nutritional:  Goal: Achieve adequate nutritional intake  Description: Start diet or nutrition support  Outcome: NOT MET

## 2020-08-07 NOTE — ANESTHESIA POSTPROCEDURE EVALUATION
Patient: Yvette Dailey    Procedure Summary     Date: 08/07/20 Room / Location: [place] [id number] / SURGERY [facility]    Anesthesia Start: 1204 Anesthesia Stop: 1322    Procedure: LAPAROTOMY, EXPLORATORY - REDO, WASHOUT WITH ABTHERA (N/A Abdomen) Diagnosis: (OPEN ABDOMEN, ANASTEMOTIC LEAK)    Surgeon: Akhil Ta M.D. Responsible Provider: Oh Cabrera M.D.    Anesthesia Type: general ASA Status: 4          Final Anesthesia Type: general  Last vitals  BP   Blood Pressure : 115/57    Temp   36.6 °C (97.9 °F)    Pulse   Pulse: (!) 105   Resp   (!) 22    SpO2   100 %      Anesthesia Post Evaluation    Patient location during evaluation: ICU  Patient participation: complete - patient cannot participate  Level of consciousness: obtunded/minimal responses  Pain score: 0    Airway patency: patent  Anesthetic complications: no  Cardiovascular status: hemodynamically stable  Respiratory status: acceptable, ventilator and intubated  Hydration status: euvolemic  Comments: NPRS from preop    PONV: none           Nurse Pain Score: 10 (NPRS)

## 2020-08-07 NOTE — CARE PLAN
Problem: Infection  Goal: Will remain free from infection  Intervention: Implement standard precautions and perform hand washing before and after patient contact  Note: Standard precautions in place at all times. Following infection control bundles to prevent CAUTI, VAP, and CLABSI       Problem: Venous Thromboembolism (VTW)/Deep Vein Thrombosis (DVT) Prevention:  Goal: Patient will participate in Venous Thrombosis (VTE)/Deep Vein Thrombosis (DVT)Prevention Measures  Intervention: Ensure patient wears graduated elastic stockings (YANET hose) and/or SCDs, if ordered, when in bed or chair (Remove at least once per shift for skin check)  Note: SCDs inflating on BLE

## 2020-08-07 NOTE — PROGRESS NOTES
Denisse from Lab called with critical result of hgb, hct, wbc at 0535. Critical lab result read back to Denisse.   Dr. Llanos notified of critical lab result at 0540.  Critical lab result read back by Dr. Llanos.    Dr. Llanos updated on hgb 5.4, hct 17.4, and wbc 31.4, and 800 cc black OG output. MD order for updated COD and 2 units of PRBCs.

## 2020-08-07 NOTE — PROGRESS NOTES
1200:   Patient transported to pre-op with ACLS RN, RN, and RT.  Patient attached to transport monitor at this time.  Handed off to pre-op RN and anesthesiologist.      1315:  Patient arrived back from OR.  Attached to SICU monitor and repositioned in bed.

## 2020-08-07 NOTE — OP REPORT
Operative Report    Date: 8/7/2020    PreOp Diagnosis:   1.  Open abdomen, bowel discontinuity  2.  Anemia  3.  Ileocolic anastomotic leak, intraabdominal abscess  4.  Perforated appendicitis s/p ileocecectomy  5.  Morbid obesity     PostOp Diagnosis: same     Procedure(s):  1.  LAPAROTOMY, EXPLORATORY - REDO, WASHOUT WITH ABTHERA - Wound Class: Dirty or Infected     Surgeon(s):  Akhil Ta M.D.     Anesthesiologist/Type of Anesthesia:  Anesthesiologist: Oh Cabrera M.D./General     Surgical Staff:  Circulator: Albert De Leon R.N.  Scrub Person: Krystian Harvey     Specimens removed if any:  * No specimens in log *     Estimated Blood Loss: 15mL     Findings: No evidence of intra-abdominal bleeding. Large amount of phlegmonous debris in the right lower quadrant, but no jr pus noted.   Mildly dilated small bowel.  No evidence of leak from distal small bowel staple line or proximal colon staple line.      Drains: 19 Maur[place]ian Francisco drain in right lower quadrant/right flank     Complications: None noted    Indications:  74-year-old female with a history of perforated appendicitis requiring ileocecectomy, who suffered anastomotic leak requiring completion right hemicolectomy, washout, and left in discontinuity.  She has had ongoing anemia requiring transfusion, and increasing leukocytosis.  Recommended return to the OR for exploration washout.  Procedure as well as the risk, benefits, and alternatives were discussed with the patient's  in detail and he wished for us to proceed.    Procedure in detail:   Patient was taken the operating room placed in the operative table in supine position.  She was already intubated from the ICU.  The existing ABThera VAC sponge was removed and then the abdomen prepped in the usual sterile fashion with Betadine.  Appropriate drape was placed.    The peritoneal cavity was then reexplored in 4 quadrants.  There was no evidence of ongoing fecal contamination.   There was a phlegmonous debris and cloudy fluid conglomeration in the right lower quadrant at the area of her prior abscess.  There is also areas of necrotic omentum.  These were resected back to viable tissue, and the right lower quadrant was washed out copiously with warm sterile saline.  The distal small bowel staple line was examined and was noted to be intact.  The proximal transverse colon staple line was also intact.  There was no evidence of abscess over the dome of the liver, and the gallbladder fossa, in the left upper quadrant or down in the pelvis.  Each area was examined closely.  The NG tube position was confirmed and was excellent.  Because of the inflammation/phlegmonous change in the right lower quadrant, the decision was made to replace a right lower quadrant drain.  A 19 Korean Francisco drain was used and placed through the prior right lower quadrant drain opening.  The AB Thera VAC sponge was then replaced.  The internal plastic covered sponge was placed over the entire viscera, and then the external sponges tacked in place with a stapler to the skin edge.  The tape was replaced, and the Iram pad attached to suction and noted to suction down well.  This concluded the procedure.  The patient was then returned to the surgical ICU in critical but stable condition.    Sponge and needle counts were correct at the end of the case.       Akhil Ta M.D.  [facility]  [phone]

## 2020-08-08 ENCOUNTER — APPOINTMENT (OUTPATIENT)
Dept: RADIOLOGY | Facility: MEDICAL CENTER | Age: 75
DRG: 853 | End: 2020-08-08
Attending: SURGERY
Payer: MEDICARE

## 2020-08-08 LAB
ALBUMIN SERPL BCP-MCNC: 2.2 G/DL (ref 3.2–4.9)
ALBUMIN/GLOB SERPL: 1 G/DL
ALP SERPL-CCNC: 137 U/L (ref 30–99)
ALT SERPL-CCNC: 138 U/L (ref 2–50)
ANION GAP SERPL CALC-SCNC: 21 MMOL/L (ref 7–16)
ANISOCYTOSIS BLD QL SMEAR: ABNORMAL
AST SERPL-CCNC: 190 U/L (ref 12–45)
BASO STIPL BLD QL SMEAR: NORMAL
BASOPHILS # BLD AUTO: 0 % (ref 0–1.8)
BASOPHILS # BLD: 0 K/UL (ref 0–0.12)
BILIRUB SERPL-MCNC: 1.1 MG/DL (ref 0.1–1.5)
BUN SERPL-MCNC: 124 MG/DL (ref 8–22)
CALCIUM SERPL-MCNC: 7.7 MG/DL (ref 8.5–10.5)
CHLORIDE SERPL-SCNC: 111 MMOL/L (ref 96–112)
CO2 SERPL-SCNC: 19 MMOL/L (ref 20–33)
CREAT SERPL-MCNC: 2.61 MG/DL (ref 0.5–1.4)
EOSINOPHIL # BLD AUTO: 0 K/UL (ref 0–0.51)
EOSINOPHIL NFR BLD: 0 % (ref 0–6.9)
ERYTHROCYTE [DISTWIDTH] IN BLOOD BY AUTOMATED COUNT: 58.2 FL (ref 35.9–50)
ERYTHROCYTE [DISTWIDTH] IN BLOOD BY AUTOMATED COUNT: 58.6 FL (ref 35.9–50)
GLOBULIN SER CALC-MCNC: 2.3 G/DL (ref 1.9–3.5)
GLUCOSE BLD-MCNC: 125 MG/DL (ref 65–99)
GLUCOSE BLD-MCNC: 135 MG/DL (ref 65–99)
GLUCOSE BLD-MCNC: 295 MG/DL (ref 65–99)
GLUCOSE SERPL-MCNC: 168 MG/DL (ref 65–99)
HCT VFR BLD AUTO: 20.5 % (ref 37–47)
HCT VFR BLD AUTO: 21.9 % (ref 37–47)
HGB BLD-MCNC: 6.8 G/DL (ref 12–16)
HGB BLD-MCNC: 7.2 G/DL (ref 12–16)
HGB RETIC QN AUTO: 30.4 PG/CELL (ref 29–35)
HYPOCHROMIA BLD QL SMEAR: ABNORMAL
IMM RETICS NFR: 58.5 % (ref 9.3–17.4)
IRON SATN MFR SERPL: 28 % (ref 15–55)
IRON SERPL-MCNC: 41 UG/DL (ref 40–170)
LYMPHOCYTES # BLD AUTO: 3.87 K/UL (ref 1–4.8)
LYMPHOCYTES NFR BLD: 10.4 % (ref 22–41)
MACROCYTES BLD QL SMEAR: ABNORMAL
MANUAL DIFF BLD: NORMAL
MCH RBC QN AUTO: 32.7 PG (ref 27–33)
MCH RBC QN AUTO: 33.5 PG (ref 27–33)
MCHC RBC AUTO-ENTMCNC: 32.9 G/DL (ref 33.6–35)
MCHC RBC AUTO-ENTMCNC: 33.2 G/DL (ref 33.6–35)
MCV RBC AUTO: 101 FL (ref 81.4–97.8)
MCV RBC AUTO: 99.5 FL (ref 81.4–97.8)
METAMYELOCYTES NFR BLD MANUAL: 1.7 %
MICROCYTES BLD QL SMEAR: ABNORMAL
MONOCYTES # BLD AUTO: 0.97 K/UL (ref 0–0.85)
MONOCYTES NFR BLD AUTO: 2.6 % (ref 0–13.4)
MORPHOLOGY BLD-IMP: NORMAL
MYELOCYTES NFR BLD MANUAL: 4.3 %
NEUTROPHILS # BLD AUTO: 30.09 K/UL (ref 2–7.15)
NEUTROPHILS NFR BLD: 80.9 % (ref 44–72)
NRBC # BLD AUTO: 1.88 K/UL
NRBC BLD-RTO: 5.1 /100 WBC
OVALOCYTES BLD QL SMEAR: NORMAL
PLATELET # BLD AUTO: 276 K/UL (ref 164–446)
PLATELET # BLD AUTO: 336 K/UL (ref 164–446)
PLATELET BLD QL SMEAR: NORMAL
PMV BLD AUTO: 10.4 FL (ref 9–12.9)
PMV BLD AUTO: 10.7 FL (ref 9–12.9)
POIKILOCYTOSIS BLD QL SMEAR: NORMAL
POLYCHROMASIA BLD QL SMEAR: NORMAL
POTASSIUM SERPL-SCNC: 4.5 MMOL/L (ref 3.6–5.5)
PROT SERPL-MCNC: 4.5 G/DL (ref 6–8.2)
RBC # BLD AUTO: 2.03 M/UL (ref 4.2–5.4)
RBC # BLD AUTO: 2.2 M/UL (ref 4.2–5.4)
RBC BLD AUTO: PRESENT
RETICS # AUTO: 0.09 M/UL (ref 0.04–0.06)
RETICS/RBC NFR: 3.9 % (ref 0.8–2.1)
SODIUM SERPL-SCNC: 151 MMOL/L (ref 135–145)
TIBC SERPL-MCNC: 148 UG/DL (ref 250–450)
UIBC SERPL-MCNC: 107 UG/DL (ref 110–370)
WBC # BLD AUTO: 32.5 K/UL (ref 4.8–10.8)
WBC # BLD AUTO: 37.2 K/UL (ref 4.8–10.8)

## 2020-08-08 PROCEDURE — 99291 CRITICAL CARE FIRST HOUR: CPT | Performed by: SURGERY

## 2020-08-08 PROCEDURE — 700105 HCHG RX REV CODE 258: Performed by: SURGERY

## 2020-08-08 PROCEDURE — 83540 ASSAY OF IRON: CPT

## 2020-08-08 PROCEDURE — 86923 COMPATIBILITY TEST ELECTRIC: CPT

## 2020-08-08 PROCEDURE — 94150 VITAL CAPACITY TEST: CPT

## 2020-08-08 PROCEDURE — 94003 VENT MGMT INPAT SUBQ DAY: CPT

## 2020-08-08 PROCEDURE — 770022 HCHG ROOM/CARE - ICU (200)

## 2020-08-08 PROCEDURE — 700111 HCHG RX REV CODE 636 W/ 250 OVERRIDE (IP): Performed by: SURGERY

## 2020-08-08 PROCEDURE — 85007 BL SMEAR W/DIFF WBC COUNT: CPT

## 2020-08-08 PROCEDURE — 85027 COMPLETE CBC AUTOMATED: CPT

## 2020-08-08 PROCEDURE — 80053 COMPREHEN METABOLIC PANEL: CPT

## 2020-08-08 PROCEDURE — 83550 IRON BINDING TEST: CPT

## 2020-08-08 PROCEDURE — 94770 HCHG CO2 EXPIRED GAS DETERMINATION: CPT

## 2020-08-08 PROCEDURE — 85046 RETICYTE/HGB CONCENTRATE: CPT

## 2020-08-08 PROCEDURE — 71045 X-RAY EXAM CHEST 1 VIEW: CPT

## 2020-08-08 PROCEDURE — P9016 RBC LEUKOCYTES REDUCED: HCPCS

## 2020-08-08 PROCEDURE — 36430 TRANSFUSION BLD/BLD COMPNT: CPT

## 2020-08-08 PROCEDURE — 700101 HCHG RX REV CODE 250: Performed by: SURGERY

## 2020-08-08 PROCEDURE — 82962 GLUCOSE BLOOD TEST: CPT | Mod: 91

## 2020-08-08 RX ORDER — SODIUM CHLORIDE 9 MG/ML
1000 INJECTION, SOLUTION INTRAVENOUS ONCE
Status: COMPLETED | OUTPATIENT
Start: 2020-08-08 | End: 2020-08-08

## 2020-08-08 RX ORDER — HEPARIN SODIUM 5000 [USP'U]/ML
5000 INJECTION, SOLUTION INTRAVENOUS; SUBCUTANEOUS EVERY 8 HOURS
Status: DISCONTINUED | OUTPATIENT
Start: 2020-08-08 | End: 2020-08-13

## 2020-08-08 RX ADMIN — AMIODARONE HYDROCHLORIDE 0.5 MG/MIN: 1.8 INJECTION, SOLUTION INTRAVENOUS at 15:56

## 2020-08-08 RX ADMIN — NYSTATIN: 100000 POWDER TOPICAL at 17:08

## 2020-08-08 RX ADMIN — SODIUM CHLORIDE, POTASSIUM CHLORIDE, SODIUM LACTATE AND CALCIUM CHLORIDE: 600; 310; 30; 20 INJECTION, SOLUTION INTRAVENOUS at 12:30

## 2020-08-08 RX ADMIN — SODIUM CHLORIDE, POTASSIUM CHLORIDE, SODIUM LACTATE AND CALCIUM CHLORIDE: 600; 310; 30; 20 INJECTION, SOLUTION INTRAVENOUS at 04:46

## 2020-08-08 RX ADMIN — HYDROCORTISONE SODIUM SUCCINATE 25 MG: 100 INJECTION, POWDER, FOR SOLUTION INTRAMUSCULAR; INTRAVENOUS at 05:46

## 2020-08-08 RX ADMIN — SODIUM CHLORIDE 1000 ML: 9 INJECTION, SOLUTION INTRAVENOUS at 06:37

## 2020-08-08 RX ADMIN — HYDROMORPHONE HYDROCHLORIDE 0.25 MG: 1 INJECTION, SOLUTION INTRAMUSCULAR; INTRAVENOUS; SUBCUTANEOUS at 21:09

## 2020-08-08 RX ADMIN — METOPROLOL TARTRATE 10 MG: 5 INJECTION, SOLUTION INTRAVENOUS at 11:42

## 2020-08-08 RX ADMIN — HYDROCORTISONE SODIUM SUCCINATE 25 MG: 100 INJECTION, POWDER, FOR SOLUTION INTRAMUSCULAR; INTRAVENOUS at 17:08

## 2020-08-08 RX ADMIN — SODIUM CHLORIDE, POTASSIUM CHLORIDE, SODIUM LACTATE AND CALCIUM CHLORIDE: 600; 310; 30; 20 INJECTION, SOLUTION INTRAVENOUS at 21:09

## 2020-08-08 RX ADMIN — PIPERACILLIN SODIUM, TAZOBACTAM SODIUM 4.5 G: 4; .5 INJECTION, POWDER, LYOPHILIZED, FOR SOLUTION INTRAVENOUS at 21:09

## 2020-08-08 RX ADMIN — HEPARIN SODIUM 5000 UNITS: 5000 INJECTION, SOLUTION INTRAVENOUS; SUBCUTANEOUS at 22:45

## 2020-08-08 RX ADMIN — NYSTATIN: 100000 POWDER TOPICAL at 05:36

## 2020-08-08 RX ADMIN — METOPROLOL TARTRATE 10 MG: 5 INJECTION, SOLUTION INTRAVENOUS at 17:08

## 2020-08-08 RX ADMIN — HYDROCORTISONE SODIUM SUCCINATE 25 MG: 100 INJECTION, POWDER, FOR SOLUTION INTRAMUSCULAR; INTRAVENOUS at 11:42

## 2020-08-08 RX ADMIN — FAMOTIDINE 20 MG: 10 INJECTION, SOLUTION INTRAVENOUS at 05:48

## 2020-08-08 RX ADMIN — METOPROLOL TARTRATE 10 MG: 5 INJECTION, SOLUTION INTRAVENOUS at 05:15

## 2020-08-08 RX ADMIN — HEPARIN SODIUM 5000 UNITS: 5000 INJECTION, SOLUTION INTRAVENOUS; SUBCUTANEOUS at 10:19

## 2020-08-08 RX ADMIN — AMIODARONE HYDROCHLORIDE 0.5 MG/MIN: 1.8 INJECTION, SOLUTION INTRAVENOUS at 05:23

## 2020-08-08 RX ADMIN — PIPERACILLIN SODIUM, TAZOBACTAM SODIUM 4.5 G: 4; .5 INJECTION, POWDER, LYOPHILIZED, FOR SOLUTION INTRAVENOUS at 04:45

## 2020-08-08 RX ADMIN — PIPERACILLIN SODIUM, TAZOBACTAM SODIUM 4.5 G: 4; .5 INJECTION, POWDER, LYOPHILIZED, FOR SOLUTION INTRAVENOUS at 13:05

## 2020-08-08 RX ADMIN — INSULIN HUMAN 3 UNITS: 100 INJECTION, SOLUTION PARENTERAL at 05:32

## 2020-08-08 RX ADMIN — HYDROMORPHONE HYDROCHLORIDE 0.25 MG: 1 INJECTION, SOLUTION INTRAMUSCULAR; INTRAVENOUS; SUBCUTANEOUS at 21:28

## 2020-08-08 ASSESSMENT — PULMONARY FUNCTION TESTS: FVC: 1.3

## 2020-08-08 ASSESSMENT — FIBROSIS 4 INDEX: FIB4 SCORE: 4.46

## 2020-08-08 NOTE — PROGRESS NOTES
RN skin check:    Midline abdominal abthera wound vac, 125 mm Hg suction  RLQ HAYDEE drain, dressing CDI  Redness under breast and pannus, nystatin powder applied per MAR  Heels red/boggy, blanching. Heel float boots in use.  Bilateral forearm/ac bruising  Left upper arm linear bruising from previous BP cuff, open to air  Sacrum intact, mepilex in place.       Devices: ET tube, OG, central line, PIV, BP cuff, pulse ox rotated to new finger, EKG leads, Orellana, Wound vac, SCD's, Heel float boots, RLQ HAYDEE drain, bilateral soft wrist restraints.

## 2020-08-08 NOTE — CARE PLAN
Adult Ventilation Update    Total Vent Days: 3    Patient Lines/Drains/Airways Status      Active Airway       Name: Placement date: Placement time: Site: Days:    Airway ETT Oral 7.5  08/05/20   --   Oral  2    ETT  07/31/20   1526   Oral  7    ETT  08/04/20   2351   Oral  2                  In the last 24 hours, the patient tolerated SBT for 1 hour on settings of 5/8    Compliance (ml / cm H2O): 41.1 (08/07/20 1526)    Patient failed trials because of    Barriers to SBT Weaning Trial Stopped due to:: Hemodynamically unstable/new or worsening arrythmia/SBP <80 or >180 mmHg (08/07/20 0711)  Length of Weaning Trial Length of Weaning Trial (Hours): 60 minutes (08/07/20 1730)        (ASV) % MIN VOL: 120 (08/07/20 1526)  ASV Inspiratory Pressures  % Spontaneous 100      Cough: Non Productive (08/07/20 1526)  Sputum Amount: Scant (08/07/20 1200)  Sputum Color: White (08/07/20 1200)  Sputum Consistency: Thin;Thick (08/07/20 1200)    Mobility  Level of Mobility: Level III (08/07/20 0800)  Activity Performed: Unable to mobilize (08/07/20 0800)    Reason Not Mobilized: Bed rest (08/07/20 0800)  Mobilization Comments: Bed rest orders (08/07/20 0800)    Events/Summary/Plan: pt returned from surgery (08/07/20 1341)

## 2020-08-08 NOTE — PROGRESS NOTES
BP 75/36 Dr. Llanos updated, along with am hgb of 6.8. MD order for 1 L NS bolus and 1 unit of PRBCs

## 2020-08-08 NOTE — PROGRESS NOTES
"    Progress Note:  8/8/2020, 12:58 PM    S:   No acute events.  WBC persistently elevated  H&H stable  Off pressors, remains on ventilator in discontinuity    O:  /60   Pulse 76   Temp 36.7 °C (98.1 °F) (Bladder)   Resp (!) 23   Ht 1.753 m (5' 9\")   Wt 122.8 kg (270 lb 11.6 oz)   SpO2 100%     NAD, intubated/sedated but arousable  Abdomen is obese, soft, ABThera VAC in place, functioning      A:   Active Hospital Problems    Diagnosis   • Atrial fibrillation with rapid ventricular response (HCC) [I48.91]     Priority: High   • Anemia [D64.9]     Priority: High   • Large bowel perforation (HCC) [K63.1]     Priority: High   • Acute renal insufficiency [N28.9]     Priority: High   • Septic shock (HCC) [A41.9, R65.21]     Priority: High   • Perforated appendicitis [K35.32]     Priority: High   • Respiratory failure following trauma and surgery (HCC) [J95.821]     Priority: High   • Paroxysmal atrial fibrillation (CMS-HCC) [I48.0]     Priority: High   • Adrenal insufficiency (HCC) [E27.40]     Priority: Medium   • HTN (hypertension) [I10]     Priority: Medium   • No contraindication to anticoagulation therapy [Z78.9]     Priority: Low   • Obesity [E66.9]     Priority: Low   • GLORIA (obstructive sleep apnea) [G47.33]     Priority: Low         P:   -OR tomorrow for relook laparotomy, washout, attempted ileocolic anastomosis and abdominal closure as able  -Appreciate continued ICU management    Akhil Ta M.D.  [facility]  [phone]       ____________________________________     Akhil Ta M.D.    DD: 8/8/2020  12:58 PM      "

## 2020-08-08 NOTE — PROGRESS NOTES
Trauma / Surgical Daily Progress Note    Date of Service  8/8/2020    Chief Complaint  74 y.o. female admitted 7/31/2020 with Perforation bowel (HCC)    Interval Events  Critically ill. On full vent support - did collin SBT for 1 hour. Hemodynamically appropriate but on amio gtt. Rate controlled. Renal function slightly worse.   Plan return to OR tomorrow.  Cont IV abx. Critical anemia prompting transfusion again.     Review of Systems  Review of Systems   Unable to perform ROS: Intubated        Vital Signs for last 24 hours  Temp:  [36.7 °C (98.1 °F)] 36.7 °C (98.1 °F)  Pulse:  [] 83  Resp:  [13-36] 18  BP: ()/(46-92) 123/65  SpO2:  [90 %-100 %] 100 %    Hemodynamic parameters for last 24 hours  CVP:  [4 MM HG-273 MM HG] 16 MM HG    Respiratory Data     Respiration: 18, Pulse Oximetry: 100 %     Work Of Breathing / Effort: Vented  RUL Breath Sounds: Clear, RML Breath Sounds: Clear, RLL Breath Sounds: Diminished, ELISSA Breath Sounds: Clear, LLL Breath Sounds: Diminished    Physical Exam  Physical Exam  Constitutional:       Appearance: She is obese.   Neck:      Musculoskeletal: Neck supple.   Cardiovascular:      Rate and Rhythm: Tachycardia present. Rhythm irregular.   Pulmonary:      Effort: Pulmonary effort is normal.   Abdominal:      General: There is distension.      Tenderness: There is abdominal tenderness.      Comments: Apthera in place  HAYDEE serosang   Genitourinary:     Comments: Orellana to gravity  Musculoskeletal: Normal range of motion.      Left lower leg: Edema present.   Skin:     General: Skin is warm.         Laboratory  Recent Results (from the past 24 hour(s))   ACCU-CHEK GLUCOSE    Collection Time: 08/07/20 11:47 AM   Result Value Ref Range    Glucose - Accu-Ck 149 (H) 65 - 99 mg/dL   ACCU-CHEK GLUCOSE    Collection Time: 08/07/20  5:48 PM   Result Value Ref Range    Glucose - Accu-Ck 210 (H) 65 - 99 mg/dL   ACCU-CHEK GLUCOSE    Collection Time: 08/07/20 11:13 PM   Result Value Ref Range     Glucose - Accu-Ck 139 (H) 65 - 99 mg/dL   ACCU-CHEK GLUCOSE    Collection Time: 08/08/20  5:29 AM   Result Value Ref Range    Glucose - Accu-Ck 295 (H) 65 - 99 mg/dL   Comp Metabolic Panel    Collection Time: 08/08/20  5:30 AM   Result Value Ref Range    Sodium 151 (H) 135 - 145 mmol/L    Potassium 4.5 3.6 - 5.5 mmol/L    Chloride 111 96 - 112 mmol/L    Co2 19 (L) 20 - 33 mmol/L    Anion Gap 21.0 (H) 7.0 - 16.0    Glucose 168 (H) 65 - 99 mg/dL    Bun 124 (HH) 8 - 22 mg/dL    Creatinine 2.61 (H) 0.50 - 1.40 mg/dL    Calcium 7.7 (L) 8.5 - 10.5 mg/dL    AST(SGOT) 190 (H) 12 - 45 U/L    ALT(SGPT) 138 (H) 2 - 50 U/L    Alkaline Phosphatase 137 (H) 30 - 99 U/L    Total Bilirubin 1.1 0.1 - 1.5 mg/dL    Albumin 2.2 (L) 3.2 - 4.9 g/dL    Total Protein 4.5 (L) 6.0 - 8.2 g/dL    Globulin 2.3 1.9 - 3.5 g/dL    A-G Ratio 1.0 g/dL   CBC WITH DIFFERENTIAL    Collection Time: 08/08/20  5:30 AM   Result Value Ref Range    WBC 37.2 (HH) 4.8 - 10.8 K/uL    RBC 2.03 (L) 4.20 - 5.40 M/uL    Hemoglobin 6.8 (L) 12.0 - 16.0 g/dL    Hematocrit 20.5 (L) 37.0 - 47.0 %    .0 (H) 81.4 - 97.8 fL    MCH 33.5 (H) 27.0 - 33.0 pg    MCHC 33.2 (L) 33.6 - 35.0 g/dL    RDW 58.2 (H) 35.9 - 50.0 fL    Platelet Count 336 164 - 446 K/uL    MPV 10.7 9.0 - 12.9 fL    Neutrophils-Polys 80.90 (H) 44.00 - 72.00 %    Lymphocytes 10.40 (L) 22.00 - 41.00 %    Monocytes 2.60 0.00 - 13.40 %    Eosinophils 0.00 0.00 - 6.90 %    Basophils 0.00 0.00 - 1.80 %    Nucleated RBC 5.10 /100 WBC    Neutrophils (Absolute) 30.09 (H) 2.00 - 7.15 K/uL    Lymphs (Absolute) 3.87 1.00 - 4.80 K/uL    Monos (Absolute) 0.97 (H) 0.00 - 0.85 K/uL    Eos (Absolute) 0.00 0.00 - 0.51 K/uL    Baso (Absolute) 0.00 0.00 - 0.12 K/uL    NRBC (Absolute) 1.88 K/uL    Hypochromia 1+     Anisocytosis 2+     Macrocytosis 1+     Microcytosis 1+    ESTIMATED GFR    Collection Time: 08/08/20  5:30 AM   Result Value Ref Range    GFR If  22 (A) >60 mL/min/1.73 m 2    GFR If Non   18 (A) >60 mL/min/1.73 m 2   DIFFERENTIAL MANUAL    Collection Time: 08/08/20  5:30 AM   Result Value Ref Range    Metamyelocytes 1.70 %    Myelocytes 4.30 %    Manual Diff Status PERFORMED    PERIPHERAL SMEAR REVIEW    Collection Time: 08/08/20  5:30 AM   Result Value Ref Range    Peripheral Smear Review see below    PLATELET ESTIMATE    Collection Time: 08/08/20  5:30 AM   Result Value Ref Range    Plt Estimation Normal    MORPHOLOGY    Collection Time: 08/08/20  5:30 AM   Result Value Ref Range    RBC Morphology Present     Polychromia 1+     Poikilocytosis 1+     Ovalocytes 1+     Basophilic Stippling Few        Fluids    Intake/Output Summary (Last 24 hours) at 8/8/2020 1113  Last data filed at 8/8/2020 1000  Gross per 24 hour   Intake 3573.63 ml   Output 4780 ml   Net -1206.37 ml       Core Measures & Quality Metrics  Labs reviewed, Medications reviewed and Radiology images reviewed  Orellana catheter: Critically Ill - Requiring Accurate Measurement of Urinary Output  Central line in place: Need for access    DVT Prophylaxis: Contraindicated - High bleeding risk  DVT prophylaxis - mechanical: SCDs  Ulcer prophylaxis: Yes  Antibiotics: Treating active infection/contamination beyond 24 hours perioperative coverage  Assessed for rehab: Patient unable to tolerate rehabilitation therapeutic regimen    CAITLIN Score  ETOH Screening    Assessment/Plan  Atrial fibrillation with rapid ventricular response (HCC)  Assessment & Plan  Rapid a fib  Start amio gtt.    Anemia  Assessment & Plan  Critical anemia  8/6 Hg 5.9 - Transfused 1 unit PRBC  8/7 Hg 5.4 - Transfuse 2 unit PRBC  8/8 Hg 6.8 - Transfuse 1 unit PRBC - check iron studies  Transfuse for hg < 7    Large bowel perforation (HCC)  Assessment & Plan  8/4 recurrent sepsis. OR for exploration - anastomosis (side-side) intact but end of colon perforated with feculent peritonitits  Resection with discontinuity, ABthera  On ollie Llanos MD [facility]  Surgical Acute Care Surgery    Acute renal insufficiency  Assessment & Plan  Admission Cr elevated associated with infection  Improved with resuscitation after initial surgery  8/4 oliguria progressed to anuria despite crystalloid resuscitation  8/5 Cr markedly elevated, electrolytes ok. 24 hours of albumin resuscitation.   8/6 Cr trending down with improved UO  8/7 BUN climbing but creatinine and UO cont to improve   8/8 Indices up again - cont fluids    Trend renal indices, avoid nephrotoxins    Septic shock (HCC)- (present on admission)  Assessment & Plan  7/31 high lactate, oliguric and hypotensive post op  8/1 lactate and urine output improved following 7 liter resuscitation. Levophed to support MAP.  8/2-8/3 weaning levophed. UOP  Adequate. Added steroids 8/3  8/4 Recurrent septic shock associated with leak. Resuscitation ongoing.  8/6 Stabilized off pressors  Cont fluid, low dose steroids  On zosyn    Respiratory failure following trauma and surgery (HCC)- (present on admission)  Assessment & Plan  Full ventilatory support. Ensure optimal oxygenation and mitigate secondary injury.  8/2 extubated  8/4 Remained intubated after 2nd surgery  Wean vent as tolerated  Work toward extubation after abd closed.      Perforated appendicitis- (present on admission)  Assessment & Plan  7/31 perforated appendix with retrocolic abscess.  Small bowel and right colon resection.  RLQ drain.  8/4 Returned to OR for anastamotic leak - left in discontinuity  8/7 - Washout and drain placement - still in discontinuity  8/8 zosyn day 9 of 14  Plan return to OR 8/9  [facility] [place] [facility]  Dr. Harris      Paroxysmal atrial fibrillation (CMS-HCC)- (present on admission)  Assessment & Plan  Chronic afib complicated by RVR  8/1 Digoxin load and metoprolol  8/3 digoxin level appropriate  8/7 - A fib with RVR - amio gtt  Rate controlled    Adrenal insufficiency (HCC)  Assessment & Plan  Failure to fully wean from vasopressors despite  adequate resuscitation  8/3 empiric steroids started with liberation from vasopressors  8/6 Started weaning off steroids    HTN (hypertension)- (present on admission)  Assessment & Plan  Takes multiple agents at home for hypertension  Held in acute setting with septic shock  Restart when appropriate    No contraindication to anticoagulation therapy- (present on admission)  Assessment & Plan  8/1 Begin Lovenox  8/5 Held for bleeding  Resume after ex lap on 8/7    GLORIA (obstructive sleep apnea)- (present on admission)  Assessment & Plan  Refusing CPAP until she gets formal sleep study.       Discussed patient condition with RN, RT and Pharmacy.  CRITICAL CARE TIME EXCLUDING PROCEDURES: 45 minutes

## 2020-08-08 NOTE — CARE PLAN
Problem: Infection  Goal: Will remain free from infection  Outcome: PROGRESSING SLOWER THAN EXPECTED  Note: Elevated WBC, antibiotics in use.     Problem: Bowel/Gastric:  Goal: Normal bowel function is maintained or improved  Outcome: PROGRESSING SLOWER THAN EXPECTED  Flowsheets (Taken 8/8/2020 0945)  Last BM: 08/06/20  Note: NPO at this time. Surgery again tomorrow 8/9. Dr Llanos would not like to start TPN at this time.

## 2020-08-08 NOTE — PROGRESS NOTES
2 RN skin check complete with TREVON Galan.    Devices in place ett, og, right subclavian central line, bp cuff, pulse ox, ekg leads, scds, heel float boots, narvaez catheter, abthera, RLQ bird drain, wrist restraints.    Preventative measures in place including q2h turns, sacral mepilex, heel mepilexes, low air loss mattress, rotating ett q2h, mepitel one under bird drain tubing apophylactically and continuously ensuring patient is not laying on any medical devices.  Following areas of concern:   ·           Mid abdomen abthera CDI, RLQ bird drain with gauze and tegaderm, pink heels blanching, bilateral FA/ac bruising, left bicep possible pressure injury with linear purple bruising     Wound consult placedYES/NO: already placed    Wound reported YES/NO: no  Appropriate LDAs opened YES/NO: already opened

## 2020-08-08 NOTE — FLOWSHEET NOTE
08/08/20 0755   Events/Summary/Plan   Events/Summary/Plan Placed pt back to rest settings   Weaning Trial   Weaning Trial Stopped due to: Pt weaned for 1 hour and returned to rest settings per protocol   Length of Weaning Trial (Hours) 1   Weaning Parameters   RR (bpm) 16   $ FVC / Vital Capacity (liters)  1.3  (Forced parameter)   NIF (cm H2O)  -17   Rapid Shallow Breathing Index (RR/VT) 44   Spontaneous VE 6.1   Spontaneous

## 2020-08-08 NOTE — CARE PLAN
Problem: Infection  Goal: Will remain free from infection  Intervention: Implement standard precautions and perform hand washing before and after patient contact  Note: Standard precautions in place at all times. Following infection control bundles to prevent CAUTI, VAP, and CLABSI     Problem: Venous Thromboembolism (VTW)/Deep Vein Thrombosis (DVT) Prevention:  Goal: Patient will participate in Venous Thrombosis (VTE)/Deep Vein Thrombosis (DVT)Prevention Measures  Intervention: Ensure patient wears graduated elastic stockings (YANET hose) and/or SCDs, if ordered, when in bed or chair (Remove at least once per shift for skin check)  Note: SCDs inflating on BLE. Pharmacologic prophylaxis contraindicated per MD.

## 2020-08-08 NOTE — PROGRESS NOTES
Daniel from Lab called with critical result of wbc at 0620. Critical lab result read back to Daniel.   Dr. Llanos notified of critical lab result at 0630.  Critical lab result read back by Dr. Llanos.

## 2020-08-08 NOTE — PROGRESS NOTES
0915: Interdisciplinary team at bedside. Orders received to recheck CBC at 1200. OK to mobilize if not hypotensive. Restart heparin. Will continue to monitor.    1340: Pt's  Florencio updated over phone. Plan for surgery tomorrow at 0850.

## 2020-08-09 ENCOUNTER — ANESTHESIA (OUTPATIENT)
Dept: SURGERY | Facility: MEDICAL CENTER | Age: 75
DRG: 853 | End: 2020-08-09
Payer: MEDICARE

## 2020-08-09 ENCOUNTER — APPOINTMENT (OUTPATIENT)
Dept: RADIOLOGY | Facility: MEDICAL CENTER | Age: 75
DRG: 853 | End: 2020-08-09
Attending: SURGERY
Payer: MEDICARE

## 2020-08-09 ENCOUNTER — ANESTHESIA EVENT (OUTPATIENT)
Dept: SURGERY | Facility: MEDICAL CENTER | Age: 75
DRG: 853 | End: 2020-08-09
Payer: MEDICARE

## 2020-08-09 PROBLEM — I48.91 ATRIAL FIBRILLATION WITH RAPID VENTRICULAR RESPONSE (HCC): Status: RESOLVED | Noted: 2020-08-07 | Resolved: 2020-08-09

## 2020-08-09 LAB
ALBUMIN SERPL BCP-MCNC: 2 G/DL (ref 3.2–4.9)
ALBUMIN/GLOB SERPL: 1 G/DL
ALP SERPL-CCNC: 151 U/L (ref 30–99)
ALT SERPL-CCNC: 85 U/L (ref 2–50)
ANION GAP SERPL CALC-SCNC: 18 MMOL/L (ref 7–16)
ANISOCYTOSIS BLD QL SMEAR: ABNORMAL
AST SERPL-CCNC: 108 U/L (ref 12–45)
BASOPHILS # BLD AUTO: 0 % (ref 0–1.8)
BASOPHILS # BLD: 0 K/UL (ref 0–0.12)
BILIRUB SERPL-MCNC: 1.4 MG/DL (ref 0.1–1.5)
BUN SERPL-MCNC: 112 MG/DL (ref 8–22)
CALCIUM SERPL-MCNC: 7.5 MG/DL (ref 8.5–10.5)
CHLORIDE SERPL-SCNC: 114 MMOL/L (ref 96–112)
CO2 SERPL-SCNC: 19 MMOL/L (ref 20–33)
CREAT SERPL-MCNC: 2.36 MG/DL (ref 0.5–1.4)
EOSINOPHIL # BLD AUTO: 0 K/UL (ref 0–0.51)
EOSINOPHIL NFR BLD: 0 % (ref 0–6.9)
ERYTHROCYTE [DISTWIDTH] IN BLOOD BY AUTOMATED COUNT: 58.8 FL (ref 35.9–50)
GLOBULIN SER CALC-MCNC: 2.1 G/DL (ref 1.9–3.5)
GLUCOSE BLD-MCNC: 136 MG/DL (ref 65–99)
GLUCOSE BLD-MCNC: 147 MG/DL (ref 65–99)
GLUCOSE BLD-MCNC: 169 MG/DL (ref 65–99)
GLUCOSE SERPL-MCNC: 146 MG/DL (ref 65–99)
HCT VFR BLD AUTO: 20.2 % (ref 37–47)
HCT VFR BLD AUTO: 29.5 % (ref 37–47)
HGB BLD-MCNC: 6.7 G/DL (ref 12–16)
HGB BLD-MCNC: 9.7 G/DL (ref 12–16)
HYPOCHROMIA BLD QL SMEAR: ABNORMAL
LYMPHOCYTES # BLD AUTO: 2 K/UL (ref 1–4.8)
LYMPHOCYTES NFR BLD: 7 % (ref 22–41)
MACROCYTES BLD QL SMEAR: ABNORMAL
MANUAL DIFF BLD: NORMAL
MCH RBC QN AUTO: 32.8 PG (ref 27–33)
MCHC RBC AUTO-ENTMCNC: 33.2 G/DL (ref 33.6–35)
MCV RBC AUTO: 99 FL (ref 81.4–97.8)
MICROCYTES BLD QL SMEAR: ABNORMAL
MONOCYTES # BLD AUTO: 2 K/UL (ref 0–0.85)
MONOCYTES NFR BLD AUTO: 7 % (ref 0–13.4)
MORPHOLOGY BLD-IMP: NORMAL
NEUTROPHILS # BLD AUTO: 24.54 K/UL (ref 2–7.15)
NEUTROPHILS NFR BLD: 85.2 % (ref 44–72)
NEUTS BAND NFR BLD MANUAL: 0.9 % (ref 0–10)
NRBC # BLD AUTO: 0.34 K/UL
NRBC BLD-RTO: 1.2 /100 WBC
OVALOCYTES BLD QL SMEAR: NORMAL
PLATELET # BLD AUTO: 270 K/UL (ref 164–446)
PLATELET BLD QL SMEAR: NORMAL
PMV BLD AUTO: 10.6 FL (ref 9–12.9)
POIKILOCYTOSIS BLD QL SMEAR: NORMAL
POLYCHROMASIA BLD QL SMEAR: NORMAL
POTASSIUM SERPL-SCNC: 3.8 MMOL/L (ref 3.6–5.5)
PROT SERPL-MCNC: 4.1 G/DL (ref 6–8.2)
RBC # BLD AUTO: 2.04 M/UL (ref 4.2–5.4)
RBC BLD AUTO: PRESENT
SODIUM SERPL-SCNC: 151 MMOL/L (ref 135–145)
WBC # BLD AUTO: 28.5 K/UL (ref 4.8–10.8)

## 2020-08-09 PROCEDURE — 160042 HCHG SURGERY MINUTES - EA ADDL 1 MIN LEVEL 5: Performed by: SURGERY

## 2020-08-09 PROCEDURE — 94770 HCHG CO2 EXPIRED GAS DETERMINATION: CPT

## 2020-08-09 PROCEDURE — 501838 HCHG SUTURE GENERAL: Performed by: SURGERY

## 2020-08-09 PROCEDURE — 770022 HCHG ROOM/CARE - ICU (200)

## 2020-08-09 PROCEDURE — 700111 HCHG RX REV CODE 636 W/ 250 OVERRIDE (IP): Performed by: SURGERY

## 2020-08-09 PROCEDURE — 80053 COMPREHEN METABOLIC PANEL: CPT

## 2020-08-09 PROCEDURE — 501452 HCHG STAPLES, GIA MULTIFIRE 60/80: Performed by: SURGERY

## 2020-08-09 PROCEDURE — 700101 HCHG RX REV CODE 250: Performed by: ANESTHESIOLOGY

## 2020-08-09 PROCEDURE — 700101 HCHG RX REV CODE 250: Performed by: SURGERY

## 2020-08-09 PROCEDURE — 700105 HCHG RX REV CODE 258: Performed by: ANESTHESIOLOGY

## 2020-08-09 PROCEDURE — 86923 COMPATIBILITY TEST ELECTRIC: CPT

## 2020-08-09 PROCEDURE — 700105 HCHG RX REV CODE 258: Performed by: SURGERY

## 2020-08-09 PROCEDURE — 85014 HEMATOCRIT: CPT

## 2020-08-09 PROCEDURE — 160048 HCHG OR STATISTICAL LEVEL 1-5: Performed by: SURGERY

## 2020-08-09 PROCEDURE — 160009 HCHG ANES TIME/MIN: Performed by: SURGERY

## 2020-08-09 PROCEDURE — 85007 BL SMEAR W/DIFF WBC COUNT: CPT

## 2020-08-09 PROCEDURE — 85018 HEMOGLOBIN: CPT

## 2020-08-09 PROCEDURE — 501445 HCHG STAPLER, SKIN DISP: Performed by: SURGERY

## 2020-08-09 PROCEDURE — 71045 X-RAY EXAM CHEST 1 VIEW: CPT

## 2020-08-09 PROCEDURE — 500389 HCHG DRAIN, RESERVOIR SUCT JP 100CC: Performed by: SURGERY

## 2020-08-09 PROCEDURE — 94003 VENT MGMT INPAT SUBQ DAY: CPT

## 2020-08-09 PROCEDURE — 501433 HCHG STAPLER, GIA MULTIFIRE 60/80: Performed by: SURGERY

## 2020-08-09 PROCEDURE — 502240 HCHG MISC OR SUPPLY RC 0272: Performed by: SURGERY

## 2020-08-09 PROCEDURE — 0D1B0ZH BYPASS ILEUM TO CECUM, OPEN APPROACH: ICD-10-PCS | Performed by: SURGERY

## 2020-08-09 PROCEDURE — 85027 COMPLETE CBC AUTOMATED: CPT

## 2020-08-09 PROCEDURE — 160031 HCHG SURGERY MINUTES - 1ST 30 MINS LEVEL 5: Performed by: SURGERY

## 2020-08-09 PROCEDURE — 0W9F0ZZ DRAINAGE OF ABDOMINAL WALL, OPEN APPROACH: ICD-10-PCS | Performed by: SURGERY

## 2020-08-09 PROCEDURE — 82962 GLUCOSE BLOOD TEST: CPT | Mod: 91

## 2020-08-09 PROCEDURE — A4314 CATH W/DRAINAGE 2-WAY LATEX: HCPCS | Performed by: SURGERY

## 2020-08-09 PROCEDURE — P9016 RBC LEUKOCYTES REDUCED: HCPCS

## 2020-08-09 PROCEDURE — 500371 HCHG DRAIN, BLAKE 10MM: Performed by: SURGERY

## 2020-08-09 PROCEDURE — 36430 TRANSFUSION BLD/BLD COMPNT: CPT

## 2020-08-09 PROCEDURE — 99291 CRITICAL CARE FIRST HOUR: CPT | Performed by: SURGERY

## 2020-08-09 RX ORDER — ROCURONIUM BROMIDE 10 MG/ML
INJECTION, SOLUTION INTRAVENOUS PRN
Status: DISCONTINUED | OUTPATIENT
Start: 2020-08-09 | End: 2020-08-09 | Stop reason: SURG

## 2020-08-09 RX ORDER — SODIUM CHLORIDE, SODIUM LACTATE, POTASSIUM CHLORIDE, CALCIUM CHLORIDE 600; 310; 30; 20 MG/100ML; MG/100ML; MG/100ML; MG/100ML
INJECTION, SOLUTION INTRAVENOUS
Status: DISCONTINUED | OUTPATIENT
Start: 2020-08-09 | End: 2020-08-09 | Stop reason: SURG

## 2020-08-09 RX ADMIN — HYDROMORPHONE HYDROCHLORIDE 0.5 MG: 1 INJECTION, SOLUTION INTRAMUSCULAR; INTRAVENOUS; SUBCUTANEOUS at 21:51

## 2020-08-09 RX ADMIN — HYDROCORTISONE SODIUM SUCCINATE 25 MG: 100 INJECTION, POWDER, FOR SOLUTION INTRAMUSCULAR; INTRAVENOUS at 11:09

## 2020-08-09 RX ADMIN — HYDROMORPHONE HYDROCHLORIDE 0.5 MG: 1 INJECTION, SOLUTION INTRAMUSCULAR; INTRAVENOUS; SUBCUTANEOUS at 11:09

## 2020-08-09 RX ADMIN — SODIUM CHLORIDE, POTASSIUM CHLORIDE, SODIUM LACTATE AND CALCIUM CHLORIDE: 600; 310; 30; 20 INJECTION, SOLUTION INTRAVENOUS at 09:33

## 2020-08-09 RX ADMIN — PIPERACILLIN SODIUM, TAZOBACTAM SODIUM 4.5 G: 4; .5 INJECTION, POWDER, LYOPHILIZED, FOR SOLUTION INTRAVENOUS at 12:53

## 2020-08-09 RX ADMIN — NYSTATIN: 100000 POWDER TOPICAL at 06:31

## 2020-08-09 RX ADMIN — SODIUM CHLORIDE, POTASSIUM CHLORIDE, SODIUM LACTATE AND CALCIUM CHLORIDE: 600; 310; 30; 20 INJECTION, SOLUTION INTRAVENOUS at 12:51

## 2020-08-09 RX ADMIN — ROCURONIUM BROMIDE 50 MG: 10 INJECTION, SOLUTION INTRAVENOUS at 08:51

## 2020-08-09 RX ADMIN — HEPARIN SODIUM 5000 UNITS: 5000 INJECTION, SOLUTION INTRAVENOUS; SUBCUTANEOUS at 13:09

## 2020-08-09 RX ADMIN — METOPROLOL TARTRATE 10 MG: 5 INJECTION, SOLUTION INTRAVENOUS at 06:12

## 2020-08-09 RX ADMIN — METOPROLOL TARTRATE 10 MG: 5 INJECTION, SOLUTION INTRAVENOUS at 11:08

## 2020-08-09 RX ADMIN — NYSTATIN: 100000 POWDER TOPICAL at 17:09

## 2020-08-09 RX ADMIN — AMIODARONE HYDROCHLORIDE 0.5 MG/MIN: 1.8 INJECTION, SOLUTION INTRAVENOUS at 23:55

## 2020-08-09 RX ADMIN — SODIUM CHLORIDE, POTASSIUM CHLORIDE, SODIUM LACTATE AND CALCIUM CHLORIDE: 600; 310; 30; 20 INJECTION, SOLUTION INTRAVENOUS at 01:50

## 2020-08-09 RX ADMIN — AMIODARONE HYDROCHLORIDE 0.5 MG/MIN: 1.8 INJECTION, SOLUTION INTRAVENOUS at 02:08

## 2020-08-09 RX ADMIN — PIPERACILLIN SODIUM, TAZOBACTAM SODIUM 4.5 G: 4; .5 INJECTION, POWDER, LYOPHILIZED, FOR SOLUTION INTRAVENOUS at 06:17

## 2020-08-09 RX ADMIN — INSULIN HUMAN 1 UNITS: 100 INJECTION, SOLUTION PARENTERAL at 11:23

## 2020-08-09 RX ADMIN — FAMOTIDINE 20 MG: 10 INJECTION, SOLUTION INTRAVENOUS at 06:13

## 2020-08-09 RX ADMIN — HYDROCORTISONE SODIUM SUCCINATE 25 MG: 100 INJECTION, POWDER, FOR SOLUTION INTRAMUSCULAR; INTRAVENOUS at 00:05

## 2020-08-09 RX ADMIN — METOPROLOL TARTRATE 10 MG: 5 INJECTION, SOLUTION INTRAVENOUS at 17:08

## 2020-08-09 RX ADMIN — HYDROCORTISONE SODIUM SUCCINATE 25 MG: 100 INJECTION, POWDER, FOR SOLUTION INTRAMUSCULAR; INTRAVENOUS at 06:13

## 2020-08-09 RX ADMIN — HEPARIN SODIUM 5000 UNITS: 5000 INJECTION, SOLUTION INTRAVENOUS; SUBCUTANEOUS at 06:13

## 2020-08-09 RX ADMIN — AMIODARONE HYDROCHLORIDE 0.5 MG/MIN: 1.8 INJECTION, SOLUTION INTRAVENOUS at 12:50

## 2020-08-09 RX ADMIN — HYDROMORPHONE HYDROCHLORIDE 0.5 MG: 1 INJECTION, SOLUTION INTRAMUSCULAR; INTRAVENOUS; SUBCUTANEOUS at 17:08

## 2020-08-09 RX ADMIN — HYDROCORTISONE SODIUM SUCCINATE 25 MG: 100 INJECTION, POWDER, FOR SOLUTION INTRAMUSCULAR; INTRAVENOUS at 17:08

## 2020-08-09 RX ADMIN — PIPERACILLIN SODIUM, TAZOBACTAM SODIUM 4.5 G: 4; .5 INJECTION, POWDER, LYOPHILIZED, FOR SOLUTION INTRAVENOUS at 21:03

## 2020-08-09 RX ADMIN — METOPROLOL TARTRATE 10 MG: 5 INJECTION, SOLUTION INTRAVENOUS at 00:05

## 2020-08-09 RX ADMIN — HEPARIN SODIUM 5000 UNITS: 5000 INJECTION, SOLUTION INTRAVENOUS; SUBCUTANEOUS at 21:52

## 2020-08-09 ASSESSMENT — LIFESTYLE VARIABLES: EVER_SMOKED: YES

## 2020-08-09 ASSESSMENT — PAIN SCALES - GENERAL: PAIN_LEVEL: 0

## 2020-08-09 ASSESSMENT — FIBROSIS 4 INDEX: FIB4 SCORE: 3.17

## 2020-08-09 NOTE — PROGRESS NOTES
Skin check done with Rosales LESTER     Midline abdominal abthera wound vac, 125 mmHg suction in place, empty cannister at bedside   Left abdomen blistering, biotin applied  Redness under bilateral breasts, cleansed, nystatin applied   Heels red and blanching, bilateral mepilex's applied and heel float boots in use   Bilateral forearm/AC bruising  Left upper arm bruising with striations, wound consult in   Left lower back discoloration, blanching, mepilex applied,   Mepilex on sacrum, skin intact  Redness under pannus, cleansed and nystatin applied   RLQ HAYDEE drain, dressing CDI  Redness on back of head, blanching, waffle pillow in use    Q2 turns, skin check Qshift, full bed bath, CHG bath, dressing's changed per protocol, mepilex on bilateral heels and float boots in use, mepilex on sacrum, SCD's repositioned and skin assessed underneath, BP cuff readjusted and skin assessed, ensured pt is free of all medical devices, ETT tube moved Q2hrs

## 2020-08-09 NOTE — ANESTHESIA POSTPROCEDURE EVALUATION
Patient: Yvette Dailey    Procedure Summary     Date: 08/09/20 Room / Location: [place] [address] / SURGERY [facility]    Anesthesia Start: 0840 Anesthesia Stop: 1016    Procedures:       LAPAROTOMY, EXPLORATORY WASHOUT AB CLOSURE (N/A Abdomen)      EXCISION, INTESTINE (N/A Abdomen) Diagnosis: (abdominal wound washout and closure)    Surgeon: Akhil Ta M.D. Responsible Provider: Oh Cabrera M.D.    Anesthesia Type: general ASA Status: 4          Final Anesthesia Type: general  Last vitals  BP   Blood Pressure : 115/58    Temp   36.6 °C (97.9 °F)    Pulse   Pulse: 68   Resp   15    SpO2   100 %      Anesthesia Post Evaluation    Patient location during evaluation: PACU  Patient participation: complete - patient cannot participate  Level of consciousness: obtunded/minimal responses  Pain score: 0    Airway patency: patent  Anesthetic complications: no  Cardiovascular status: hemodynamically stable  Respiratory status: acceptable, intubated and ventilator  Hydration status: euvolemic    PONV: none           Nurse Pain Score: 10 (NPRS)

## 2020-08-09 NOTE — PROGRESS NOTES
"    Progress Note:  8/9/2020, 6:59 AM    S:   No acute overnight events.  Required transfusion  Leukocytosis and kidney function slightly improved    O:  /54   Pulse 81   Temp 36.6 °C (97.9 °F)   Resp 17   Ht 1.753 m (5' 9\")   Wt (!) 128 kg (282 lb 3 oz)   SpO2 100%     NAD, intubated/sedated, follows commands  Abdomen is obese, soft, ABThera functioning  Right lower quadrant HAYDEE with serous fluid output      A:   Active Hospital Problems    Diagnosis   • Atrial fibrillation with rapid ventricular response (HCC) [I48.91]     Priority: High   • Anemia [D64.9]     Priority: High   • Large bowel perforation (HCC) [K63.1]     Priority: High   • Acute renal insufficiency [N28.9]     Priority: High   • Septic shock (HCC) [A41.9, R65.21]     Priority: High   • Perforated appendicitis [K35.32]     Priority: High   • Respiratory failure following trauma and surgery (HCC) [J95.821]     Priority: High   • Paroxysmal atrial fibrillation (CMS-HCC) [I48.0]     Priority: High   • Adrenal insufficiency (HCC) [E27.40]     Priority: Medium   • HTN (hypertension) [I10]     Priority: Medium   • No contraindication to anticoagulation therapy [Z78.9]     Priority: Low   • Obesity [E66.9]     Priority: Low   • GLORIA (obstructive sleep apnea) [G47.33]     Priority: Low         P:   -OR today for relook, washout, probable reanastomosis and closure as able.  Discussed with patient's  Florencio.  Risks, benefits, alternatives discussed.  Risks include but are not limited to bleeding, infection, damage to surrounding structures, need for further procedures, inability to achieve goals of procedure, pneumonia, MI, stroke, death.  The benefits and alternatives were also discussed and the patient wishes to proceed.  Consent signed by spouse over the phone.           ____________________________________     Akhil Ta M.D.    DD: 8/9/2020  6:58 AM      "

## 2020-08-09 NOTE — ANESTHESIA TIME REPORT
Anesthesia Start and Stop Event Times     Date Time Event    8/9/2020 0835 Ready for Procedure     0840 Anesthesia Start     1016 Anesthesia Stop        Responsible Staff  08/09/20    Name Role Begin End    Oh Cabrera M.D. Anesth 0840 1016        Preop Diagnosis (Free Text):  Pre-op Diagnosis     large bowel perforation        Preop Diagnosis (Codes):    Post op Diagnosis  Perforated bowel (HCC)  open abdomen    Premium Reason  E. Weekend    Comments:

## 2020-08-09 NOTE — CARE PLAN
Problem: Safety - Medical Restraint  Goal: Remains free of injury from restraints (Restraint for Interference with Medical Device)  Description: INTERVENTIONS:  1. Determine that other, less restrictive measures have been tried or would not be effective before applying the restraint  2. Evaluate the patient's condition at the time of restraint application  3. Inform patient/family regarding the reason for restraint  4. Q2H: Monitor safety, psychosocial status, comfort, nutrition and hydration  Outcome: PROGRESSING AS EXPECTED   All above in place    Problem: Bowel/Gastric:  Goal: Normal bowel function is maintained or improved  Outcome: PROGRESSING SLOWER THAN EXPECTED   Wound vac in place, hypoactive bowel sounds, last BM 8/6, OG low cont per order, surgery planned in am

## 2020-08-09 NOTE — WOUND TEAM
In to see patient for wound consult of left arm.  Area assessed, patient noted to have scattered bruising.  Linear area seen in photo no seen during assessed, appears to be resolving as bruising.  Left CHADWICK.  No advanced wound care needs at this time.      Patient returned from OR with ABthera dressing, per RN will return to surgery.

## 2020-08-09 NOTE — OP REPORT
Operative Report    Date: 8/9/2020    PreOp Diagnosis:   1.  Open abdomen, bowel discontinuity  2.  Anemia  3.  Ileocolic anastomotic leak, intraabdominal abscess  4.  Perforated appendicitis s/p ileocecectomy  5.  Morbid obesity     PostOp Diagnosis:   1.  Open abdomen, bowel discontinuity  2.  Abdominal wall abscess  3.  Anemia  4.  Ileocolic anastomotic leak, intraabdominal abscess  5.  Perforated appendicitis s/p ileocecectomy  6.  Morbid obesity     Procedure(s):  1.  LAPAROTOMY, EXPLORATORY  2.  WASHOUT, Drainage of abdominal wall abscess  3.  Bowel anastomosis (ileocolic)    Wound Class: Dirty or Infected     Surgeon(s):  Akhil Ta M.D.     Anesthesiologist/Type of Anesthesia:  Anesthesiologist: Oh Cabrera M.D./General     Surgical Staff:  Circulator: Lashonda Panda R.N.  Scrub Person: Mark King     Specimens removed if any:  * No specimens in log *     Estimated Blood Loss: 25mL     Drains:  1.  24 Monegasque Francisco: right flank/abdominal wall   2.  24 Monegasque Francisco: Right lower quadrant, intraperitoneal  3.  19 Monegasque Francisco: right lower quadrant abdominal wall  4.  ABthera/VAC      Findings: Large abscess cavity in the right lower quadrant/right flank abdominal wall.  The colon and small intestine were healthy and viable appearing.  No evidence of ongoing intra-abdominal bleeding.  Abdomen left open.     Complications: None noted    Indications:  74-year-old female with history of perforated appendicitis requiring ileocecectomy, who then suffered anastomotic leak requiring completion right hemicolectomy, washout, left in discontinuity.  She has been washed out multiple times.  Scheduled return to IR for reevaluation.  This procedure was discussed with the patient's  who is getting consent.  He understands risk, benefits, alternatives and wishes to proceed.    Procedure in detail:   Patient taken the operating room placed on the operating table in supine position.  She was already intubated  from the ICU.  The 16 ABThera VAC sponge was removed and discarded.  The abdomen was then prepped and draped in usual sterile fashion with Betadine.  An appropriate drape was placed.    The peritoneal cavity was explored in all 4 quadrants.  There is no evidence of leak.  The stapled ends of the colon and small intestine were intact.  The colon and small intestine appeared viable and quite healthy appearing.  Copious saline irrigation was then performed.  The right lower quadrant area was then reexamined.  There was evidence of ongoing phlegmonous inflammation with areas of purulence.  This extended from the right lower quadrant into the right flank/abdominal wall.  Pus was noted.  This was thoroughly cleaned out and a portion of the right lower quadrant abdominal wall was debrided.  A 19 and 24 Uzbek Francisco drain was placed within the abdominal wall and brought out to the right side of the abdomen.  A second 24 Uzbek Francisco was also brought out through the right abdomen and placed in the right lower quadrant intraperitoneally.  These were sutured in standard fashion with 3-0 nylon.    The decision was then made to proceed with bowel anastomosis but leave the abdomen open given the ongoing activity within the right lower quadrant.  A side-to-side functional end and anastomosis was performed using a 75 mm COLLEEN stapler between the stapled end of the ileum and the transverse colon.  3-0 Vicryl suture was used to approximate the bowel.  This came together well.  The mesenteric defect was then closed with running 3-0 Vicryl.  The peritoneal space was then re-irrigated with warm saline and the patient's omentum was placed over the viscera.  The ABThera VAC sponge was then replaced and connected to the suction device, which functioned well.  The 3 drains were placed on bulb suction.  This concluded the procedure.  The patient was then taken back to the ICU in critical but stable condition.    Sponge and needle counts were  correct at the end of the case.       Akhil Ta M.D.  [facility]  [phone]

## 2020-08-09 NOTE — PROGRESS NOTES
0835: Patient taken to pre-op with RN, RT, monitor, and transporter. Report given to anesthesia. VS stable.    1015: Pt returned from OR. VS stable.

## 2020-08-09 NOTE — ANESTHESIA PREPROCEDURE EVALUATION
Relevant Problems   ANESTHESIA   (+) GLORIA (obstructive sleep apnea)      CARDIAC   (+) Atrial fibrillation with rapid ventricular response (HCC)   (+) HTN (hypertension)   (+) Paroxysmal atrial fibrillation (CMS-HCC)         (+) Acute renal insufficiency      Other   (+) Adrenal insufficiency (HCC)   (+) Anemia   (+) Large bowel perforation (HCC)   (+) Obesity   (+) Perforated appendicitis   (+) Respiratory failure following trauma and surgery (HCC)   (+) Septic shock (HCC)       Physical Exam    Airway - unable to assess  TM distance: >3 FB  Neck ROM: full  Patient is intubated/trached     Cardiovascular - normal exam  Rhythm: regular  Rate: normal  (-) murmur     Dental - normal exam           Pulmonary - normal exam  Breath sounds clear to auscultation     Abdominal    Neurological - normal exam                 Anesthesia Plan    ASA 4   ASA physical status 4 criteria: respiratory failure    Plan - general       Airway plan will be ETT        Induction: intravenous    Postoperative Plan: Postoperative administration of opioids is intended.    Pertinent diagnostic labs and testing reviewed    Informed Consent:    Anesthetic plan and risks discussed with patient and healthcare power of  (Healthcare POA signed consents based on previous interactions and did not indicate having questions.).

## 2020-08-09 NOTE — ANESTHESIA QCDR
2019 [facility] Clinical Data Registry (for Quality Improvement)     Postoperative nausea/vomiting risk protocol (Adult = 18 yrs and Pediatric 3-17 yrs)- (430 and 463)  General inhalation anesthetic (NOT TIVA) with PONV risk factors: No  Provision of anti-emetic therapy with at least 2 different classes of agents: N/A  Patient DID NOT receive anti-emetic therapy and reason is documented in Medical Record: N/A    Multimodal Pain Management- (477)  Non-emergent surgery AND patient age >= 18: No  Use of Multimodal Pain Management, two or more drugs and/or interventions, NOT including systemic opioids:   Exception: Documented allergy to multiple classes of analgesics:     Smoking Abstinence (404)  Patient is current smoker (cigarette, pipe, e-cig, marijuanna): No  Elective Surgery:   Abstinence instructions provided prior to day of surgery:   Patient abstained from smoking on day of surgery:     Pre-Op Beta-Blocker in Isolated CABG (44)  Isolated CABG AND patient age >= 18: No  Beta-blocker admin within 24 hours of surgical incision:   Exception:of medical reason(s) for not administering beta blocker within 24 hours prior to surgical incision (e.g., not  indicated,other medical reason):     PACU assessment of acute postoperative pain prior to Anesthesia Care End- Applies to Patients Age = 18- (ABG7)  Initial PACU pain score is which of the following: Pain not assessed  Patient unable to report pain score: Yes (Patient Unable to Report)    Post-anesthetic transfer of care checklist/protocol to PACU/ICU- (426 and 427)  Upon conclusion of case, patient transferred to which of the following locations: ICU  Use of transfer checklist/protocol: Yes  Exclusion: Service Performed in Patient Hospital Room (and thus did not require transfer): N/A  Unplanned admission to ICU related to anesthesia service up through end of PACU care- (MD51)  Unplanned admission to ICU (not initially anticipated at anesthesia start time): No

## 2020-08-09 NOTE — CARE PLAN
Ventilator Daily Summary    Vent Day # 5    Ventilator settings: , PEEP 8, 30% FiO2    Weaning trials: SBT 1hr    Plan: Continue current ventilator settings and wean mechanical ventilation as tolerated per physician orders.

## 2020-08-09 NOTE — CARE PLAN
Problem: Safety  Goal: Will remain free from injury  Outcome: PROGRESSING AS EXPECTED  Note: Pt remains free from injury.     Problem: Knowledge Deficit  Goal: Knowledge of disease process/condition, treatment plan, diagnostic tests, and medications will improve  Outcome: PROGRESSING SLOWER THAN EXPECTED  Note: Pt updated on plan of care and surgery. Pt nods appropriately.

## 2020-08-09 NOTE — PROGRESS NOTES
RN skin check:     Midline abdominal abthera wound vac, 125 mm Hg suction  Redness on back of head, blanching, waffle pillow in use  RLQ HAYDEE drain, dressing CDI  Redness under breast and pannus, nystatin powder applied per MAR  Heels red/boggy, blanching. Heel float boots in use.  Bilateral forearm/ac bruising  Left upper arm linear bruising from previous BP cuff, open to air  Sacrum intact, mepilex in place.  Left abdomen blister, dressing in place          Devices: ET tube, OG, central line, PIV, BP cuff, pulse ox rotated to new finger, EKG leads, Orellana, Wound vac, SCD's, Heel float boots, RLQ HAYDEE drain, bilateral soft wrist restraints.

## 2020-08-09 NOTE — OR SURGEON
Immediate Post OP Note    PreOp Diagnosis:   1.  Open abdomen, bowel discontinuity  2.  Anemia  3.  Ileocolic anastomotic leak, intraabdominal abscess  4.  Perforated appendicitis s/p ileocecectomy  5.  Morbid obesity    PostOp Diagnosis:   1.  Open abdomen, bowel discontinuity  2.  Abdominal wall abscess  3.  Anemia  4.  Ileocolic anastomotic leak, intraabdominal abscess  5.  Perforated appendicitis s/p ileocecectomy  6.  Morbid obesity    Procedure(s):  1.  LAPAROTOMY, EXPLORATORY  2.  WASHOUT, Drainage of abdominal wall abscess  3.  Bowel anastomosis (ileocolic)    Wound Class: Dirty or Infected    Surgeon(s):  Akhil Ta M.D.    Anesthesiologist/Type of Anesthesia:  Anesthesiologist: Oh Cabrera M.D./General    Surgical Staff:  Circulator: Lashonda Panda R.N.  Scrub Person: Mark King    Specimens removed if any:  * No specimens in log *    Estimated Blood Loss: 25mL    Drains:  1.  24 Czech Francisco: right flank/abdominal wall   2.  24 Czech Francisco: Right lower quadrant, intraperitoneal  3.  19 Czech Francisco: right lower quadrant abdominal wall  4.  ABthera/VAC     Findings: Large abscess cavity in the right lower quadrant/right flank abdominal wall.  The colon and small intestine were healthy and viable appearing.  No evidence of ongoing intra-abdominal bleeding.  Abdomen left open.    Complications: None noted    Disposition: Transferred back to ICU in critical but stable condition    8/9/2020 10:05 AM Akhil Ta M.D.

## 2020-08-09 NOTE — OR NURSING
Pt arrived to pre op holding. 2 RN consent obtained over the phone with pt's . Armband verified with OR RN. Pt taken back to OR quickly with OR staff. Full pre op assessment unable to be completed.

## 2020-08-09 NOTE — PROGRESS NOTES
Trauma / Surgical Daily Progress Note    Date of Service  8/9/2020    Chief Complaint  74 y.o. female admitted 7/31/2020 with Perforation bowel (HCC)    Interval Events  Critically ill. On full vent support. Hemodynamically appropriate and still on amio gtt. Rate controlled. Returned to OR and primary anastamosis but still left open. Renal function slightly improved.  Cont IV abx. Critical anemia prompting transfusion again.     Review of Systems  Review of Systems   Unable to perform ROS: Intubated        Vital Signs for last 24 hours  Temp:  [36.6 °C (97.9 °F)] 36.6 °C (97.9 °F)  Pulse:  [] 68  Resp:  [0-23] 15  BP: ()/() 115/58  SpO2:  [97 %-100 %] 100 %    Hemodynamic parameters for last 24 hours  CVP:  [9 MM HG-282 MM HG] 11 MM HG    Respiratory Data     Respiration: 15, Pulse Oximetry: 100 %     Work Of Breathing / Effort: Vented  RUL Breath Sounds: Coarse Crackles, RML Breath Sounds: Coarse Crackles, RLL Breath Sounds: Diminished, ELISSA Breath Sounds: Coarse Crackles, LLL Breath Sounds: Diminished    Physical Exam  Physical Exam  Constitutional:       Appearance: She is obese.   Neck:      Musculoskeletal: Neck supple.   Cardiovascular:      Rate and Rhythm: Tachycardia present. Rhythm irregular.   Pulmonary:      Effort: Pulmonary effort is normal.   Abdominal:      General: There is distension.      Tenderness: There is abdominal tenderness.      Comments: Apthera in place  HAYDEE serosang   Genitourinary:     Comments: Orellana to gravity  Musculoskeletal: Normal range of motion.      Left lower leg: Edema present.   Skin:     General: Skin is warm.         Laboratory  Recent Results (from the past 24 hour(s))   CBC WITHOUT DIFFERENTIAL    Collection Time: 08/08/20 11:45 AM   Result Value Ref Range    WBC 32.5 (HH) 4.8 - 10.8 K/uL    RBC 2.20 (L) 4.20 - 5.40 M/uL    Hemoglobin 7.2 (L) 12.0 - 16.0 g/dL    Hematocrit 21.9 (L) 37.0 - 47.0 %    MCV 99.5 (H) 81.4 - 97.8 fL    MCH 32.7 27.0 - 33.0 pg     MCHC 32.9 (L) 33.6 - 35.0 g/dL    RDW 58.6 (H) 35.9 - 50.0 fL    Platelet Count 276 164 - 446 K/uL    MPV 10.4 9.0 - 12.9 fL   RETICULOCYTES COUNT    Collection Time: 08/08/20 11:45 AM   Result Value Ref Range    Reticulocyte Count 3.9 (H) 0.8 - 2.1 %    Retic, Absolute 0.09 (H) 0.04 - 0.06 M/uL    Imm. Reticulocyte Fraction 58.5 (H) 9.3 - 17.4 %    Retic Hgb Equivalent 30.4 29.0 - 35.0 pg/cell   ACCU-CHEK GLUCOSE    Collection Time: 08/08/20 11:56 AM   Result Value Ref Range    Glucose - Accu-Ck 125 (H) 65 - 99 mg/dL   ACCU-CHEK GLUCOSE    Collection Time: 08/08/20  5:05 PM   Result Value Ref Range    Glucose - Accu-Ck 135 (H) 65 - 99 mg/dL   ACCU-CHEK GLUCOSE    Collection Time: 08/08/20 11:57 PM   Result Value Ref Range    Glucose - Accu-Ck 136 (H) 65 - 99 mg/dL   Comp Metabolic Panel    Collection Time: 08/09/20  4:22 AM   Result Value Ref Range    Sodium 151 (H) 135 - 145 mmol/L    Potassium 3.8 3.6 - 5.5 mmol/L    Chloride 114 (H) 96 - 112 mmol/L    Co2 19 (L) 20 - 33 mmol/L    Anion Gap 18.0 (H) 7.0 - 16.0    Glucose 146 (H) 65 - 99 mg/dL    Bun 112 (HH) 8 - 22 mg/dL    Creatinine 2.36 (H) 0.50 - 1.40 mg/dL    Calcium 7.5 (L) 8.5 - 10.5 mg/dL    AST(SGOT) 108 (H) 12 - 45 U/L    ALT(SGPT) 85 (H) 2 - 50 U/L    Alkaline Phosphatase 151 (H) 30 - 99 U/L    Total Bilirubin 1.4 0.1 - 1.5 mg/dL    Albumin 2.0 (L) 3.2 - 4.9 g/dL    Total Protein 4.1 (L) 6.0 - 8.2 g/dL    Globulin 2.1 1.9 - 3.5 g/dL    A-G Ratio 1.0 g/dL   CBC WITH DIFFERENTIAL    Collection Time: 08/09/20  4:22 AM   Result Value Ref Range    WBC 28.5 (H) 4.8 - 10.8 K/uL    RBC 2.04 (L) 4.20 - 5.40 M/uL    Hemoglobin 6.7 (L) 12.0 - 16.0 g/dL    Hematocrit 20.2 (L) 37.0 - 47.0 %    MCV 99.0 (H) 81.4 - 97.8 fL    MCH 32.8 27.0 - 33.0 pg    MCHC 33.2 (L) 33.6 - 35.0 g/dL    RDW 58.8 (H) 35.9 - 50.0 fL    Platelet Count 270 164 - 446 K/uL    MPV 10.6 9.0 - 12.9 fL    Neutrophils-Polys 85.20 (H) 44.00 - 72.00 %    Lymphocytes 7.00 (L) 22.00 - 41.00 %     Monocytes 7.00 0.00 - 13.40 %    Eosinophils 0.00 0.00 - 6.90 %    Basophils 0.00 0.00 - 1.80 %    Nucleated RBC 1.20 /100 WBC    Neutrophils (Absolute) 24.54 (H) 2.00 - 7.15 K/uL    Lymphs (Absolute) 2.00 1.00 - 4.80 K/uL    Monos (Absolute) 2.00 (H) 0.00 - 0.85 K/uL    Eos (Absolute) 0.00 0.00 - 0.51 K/uL    Baso (Absolute) 0.00 0.00 - 0.12 K/uL    NRBC (Absolute) 0.34 K/uL    Hypochromia 1+     Anisocytosis 2+     Macrocytosis 1+     Microcytosis 1+    ESTIMATED GFR    Collection Time: 08/09/20  4:22 AM   Result Value Ref Range    GFR If  24 (A) >60 mL/min/1.73 m 2    GFR If Non African American 20 (A) >60 mL/min/1.73 m 2   DIFFERENTIAL MANUAL    Collection Time: 08/09/20  4:22 AM   Result Value Ref Range    Bands-Stabs 0.90 0.00 - 10.00 %    Manual Diff Status PERFORMED    PERIPHERAL SMEAR REVIEW    Collection Time: 08/09/20  4:22 AM   Result Value Ref Range    Peripheral Smear Review see below    PLATELET ESTIMATE    Collection Time: 08/09/20  4:22 AM   Result Value Ref Range    Plt Estimation Normal    MORPHOLOGY    Collection Time: 08/09/20  4:22 AM   Result Value Ref Range    RBC Morphology Present     Polychromia 1+     Poikilocytosis 1+     Ovalocytes 1+        Fluids    Intake/Output Summary (Last 24 hours) at 8/9/2020 1041  Last data filed at 8/9/2020 1016  Gross per 24 hour   Intake 4424 ml   Output 4235 ml   Net 189 ml       Core Measures & Quality Metrics  Labs reviewed, Medications reviewed and Radiology images reviewed  Orellana catheter: Critically Ill - Requiring Accurate Measurement of Urinary Output  Central line in place: Need for access    DVT Prophylaxis: Contraindicated - High bleeding risk  DVT prophylaxis - mechanical: SCDs  Ulcer prophylaxis: Yes  Antibiotics: Treating active infection/contamination beyond 24 hours perioperative coverage  Assessed for rehab: Patient unable to tolerate rehabilitation therapeutic regimen    CAITLIN Score  ETOH  Screening    Assessment/Plan  Anemia  Assessment & Plan  Critical anemia  8/6 Hg 5.9 - Transfused 1 unit PRBC  8/7 Hg 5.4 - Transfuse 2 unit PRBC  8/8 Hg 6.8 - Transfuse 1 unit PRBC - check iron studies  8/9 Hg 6.7 - Transfuse 1 unit PRBC  Transfuse for hg < 7    Large bowel perforation (HCC)  Assessment & Plan  8/4 recurrent sepsis. OR for exploration - anastomosis (side-side) intact but end of colon perforated with feculent peritonitits  Resection with discontinuity, ABthera  8/7 Washout  8/9 Primary anastamosis but still open  Plan closure 8/11  On Zosyn  Karthikeyan Llanos MD - [facility]    Acute renal insufficiency  Assessment & Plan  Admission Cr elevated associated with infection  Improved with resuscitation after initial surgery  8/4 oliguria progressed to anuria despite crystalloid resuscitation  8/5 Cr markedly elevated, electrolytes ok. 24 hours of albumin resuscitation.   8/6 Cr trending down with improved UO  8/7 BUN climbing but creatinine and UO cont to improve   8/8 Indices up again - cont fluids  8/9 Some improvement  Trend renal indices, avoid nephrotoxins    Respiratory failure following trauma and surgery (HCC)- (present on admission)  Assessment & Plan  Full ventilatory support. Ensure optimal oxygenation and mitigate secondary injury.  8/2 Extubated  8/4 Remained intubated after 2nd surgery  Wean vent as tolerated  Work toward extubation after abd closed      Perforated appendicitis- (present on admission)  Assessment & Plan  7/31 perforated appendix with retrocolic abscess.  Small bowel and right colon resection.  RLQ drain.  8/4 Returned to OR for anastamotic leak - left in discontinuity  8/7 Washout and drain placement - still in discontinuity  8/9 Ex lap, primary anastamosis. Abd still open  Plan return to OR 8/11 8/9 Zosyn day 10 of 14  [facility]  Dr. Harris      Paroxysmal atrial fibrillation (CMS-HCC)- (present on admission)  Assessment & Plan  Chronic  afib complicated by RVR  8/1 Digoxin load and metoprolol  8/3 digoxin level appropriate  8/7 Recurrent A fib with RVR - amio gtt  Rate controlled      Adrenal insufficiency (HCC)  Assessment & Plan  Failure to fully wean from vasopressors despite adequate resuscitation  8/3 empiric steroids started with liberation from vasopressors  8/6 Started weaning off steroids    Septic shock (HCC)- (present on admission)  Assessment & Plan  7/31 high lactate, oliguric and hypotensive post op  8/1 lactate and urine output improved following 7 liter resuscitation. Levophed to support MAP.  8/2-8/3 weaning levophed. UOP  Adequate. Added steroids 8/3  8/4 Recurrent septic shock associated with leak. Resuscitation ongoing.  8/6 Stabilized off pressors  Cont fluids, low dose steroids  On zosyn    HTN (hypertension)- (present on admission)  Assessment & Plan  Takes multiple agents at home for hypertension  Held in acute setting with septic shock  Restart when appropriate    No contraindication to anticoagulation therapy- (present on admission)  Assessment & Plan  8/1 Begin Lovenox  8/5 Held for bleeding  8/8 Initiated heparin SQ    GLORIA (obstructive sleep apnea)- (present on admission)  Assessment & Plan  Refusing CPAP until she gets formal sleep study.       Discussed patient condition with RN, RT and Pharmacy. Dr Ta  CRITICAL CARE TIME EXCLUDING PROCEDURES:40 minutes

## 2020-08-10 LAB
ALBUMIN SERPL BCP-MCNC: 1.8 G/DL (ref 3.2–4.9)
ALBUMIN/GLOB SERPL: 0.9 G/DL
ALP SERPL-CCNC: 122 U/L (ref 30–99)
ALT SERPL-CCNC: 62 U/L (ref 2–50)
ANION GAP SERPL CALC-SCNC: 17 MMOL/L (ref 7–16)
ANISOCYTOSIS BLD QL SMEAR: ABNORMAL
AST SERPL-CCNC: 78 U/L (ref 12–45)
BASO STIPL BLD QL SMEAR: NORMAL
BASOPHILS # BLD AUTO: 0 % (ref 0–1.8)
BASOPHILS # BLD: 0 K/UL (ref 0–0.12)
BILIRUB SERPL-MCNC: 1.4 MG/DL (ref 0.1–1.5)
BUN SERPL-MCNC: 102 MG/DL (ref 8–22)
BURR CELLS BLD QL SMEAR: NORMAL
CALCIUM SERPL-MCNC: 7.4 MG/DL (ref 8.5–10.5)
CHLORIDE SERPL-SCNC: 118 MMOL/L (ref 96–112)
CO2 SERPL-SCNC: 19 MMOL/L (ref 20–33)
CREAT SERPL-MCNC: 2.25 MG/DL (ref 0.5–1.4)
EOSINOPHIL # BLD AUTO: 0 K/UL (ref 0–0.51)
EOSINOPHIL NFR BLD: 0 % (ref 0–6.9)
ERYTHROCYTE [DISTWIDTH] IN BLOOD BY AUTOMATED COUNT: 58.4 FL (ref 35.9–50)
GLOBULIN SER CALC-MCNC: 2 G/DL (ref 1.9–3.5)
GLUCOSE BLD-MCNC: 125 MG/DL (ref 65–99)
GLUCOSE BLD-MCNC: 142 MG/DL (ref 65–99)
GLUCOSE BLD-MCNC: 150 MG/DL (ref 65–99)
GLUCOSE BLD-MCNC: 185 MG/DL (ref 65–99)
GLUCOSE SERPL-MCNC: 146 MG/DL (ref 65–99)
HCT VFR BLD AUTO: 22.8 % (ref 37–47)
HGB BLD-MCNC: 7.4 G/DL (ref 12–16)
HGB BLD-MCNC: 7.9 G/DL (ref 12–16)
HYPOCHROMIA BLD QL SMEAR: ABNORMAL
LG PLATELETS BLD QL SMEAR: NORMAL
LYMPHOCYTES # BLD AUTO: 1.18 K/UL (ref 1–4.8)
LYMPHOCYTES NFR BLD: 5.3 % (ref 22–41)
MACROCYTES BLD QL SMEAR: ABNORMAL
MAGNESIUM SERPL-MCNC: 1.9 MG/DL (ref 1.5–2.5)
MANUAL DIFF BLD: NORMAL
MCH RBC QN AUTO: 31.9 PG (ref 27–33)
MCHC RBC AUTO-ENTMCNC: 32.7 G/DL (ref 33.6–35)
MCV RBC AUTO: 97.4 FL (ref 81.4–97.8)
MICROCYTES BLD QL SMEAR: ABNORMAL
MONOCYTES # BLD AUTO: 1.18 K/UL (ref 0–0.85)
MONOCYTES NFR BLD AUTO: 5.3 % (ref 0–13.4)
MORPHOLOGY BLD-IMP: NORMAL
NEUTROPHILS # BLD AUTO: 19.96 K/UL (ref 2–7.15)
NEUTROPHILS NFR BLD: 87.7 % (ref 44–72)
NEUTS BAND NFR BLD MANUAL: 1.8 % (ref 0–10)
NRBC # BLD AUTO: 0.21 K/UL
NRBC BLD-RTO: 0.9 /100 WBC
OVALOCYTES BLD QL SMEAR: NORMAL
PHOSPHATE SERPL-MCNC: 5 MG/DL (ref 2.5–4.5)
PLATELET # BLD AUTO: 265 K/UL (ref 164–446)
PLATELET BLD QL SMEAR: NORMAL
PMV BLD AUTO: 10.4 FL (ref 9–12.9)
POIKILOCYTOSIS BLD QL SMEAR: NORMAL
POLYCHROMASIA BLD QL SMEAR: NORMAL
POTASSIUM SERPL-SCNC: 3.2 MMOL/L (ref 3.6–5.5)
PROT SERPL-MCNC: 3.8 G/DL (ref 6–8.2)
RBC # BLD AUTO: 2.29 M/UL (ref 4.2–5.4)
RBC BLD AUTO: PRESENT
SODIUM SERPL-SCNC: 154 MMOL/L (ref 135–145)
TRIGL SERPL-MCNC: 283 MG/DL (ref 0–149)
WBC # BLD AUTO: 22.3 K/UL (ref 4.8–10.8)

## 2020-08-10 PROCEDURE — 84478 ASSAY OF TRIGLYCERIDES: CPT

## 2020-08-10 PROCEDURE — 85007 BL SMEAR W/DIFF WBC COUNT: CPT

## 2020-08-10 PROCEDURE — 85027 COMPLETE CBC AUTOMATED: CPT

## 2020-08-10 PROCEDURE — 700111 HCHG RX REV CODE 636 W/ 250 OVERRIDE (IP): Performed by: SURGERY

## 2020-08-10 PROCEDURE — 94150 VITAL CAPACITY TEST: CPT

## 2020-08-10 PROCEDURE — 94770 HCHG CO2 EXPIRED GAS DETERMINATION: CPT

## 2020-08-10 PROCEDURE — 85018 HEMOGLOBIN: CPT

## 2020-08-10 PROCEDURE — 700102 HCHG RX REV CODE 250 W/ 637 OVERRIDE(OP): Performed by: SURGERY

## 2020-08-10 PROCEDURE — 94003 VENT MGMT INPAT SUBQ DAY: CPT

## 2020-08-10 PROCEDURE — 700105 HCHG RX REV CODE 258: Performed by: SURGERY

## 2020-08-10 PROCEDURE — 80053 COMPREHEN METABOLIC PANEL: CPT

## 2020-08-10 PROCEDURE — 82962 GLUCOSE BLOOD TEST: CPT | Mod: 91

## 2020-08-10 PROCEDURE — 83735 ASSAY OF MAGNESIUM: CPT

## 2020-08-10 PROCEDURE — 84100 ASSAY OF PHOSPHORUS: CPT

## 2020-08-10 PROCEDURE — 99291 CRITICAL CARE FIRST HOUR: CPT | Performed by: SURGERY

## 2020-08-10 PROCEDURE — 770022 HCHG ROOM/CARE - ICU (200)

## 2020-08-10 PROCEDURE — 700101 HCHG RX REV CODE 250: Performed by: SURGERY

## 2020-08-10 PROCEDURE — 71045 X-RAY EXAM CHEST 1 VIEW: CPT

## 2020-08-10 PROCEDURE — A9270 NON-COVERED ITEM OR SERVICE: HCPCS | Performed by: SURGERY

## 2020-08-10 RX ORDER — FUROSEMIDE 10 MG/ML
40 INJECTION INTRAMUSCULAR; INTRAVENOUS 2 TIMES DAILY
Status: DISCONTINUED | OUTPATIENT
Start: 2020-08-10 | End: 2020-08-10

## 2020-08-10 RX ORDER — MAGNESIUM SULFATE 1 G/100ML
1 INJECTION INTRAVENOUS ONCE
Status: COMPLETED | OUTPATIENT
Start: 2020-08-10 | End: 2020-08-10

## 2020-08-10 RX ORDER — FUROSEMIDE 10 MG/ML
40 INJECTION INTRAMUSCULAR; INTRAVENOUS 2 TIMES DAILY
Status: COMPLETED | OUTPATIENT
Start: 2020-08-10 | End: 2020-08-10

## 2020-08-10 RX ORDER — POTASSIUM CHLORIDE 29.8 MG/ML
40 INJECTION INTRAVENOUS ONCE
Status: COMPLETED | OUTPATIENT
Start: 2020-08-10 | End: 2020-08-10

## 2020-08-10 RX ADMIN — POTASSIUM CHLORIDE 40 MEQ: 29.8 INJECTION, SOLUTION INTRAVENOUS at 08:50

## 2020-08-10 RX ADMIN — HYDROCORTISONE SODIUM SUCCINATE 25 MG: 100 INJECTION, POWDER, FOR SOLUTION INTRAMUSCULAR; INTRAVENOUS at 17:23

## 2020-08-10 RX ADMIN — MAGNESIUM SULFATE 1 G: 1 INJECTION INTRAVENOUS at 08:50

## 2020-08-10 RX ADMIN — AMIODARONE HYDROCHLORIDE 0.5 MG/MIN: 1.8 INJECTION, SOLUTION INTRAVENOUS at 11:52

## 2020-08-10 RX ADMIN — HEPARIN SODIUM 5000 UNITS: 5000 INJECTION, SOLUTION INTRAVENOUS; SUBCUTANEOUS at 13:44

## 2020-08-10 RX ADMIN — HYDROMORPHONE HYDROCHLORIDE 0.5 MG: 1 INJECTION, SOLUTION INTRAMUSCULAR; INTRAVENOUS; SUBCUTANEOUS at 22:18

## 2020-08-10 RX ADMIN — FAMOTIDINE 20 MG: 10 INJECTION, SOLUTION INTRAVENOUS at 05:01

## 2020-08-10 RX ADMIN — DOCUSATE SODIUM 50 MG AND SENNOSIDES 8.6 MG 1 TABLET: 8.6; 5 TABLET, FILM COATED ORAL at 20:27

## 2020-08-10 RX ADMIN — METOPROLOL TARTRATE 10 MG: 5 INJECTION, SOLUTION INTRAVENOUS at 05:01

## 2020-08-10 RX ADMIN — PIPERACILLIN SODIUM, TAZOBACTAM SODIUM 4.5 G: 4; .5 INJECTION, POWDER, LYOPHILIZED, FOR SOLUTION INTRAVENOUS at 04:57

## 2020-08-10 RX ADMIN — METOPROLOL TARTRATE 10 MG: 5 INJECTION, SOLUTION INTRAVENOUS at 23:20

## 2020-08-10 RX ADMIN — HYDROCORTISONE SODIUM SUCCINATE 25 MG: 100 INJECTION, POWDER, FOR SOLUTION INTRAMUSCULAR; INTRAVENOUS at 11:17

## 2020-08-10 RX ADMIN — HYDROCORTISONE SODIUM SUCCINATE 25 MG: 100 INJECTION, POWDER, FOR SOLUTION INTRAMUSCULAR; INTRAVENOUS at 05:02

## 2020-08-10 RX ADMIN — NYSTATIN: 100000 POWDER TOPICAL at 18:58

## 2020-08-10 RX ADMIN — HEPARIN SODIUM 5000 UNITS: 5000 INJECTION, SOLUTION INTRAVENOUS; SUBCUTANEOUS at 05:01

## 2020-08-10 RX ADMIN — HYDROCORTISONE SODIUM SUCCINATE 25 MG: 100 INJECTION, POWDER, FOR SOLUTION INTRAMUSCULAR; INTRAVENOUS at 00:00

## 2020-08-10 RX ADMIN — HYDROMORPHONE HYDROCHLORIDE 0.5 MG: 1 INJECTION, SOLUTION INTRAMUSCULAR; INTRAVENOUS; SUBCUTANEOUS at 02:13

## 2020-08-10 RX ADMIN — HEPARIN SODIUM 5000 UNITS: 5000 INJECTION, SOLUTION INTRAVENOUS; SUBCUTANEOUS at 23:24

## 2020-08-10 RX ADMIN — METOPROLOL TARTRATE 10 MG: 5 INJECTION, SOLUTION INTRAVENOUS at 17:23

## 2020-08-10 RX ADMIN — FUROSEMIDE 40 MG: 10 INJECTION, SOLUTION INTRAMUSCULAR; INTRAVENOUS at 17:23

## 2020-08-10 RX ADMIN — HYDROMORPHONE HYDROCHLORIDE 0.5 MG: 1 INJECTION, SOLUTION INTRAMUSCULAR; INTRAVENOUS; SUBCUTANEOUS at 19:08

## 2020-08-10 RX ADMIN — METOPROLOL TARTRATE 10 MG: 5 INJECTION, SOLUTION INTRAVENOUS at 00:02

## 2020-08-10 RX ADMIN — METOPROLOL TARTRATE 10 MG: 5 INJECTION, SOLUTION INTRAVENOUS at 11:16

## 2020-08-10 RX ADMIN — AMIODARONE HYDROCHLORIDE 0.5 MG/MIN: 1.8 INJECTION, SOLUTION INTRAVENOUS at 23:40

## 2020-08-10 RX ADMIN — PIPERACILLIN SODIUM, TAZOBACTAM SODIUM 4.5 G: 4; .5 INJECTION, POWDER, LYOPHILIZED, FOR SOLUTION INTRAVENOUS at 20:27

## 2020-08-10 RX ADMIN — NYSTATIN: 100000 POWDER TOPICAL at 05:02

## 2020-08-10 RX ADMIN — PIPERACILLIN SODIUM, TAZOBACTAM SODIUM 4.5 G: 4; .5 INJECTION, POWDER, LYOPHILIZED, FOR SOLUTION INTRAVENOUS at 13:45

## 2020-08-10 RX ADMIN — SODIUM CHLORIDE, POTASSIUM CHLORIDE, SODIUM LACTATE AND CALCIUM CHLORIDE: 600; 310; 30; 20 INJECTION, SOLUTION INTRAVENOUS at 02:14

## 2020-08-10 RX ADMIN — INSULIN HUMAN 1 UNITS: 100 INJECTION, SOLUTION PARENTERAL at 23:40

## 2020-08-10 RX ADMIN — INSULIN HUMAN 1 UNITS: 100 INJECTION, SOLUTION PARENTERAL at 00:03

## 2020-08-10 RX ADMIN — HYDROCORTISONE SODIUM SUCCINATE 25 MG: 100 INJECTION, POWDER, FOR SOLUTION INTRAMUSCULAR; INTRAVENOUS at 23:19

## 2020-08-10 ASSESSMENT — PULMONARY FUNCTION TESTS: FVC: .71

## 2020-08-10 ASSESSMENT — FIBROSIS 4 INDEX: FIB4 SCORE: 2.73

## 2020-08-10 NOTE — PROGRESS NOTES
TPN per Pharmacy    Patient will have TPN started tomorrow due to TPN order being placed after 1500 per TPN protocol.      Maggy Srivastava, Pharm.D., BCPS

## 2020-08-10 NOTE — CARE PLAN
Problem: Pain Management  Goal: Pain level will decrease to patient's comfort goal  Outcome: PROGRESSING AS EXPECTED   Assessing pain Q2 using CPOT, reassessing within 2 hours    Problem: Skin Integrity  Goal: Risk for impaired skin integrity will decrease  Outcome: PROGRESSING AS EXPECTED   Q2 turns, qshift skin check, float boots and mepilex to heels and sacrum, ensure pt free of medical devices, daily bath and clean sheets

## 2020-08-10 NOTE — THERAPY
Missed Therapy     Patient Name: Yvette Dailey  Age:  74 y.o., Sex:  female  Medical Record #: [id number]  Today's Date: 8/10/2020    Discussed missed therapy with TREVON Green.    Pt is currently intubated. SLP will hold dysphagia tx. Please reconsult as pt is able/appropriate to participate.

## 2020-08-10 NOTE — PROGRESS NOTES
"    DATE: 8/10/2020    7/31 Perforated appendix with retrocolic abscess.  Small bowel and right colon resection.  RLQ drain.  8/4 Returned to OR for anastamotic leak, AbThera - left in discontinuity  8/7 Washout, drain placement, AbThera - still in discontinuity  8/9 Washout, drain placement, ileocolic anastomosis, AbThera    Interval Events:  Stable overnight  Ongoing RLQ infection/contamination, bowel continuity re-established, but left open    -OK for trickle tube feeds to commence today  -Rec stopping IV fluids and administering forced diuresis at the discretion of the [facility] intensivist to facilitate possible abdominal wall closure tomorrow    PHYSICAL EXAMINATION:  Vital Signs: /73   Pulse 84   Temp 36.6 °C (97.9 °F)   Resp 13   Ht 1.753 m (5' 9\")   Wt (!) 128 kg (282 lb 3 oz)   SpO2 98%     GENERAL:  No acute distress  HEENT: Pupils equal, round and reactive to light bilaterally.  Sclera are clear bilaterally.  No otorrhea.  No rhinorrhea.  Mucus membranes are moist.  CHEST:  Lungs are clear to auscultation bilaterally, mechanically ventilated  CARDIOVASCULAR:  Regular rate and rhythm  ABDOMEN: Soft, protuberant, non-tender, non-distended, multiple RLQ drains with serosang output  GENITOURINARY:  No narvaez in place, voiding without issues  EXTREMITIES:  Good range of motion through out  NEUROLOGIC:  Cranial Nerves II through XII are grossly intact, no focal deficits appreciated  PSYCHIATRIC:  Normal affect and mood appropriate for age and condition  SKIN:  Warm and well perfused, no rashes    ASSESSMENT AND PLAN:   As outlined above    Appreciate ICU and consulting physician care.     ____________________________________     Mane Barber M.D.    DD: 8/10/2020  8:25 AM        "

## 2020-08-10 NOTE — DIETARY
"Nutrition Support Assessment     Nutrition services:   Day 10 of admit.  75 yo female with admitting diagnosis: bowel perforation.    Current problem list:  1. Anemia   2. Large bowel perforation  3. Septic shock  4. Acute renal insufficiency  5. Respiratory distress - on vent   6. Perforated appendicitis  7. Adrenal insufficiency  8. History of a-fib, HTN    Assessment:  Estimated Nutritional Needs: based on: height 5'9\", weight 111.3 kg (admit scale wt), IBW 65.772 kg, BMI 36.24 - class 2 obesity.    Calculation/Equation: obesity guidelines for critical illness - based on admit scale wt  Calories/day: 1220 - 1550 kcals (11 - 14 kcals/kg admit wt)  Protein/day: 99 - 132 g (1.5 - 2.0 g/kg IBW)    Evaluation:   1. Pt has been NPO/Clear liquids x 10 days. Pt also had brief period of TF @ 10 ml/hr - less than 24 hours.  2. Discussed in critical care rounds and surgeon ok for trickle tube feedings today. Recommended consideration of TPN as it is uncertain when pt will be able to advance to goal feedings.  3. Exploratory laparotomy on 7/31, 8/4, 8/7 and 8/9. Possible abdominal wall closure tomorrow.  4. Surgeon recommended stopping IVF and administering forced diuresis for surgery tomorrow.   5. Weight today 128 kg is increased 17 kg from admit scale weight of 111 kg. Pt is 17.5 liters fluid positive.   6. hypocaloric high protein feedings appropriate for critically ill obese pt      Malnutrition Risk: na    Recommendations/Plan:  1. Start Peptamen Intense @ 10 ml/hr and advance only with MD orders.   2. Full goal for Peptamen Intense 60 ml/hr will provide 1440 kcals, 134 g protein, 1152 ml H20/day  3. Consider TPN until pt is able to reach goal feedings.   4. Po diet when appropriate    "

## 2020-08-10 NOTE — PROGRESS NOTES
"Trauma / Surgical Daily Progress Note    Date of Service  8/10/2020    Chief Complaint  74 y.o. female admitted 7/31/2020 with Perforation bowel (HCC)    Interval Events  CRITICAL CARE DECISION MAKING:    Patient examined and discussed with with team at bedside.    Addressed pulmonary hygiene concerns as well as oxygenation/ventilation.  Weaning PEEP and continuing protocol.  Given poor reserve, will hold off on extubation until abdomen closed.  We will tried some diuresis to offload positive fluid balance but renal function may be labile.    Labs reviewed, electrolytes addressed, renal function assessed.  Urine output is more than appropriate.  We will try holding off on IV fluids, will resume them if patient bumps creatinine with diuresis.  Continuing steroid taper.     Reviewed nutrition strategies, recent indices start trickle feeding and TPN    Addressed GI prophylaxis and bowel frequency    Assessed/discussed/titrated analgesics and need for sedatives: Pepcid ordered.  Holding off on bowel regimen.  Addressed DVT prophylaxis: Heparin 3 times daily  Addressed line days, narvaez catheter days, access needs  Addressed family and discharge concerns    Review of Systems  Review of Systems   Unable to perform ROS: Intubated        Vital Signs for last 24 hours  Pulse:  [] 78  Resp:  [8-23] 17  BP: ()/(49-81) 127/62  SpO2:  [98 %-99 %] 98 %    Hemodynamic parameters for last 24 hours  CVP:  [9 MM HG-271 MM HG] 10 MM HG  BP (!) 165/67   Pulse 82   Temp 36.6 °C (97.9 °F)   Resp 17   Ht 1.753 m (5' 9\")   Wt (!) 128 kg (282 lb 3 oz)   SpO2 98%   BMI 41.67 kg/m²     Respiratory Data     Respiration: 17, Pulse Oximetry: 98 %     Work Of Breathing / Effort: Vented  RUL Breath Sounds: Coarse Crackles, RML Breath Sounds: Coarse Crackles, RLL Breath Sounds: Diminished, ELISSA Breath Sounds: Coarse Crackles, LLL Breath Sounds: Diminished    Physical Exam  Physical Exam  Vitals signs and nursing note reviewed. "   Constitutional:       General: She is awake.      Interventions: She is intubated and restrained.   HENT:      Head: Normocephalic and atraumatic.      Mouth/Throat:      Mouth: Mucous membranes are dry.      Pharynx: Oropharynx is clear.   Eyes:      Extraocular Movements: Extraocular movements intact.      Conjunctiva/sclera: Conjunctivae normal.      Pupils: Pupils are equal, round, and reactive to light.   Neck:      Musculoskeletal: Neck supple.   Cardiovascular:      Rate and Rhythm: Normal rate. Rhythm irregular. Frequent extrasystoles are present.     Pulses: Normal pulses.   Pulmonary:      Effort: She is intubated.      Breath sounds: No stridor. Examination of the right-lower field reveals decreased breath sounds. Examination of the left-lower field reveals decreased breath sounds. Decreased breath sounds present. No rhonchi.   Abdominal:      Comments: VAC intact  Drain serosanguineous   Genitourinary:     Comments: Orellana  Skin:     General: Skin is warm and dry.      Coloration: Skin is pale.   Neurological:      Mental Status: She is disoriented.      GCS: GCS eye subscore is 4. GCS verbal subscore is 1. GCS motor subscore is 6.   Psychiatric:         Behavior: Behavior is cooperative.         Laboratory  Recent Results (from the past 24 hour(s))   ACCU-CHEK GLUCOSE    Collection Time: 08/09/20  5:15 PM   Result Value Ref Range    Glucose - Accu-Ck 147 (H) 65 - 99 mg/dL   ACCU-CHEK GLUCOSE    Collection Time: 08/09/20 11:54 PM   Result Value Ref Range    Glucose - Accu-Ck 185 (H) 65 - 99 mg/dL   Comp Metabolic Panel    Collection Time: 08/10/20  4:06 AM   Result Value Ref Range    Sodium 154 (H) 135 - 145 mmol/L    Potassium 3.2 (L) 3.6 - 5.5 mmol/L    Chloride 118 (H) 96 - 112 mmol/L    Co2 19 (L) 20 - 33 mmol/L    Anion Gap 17.0 (H) 7.0 - 16.0    Glucose 146 (H) 65 - 99 mg/dL    Bun 102 (HH) 8 - 22 mg/dL    Creatinine 2.25 (H) 0.50 - 1.40 mg/dL    Calcium 7.4 (L) 8.5 - 10.5 mg/dL    AST(SGOT) 78  (H) 12 - 45 U/L    ALT(SGPT) 62 (H) 2 - 50 U/L    Alkaline Phosphatase 122 (H) 30 - 99 U/L    Total Bilirubin 1.4 0.1 - 1.5 mg/dL    Albumin 1.8 (L) 3.2 - 4.9 g/dL    Total Protein 3.8 (L) 6.0 - 8.2 g/dL    Globulin 2.0 1.9 - 3.5 g/dL    A-G Ratio 0.9 g/dL   CBC WITH DIFFERENTIAL    Collection Time: 08/10/20  4:06 AM   Result Value Ref Range    WBC 22.3 (H) 4.8 - 10.8 K/uL    RBC 2.29 (L) 4.20 - 5.40 M/uL    Hemoglobin 7.4 (L) 12.0 - 16.0 g/dL    Hematocrit 22.8 (L) 37.0 - 47.0 %    MCV 97.4 81.4 - 97.8 fL    MCH 31.9 27.0 - 33.0 pg    MCHC 32.7 (L) 33.6 - 35.0 g/dL    RDW 58.4 (H) 35.9 - 50.0 fL    Platelet Count 265 164 - 446 K/uL    MPV 10.4 9.0 - 12.9 fL    Neutrophils-Polys 87.70 (H) 44.00 - 72.00 %    Lymphocytes 5.30 (L) 22.00 - 41.00 %    Monocytes 5.30 0.00 - 13.40 %    Eosinophils 0.00 0.00 - 6.90 %    Basophils 0.00 0.00 - 1.80 %    Nucleated RBC 0.90 /100 WBC    Neutrophils (Absolute) 19.96 (H) 2.00 - 7.15 K/uL    Lymphs (Absolute) 1.18 1.00 - 4.80 K/uL    Monos (Absolute) 1.18 (H) 0.00 - 0.85 K/uL    Eos (Absolute) 0.00 0.00 - 0.51 K/uL    Baso (Absolute) 0.00 0.00 - 0.12 K/uL    NRBC (Absolute) 0.21 K/uL    Hypochromia 1+     Anisocytosis 1+     Macrocytosis 1+     Microcytosis 1+    MAGNESIUM    Collection Time: 08/10/20  4:06 AM   Result Value Ref Range    Magnesium 1.9 1.5 - 2.5 mg/dL   PHOSPHORUS    Collection Time: 08/10/20  4:06 AM   Result Value Ref Range    Phosphorus 5.0 (H) 2.5 - 4.5 mg/dL   Triglyceride    Collection Time: 08/10/20  4:06 AM   Result Value Ref Range    Triglycerides 283 (H) 0 - 149 mg/dL   ESTIMATED GFR    Collection Time: 08/10/20  4:06 AM   Result Value Ref Range    GFR If  26 (A) >60 mL/min/1.73 m 2    GFR If Non African American 21 (A) >60 mL/min/1.73 m 2   DIFFERENTIAL MANUAL    Collection Time: 08/10/20  4:06 AM   Result Value Ref Range    Bands-Stabs 1.80 0.00 - 10.00 %    Manual Diff Status PERFORMED    PERIPHERAL SMEAR REVIEW    Collection Time:  08/10/20  4:06 AM   Result Value Ref Range    Peripheral Smear Review see below    PLATELET ESTIMATE    Collection Time: 08/10/20  4:06 AM   Result Value Ref Range    Plt Estimation Normal    MORPHOLOGY    Collection Time: 08/10/20  4:06 AM   Result Value Ref Range    RBC Morphology Present     Large Platelets 1+     Polychromia 2+     Poikilocytosis 1+     Ovalocytes 1+     Echinocytes 1+     Basophilic Stippling Few    ACCU-CHEK GLUCOSE    Collection Time: 08/10/20  5:00 AM   Result Value Ref Range    Glucose - Accu-Ck 125 (H) 65 - 99 mg/dL   ACCU-CHEK GLUCOSE    Collection Time: 08/10/20 11:14 AM   Result Value Ref Range    Glucose - Accu-Ck 142 (H) 65 - 99 mg/dL   HGB    Collection Time: 08/10/20 11:32 AM   Result Value Ref Range    Hemoglobin 7.9 (L) 12.0 - 16.0 g/dL       Fluids    Intake/Output Summary (Last 24 hours) at 8/10/2020 1522  Last data filed at 8/10/2020 1400  Gross per 24 hour   Intake 2291.5 ml   Output 3420 ml   Net -1128.5 ml       Core Measures & Quality Metrics  Labs reviewed, Medications reviewed and Radiology images reviewed  Orellana catheter: Critically Ill - Requiring Accurate Measurement of Urinary Output  Central line in place: Concentrated IV drugs and Need for access    DVT Prophylaxis: Heparin  DVT prophylaxis - mechanical: SCDs  Ulcer prophylaxis: Yes  Antibiotics: Treating active infection/contamination beyond 24 hours perioperative coverage  Assessed for rehab: Patient unable to tolerate rehabilitation therapeutic regimen    CAITLIN Score  ETOH Screening    Assessment/Plan  Anemia  Assessment & Plan  Critical anemia  8/6 Hg 5.9 - Transfused 1 unit PRBC  8/7 Hg 5.4 - Transfuse 2 unit PRBC  8/8 Hg 6.8 - Transfuse 1 unit PRBC - check iron studies  8/9 Hg 6.7 - Transfuse 1 unit PRBC  Transfuse for hg < 7    Large bowel perforation (HCC)  Assessment & Plan  8/4 recurrent sepsis. OR for exploration - anastomosis (side-side) intact but end of colon perforated with feculent  peritonitits  Resection with discontinuity, ABthera  8/7 Washout  8/9 Primary anastamosis but still open  Plan closure 8/11  On Zosyn  Karthikeyan Llanso MD - [facility] Acute Care Surgery    Acute renal insufficiency  Assessment & Plan  Admission Cr elevated associated with infection  Improved with resuscitation after initial surgery  8/4 oliguria progressed to anuria despite crystalloid resuscitation  8/5 Cr markedly elevated, electrolytes ok. 24 hours of albumin resuscitation.   8/6 Cr trending down with improved UO  8/7 BUN climbing but creatinine and UO cont to improve   8/8 Indices up again - cont fluids  8/9 Some improvement  Trend renal indices, avoid nephrotoxins    Respiratory failure following trauma and surgery (HCC)- (present on admission)  Assessment & Plan  Full ventilatory support. Ensure optimal oxygenation and mitigate secondary injury.  8/2 Extubated  8/4 Remained intubated after 2nd surgery  8/10 short SBT with borderline parameters  Continue SICU weaning protocol  Work toward extubation after abd closed      Perforated appendicitis- (present on admission)  Assessment & Plan  7/31 perforated appendix with retrocolic abscess.  Small bowel and right colon resection.  RLQ drain.  8/4 Returned to OR for anastamotic leak - left in discontinuity  8/7 Washout and drain placement - still in discontinuity  8/9 Ex lap, primary anastamosis. Abd still open  Plan return to OR 8/11   8/10 Zosyn day 11 of 14  [facility]  Dr. Harris      Paroxysmal atrial fibrillation (CMS-HCC)- (present on admission)  Assessment & Plan  Chronic afib complicated by RVR  8/1 Digoxin load and metoprolol  8/3 digoxin level appropriate  8/7 Recurrent A fib with RVR - amio gtt  8/10 plan to resume Lopressor once able to tolerate p.o.  Rate controlled      Adrenal insufficiency (HCC)  Assessment & Plan  Failure to fully wean from vasopressors despite adequate resuscitation  8/3 empiric steroids started with liberation from  vasopressors  8/6 Started weaning off steroids    Septic shock (HCC)- (present on admission)  Assessment & Plan  7/31 high lactate, oliguric and hypotensive post op  8/1 lactate and urine output improved following 7 liter resuscitation. Levophed to support MAP.  8/2-8/3 weaning levophed. UOP  Adequate. Added steroids 8/3  8/4 Recurrent septic shock associated with leak. Resuscitation ongoing.  8/6 Stabilized off pressors  Cont fluids, low dose steroids  On zosyn    HTN (hypertension)- (present on admission)  Assessment & Plan  Takes multiple agents at home for hypertension  Held in acute setting with septic shock  Restart when appropriate    No contraindication to anticoagulation therapy- (present on admission)  Assessment & Plan  8/1 Begin Lovenox  8/5 Held for bleeding  8/8 Initiated heparin SQ    GLORIA (obstructive sleep apnea)- (present on admission)  Assessment & Plan  Refusing CPAP until she gets formal sleep study.         Discussed patient condition with RN, RT, Pharmacy, Dietary and .  CRITICAL CARE TIME EXCLUDING PROCEDURES: 39  minutes

## 2020-08-10 NOTE — PROGRESS NOTES
RN skin check w/ Rosales RN     Midline abdominal abthera wound vac, 125mmHg suction  2x hemovac and 1x HAYDEE drain on right abdomen, dressing CDI   Redness on back of head, blanching, wafflee pillow in use  Redness under breast and pannus, cleansed, nystatin applied  Heels red and boddy, blanching, heel mepilex and float boots  Bilateral forearm/ac bruising  Left upper arm bruising  Sacrum intact, mepilex in place  Left abdomen blister healing, cleansed, open to air     Rotate ET tube q2 hours, waffle pillow, q2 turns, mepilex on sacrum, mepilex and float boots for feet, full bed bath, CHG and clean lines, qshift skin check

## 2020-08-10 NOTE — FLOWSHEET NOTE
08/10/20 0821   Events/Summary/Plan   Events/Summary/Plan Placed back to rest settings   Weaning Trial   Weaning Trial Stopped due to: Pt weaned for 1 hour and returned to rest settings per protocol   Weaning Parameters   RR (bpm) 20   $ FVC / Vital Capacity (liters)  0.71  (pt's best effort)   NIF (cm H2O)  -21  (pt's best effort)   Rapid Shallow Breathing Index (RR/VT) 46   Spontaneous VE 9.3   Spontaneous

## 2020-08-10 NOTE — CARE PLAN
Problem: Communication  Goal: The ability to communicate needs accurately and effectively will improve  Outcome: PROGRESSING SLOWER THAN EXPECTED   Pt vented, able to follow commands and nods her head appropriately    Problem: Safety  Goal: Will remain free from injury  Outcome: PROGRESSING AS EXPECTED   Bed is in lowest and locked position, call light is within reach, bed alarm is on. Patient oriented to room, plan of care and educated on how to call for when they need assistance. Patient educated to not get out of bed without staff present. Patient educated on safety instructions for mobility.     Problem: Infection  Goal: Will remain free from infection  Outcome: PROGRESSING AS EXPECTED   Utilized hand hygiene before and after patient contact. Accessed IV hubs/ports by scrubbing with alcohol swab for 30 seconds before using. Daily labs and vital signs assessed for signs of infections. Patient assessed for signs and symptoms of new onset or increasing infection. Mouth care provided every 2 hours. VAP bundle in place.

## 2020-08-10 NOTE — RESPIRATORY CARE
Ventilator Daily Summary    Vent Day # 6    Ventilator settings changed this shift: None    Weaning trials: SBT 1 hour on5/8 30%        Plan: Continue current ventilator settings and wean mechanical ventilation as tolerated per physician orders.

## 2020-08-11 ENCOUNTER — APPOINTMENT (OUTPATIENT)
Dept: RADIOLOGY | Facility: MEDICAL CENTER | Age: 75
DRG: 853 | End: 2020-08-11
Attending: SURGERY
Payer: MEDICARE

## 2020-08-11 ENCOUNTER — ANESTHESIA EVENT (OUTPATIENT)
Dept: SURGERY | Facility: MEDICAL CENTER | Age: 75
DRG: 853 | End: 2020-08-11
Payer: MEDICARE

## 2020-08-11 ENCOUNTER — ANESTHESIA (OUTPATIENT)
Dept: SURGERY | Facility: MEDICAL CENTER | Age: 75
DRG: 853 | End: 2020-08-11
Payer: MEDICARE

## 2020-08-11 LAB
ALBUMIN SERPL BCP-MCNC: 2.3 G/DL (ref 3.2–4.9)
ALBUMIN/GLOB SERPL: 0.9 G/DL
ALP SERPL-CCNC: 147 U/L (ref 30–99)
ALT SERPL-CCNC: 61 U/L (ref 2–50)
ANION GAP SERPL CALC-SCNC: 16 MMOL/L (ref 7–16)
ANISOCYTOSIS BLD QL SMEAR: ABNORMAL
AST SERPL-CCNC: 74 U/L (ref 12–45)
BASOPHILS # BLD AUTO: 0 % (ref 0–1.8)
BASOPHILS # BLD: 0 K/UL (ref 0–0.12)
BILIRUB SERPL-MCNC: 1.2 MG/DL (ref 0.1–1.5)
BUN SERPL-MCNC: 92 MG/DL (ref 8–22)
CALCIUM SERPL-MCNC: 7.9 MG/DL (ref 8.5–10.5)
CHLORIDE SERPL-SCNC: 118 MMOL/L (ref 96–112)
CHOLEST SERPL-MCNC: 151 MG/DL (ref 100–199)
CO2 SERPL-SCNC: 22 MMOL/L (ref 20–33)
CREAT SERPL-MCNC: 2.14 MG/DL (ref 0.5–1.4)
CRP SERPL HS-MCNC: 2.45 MG/DL (ref 0–0.75)
EOSINOPHIL # BLD AUTO: 0 K/UL (ref 0–0.51)
EOSINOPHIL NFR BLD: 0 % (ref 0–6.9)
ERYTHROCYTE [DISTWIDTH] IN BLOOD BY AUTOMATED COUNT: 65.3 FL (ref 35.9–50)
GLOBULIN SER CALC-MCNC: 2.5 G/DL (ref 1.9–3.5)
GLUCOSE BLD-MCNC: 132 MG/DL (ref 65–99)
GLUCOSE BLD-MCNC: 138 MG/DL (ref 65–99)
GLUCOSE BLD-MCNC: 144 MG/DL (ref 65–99)
GLUCOSE BLD-MCNC: 151 MG/DL (ref 65–99)
GLUCOSE SERPL-MCNC: 184 MG/DL (ref 65–99)
HCT VFR BLD AUTO: 27.6 % (ref 37–47)
HGB BLD-MCNC: 8.6 G/DL (ref 12–16)
LYMPHOCYTES # BLD AUTO: 0.61 K/UL (ref 1–4.8)
LYMPHOCYTES NFR BLD: 2.6 % (ref 22–41)
MACROCYTES BLD QL SMEAR: ABNORMAL
MAGNESIUM SERPL-MCNC: 2.1 MG/DL (ref 1.5–2.5)
MANUAL DIFF BLD: NORMAL
MCH RBC QN AUTO: 32.1 PG (ref 27–33)
MCHC RBC AUTO-ENTMCNC: 31.2 G/DL (ref 33.6–35)
MCV RBC AUTO: 103 FL (ref 81.4–97.8)
METAMYELOCYTES NFR BLD MANUAL: 1.7 %
MICROCYTES BLD QL SMEAR: ABNORMAL
MONOCYTES # BLD AUTO: 1.22 K/UL (ref 0–0.85)
MONOCYTES NFR BLD AUTO: 5.2 % (ref 0–13.4)
MORPHOLOGY BLD-IMP: NORMAL
MYELOCYTES NFR BLD MANUAL: 2.6 %
NEUTROPHILS # BLD AUTO: 20.57 K/UL (ref 2–7.15)
NEUTROPHILS NFR BLD: 86.2 % (ref 44–72)
NEUTS BAND NFR BLD MANUAL: 1.7 % (ref 0–10)
NRBC # BLD AUTO: 0.09 K/UL
NRBC BLD-RTO: 0.4 /100 WBC
PHOSPHATE SERPL-MCNC: 5.8 MG/DL (ref 2.5–4.5)
PLATELET # BLD AUTO: 327 K/UL (ref 164–446)
PLATELET BLD QL SMEAR: NORMAL
PMV BLD AUTO: 10.4 FL (ref 9–12.9)
POLYCHROMASIA BLD QL SMEAR: NORMAL
POTASSIUM SERPL-SCNC: 3.2 MMOL/L (ref 3.6–5.5)
PREALB SERPL-MCNC: 19.1 MG/DL (ref 18–38)
PROT SERPL-MCNC: 4.8 G/DL (ref 6–8.2)
RBC # BLD AUTO: 2.68 M/UL (ref 4.2–5.4)
RBC BLD AUTO: PRESENT
SODIUM SERPL-SCNC: 156 MMOL/L (ref 135–145)
TRIGL SERPL-MCNC: 315 MG/DL (ref 0–149)
WBC # BLD AUTO: 23.4 K/UL (ref 4.8–10.8)

## 2020-08-11 PROCEDURE — 94150 VITAL CAPACITY TEST: CPT

## 2020-08-11 PROCEDURE — 74018 RADEX ABDOMEN 1 VIEW: CPT

## 2020-08-11 PROCEDURE — 99291 CRITICAL CARE FIRST HOUR: CPT | Performed by: SURGERY

## 2020-08-11 PROCEDURE — 700111 HCHG RX REV CODE 636 W/ 250 OVERRIDE (IP): Performed by: SURGERY

## 2020-08-11 PROCEDURE — 700101 HCHG RX REV CODE 250: Performed by: SURGERY

## 2020-08-11 PROCEDURE — 160031 HCHG SURGERY MINUTES - 1ST 30 MINS LEVEL 5: Performed by: SURGERY

## 2020-08-11 PROCEDURE — 85027 COMPLETE CBC AUTOMATED: CPT

## 2020-08-11 PROCEDURE — 94002 VENT MGMT INPAT INIT DAY: CPT

## 2020-08-11 PROCEDURE — 83735 ASSAY OF MAGNESIUM: CPT

## 2020-08-11 PROCEDURE — 84478 ASSAY OF TRIGLYCERIDES: CPT

## 2020-08-11 PROCEDURE — 700102 HCHG RX REV CODE 250 W/ 637 OVERRIDE(OP): Performed by: SURGERY

## 2020-08-11 PROCEDURE — 700111 HCHG RX REV CODE 636 W/ 250 OVERRIDE (IP): Performed by: ANESTHESIOLOGY

## 2020-08-11 PROCEDURE — 501445 HCHG STAPLER, SKIN DISP: Performed by: SURGERY

## 2020-08-11 PROCEDURE — 501838 HCHG SUTURE GENERAL: Performed by: SURGERY

## 2020-08-11 PROCEDURE — 160048 HCHG OR STATISTICAL LEVEL 1-5: Performed by: SURGERY

## 2020-08-11 PROCEDURE — 82962 GLUCOSE BLOOD TEST: CPT

## 2020-08-11 PROCEDURE — 71045 X-RAY EXAM CHEST 1 VIEW: CPT

## 2020-08-11 PROCEDURE — A9270 NON-COVERED ITEM OR SERVICE: HCPCS | Performed by: SURGERY

## 2020-08-11 PROCEDURE — 160009 HCHG ANES TIME/MIN: Performed by: SURGERY

## 2020-08-11 PROCEDURE — 700105 HCHG RX REV CODE 258: Performed by: ANESTHESIOLOGY

## 2020-08-11 PROCEDURE — 700105 HCHG RX REV CODE 258: Performed by: SURGERY

## 2020-08-11 PROCEDURE — 94003 VENT MGMT INPAT SUBQ DAY: CPT

## 2020-08-11 PROCEDURE — 502240 HCHG MISC OR SUPPLY RC 0272: Performed by: SURGERY

## 2020-08-11 PROCEDURE — 84134 ASSAY OF PREALBUMIN: CPT

## 2020-08-11 PROCEDURE — 85007 BL SMEAR W/DIFF WBC COUNT: CPT

## 2020-08-11 PROCEDURE — C1765 ADHESION BARRIER: HCPCS | Performed by: SURGERY

## 2020-08-11 PROCEDURE — 770022 HCHG ROOM/CARE - ICU (200)

## 2020-08-11 PROCEDURE — 82803 BLOOD GASES ANY COMBINATION: CPT

## 2020-08-11 PROCEDURE — 86140 C-REACTIVE PROTEIN: CPT

## 2020-08-11 PROCEDURE — 82465 ASSAY BLD/SERUM CHOLESTEROL: CPT

## 2020-08-11 PROCEDURE — 0WJG0ZZ INSPECTION OF PERITONEAL CAVITY, OPEN APPROACH: ICD-10-PCS | Performed by: SURGERY

## 2020-08-11 PROCEDURE — 160042 HCHG SURGERY MINUTES - EA ADDL 1 MIN LEVEL 5: Performed by: SURGERY

## 2020-08-11 PROCEDURE — 84100 ASSAY OF PHOSPHORUS: CPT

## 2020-08-11 PROCEDURE — 80053 COMPREHEN METABOLIC PANEL: CPT

## 2020-08-11 PROCEDURE — 36600 WITHDRAWAL OF ARTERIAL BLOOD: CPT

## 2020-08-11 RX ORDER — SODIUM CHLORIDE 450 MG/100ML
INJECTION, SOLUTION INTRAVENOUS
Status: DISCONTINUED | OUTPATIENT
Start: 2020-08-11 | End: 2020-08-11 | Stop reason: SURG

## 2020-08-11 RX ORDER — VECURONIUM BROMIDE 1 MG/ML
INJECTION, POWDER, LYOPHILIZED, FOR SOLUTION INTRAVENOUS PRN
Status: DISCONTINUED | OUTPATIENT
Start: 2020-08-11 | End: 2020-08-11 | Stop reason: SURG

## 2020-08-11 RX ORDER — POTASSIUM CHLORIDE 29.8 MG/ML
40 INJECTION INTRAVENOUS ONCE
Status: COMPLETED | OUTPATIENT
Start: 2020-08-11 | End: 2020-08-11

## 2020-08-11 RX ORDER — SODIUM CHLORIDE, SODIUM LACTATE, POTASSIUM CHLORIDE, CALCIUM CHLORIDE 600; 310; 30; 20 MG/100ML; MG/100ML; MG/100ML; MG/100ML
INJECTION, SOLUTION INTRAVENOUS CONTINUOUS
Status: ACTIVE | OUTPATIENT
Start: 2020-08-11 | End: 2020-08-11

## 2020-08-11 RX ORDER — MORPHINE SULFATE 2 MG/ML
INJECTION, SOLUTION INTRAMUSCULAR; INTRAVENOUS PRN
Status: DISCONTINUED | OUTPATIENT
Start: 2020-08-11 | End: 2020-08-11 | Stop reason: SURG

## 2020-08-11 RX ADMIN — HEPARIN SODIUM 5000 UNITS: 5000 INJECTION, SOLUTION INTRAVENOUS; SUBCUTANEOUS at 22:03

## 2020-08-11 RX ADMIN — NYSTATIN: 100000 POWDER TOPICAL at 16:47

## 2020-08-11 RX ADMIN — HYDROCORTISONE SODIUM SUCCINATE 25 MG: 100 INJECTION, POWDER, FOR SOLUTION INTRAMUSCULAR; INTRAVENOUS at 22:03

## 2020-08-11 RX ADMIN — METOPROLOL TARTRATE 10 MG: 5 INJECTION, SOLUTION INTRAVENOUS at 05:02

## 2020-08-11 RX ADMIN — POTASSIUM CHLORIDE 40 MEQ: 29.8 INJECTION, SOLUTION INTRAVENOUS at 09:31

## 2020-08-11 RX ADMIN — VECURONIUM BROMIDE 10 MG: 10 INJECTION, POWDER, LYOPHILIZED, FOR SOLUTION INTRAVENOUS at 10:40

## 2020-08-11 RX ADMIN — SODIUM CHLORIDE: 4.5 INJECTION, SOLUTION INTRAVENOUS at 10:33

## 2020-08-11 RX ADMIN — HYDROMORPHONE HYDROCHLORIDE 0.5 MG: 1 INJECTION, SOLUTION INTRAMUSCULAR; INTRAVENOUS; SUBCUTANEOUS at 02:06

## 2020-08-11 RX ADMIN — METOPROLOL TARTRATE 10 MG: 5 INJECTION, SOLUTION INTRAVENOUS at 16:47

## 2020-08-11 RX ADMIN — HYDROCORTISONE SODIUM SUCCINATE 25 MG: 100 INJECTION, POWDER, FOR SOLUTION INTRAMUSCULAR; INTRAVENOUS at 11:46

## 2020-08-11 RX ADMIN — METOPROLOL TARTRATE 10 MG: 5 INJECTION, SOLUTION INTRAVENOUS at 23:57

## 2020-08-11 RX ADMIN — SODIUM CHLORIDE, POTASSIUM CHLORIDE, SODIUM LACTATE AND CALCIUM CHLORIDE: 600; 310; 30; 20 INJECTION, SOLUTION INTRAVENOUS at 15:15

## 2020-08-11 RX ADMIN — PIPERACILLIN SODIUM, TAZOBACTAM SODIUM 4.5 G: 4; .5 INJECTION, POWDER, LYOPHILIZED, FOR SOLUTION INTRAVENOUS at 05:03

## 2020-08-11 RX ADMIN — PIPERACILLIN SODIUM, TAZOBACTAM SODIUM 4.5 G: 4; .5 INJECTION, POWDER, LYOPHILIZED, FOR SOLUTION INTRAVENOUS at 14:15

## 2020-08-11 RX ADMIN — HEPARIN SODIUM 5000 UNITS: 5000 INJECTION, SOLUTION INTRAVENOUS; SUBCUTANEOUS at 14:15

## 2020-08-11 RX ADMIN — HYDROMORPHONE HYDROCHLORIDE 0.5 MG: 1 INJECTION, SOLUTION INTRAMUSCULAR; INTRAVENOUS; SUBCUTANEOUS at 23:10

## 2020-08-11 RX ADMIN — METOPROLOL TARTRATE 10 MG: 5 INJECTION, SOLUTION INTRAVENOUS at 11:47

## 2020-08-11 RX ADMIN — MORPHINE SULFATE 1 MG: 2 INJECTION, SOLUTION INTRAMUSCULAR; INTRAVENOUS at 10:57

## 2020-08-11 RX ADMIN — PIPERACILLIN SODIUM, TAZOBACTAM SODIUM 4.5 G: 4; .5 INJECTION, POWDER, LYOPHILIZED, FOR SOLUTION INTRAVENOUS at 21:10

## 2020-08-11 RX ADMIN — HEPARIN SODIUM 5000 UNITS: 5000 INJECTION, SOLUTION INTRAVENOUS; SUBCUTANEOUS at 05:03

## 2020-08-11 RX ADMIN — MORPHINE SULFATE 4 MG: 2 INJECTION, SOLUTION INTRAMUSCULAR; INTRAVENOUS at 11:03

## 2020-08-11 RX ADMIN — FAMOTIDINE 20 MG: 10 INJECTION, SOLUTION INTRAVENOUS at 05:02

## 2020-08-11 RX ADMIN — AMIODARONE HYDROCHLORIDE 0.5 MG/MIN: 1.8 INJECTION, SOLUTION INTRAVENOUS at 11:47

## 2020-08-11 RX ADMIN — HYDROCORTISONE SODIUM SUCCINATE 25 MG: 100 INJECTION, POWDER, FOR SOLUTION INTRAMUSCULAR; INTRAVENOUS at 05:03

## 2020-08-11 RX ADMIN — PROPOFOL 20 MCG/KG/MIN: 10 INJECTION, EMULSION INTRAVENOUS at 12:15

## 2020-08-11 RX ADMIN — NYSTATIN: 100000 POWDER TOPICAL at 05:02

## 2020-08-11 RX ADMIN — PROPOFOL 150 MG: 10 INJECTION, EMULSION INTRAVENOUS at 10:40

## 2020-08-11 RX ADMIN — CALCIUM GLUCONATE: 98 INJECTION, SOLUTION INTRAVENOUS at 19:40

## 2020-08-11 RX ADMIN — HYDROMORPHONE HYDROCHLORIDE 0.5 MG: 1 INJECTION, SOLUTION INTRAMUSCULAR; INTRAVENOUS; SUBCUTANEOUS at 00:04

## 2020-08-11 ASSESSMENT — PULMONARY FUNCTION TESTS: FVC: .35

## 2020-08-11 ASSESSMENT — FIBROSIS 4 INDEX: FIB4 SCORE: 2.12

## 2020-08-11 NOTE — CARE PLAN
Problem: Communication  Goal: The ability to communicate needs accurately and effectively will improve  Outcome: PROGRESSING AS EXPECTED   Pt AOx4, calls appropriately, able to express needs  Problem: Safety  Goal: Will remain free from injury  Outcome: PROGRESSING AS EXPECTED   Bed is in lowest and locked position, call light is within reach, bed alarm is on. Patient oriented to room, plan of care and educated on how to call for when they need assistance. Patient educated to not get out of bed without staff present. Patient educated on safety instructions for mobility.

## 2020-08-11 NOTE — ANESTHESIA QCDR
2019 [facility] Clinical Data Registry (for Quality Improvement)     Postoperative nausea/vomiting risk protocol (Adult = 18 yrs and Pediatric 3-17 yrs)- (430 and 463)  General inhalation anesthetic (NOT TIVA) with PONV risk factors: No  Provision of anti-emetic therapy with at least 2 different classes of agents: N/A  Patient DID NOT receive anti-emetic therapy and reason is documented in Medical Record: N/A    Multimodal Pain Management- (477)  Non-emergent surgery AND patient age >= 18: Yes  Use of Multimodal Pain Management, two or more drugs and/or interventions, NOT including systemic opioids: No  Exception: Documented allergy to multiple classes of analgesics: Yes       Smoking Abstinence (404)  Patient is current smoker (cigarette, pipe, e-cig, marijuanna): No  Elective Surgery:   Abstinence instructions provided prior to day of surgery:   Patient abstained from smoking on day of surgery:     Pre-Op Beta-Blocker in Isolated CABG (44)  Isolated CABG AND patient age >= 18: No  Beta-blocker admin within 24 hours of surgical incision:   Exception:of medical reason(s) for not administering beta blocker within 24 hours prior to surgical incision (e.g., not  indicated,other medical reason):     PACU assessment of acute postoperative pain prior to Anesthesia Care End- Applies to Patients Age = 18- (ABG7)  Initial PACU pain score is which of the following: Pain not assessed  Patient unable to report pain score: Yes (Patient Unable to Report)    Post-anesthetic transfer of care checklist/protocol to PACU/ICU- (426 and 427)  Upon conclusion of case, patient transferred to which of the following locations: ICU  Use of transfer checklist/protocol: Yes  Exclusion: Service Performed in Patient Hospital Room (and thus did not require transfer): N/A  Unplanned admission to ICU related to anesthesia service up through end of PACU care- (MD51)  Unplanned admission to ICU (not initially anticipated at anesthesia start time):  No

## 2020-08-11 NOTE — ANESTHESIA PROCEDURE NOTES
Airway    Date/Time: 8/11/2020 10:40 AM  Performed by: Otis Lizama M.D.  Authorized by: Otis Lizama M.D.     Location:  OR  Urgency:  Elective  Indications for Airway Management:  Anesthesia      Spontaneous Ventilation: absent    Sedation Level:  Deep  Preoxygenated: Yes    Patient Position:  Sniffing  Final Airway Type:  Endotracheal airway  Final Endotracheal Airway:  ETT  Cuffed: Yes    Technique Used for Successful ETT Placement:  Direct laryngoscopy    Insertion Site:  Oral  Blade Type:  Heller  Laryngoscope Blade/Videolaryngoscope Blade Size:  2  ETT Size (mm):  7.5  Measured from:  Teeth  ETT to Teeth (cm):  22  Placement Verified by: auscultation and capnometry    Cormack-Lehane Classification:  Grade I - full view of glottis  Number of Attempts at Approach:  1

## 2020-08-11 NOTE — PROGRESS NOTES
Skin check with Asa RN     Midline abdominal abthera wound vac, 125 mmHg suction 2x hemovac and 1x HAYDEE drain on Right abdomen, dressing CDI   Redness on back of head, blanching, waffle pillow in use, pt turns head side to side  Redness under breasts and pannus, cleansed, nystatin applied   Heels red and boggy, blanching, heel float boots in use   Bilateral forearm bruising and AC  Left upper arm bruising  Sacrum intact, mepilex in place  Left abdomen blister healing, cleansed, open to air    Q2 turns, waffle pillow, mepilex on sacrum, float boots, full bed bath and CHG, fresh linens, Qshift skin check

## 2020-08-11 NOTE — CARE PLAN
Problem: Bowel/Gastric:  Goal: Normal bowel function is maintained or improved  Outcome: PROGRESSING AS EXPECTED   Bowel protocol initiated, hypoactive bowel sounds, surgery planned in AM, trickle feeds initiated    Problem: Skin Integrity  Goal: Risk for impaired skin integrity will decrease  Outcome: PROGRESSING AS EXPECTED   Q2 turns, mepilex on bilateral heels and sacrum, float boots, Qshift skin assessment, Qday bath and linen change, ensuring pt is free from medical devices

## 2020-08-11 NOTE — WOUND TEAM
Wound consult received for NPWT dressing changes to begin Thursday 8/13. Wound team to follow up for first change Thursday at bedside, consult completed.

## 2020-08-11 NOTE — PROGRESS NOTES
Pharmacy TPN Day # 1       2020    Dosing Weight   84 kg (adjusted BW based on admit weight 111 kg)        TPN New Start      TPN goal: 2000 kcal/day including 1 gm/kg/day Protein      TPN indication: Perforated Appendicitis         Pertinent PMH: Patient presented with RLQ abdominal pain with N/V.  Found to have perforated appendicitis with retro-colic abscess and involvement of the terminal ileum.  The patient's various surgeries to date are noted below.  The patient is currently intubated.  She has developed renal failure.  The patient is currently receiving hydrocortisone.  There was concern for possible GIB on  related to coffee ground emesis.  The patient has been either NPO or Clear Liquid Diet since admit.  It is unclear when the patient will be able to tolerate enteral nutrition to meet nutritional goals.  Thus TPN has been started.    Surgeries:   small bowel and right colon resection   return to OR for anastamotic leak, discontinuous   washout, drain placement, discontinuous   primary anastamosis, abdomen open   abdomen closed, drains placed, midline wound VAC    Temp (24hrs), Av.8 °C (98.2 °F), Min:36.2 °C (97.2 °F), Max:37.6 °C (99.6 °F)  .  Recent Labs     20  0422 08/10/20  0406 20  0411   SODIUM 151* 154* 156*   POTASSIUM 3.8 3.2* 3.2*   CHLORIDE 114* 118* 118*   CO2 19* 19* 22   * 102* 92*   CREATININE 2.36* 2.25* 2.14*   GLUCOSE 146* 146* 184*   CALCIUM 7.5* 7.4* 7.9*   ASTSGOT 108* 78* 74*   ALTSGPT 85* 62* 61*   ALBUMIN 2.0* 1.8* 2.3*   TBILIRUBIN 1.4 1.4 1.2   PHOSPHORUS  --  5.0* 5.8*   MAGNESIUM  --  1.9 2.1   PREALBUMIN  --   --  19.1     Accu-Checks  Recent Labs     08/10/20  2339 20  0529 20  1146   POCGLUCOSE 151* 132* 144*       Vitals:    20 0400 20 0600 20 1027 20 1200   BP: 157/68 120/58 128/67 141/67   Weight: (!) 126.5 kg (278 lb 14.1 oz)      Height:           Intake/Output Summary (Last 24 hours) at  8/11/2020 1504  Last data filed at 8/11/2020 1200  Gross per 24 hour   Intake 924 ml   Output 3050 ml   Net -2126 ml       Orders Placed This Encounter   Procedures   • Diet NPO     Standing Status:   Standing     Number of Occurrences:   1     Order Specific Question:   Restrict to:     Answer:   Strict [1]         TPN for past 72 hours (Show up to 3 orders; newest on the left.)     Start date and time   08/11/2020 2000      TPN Central Line Formulation [[id number]]    Order Status  Active       Base    Clinisol 15%  40 g    dextrose 70%  180 g    fat emulsions 20%  20 g       Additives    potassium chloride  40 mEq    sodium acetate  75 mEq    sodium chloride  75 mEq    calcium GLUConate  4.65 mEq    M.T.E. -5 Adult  1 mL    M.V.I. Adult  10 mL    famotidine  20 mg       QS Base    sterile water for inj(pf)  1,268.98 mL       Energy Contribution    Proteins  --    Dextrose  --    Lipids  --    Total  --       Electrolyte Ion Calculated Amount    Sodium  150 mEq    Potassium  40 mEq    Calcium  4.65 mEq    Magnesium  --    Aluminum  --    Phosphate  --    Chloride  115 mEq    Acetate  108.87 mEq       Other    Total Protein  40 g    Total Protein/kg  0.32 g/kg    Total Amino Acid  --    Total Amino Acid/kg  --    Glucose Infusion Rate  0.99 mg/kg/min    Osmolarity (Estimated)  --    Volume  1,992 mL    Rate  83 mL/hr    Dosing Weight  126.5 kg    Infusion Site  Central        This formula provides:  % kcal as lipids = 20.5  Grams protein/kg = 0.5  Non-protein calories = 812  Kcals/kg = 11.5  Total daily calories = 972    A/P:  1. Interval Events:  Hydrocortisone weaned today.  Abdomen closed today.    2. Macronutrients:  Patient would meet Critically Ill Obese Nutrition Guidelines based on weight.  Limited on protein due to her renal failure that has persisted.  Thus her protein goal is limited and is currently estimated to be 1 g/kg (adjusted BW).  Protein received with obesity guideline dosing would be nearly  double this amount.  CTM renal function and adjust goal nutritional requirements to meet the patient's needs considering changing goal calories and protein if renal function improves to where the patient can tolerate higher protein.  The patient is at risk for refeeding given her extended duration without adequate nutrition.  Monitor electrolytes with initiation TPN and consider adding thiamine if patient showing signs of refeeding.  3. Micronutrients/Electrolytes:   - Hypokalemia:  Patient received 40 mEq IVPB this AM.  Added 40 mEq to the TPN.   - Hypernatremia:  Patient has been receiving LR at 125 ml/hr.  TPN will be at 1/2 NS equivalents.   - JULIO:  Patient is at risk for electrolyte accumulation.  Will therefore opt to replete outside TPN for the time being.   - Famotidine to be provided in TPN.  4. Glycemic Control:  FSBS stable < 180 mg/dL on FSBS.  Received 1 unit/24 hours from ISS  5. Fluid Status:  Patient is currently on  ml/hr.  Per discussion with physician ok to reduce TIVF to 83 ml/hr once TPN started at 2000 and DC LR.  UOP remains stable WNL.      Maggy Srivastava, Pharm.D., BCPS

## 2020-08-11 NOTE — OP REPORT
DATE OF SERVICE:  08/11/2020    PREOPERATIVE DIAGNOSIS:  Perforated appendicitis.      POSTOPERATIVE DIAGNOSIS:  Perforated appendicitis.      PROCEDURE PERFORMED:  Exploratory laparotomy, abdominal fascial closure,   placement of negative pressure wound therapy device greater than 50 cm2.      PRIMARY SURGEON:  Mane Barber MD    ASSISTANT:  ARIANNE Carlos    ANESTHESIOLOGIST:  Otis Lizama MD    ANESTHESIA:  General endotracheal.      ESTIMATED BLOOD LOSS:  Minimal.      COMPLICATIONS:  None immediately apparent.      SPECIMENS:  None.      OPERATIVE FINDINGS:  The stalk and the base of the patient's umbilicus   appeared to have become necrotic and this was resected.  The patient's fascial   edges appeared healthy and viable.  I could not identify any areas of ongoing   infection or contamination within any of the areas of the abdominal   compartment, but was able to visualize the patient's previously created   ileocolonic anastomosis and appeared to be intact.      DESCRIPTION OF PROCEDURE:  The patient was taken back to the operating room,   placed in the supine position.  General anesthesia was induced.  The patient   was intubated.  Bilateral SCD devices were placed.  Appropriate IV antibiotics   had already been given.  The patient arrived to the operating room with a   Orellana catheter had already in place.  The outer portions of the patient's   ABThera wound therapy device were removed and discarded.  The inner portions   were retrieved as well and discarded.  Patient's abdomen was widely prepped   and draped in a sterile fashion.  A timeout was performed.  The patient and   procedure both verified.      Four-quadrant abdominal exploration was undertaken.  The above findings were   noted.  Patient's midline fascia was approximated with two #1 non-looped PDS   Plus sutures.  Once tied down, the fascia was noted to be tight and well   approximated.  A black wound VAC sponge was then cut to fit  the patient's   residual wound, the outer portions of the wound VAC dressing were applied.    Suction was applied to the dressing and there was a good seal.      Overall, the patient tolerated this procedure well.  There was minimal blood   loss.  She had no hemodynamic instability.  She was left intubated; however,   and taken back to the [facility] Unit in guarded condition.  All   sharps and sponge counts were correct by end of the case on 2 occasions.      ASA SCORE:  4.      WOUND CLASSIFICATION:  Infected.       ____________________________________     MD LINN Heck / NTS    DD:  08/11/2020 11:50:44  DT:  08/11/2020 12:01:20    D#:  [id number]  Job#:  [id number]

## 2020-08-11 NOTE — CARE PLAN
Ventilator Daily Summary    Vent Day # 1, pt back from OR intubated.    Ventilator settings: , PEEP 8, FiO2 30    Plan: Continue current ventilator settings and wean mechanical ventilation as tolerated per physician orders.

## 2020-08-11 NOTE — ANESTHESIA PREPROCEDURE EVALUATION
Relevant Problems   ANESTHESIA   (+) GLORIA (obstructive sleep apnea)      CARDIAC   (+) HTN (hypertension)   (+) Paroxysmal atrial fibrillation (CMS-HCC)         (+) Acute renal insufficiency      Other   (+) Obesity   (+) Perforated appendicitis   Hpernatremia  Open abdomen      Physical Exam    Airway   Mallampati: IV  TM distance: <3 FB  Neck ROM: full       Cardiovascular - normal exam  Rhythm: regular  Rate: normal  (-) murmur     Dental - normal exam           Pulmonary - normal exam  Breath sounds clear to auscultation     Abdominal    Neurological - normal exam                 Anesthesia Plan    ASA 4   ASA physical status 4 criteria: other (comment)    Plan - general       Airway plan will be ETT        Induction: intravenous    Postoperative Plan: Postoperative administration of opioids is intended.    Pertinent diagnostic labs and testing reviewed    Informed Consent:    Anesthetic plan and risks discussed with patient.    Use of blood products discussed with: patient whom consented to blood products.

## 2020-08-11 NOTE — PROGRESS NOTES
"    DATE: 8/11/2020 7/31 Perforated appendix with retrocolic abscess.  Small bowel and right colon resection.  RLQ drain.  8/4 Returned to OR for anastamotic leak, AbThera - left in discontinuity  8/7 Washout, drain placement, AbThera - still in discontinuity  8/9 Washout, drain placement, ileocolic anastomosis, AbThera    Interval Events:  Stable overnight  To OR today for possible abdominal closure    Given the above presentation, the patient will be taken to the operating room for exploratory laparotomy, abdominal closure. The surgical plan rationale for surgery was discussed in detail and in layman's terms with the patient and available family. Potential complications were discussed in detail and include but are not limited to infection, bleeding, damage to adjacent tissues and organs, pneumonia, anesthetic complications and death on very rare occasions. Questions and concerns were addressed to the patient's and available family's apparent satisfaction.  It was agreed to proceed.  Operative consent was obtained.      PHYSICAL EXAMINATION:  Vital Signs: /58   Pulse 62   Temp 36.4 °C (97.6 °F) (Temporal)   Resp 12   Ht 1.753 m (5' 9\")   Wt (!) 126.5 kg (278 lb 14.1 oz)   SpO2 100%     GENERAL:  No acute distress  HEENT: Pupils equal, round and reactive to light bilaterally.  Sclera are clear bilaterally.  No otorrhea.  No rhinorrhea.  Mucus membranes are moist.  CHEST:  Lungs are clear to auscultation bilaterally, mechanically ventilated  CARDIOVASCULAR:  Regular rate and rhythm  ABDOMEN: Soft, protuberant, non-tender, non-distended, multiple RLQ drains with serosang output  GENITOURINARY:  No narvaez in place, voiding without issues  EXTREMITIES:  Good range of motion through out  NEUROLOGIC:  Cranial Nerves II through XII are grossly intact, no focal deficits appreciated  PSYCHIATRIC:  Normal affect and mood appropriate for age and condition  SKIN:  Warm and well perfused, no rashes    ASSESSMENT " AND PLAN:   As outlined above    Appreciate ICU and consulting physician care.     ____________________________________     Mane Barber M.D.    DD: 8/10/2020  8:25 AM

## 2020-08-11 NOTE — PROGRESS NOTES
RN skin check w/ Eun RN     Midline abdominal abthera wound vac  2x hemovac and 1x HAYDEE drain on right abdomen, dressing CDI   Redness on back of head, blanching, wafflee pillow in use  Redness under breast and pannus, cleansed, nystatin applied  Heels red and boddy, blanching, heel mepilex and float boots  Bilateral forearm/ac bruising  Left upper arm bruising  Sacrum intact, mepilex in place  Left abdomen blister healing, cleansed, open to air      Rotate ET tube q2 hours, waffle pillow, q2 turns, mepilex on sacrum, mepilex and float boots for feet, full bed bath, CHG and clean lines, qshift skin check

## 2020-08-11 NOTE — ANESTHESIA TIME REPORT
Anesthesia Start and Stop Event Times     Date Time Event    8/11/2020 1033 Anesthesia Start     1034 Ready for Procedure     1133 Anesthesia Stop        Responsible Staff  08/11/20    Name Role Begin End    Otis Lizama M.D. Anesth 1033 1133        Preop Diagnosis (Free Text):  Pre-op Diagnosis     PERFORATED APPENDICITIS        Preop Diagnosis (Codes):    Post op Diagnosis  Perforated appendicitis      Premium Reason  Non-Premium    Comments:

## 2020-08-11 NOTE — RESPIRATORY CARE
Ventilator Daily Summary    Vent Day # 6    Pt. Being turn by nurses, et tubed got pulled  And  was extubated.  Pt. Hand ventilator for about 1 minute and a 15 lpm non-rebreather mask put on pt.  Switched over to 7 lpm oxymask and Dr. Petit came to bedside.  Pt. Not reintubated. Pt. Doing well.

## 2020-08-11 NOTE — PROGRESS NOTES
Critical lab result received of Na 156 and BUN. Will pass off to make sure Dr. Rosales aware during rounds this AM.

## 2020-08-11 NOTE — PROGRESS NOTES
During turn ETT tube was pulled and pt self extubated.  RT Jef was at bedside, cuff deflated and ETT tube was taken out, bag valve mask was used on pt and suction and then pt was put on a nonbreather at 15L./min. Pt sating 100% on nonrebreather, HR and BP stable. Transitioned pt to oxymask. Currently sating 99% on 7L.    Dr. Rosales updated at 2250. Dr. Petit on unit came to bedside. Marisabel okay to leave on oxymask till surgery in am. Dr. Petit stated okay to leave tube feeds off.

## 2020-08-11 NOTE — DIETARY
"Nutrition services: assessment for trickle tube feedings completed yesterday (see full nutrition assessment 8/10). Pt NPO for surgery today. TPN to start tonight.     Estimated Nutritional Needs: based on: height 5'9\", weight 111.3 kg (admit scale wt), IBW 65.772 kg, BMI 36.24 - class 2 obesity.     Calculation/Equation: obesity guidelines for critical illness - based on admit scale wt  Calories/day: 1220 - 1550 kcals (11 - 14 kcals/kg admit wt)  Protein/day: 99 - 132 g (1.5 - 2.0 g/kg IBW)    Recommendations/Plan:TPN per [facility] to meet estimated nutritional needs until pt can adequately take enteral feedings or po diet.    "

## 2020-08-11 NOTE — PROGRESS NOTES
"Trauma / Surgical Daily Progress Note    Date of Service  8/11/2020    Chief Complaint  74 y.o. female admitted 7/31/2020 with Perforation bowel (HCC)    Interval Events  Multiple ongoing comorbid conditions require patient remain in the intensive care unit    Appendiceal abscess with postoperative anastomotic leak.  Anastomosis redone at prior laparotomy.  Abdomen clean without sign of contamination and viable anastomosis: Abdomen closed today.  Wound VAC in the skin.  Antibiotics for 3 more days.  Drains in place.    Ventilator dependent respiratory failure: Patient self extubated yesterday actually did quite well.  She had some confusion prior to surgery today but there are multiple other potential explanations for this.  Will try to aggressively wean ventilator once patient is awakened from anesthesia and hopefully plan for extubation in the next 24 hours.    Continuing steroid weaning.     Multiple electrolyte abnormalities being addressed    Ongoing hypernatremia with elevated creatinine: Continuing fluid resuscitation, stop diuresis.  Trend indices.    Review of Systems  Review of Systems   Unable to perform ROS: Intubated        Vital Signs for last 24 hours  Temp:  [36.2 °C (97.2 °F)-37.6 °C (99.6 °F)] 36.2 °C (97.2 °F)  Pulse:  [] 89  Resp:  [1-40] 17  BP: (116-177)/(58-81) 141/67  SpO2:  [94 %-100 %] 100 %    Hemodynamic parameters for last 24 hours  CVP:  [8 MM HG-14 MM HG] 14 MM HG  /67   Pulse 89   Temp 36.2 °C (97.2 °F) (Temporal)   Resp 17   Ht 1.753 m (5' 9\")   Wt (!) 126.5 kg (278 lb 14.1 oz)   SpO2 100%   BMI 41.18 kg/m²     Respiratory Data     Respiration: 17, Pulse Oximetry: 100 %     Work Of Breathing / Effort: Vented  RUL Breath Sounds: Coarse Crackles, RML Breath Sounds: Coarse Crackles, RLL Breath Sounds: Diminished, ELISSA Breath Sounds: Coarse Crackles, LLL Breath Sounds: Diminished    Physical Exam  Physical Exam  Vitals signs and nursing note reviewed.   Constitutional:  "      Appearance: She is not ill-appearing.      Interventions: She is sedated, intubated and restrained.   HENT:      Head: Normocephalic and atraumatic.      Mouth/Throat:      Mouth: Mucous membranes are moist.      Pharynx: Oropharynx is clear.   Eyes:      Extraocular Movements: Extraocular movements intact.      Conjunctiva/sclera: Conjunctivae normal.      Pupils: Pupils are equal, round, and reactive to light.   Cardiovascular:      Rate and Rhythm: Normal rate and regular rhythm.      Pulses: Normal pulses.   Pulmonary:      Effort: She is intubated.      Breath sounds: Normal breath sounds. No decreased breath sounds, wheezing or rhonchi.   Abdominal:      Comments: VAC intact  Drain serosanguinous   Musculoskeletal: Normal range of motion.   Skin:     General: Skin is warm and dry.   Neurological:      Mental Status: She is easily aroused. She is disoriented.      Comments: Moves all extremities   Psychiatric:         Behavior: Behavior is cooperative.         Laboratory  Recent Results (from the past 24 hour(s))   ACCU-CHEK GLUCOSE    Collection Time: 08/10/20  7:02 PM   Result Value Ref Range    Glucose - Accu-Ck 150 (H) 65 - 99 mg/dL   ACCU-CHEK GLUCOSE    Collection Time: 08/10/20 11:39 PM   Result Value Ref Range    Glucose - Accu-Ck 151 (H) 65 - 99 mg/dL   CRP Quantitive (Non-Cardiac)    Collection Time: 08/11/20  4:11 AM   Result Value Ref Range    Stat C-Reactive Protein 2.45 (H) 0.00 - 0.75 mg/dL   Prealbumin    Collection Time: 08/11/20  4:11 AM   Result Value Ref Range    Pre-Albumin 19.1 18.0 - 38.0 mg/dL   Magnesium    Collection Time: 08/11/20  4:11 AM   Result Value Ref Range    Magnesium 2.1 1.5 - 2.5 mg/dL   Phosphorus    Collection Time: 08/11/20  4:11 AM   Result Value Ref Range    Phosphorus 5.8 (H) 2.5 - 4.5 mg/dL   Cholesterol    Collection Time: 08/11/20  4:11 AM   Result Value Ref Range    Cholesterol,Tot 151 100 - 199 mg/dL   Triglyceride    Collection Time: 08/11/20  4:11 AM    Result Value Ref Range    Triglycerides 315 (H) 0 - 149 mg/dL   Comp Metabolic Panel    Collection Time: 08/11/20  4:11 AM   Result Value Ref Range    Sodium 156 (HH) 135 - 145 mmol/L    Potassium 3.2 (L) 3.6 - 5.5 mmol/L    Chloride 118 (H) 96 - 112 mmol/L    Co2 22 20 - 33 mmol/L    Anion Gap 16.0 7.0 - 16.0    Glucose 184 (H) 65 - 99 mg/dL    Bun 92 (HH) 8 - 22 mg/dL    Creatinine 2.14 (H) 0.50 - 1.40 mg/dL    Calcium 7.9 (L) 8.5 - 10.5 mg/dL    AST(SGOT) 74 (H) 12 - 45 U/L    ALT(SGPT) 61 (H) 2 - 50 U/L    Alkaline Phosphatase 147 (H) 30 - 99 U/L    Total Bilirubin 1.2 0.1 - 1.5 mg/dL    Albumin 2.3 (L) 3.2 - 4.9 g/dL    Total Protein 4.8 (L) 6.0 - 8.2 g/dL    Globulin 2.5 1.9 - 3.5 g/dL    A-G Ratio 0.9 g/dL   CBC WITH DIFFERENTIAL    Collection Time: 08/11/20  4:11 AM   Result Value Ref Range    WBC 23.4 (H) 4.8 - 10.8 K/uL    RBC 2.68 (L) 4.20 - 5.40 M/uL    Hemoglobin 8.6 (L) 12.0 - 16.0 g/dL    Hematocrit 27.6 (L) 37.0 - 47.0 %    .0 (H) 81.4 - 97.8 fL    MCH 32.1 27.0 - 33.0 pg    MCHC 31.2 (L) 33.6 - 35.0 g/dL    RDW 65.3 (H) 35.9 - 50.0 fL    Platelet Count 327 164 - 446 K/uL    MPV 10.4 9.0 - 12.9 fL    Neutrophils-Polys 86.20 (H) 44.00 - 72.00 %    Lymphocytes 2.60 (L) 22.00 - 41.00 %    Monocytes 5.20 0.00 - 13.40 %    Eosinophils 0.00 0.00 - 6.90 %    Basophils 0.00 0.00 - 1.80 %    Nucleated RBC 0.40 /100 WBC    Neutrophils (Absolute) 20.57 (H) 2.00 - 7.15 K/uL    Lymphs (Absolute) 0.61 (L) 1.00 - 4.80 K/uL    Monos (Absolute) 1.22 (H) 0.00 - 0.85 K/uL    Eos (Absolute) 0.00 0.00 - 0.51 K/uL    Baso (Absolute) 0.00 0.00 - 0.12 K/uL    NRBC (Absolute) 0.09 K/uL    Anisocytosis 2+     Macrocytosis 1+     Microcytosis 2+    ESTIMATED GFR    Collection Time: 08/11/20  4:11 AM   Result Value Ref Range    GFR If  27 (A) >60 mL/min/1.73 m 2    GFR If Non African American 23 (A) >60 mL/min/1.73 m 2   DIFFERENTIAL MANUAL    Collection Time: 08/11/20  4:11 AM   Result Value Ref Range     Bands-Stabs 1.70 0.00 - 10.00 %    Metamyelocytes 1.70 %    Myelocytes 2.60 %    Manual Diff Status PERFORMED    PERIPHERAL SMEAR REVIEW    Collection Time: 08/11/20  4:11 AM   Result Value Ref Range    Peripheral Smear Review see below    PLATELET ESTIMATE    Collection Time: 08/11/20  4:11 AM   Result Value Ref Range    Plt Estimation Normal    MORPHOLOGY    Collection Time: 08/11/20  4:11 AM   Result Value Ref Range    RBC Morphology Present     Polychromia 1+    ACCU-CHEK GLUCOSE    Collection Time: 08/11/20  5:29 AM   Result Value Ref Range    Glucose - Accu-Ck 132 (H) 65 - 99 mg/dL   ACCU-CHEK GLUCOSE    Collection Time: 08/11/20 11:46 AM   Result Value Ref Range    Glucose - Accu-Ck 144 (H) 65 - 99 mg/dL       Fluids    Intake/Output Summary (Last 24 hours) at 8/11/2020 1406  Last data filed at 8/11/2020 1200  Gross per 24 hour   Intake 924 ml   Output 3050 ml   Net -2126 ml       Core Measures & Quality Metrics  Labs reviewed, Medications reviewed and Radiology images reviewed  Orellana catheter: Critically Ill - Requiring Accurate Measurement of Urinary Output  Central line in place: Concentrated IV drugs and Need for access    DVT Prophylaxis: Heparin  DVT prophylaxis - mechanical: SCDs  Ulcer prophylaxis: Yes  Antibiotics: Treating active infection/contamination beyond 24 hours perioperative coverage  Assessed for rehab: Patient unable to tolerate rehabilitation therapeutic regimen    CAITLIN Score  ETOH Screening    Assessment/Plan  Anemia  Assessment & Plan  Critical anemia  8/6 Hg 5.9 - Transfused 1 unit PRBC  8/7 Hg 5.4 - Transfuse 2 unit PRBC  8/8 Hg 6.8 - Transfuse 1 unit PRBC - check iron studies  8/9 Hg 6.7 - Transfuse 1 unit PRBC  Transfuse for hg < 7    Acute renal insufficiency  Assessment & Plan  Admission Cr elevated associated with infection  Improved with resuscitation after initial surgery  8/4 oliguria progressed to anuria despite crystalloid resuscitation  8/5 Cr markedly elevated,  electrolytes ok. 24 hours of albumin resuscitation.   8/6 Cr trending down with improved UO  8/7 BUN climbing but creatinine and UO cont to improve   8/8 Indices up again - cont fluids  8/11 sodium up but creatinine improved: Continue fluid resuscitation  Trend renal indices, avoid nephrotoxins    Respiratory failure following trauma and surgery (HCC)- (present on admission)  Assessment & Plan  Full ventilatory support. Ensure optimal oxygenation and mitigate secondary injury.  8/2 Extubated  8/4 Remained intubated after 2nd surgery  8/10 self extubation: Did well on oxygen mask  8/11 return from operating room on ventilator  Continue SICU weaning protocol  Work toward extubation after abd closed      Perforated appendicitis- (present on admission)  Assessment & Plan  7/31 perforated appendix with retrocolic abscess.  Small bowel and right colon resection.  RLQ drain.  8/4 Returned to OR for anastamotic leak - left in discontinuity  8/7 Washout and drain placement - still in discontinuity  8/9 Ex lap, primary anastamosis. Abd still open  8/11 abdomen closed and drains placed.  Midline wound VAC  Zosyn day 12 of 14  [facility]  [place]. Harris      Paroxysmal atrial fibrillation (CMS-HCC)- (present on admission)  Assessment & Plan  Chronic afib complicated by RVR  8/1 Digoxin load and metoprolol  8/3 digoxin level appropriate  8/7 Recurrent A fib with RVR - amio gtt  8/10 plan to resume Lopressor once able to tolerate p.o.  8/11 A. fib briefly recurred postop then spontaneously resolved.    Continue amiodarone drip    Large bowel perforation (HCC)  Assessment & Plan  8/4 recurrent sepsis. OR for exploration - anastomosis (side-side) intact but end of colon perforated with feculent peritonitits  Resection with discontinuity, ABthera  8/7 Washout  8/9 Primary anastamosis but still open  8/11 abdomen closed  Start trickle feeding in a.m.  On Zosyn  Karthikeyan Llanos MD - [facility]    Adrenal  insufficiency (HCC)  Assessment & Plan  Failure to fully wean from vasopressors despite adequate resuscitation  8/3 empiric steroids started with liberation from vasopressors  8/6 Started weaning off steroids  8/11 continue steroid taper    Septic shock (HCC)- (present on admission)  Assessment & Plan  7/31 high lactate, oliguric and hypotensive post op  8/1 lactate and urine output improved following 7 liter resuscitation. Levophed to support MAP.  8/2-8/3 weaning levophed. UOP  Adequate. Added steroids 8/3  8/4 Recurrent septic shock associated with leak. Resuscitation ongoing.  8/6 Stabilized off pressors  Cont fluids, low dose steroids  On zosyn    HTN (hypertension)- (present on admission)  Assessment & Plan  Takes multiple agents at home for hypertension  Held in acute setting with septic shock  Restart when appropriate    No contraindication to anticoagulation therapy- (present on admission)  Assessment & Plan  8/1 Begin Lovenox  8/5 Held for bleeding  8/8 Initiated heparin SQ    GLORIA (obstructive sleep apnea)- (present on admission)  Assessment & Plan  Refusing CPAP until she gets formal sleep study.         Discussed patient condition with RN, RT, Pharmacy, Dietary,  and general surgery.  CRITICAL CARE TIME EXCLUDING PROCEDURES: 41  minutes

## 2020-08-11 NOTE — PROGRESS NOTES
Pt confused in regards to what surgery she is having.   2 RN telephone consent obtained from  Florencio.  Pt treated like vented pt.  Anesthesia at bedside, OR nurse present. Consents and ID band verified. Pt rolled straight to OR

## 2020-08-12 ENCOUNTER — HOSPITAL ENCOUNTER (INPATIENT)
Dept: RADIOLOGY | Facility: MEDICAL CENTER | Age: 75
DRG: 853 | End: 2020-08-12
Attending: SURGERY
Payer: MEDICARE

## 2020-08-12 LAB
ACTION RANGE TRIGGERED IACRT: NO
BASE EXCESS BLDA CALC-SCNC: -5 MMOL/L (ref -4–3)
BASOPHILS # BLD AUTO: 0.2 % (ref 0–1.8)
BASOPHILS # BLD: 0.04 K/UL (ref 0–0.12)
BODY TEMPERATURE: ABNORMAL DEGREES
CO2 BLDA-SCNC: 20 MMOL/L (ref 20–33)
EOSINOPHIL # BLD AUTO: 0.04 K/UL (ref 0–0.51)
EOSINOPHIL NFR BLD: 0.2 % (ref 0–6.9)
ERYTHROCYTE [DISTWIDTH] IN BLOOD BY AUTOMATED COUNT: 68.7 FL (ref 35.9–50)
GLUCOSE BLD-MCNC: 182 MG/DL (ref 65–99)
GLUCOSE BLD-MCNC: 195 MG/DL (ref 65–99)
GLUCOSE BLD-MCNC: 211 MG/DL (ref 65–99)
GLUCOSE BLD-MCNC: 211 MG/DL (ref 65–99)
HCO3 BLDA-SCNC: 19 MMOL/L (ref 17–25)
HCT VFR BLD AUTO: 28.5 % (ref 37–47)
HGB BLD-MCNC: 8.8 G/DL (ref 12–16)
HOROWITZ INDEX BLDA+IHG-RTO: 223 MM[HG]
IMM GRANULOCYTES # BLD AUTO: 1.03 K/UL (ref 0–0.11)
IMM GRANULOCYTES NFR BLD AUTO: 4.5 % (ref 0–0.9)
INST. QUALIFIED PATIENT IIQPT: YES
LYMPHOCYTES # BLD AUTO: 1.06 K/UL (ref 1–4.8)
LYMPHOCYTES NFR BLD: 4.6 % (ref 22–41)
MCH RBC QN AUTO: 32.1 PG (ref 27–33)
MCHC RBC AUTO-ENTMCNC: 30.9 G/DL (ref 33.6–35)
MCV RBC AUTO: 104 FL (ref 81.4–97.8)
MONOCYTES # BLD AUTO: 1.51 K/UL (ref 0–0.85)
MONOCYTES NFR BLD AUTO: 6.5 % (ref 0–13.4)
NEUTROPHILS # BLD AUTO: 19.46 K/UL (ref 2–7.15)
NEUTROPHILS NFR BLD: 84 % (ref 44–72)
NRBC # BLD AUTO: 0.06 K/UL
NRBC BLD-RTO: 0.3 /100 WBC
O2/TOTAL GAS SETTING VFR VENT: 40 %
PCO2 BLDA: 30 MMHG (ref 26–37)
PCO2 TEMP ADJ BLDA: 29.2 MMHG (ref 26–37)
PH BLDA: 7.41 [PH] (ref 7.4–7.5)
PH TEMP ADJ BLDA: 7.42 [PH] (ref 7.4–7.5)
PLATELET # BLD AUTO: 366 K/UL (ref 164–446)
PMV BLD AUTO: 10.6 FL (ref 9–12.9)
PO2 BLDA: 89 MMHG (ref 64–87)
PO2 TEMP ADJ BLDA: 85 MMHG (ref 64–87)
RBC # BLD AUTO: 2.74 M/UL (ref 4.2–5.4)
SAO2 % BLDA: 97 % (ref 93–99)
SPECIMEN DRAWN FROM PATIENT: ABNORMAL
WBC # BLD AUTO: 23.1 K/UL (ref 4.8–10.8)

## 2020-08-12 PROCEDURE — A9270 NON-COVERED ITEM OR SERVICE: HCPCS | Performed by: SURGERY

## 2020-08-12 PROCEDURE — 71045 X-RAY EXAM CHEST 1 VIEW: CPT

## 2020-08-12 PROCEDURE — 82962 GLUCOSE BLOOD TEST: CPT | Mod: 91

## 2020-08-12 PROCEDURE — 700111 HCHG RX REV CODE 636 W/ 250 OVERRIDE (IP): Performed by: SURGERY

## 2020-08-12 PROCEDURE — 99291 CRITICAL CARE FIRST HOUR: CPT | Performed by: SURGERY

## 2020-08-12 PROCEDURE — 94003 VENT MGMT INPAT SUBQ DAY: CPT

## 2020-08-12 PROCEDURE — 700102 HCHG RX REV CODE 250 W/ 637 OVERRIDE(OP): Performed by: SURGERY

## 2020-08-12 PROCEDURE — 700105 HCHG RX REV CODE 258: Performed by: SURGERY

## 2020-08-12 PROCEDURE — 85025 COMPLETE CBC W/AUTO DIFF WBC: CPT

## 2020-08-12 PROCEDURE — 700101 HCHG RX REV CODE 250: Performed by: SURGERY

## 2020-08-12 PROCEDURE — 94669 MECHANICAL CHEST WALL OSCILL: CPT

## 2020-08-12 PROCEDURE — 94770 HCHG CO2 EXPIRED GAS DETERMINATION: CPT

## 2020-08-12 PROCEDURE — 94150 VITAL CAPACITY TEST: CPT

## 2020-08-12 PROCEDURE — 770022 HCHG ROOM/CARE - ICU (200)

## 2020-08-12 PROCEDURE — 700112 HCHG RX REV CODE 229: Performed by: SURGERY

## 2020-08-12 RX ADMIN — INSULIN HUMAN 2 UNITS: 100 INJECTION, SOLUTION PARENTERAL at 17:10

## 2020-08-12 RX ADMIN — NYSTATIN: 100000 POWDER TOPICAL at 17:13

## 2020-08-12 RX ADMIN — NYSTATIN: 100000 POWDER TOPICAL at 06:02

## 2020-08-12 RX ADMIN — DOCUSATE SODIUM 50 MG AND SENNOSIDES 8.6 MG 1 TABLET: 8.6; 5 TABLET, FILM COATED ORAL at 20:48

## 2020-08-12 RX ADMIN — INSULIN HUMAN 1 UNITS: 100 INJECTION, SOLUTION PARENTERAL at 11:18

## 2020-08-12 RX ADMIN — CALCIUM GLUCONATE: 98 INJECTION, SOLUTION INTRAVENOUS at 19:58

## 2020-08-12 RX ADMIN — HYDROCORTISONE SODIUM SUCCINATE 25 MG: 100 INJECTION, POWDER, FOR SOLUTION INTRAMUSCULAR; INTRAVENOUS at 21:40

## 2020-08-12 RX ADMIN — HYDROCORTISONE SODIUM SUCCINATE 25 MG: 100 INJECTION, POWDER, FOR SOLUTION INTRAMUSCULAR; INTRAVENOUS at 06:02

## 2020-08-12 RX ADMIN — DOCUSATE SODIUM 100 MG: 50 LIQUID ORAL at 17:53

## 2020-08-12 RX ADMIN — HEPARIN SODIUM 5000 UNITS: 5000 INJECTION, SOLUTION INTRAVENOUS; SUBCUTANEOUS at 06:02

## 2020-08-12 RX ADMIN — HYDROMORPHONE HYDROCHLORIDE 0.5 MG: 1 INJECTION, SOLUTION INTRAMUSCULAR; INTRAVENOUS; SUBCUTANEOUS at 21:40

## 2020-08-12 RX ADMIN — OXYCODONE 5 MG: 5 TABLET ORAL at 17:16

## 2020-08-12 RX ADMIN — INSULIN HUMAN 2 UNITS: 100 INJECTION, SOLUTION PARENTERAL at 06:06

## 2020-08-12 RX ADMIN — METOPROLOL TARTRATE 10 MG: 5 INJECTION, SOLUTION INTRAVENOUS at 11:21

## 2020-08-12 RX ADMIN — POTASSIUM BICARBONATE 50 MEQ: 978 TABLET, EFFERVESCENT ORAL at 13:53

## 2020-08-12 RX ADMIN — AMIODARONE HYDROCHLORIDE 0.5 MG/MIN: 1.8 INJECTION, SOLUTION INTRAVENOUS at 00:02

## 2020-08-12 RX ADMIN — HYDROCORTISONE SODIUM SUCCINATE 25 MG: 100 INJECTION, POWDER, FOR SOLUTION INTRAMUSCULAR; INTRAVENOUS at 13:39

## 2020-08-12 RX ADMIN — PIPERACILLIN SODIUM, TAZOBACTAM SODIUM 4.5 G: 4; .5 INJECTION, POWDER, LYOPHILIZED, FOR SOLUTION INTRAVENOUS at 12:24

## 2020-08-12 RX ADMIN — AMIODARONE HYDROCHLORIDE 0.5 MG/MIN: 1.8 INJECTION, SOLUTION INTRAVENOUS at 13:52

## 2020-08-12 RX ADMIN — PIPERACILLIN SODIUM, TAZOBACTAM SODIUM 4.5 G: 4; .5 INJECTION, POWDER, LYOPHILIZED, FOR SOLUTION INTRAVENOUS at 06:20

## 2020-08-12 RX ADMIN — HEPARIN SODIUM 5000 UNITS: 5000 INJECTION, SOLUTION INTRAVENOUS; SUBCUTANEOUS at 21:39

## 2020-08-12 RX ADMIN — PIPERACILLIN SODIUM, TAZOBACTAM SODIUM 4.5 G: 4; .5 INJECTION, POWDER, LYOPHILIZED, FOR SOLUTION INTRAVENOUS at 20:48

## 2020-08-12 RX ADMIN — METOPROLOL TARTRATE 10 MG: 5 INJECTION, SOLUTION INTRAVENOUS at 17:16

## 2020-08-12 RX ADMIN — METOPROLOL TARTRATE 10 MG: 5 INJECTION, SOLUTION INTRAVENOUS at 06:02

## 2020-08-12 RX ADMIN — OXYCODONE 5 MG: 5 TABLET ORAL at 20:22

## 2020-08-12 RX ADMIN — HEPARIN SODIUM 5000 UNITS: 5000 INJECTION, SOLUTION INTRAVENOUS; SUBCUTANEOUS at 13:39

## 2020-08-12 RX ADMIN — INSULIN HUMAN 1 UNITS: 100 INJECTION, SOLUTION PARENTERAL at 00:01

## 2020-08-12 ASSESSMENT — ENCOUNTER SYMPTOMS
NAUSEA: 1
MYALGIAS: 1
ARTHRALGIAS: 1
STRIDOR: 0
SHORTNESS OF BREATH: 0
NECK PAIN: 0
COUGH: 0
NEUROLOGICAL NEGATIVE: 1
WHEEZING: 0
CONSTIPATION: 1
BACK PAIN: 1
FATIGUE: 1
VOMITING: 0
ABDOMINAL PAIN: 1
CARDIOVASCULAR NEGATIVE: 1

## 2020-08-12 ASSESSMENT — FIBROSIS 4 INDEX: FIB4 SCORE: 2.12

## 2020-08-12 ASSESSMENT — PULMONARY FUNCTION TESTS: FVC: 600

## 2020-08-12 NOTE — PROGRESS NOTES
2 RN skin check completed with RN:      Skin assessed under devices in place:  Equipment: BP cuff, pulse ox, EKG leads, SCDs, heel float boots, restraints  Lines: ETT, OG, narvaez, central line, PIV, wound vac, hemovac drain x2, HAYDEE drain      Areas to note / Skin observations:  - generalized edema, bruising throughout  - nasal abrasion  - B/L inframammary folds pink, L > R (nystatin)  - BUE bruising, edema, weeping (L > R)  - midline wound vac  - abdominal hemovac drains x2  - abdominal HAYDEE drain   - abdominal blisters, open; some with serous drainage  - pannus redness (nystatin)   - BLE mottled; calloused, dry feet, boggy heels      Preventative measures / Interventions in place, but not limited to:  - turns and repositioning of medical devices / equipment q2h and PRN  - bony prominences padded with and elevated on pillows  - keeping skin free from moisture  - mepilex   - barrier cream/paste/wipes  - heel float boots

## 2020-08-12 NOTE — RESPIRATORY CARE
Extubation    Cuff leak noted Yes  Stridor present No     FiO2%: 40 % (08/12/20 0900)  O2 (LPM): 0.5 (08/12/20 0937)     Patient toleration No Complications at this time    Events/Summary/Plan: Extubated per MD order (08/12/20 0937)

## 2020-08-12 NOTE — PROGRESS NOTES
"    DATE: 8/12/2020 7/31 Perforated appendix with retrocolic abscess.  Small bowel and right colon resection.  RLQ drain.  8/4 Returned to OR for anastamotic leak, AbThera - left in discontinuity  8/7 Washout, drain placement, AbThera - still in discontinuity  8/9 Washout, drain placement, ileocolic anastomosis, AbThera  8/11 Abdominal closure    Interval Events:  Stable overnight  Respiratory failure persists    -Tube feeds to commence/TPN  -Wound care to change midline vac 8/13    PHYSICAL EXAMINATION:  Vital Signs: /65   Pulse 77   Temp 37.1 °C (98.7 °F) (Temporal)   Resp 13   Ht 1.753 m (5' 9\")   Wt 118.9 kg (262 lb 2 oz)   SpO2 100%     GENERAL:  No acute distress  HEENT: Pupils equal, round and reactive to light bilaterally.  Sclera are clear bilaterally.  No otorrhea.  No rhinorrhea.  Mucus membranes are moist.  CHEST:  Lungs are clear to auscultation bilaterally, mechanically ventilated  CARDIOVASCULAR:  Regular rate and rhythm  ABDOMEN: Soft, protuberant, appropriately tender, non-distended, multiple RLQ drains with serosang output  GENITOURINARY:  No narvaez in place, voiding without issues  EXTREMITIES:  Good range of motion through out  NEUROLOGIC:  Cranial Nerves II through XII are grossly intact, no focal deficits appreciated  PSYCHIATRIC:  Normal affect and mood appropriate for age and condition  SKIN:  Warm and well perfused, no rashes    ASSESSMENT AND PLAN:   As outlined above    Appreciate ICU and consulting physician care.     ____________________________________     Mane Barber M.D.    DD: 8/10/2020  8:25 AM        "

## 2020-08-12 NOTE — PROGRESS NOTES
Pharmacy TPN Day # 2     2020    Dosing Weight   84 kg (adjusted BW based on admit weight 111 kg)     Current TPN at 50% of goal  TPN goal: 2000 kcal/day including 1 gm/kg/day Protein  TPN indication: Perforated Appendicitis                                                                Pertinent PMH: Patient presented with RLQ abdominal pain with N/V.  Found to have perforated appendicitis with retro-colic abscess and involvement of the terminal ileum.  The patient's various surgeries to date are noted below.  The patient is currently intubated.  She has developed renal failure.  The patient is currently receiving hydrocortisone.  There was concern for possible GIB on  related to coffee ground emesis.  The patient has been either NPO or Clear Liquid Diet since admit.  It is unclear when the patient will be able to tolerate enteral nutrition to meet nutritional goals.  Thus TPN has been started.    Temp (24hrs), Av.6 °C (97.9 °F), Min:36.4 °C (97.5 °F), Max:37.1 °C (98.7 °F)  .  Recent Labs     08/10/20  0406 20  0411   SODIUM 154* 156*   POTASSIUM 3.2* 3.2*   CHLORIDE 118* 118*   CO2 19* 22   * 92*   CREATININE 2.25* 2.14*   GLUCOSE 146* 184*   CALCIUM 7.4* 7.9*   ASTSGOT 78* 74*   ALTSGPT 62* 61*   ALBUMIN 1.8* 2.3*   TBILIRUBIN 1.4 1.2   PHOSPHORUS 5.0* 5.8*   MAGNESIUM 1.9 2.1   PREALBUMIN  --  19.1     Accu-Checks  Recent Labs     20  2358 20  0606 20  1117   POCGLUCOSE 182* 211* 195*       Vitals:    20 0800 20 0900 20 1000 20 1121   BP: 130/59 131/65 (!) 162/76 156/70   Weight:       Height:           Intake/Output Summary (Last 24 hours) at 2020 1219  Last data filed at 2020 1000  Gross per 24 hour   Intake 1158.49 ml   Output 2320 ml   Net -1161.51 ml       Orders Placed This Encounter   Procedures   • Diet NPO     Standing Status:   Standing     Number of Occurrences:   1     Order Specific Question:   Restrict to:     Answer:    Strict [1]         TPN for past 72 hours (Show up to 3 orders; newest on the left.)     Start date and time   08/11/2020 2000      TPN Central Line Formulation [[id number]]    Order Status  Active    Last Admin  Given at 08/11/2020 1940 by Swathi Batres R.N.       Base    Clinisol 15%  40 g    dextrose 70%  180 g    fat emulsions 20%  20 g       Additives    potassium chloride  40 mEq    sodium acetate  75 mEq    sodium chloride  75 mEq    calcium GLUConate  4.65 mEq    M.T.E. -5 Adult  1 mL    M.V.I. Adult  10 mL    famotidine  20 mg       QS Base    sterile water for inj(pf)  1,268.98 mL       Energy Contribution    Proteins  --    Dextrose  --    Lipids  --    Total  --       Electrolyte Ion Calculated Amount    Sodium  150 mEq    Potassium  40 mEq    Calcium  4.65 mEq    Magnesium  --    Aluminum  --    Phosphate  --    Chloride  115 mEq    Acetate  108.87 mEq       Other    Total Protein  40 g    Total Protein/kg  0.32 g/kg    Total Amino Acid  --    Total Amino Acid/kg  --    Glucose Infusion Rate  0.99 mg/kg/min    Osmolarity (Estimated)  --    Volume  1,992 mL    Rate  83 mL/hr    Dosing Weight  126.5 kg    Infusion Site  Central          This formula provides:  % kcal as lipids = 20.5  Grams protein/kg = 0.5  Non-protein calories = 812  Kcals/kg = 11.5  Total daily calories = 972    Comments:  1. Interval Events: Patient was extubated this morning, appears to be tolerating. Continues on antibiotics s/p abdomen closure yesterday. Slight improvement in renal function.   2. Macronutrients: Patient appears to be tolerating TPN at 50% of goal calories, given initiation of full liquid diet today will hold off on advancing TPN for another day. Patient is obese, however given current renal function protein goal is limited to 1-1.2g/kg.   3. Micronutrients: Sodium remains elevated and potassium low; magnesium and phosphorous are WNL. Diuretics were last received 8/10 and current TPN formulation is at 1/2NS  equivalence (was receiving LR at 125mL/hr prior to TPN initiaition yesterday); will continue to trend sodium prior to making any changes, pat had been receiving TPN for 6 hours prior to AM lab draw. Will replace potassium externally, and trend prior to increasing. Famotidine currently in TPN, will continue despite extubation given abdominal closure yesterday.   4. Glycemic Control: Blood glucose just slightly above goal (132-211), requiring 4 units of low ISS. Will continue to monitor.   5. TPN currently running at 83mL/hr. No maintenance fluids. Patient is net negative 1L over the past 24 hours; adequate UOP.       Lore Parr, PharmD

## 2020-08-12 NOTE — RESPIRATORY CARE
Ventilator Summary      Vent day 2   7.5 at 23    ASV  120  +8  30%    Continue to Mechanically ventilate and wean as tolerated.

## 2020-08-13 ENCOUNTER — APPOINTMENT (OUTPATIENT)
Dept: RADIOLOGY | Facility: MEDICAL CENTER | Age: 75
DRG: 853 | End: 2020-08-13
Attending: SURGERY
Payer: MEDICARE

## 2020-08-13 LAB
ALBUMIN SERPL BCP-MCNC: 2.2 G/DL (ref 3.2–4.9)
ALBUMIN/GLOB SERPL: 1 G/DL
ALP SERPL-CCNC: 90 U/L (ref 30–99)
ALT SERPL-CCNC: 38 U/L (ref 2–50)
ANION GAP SERPL CALC-SCNC: 12 MMOL/L (ref 7–16)
ANISOCYTOSIS BLD QL SMEAR: ABNORMAL
AST SERPL-CCNC: 29 U/L (ref 12–45)
BASOPHILS # BLD AUTO: 0 % (ref 0–1.8)
BASOPHILS # BLD: 0 K/UL (ref 0–0.12)
BILIRUB SERPL-MCNC: 0.9 MG/DL (ref 0.1–1.5)
BUN SERPL-MCNC: 68 MG/DL (ref 8–22)
CALCIUM SERPL-MCNC: 7.4 MG/DL (ref 8.5–10.5)
CHLORIDE SERPL-SCNC: 118 MMOL/L (ref 96–112)
CO2 SERPL-SCNC: 23 MMOL/L (ref 20–33)
CREAT SERPL-MCNC: 1.42 MG/DL (ref 0.5–1.4)
EOSINOPHIL # BLD AUTO: 0 K/UL (ref 0–0.51)
EOSINOPHIL NFR BLD: 0 % (ref 0–6.9)
ERYTHROCYTE [DISTWIDTH] IN BLOOD BY AUTOMATED COUNT: 81.2 FL (ref 35.9–50)
GLOBULIN SER CALC-MCNC: 2.2 G/DL (ref 1.9–3.5)
GLUCOSE BLD-MCNC: 192 MG/DL (ref 65–99)
GLUCOSE BLD-MCNC: 199 MG/DL (ref 65–99)
GLUCOSE BLD-MCNC: 214 MG/DL (ref 65–99)
GLUCOSE BLD-MCNC: 224 MG/DL (ref 65–99)
GLUCOSE SERPL-MCNC: 250 MG/DL (ref 65–99)
HCT VFR BLD AUTO: 22.8 % (ref 37–47)
HGB BLD-MCNC: 7 G/DL (ref 12–16)
LYMPHOCYTES # BLD AUTO: 1.07 K/UL (ref 1–4.8)
LYMPHOCYTES NFR BLD: 5.2 % (ref 22–41)
MAGNESIUM SERPL-MCNC: 1.6 MG/DL (ref 1.5–2.5)
MANUAL DIFF BLD: NORMAL
MCH RBC QN AUTO: 32.7 PG (ref 27–33)
MCHC RBC AUTO-ENTMCNC: 30.7 G/DL (ref 33.6–35)
MCV RBC AUTO: 106.5 FL (ref 81.4–97.8)
METAMYELOCYTES NFR BLD MANUAL: 1.7 %
MICROCYTES BLD QL SMEAR: ABNORMAL
MONOCYTES # BLD AUTO: 0.72 K/UL (ref 0–0.85)
MONOCYTES NFR BLD AUTO: 3.5 % (ref 0–13.4)
MORPHOLOGY BLD-IMP: NORMAL
NEUTROPHILS # BLD AUTO: 18.37 K/UL (ref 2–7.15)
NEUTROPHILS NFR BLD: 88.7 % (ref 44–72)
NEUTS BAND NFR BLD MANUAL: 0.9 % (ref 0–10)
NRBC # BLD AUTO: 0.05 K/UL
NRBC BLD-RTO: 0.2 /100 WBC
PHOSPHATE SERPL-MCNC: 2.1 MG/DL (ref 2.5–4.5)
PLATELET # BLD AUTO: 361 K/UL (ref 164–446)
PLATELET BLD QL SMEAR: NORMAL
PMV BLD AUTO: 11.1 FL (ref 9–12.9)
POLYCHROMASIA BLD QL SMEAR: NORMAL
POTASSIUM SERPL-SCNC: 3.2 MMOL/L (ref 3.6–5.5)
PROT SERPL-MCNC: 4.4 G/DL (ref 6–8.2)
RBC # BLD AUTO: 2.14 M/UL (ref 4.2–5.4)
RBC BLD AUTO: PRESENT
SODIUM SERPL-SCNC: 153 MMOL/L (ref 135–145)
WBC # BLD AUTO: 20.5 K/UL (ref 4.8–10.8)

## 2020-08-13 PROCEDURE — 700112 HCHG RX REV CODE 229: Performed by: SURGERY

## 2020-08-13 PROCEDURE — 700111 HCHG RX REV CODE 636 W/ 250 OVERRIDE (IP): Performed by: SURGERY

## 2020-08-13 PROCEDURE — 97166 OT EVAL MOD COMPLEX 45 MIN: CPT

## 2020-08-13 PROCEDURE — A9270 NON-COVERED ITEM OR SERVICE: HCPCS | Performed by: SURGERY

## 2020-08-13 PROCEDURE — 770022 HCHG ROOM/CARE - ICU (200)

## 2020-08-13 PROCEDURE — 700101 HCHG RX REV CODE 250: Performed by: SURGERY

## 2020-08-13 PROCEDURE — 94669 MECHANICAL CHEST WALL OSCILL: CPT

## 2020-08-13 PROCEDURE — 700102 HCHG RX REV CODE 250 W/ 637 OVERRIDE(OP): Performed by: SURGERY

## 2020-08-13 PROCEDURE — 99233 SBSQ HOSP IP/OBS HIGH 50: CPT | Performed by: SURGERY

## 2020-08-13 PROCEDURE — 82962 GLUCOSE BLOOD TEST: CPT

## 2020-08-13 PROCEDURE — 700105 HCHG RX REV CODE 258: Performed by: SURGERY

## 2020-08-13 PROCEDURE — 71045 X-RAY EXAM CHEST 1 VIEW: CPT

## 2020-08-13 PROCEDURE — 85007 BL SMEAR W/DIFF WBC COUNT: CPT

## 2020-08-13 PROCEDURE — 84100 ASSAY OF PHOSPHORUS: CPT

## 2020-08-13 PROCEDURE — 85027 COMPLETE CBC AUTOMATED: CPT

## 2020-08-13 PROCEDURE — 97162 PT EVAL MOD COMPLEX 30 MIN: CPT

## 2020-08-13 PROCEDURE — 80053 COMPREHEN METABOLIC PANEL: CPT

## 2020-08-13 PROCEDURE — 83735 ASSAY OF MAGNESIUM: CPT

## 2020-08-13 RX ORDER — AMIODARONE HYDROCHLORIDE 200 MG/1
400 TABLET ORAL TWICE DAILY
Status: DISCONTINUED | OUTPATIENT
Start: 2020-08-13 | End: 2020-08-13

## 2020-08-13 RX ORDER — SIMVASTATIN 20 MG
40 TABLET ORAL
Status: DISCONTINUED | OUTPATIENT
Start: 2020-08-13 | End: 2020-08-13

## 2020-08-13 RX ORDER — THIAMINE MONONITRATE (VIT B1) 100 MG
100 TABLET ORAL DAILY
Status: DISCONTINUED | OUTPATIENT
Start: 2020-08-13 | End: 2020-08-18

## 2020-08-13 RX ORDER — SIMVASTATIN 20 MG
20 TABLET ORAL
Status: DISCONTINUED | OUTPATIENT
Start: 2020-08-13 | End: 2020-08-18

## 2020-08-13 RX ORDER — LOSARTAN POTASSIUM 50 MG/1
100 TABLET ORAL
Status: DISCONTINUED | OUTPATIENT
Start: 2020-08-13 | End: 2020-08-18

## 2020-08-13 RX ORDER — AMIODARONE HYDROCHLORIDE 200 MG/1
400 TABLET ORAL TWICE DAILY
Status: DISCONTINUED | OUTPATIENT
Start: 2020-08-13 | End: 2020-08-18

## 2020-08-13 RX ORDER — HYDROCHLOROTHIAZIDE 25 MG/1
25 TABLET ORAL
Status: DISCONTINUED | OUTPATIENT
Start: 2020-08-13 | End: 2020-08-18

## 2020-08-13 RX ORDER — METOPROLOL TARTRATE 50 MG/1
100 TABLET, FILM COATED ORAL 2 TIMES DAILY
Status: DISCONTINUED | OUTPATIENT
Start: 2020-08-13 | End: 2020-08-18

## 2020-08-13 RX ORDER — LOSARTAN POTASSIUM AND HYDROCHLOROTHIAZIDE 25; 100 MG/1; MG/1
1 TABLET ORAL DAILY
Status: DISCONTINUED | OUTPATIENT
Start: 2020-08-13 | End: 2020-08-13

## 2020-08-13 RX ORDER — AMIODARONE HYDROCHLORIDE 200 MG/1
400 TABLET ORAL ONCE
Status: COMPLETED | OUTPATIENT
Start: 2020-08-13 | End: 2020-08-13

## 2020-08-13 RX ORDER — FOLIC ACID 1 MG/1
1 TABLET ORAL DAILY
Status: DISCONTINUED | OUTPATIENT
Start: 2020-08-13 | End: 2020-08-18

## 2020-08-13 RX ORDER — LOSARTAN POTASSIUM 50 MG/1
100 TABLET ORAL
Status: DISCONTINUED | OUTPATIENT
Start: 2020-08-13 | End: 2020-08-13

## 2020-08-13 RX ORDER — MAGNESIUM SULFATE HEPTAHYDRATE 40 MG/ML
2 INJECTION, SOLUTION INTRAVENOUS ONCE
Status: COMPLETED | OUTPATIENT
Start: 2020-08-13 | End: 2020-08-13

## 2020-08-13 RX ORDER — METOPROLOL TARTRATE 50 MG/1
100 TABLET, FILM COATED ORAL 2 TIMES DAILY
Status: DISCONTINUED | OUTPATIENT
Start: 2020-08-13 | End: 2020-08-13

## 2020-08-13 RX ORDER — HYDROCHLOROTHIAZIDE 25 MG/1
25 TABLET ORAL
Status: DISCONTINUED | OUTPATIENT
Start: 2020-08-13 | End: 2020-08-13

## 2020-08-13 RX ADMIN — HYDROCORTISONE SODIUM SUCCINATE 25 MG: 100 INJECTION, POWDER, FOR SOLUTION INTRAMUSCULAR; INTRAVENOUS at 05:02

## 2020-08-13 RX ADMIN — SIMVASTATIN 20 MG: 20 TABLET, FILM COATED ORAL at 20:48

## 2020-08-13 RX ADMIN — AMIODARONE HYDROCHLORIDE 400 MG: 200 TABLET ORAL at 12:23

## 2020-08-13 RX ADMIN — HYDROCHLOROTHIAZIDE 25 MG: 25 TABLET ORAL at 10:45

## 2020-08-13 RX ADMIN — INSULIN HUMAN 2 UNITS: 100 INJECTION, SOLUTION PARENTERAL at 17:28

## 2020-08-13 RX ADMIN — POTASSIUM BICARBONATE 25 MEQ: 978 TABLET, EFFERVESCENT ORAL at 12:23

## 2020-08-13 RX ADMIN — METOPROLOL TARTRATE 10 MG: 5 INJECTION, SOLUTION INTRAVENOUS at 05:03

## 2020-08-13 RX ADMIN — HEPARIN SODIUM 5000 UNITS: 5000 INJECTION, SOLUTION INTRAVENOUS; SUBCUTANEOUS at 05:03

## 2020-08-13 RX ADMIN — METOPROLOL TARTRATE 100 MG: 50 TABLET, FILM COATED ORAL at 17:28

## 2020-08-13 RX ADMIN — ENOXAPARIN SODIUM 40 MG: 40 INJECTION SUBCUTANEOUS at 10:59

## 2020-08-13 RX ADMIN — OXYCODONE 5 MG: 5 TABLET ORAL at 10:59

## 2020-08-13 RX ADMIN — Medication 100 MG: at 12:24

## 2020-08-13 RX ADMIN — POTASSIUM PHOSPHATE, MONOBASIC AND POTASSIUM PHOSPHATE, DIBASIC 30 MMOL: 224; 236 INJECTION, SOLUTION, CONCENTRATE INTRAVENOUS at 11:45

## 2020-08-13 RX ADMIN — INSULIN HUMAN 2 UNITS: 100 INJECTION, SOLUTION PARENTERAL at 05:03

## 2020-08-13 RX ADMIN — NYSTATIN: 100000 POWDER TOPICAL at 17:32

## 2020-08-13 RX ADMIN — LOSARTAN POTASSIUM 100 MG: 50 TABLET, FILM COATED ORAL at 12:24

## 2020-08-13 RX ADMIN — PIPERACILLIN SODIUM, TAZOBACTAM SODIUM 4.5 G: 4; .5 INJECTION, POWDER, LYOPHILIZED, FOR SOLUTION INTRAVENOUS at 14:15

## 2020-08-13 RX ADMIN — INSULIN HUMAN 1 UNITS: 100 INJECTION, SOLUTION PARENTERAL at 00:11

## 2020-08-13 RX ADMIN — FOLIC ACID 1 MG: 1 TABLET ORAL at 12:23

## 2020-08-13 RX ADMIN — PIPERACILLIN SODIUM, TAZOBACTAM SODIUM 4.5 G: 4; .5 INJECTION, POWDER, LYOPHILIZED, FOR SOLUTION INTRAVENOUS at 05:03

## 2020-08-13 RX ADMIN — HYDROMORPHONE HYDROCHLORIDE 0.5 MG: 1 INJECTION, SOLUTION INTRAMUSCULAR; INTRAVENOUS; SUBCUTANEOUS at 10:59

## 2020-08-13 RX ADMIN — OXYCODONE 5 MG: 5 TABLET ORAL at 21:51

## 2020-08-13 RX ADMIN — INSULIN HUMAN 1 UNITS: 100 INJECTION, SOLUTION PARENTERAL at 12:42

## 2020-08-13 RX ADMIN — METOPROLOL TARTRATE 10 MG: 5 INJECTION, SOLUTION INTRAVENOUS at 00:08

## 2020-08-13 RX ADMIN — AMIODARONE HYDROCHLORIDE 400 MG: 200 TABLET ORAL at 20:48

## 2020-08-13 RX ADMIN — HYDROCORTISONE SODIUM SUCCINATE 25 MG: 100 INJECTION, POWDER, FOR SOLUTION INTRAMUSCULAR; INTRAVENOUS at 21:51

## 2020-08-13 RX ADMIN — DOCUSATE SODIUM 50 MG AND SENNOSIDES 8.6 MG 1 TABLET: 8.6; 5 TABLET, FILM COATED ORAL at 20:47

## 2020-08-13 RX ADMIN — MAGNESIUM SULFATE 2 G: 2 INJECTION INTRAVENOUS at 12:24

## 2020-08-13 RX ADMIN — PIPERACILLIN SODIUM, TAZOBACTAM SODIUM 4.5 G: 4; .5 INJECTION, POWDER, LYOPHILIZED, FOR SOLUTION INTRAVENOUS at 20:48

## 2020-08-13 RX ADMIN — INSULIN HUMAN 1 UNITS: 100 INJECTION, SOLUTION PARENTERAL at 23:58

## 2020-08-13 RX ADMIN — NYSTATIN: 100000 POWDER TOPICAL at 05:02

## 2020-08-13 RX ADMIN — HYDROCORTISONE SODIUM SUCCINATE 25 MG: 100 INJECTION, POWDER, FOR SOLUTION INTRAMUSCULAR; INTRAVENOUS at 14:15

## 2020-08-13 RX ADMIN — AMIODARONE HYDROCHLORIDE 0.5 MG/MIN: 1.8 INJECTION, SOLUTION INTRAVENOUS at 00:26

## 2020-08-13 RX ADMIN — DOCUSATE SODIUM 100 MG: 50 LIQUID ORAL at 05:02

## 2020-08-13 ASSESSMENT — COGNITIVE AND FUNCTIONAL STATUS - GENERAL
PERSONAL GROOMING: A LITTLE
TOILETING: A LOT
DRESSING REGULAR UPPER BODY CLOTHING: A LOT
WALKING IN HOSPITAL ROOM: TOTAL
SUGGESTED CMS G CODE MODIFIER MOBILITY: CN
EATING MEALS: A LITTLE
CLIMB 3 TO 5 STEPS WITH RAILING: TOTAL
MOBILITY SCORE: 6
TURNING FROM BACK TO SIDE WHILE IN FLAT BAD: UNABLE
SUGGESTED CMS G CODE MODIFIER DAILY ACTIVITY: CL
DAILY ACTIVITIY SCORE: 12
MOVING TO AND FROM BED TO CHAIR: UNABLE
STANDING UP FROM CHAIR USING ARMS: TOTAL
MOVING FROM LYING ON BACK TO SITTING ON SIDE OF FLAT BED: UNABLE
HELP NEEDED FOR BATHING: TOTAL
DRESSING REGULAR LOWER BODY CLOTHING: TOTAL

## 2020-08-13 ASSESSMENT — ACTIVITIES OF DAILY LIVING (ADL): TOILETING: INDEPENDENT

## 2020-08-13 ASSESSMENT — ENCOUNTER SYMPTOMS
ABDOMINAL PAIN: 1
FATIGUE: 1

## 2020-08-13 ASSESSMENT — GAIT ASSESSMENTS: GAIT LEVEL OF ASSIST: UNABLE TO PARTICIPATE

## 2020-08-13 ASSESSMENT — FIBROSIS 4 INDEX: FIB4 SCORE: 1.89

## 2020-08-13 NOTE — CARE PLAN
Respiratory Update    Treatment modality: PEP/IS  Frequency: QID     Pt has poor coordination with PEP/IS. Still working on technique with pt.     Pt tolerating current treatments well with no adverse reactions.

## 2020-08-13 NOTE — DOCUMENTATION QUERY
[facility]                                                                       Query Response Note      PATIENT:               MAKENNA JOHNS  ACCT #:                  [id number]  MRN:                     [id number]  :                      1945  ADMIT DATE:       2020 9:41 AM  DISCH DATE:          RESPONDING  PROVIDER #:        [id number]           QUERY TEXT:    Please clarify respiratory failure following trauma and surgery in the Medical Record.      NOTE:  If an appropriate response is not listed below, please respond with a new note.      Documentation indicators that raised basis for question:   - MD PN: Respiratory failure following trauma and surgery  Treatment: Intubated w/mechanical ventilation, RT protocol, Aggressive pulmonary hygiene  Risk Factors: Sepsis, Perforated appendix w/retrocolic abscess, Multiple Ex-lap procedures, Advanced age, Lg bowel perforation  Options provided:   -- Respiratory failure is unexpected and a complication of the procedure performed   -- Respiratory failure is not routinely expected, but integral/inherent to the procedure performed   -- Respiratory failure is routinely expected and integral/inherent to the procedure performed   -- Respiratory failure is related to another etiology and not the procedure performed   -- Unable to determine      Query created by: Barrie Rojas on 2020 1:19 PM    RESPONSE TEXT:    Respiratory failure is not routinely expected, but integral/inherent to the procedure performed          Electronically signed by:  OLIVIER ATKINSON DO 2020 3:15 PM

## 2020-08-13 NOTE — CARE PLAN
Problem: Nutritional:  Goal: Nutrition support tolerated and meeting greater than 85% of estimated needs  Description: Nocturnal feeds to meet ~75% of estimated needs.   Outcome: NOT MET  Goal: Achieve adequate nutritional intake  Description: Diet advancement > CLD, Patient will consume 50% of meals  Outcome: NOT MET     See RD note.

## 2020-08-13 NOTE — PROGRESS NOTES
2 RN skin check completed with Nina RN:        Skin assessed under devices in place:  Equipment: BP cuff, pulse ox, EKG leads, SCDs, heel float boots, restraints  Lines: narvaez, central line, wound vac, hemovac drain x2, HAYDEE drain        Areas to note / Skin observations:  - generalized edema, bruising throughout  - nasal abrasion  - B/L inframammary folds pink (nystatin)  - BUE bruising, edema, weeping (L > R)  - abdominal midline wound vac  - abdominal hemovac drains x2  - abdominal HAYDEE drain   - abdominal blisters, open; some with serosanguinous drainage (adhesive foam applied)  - pannus redness (nystatin)   - BLE mottled; calloused, dry feet; boggy heels; flaky shins        Preventative measures / Interventions in place, but not limited to:  - turns and repositioning of medical devices / equipment q2h and PRN  - bony prominences padded with and elevated on pillows  - keeping skin free from moisture  - mepilex   - barrier cream/paste/wipes  - heel float boots

## 2020-08-13 NOTE — PROGRESS NOTES
2 RN skin assessment       Areas of concern / Skin observations:  - generalized edema, bruising throughout  - nasal abrasion open to air  - B/L inframammary folds pink, L > R (nystatin powder)  - BUE bruising/redness, edema, weeping (L > R), elevated on  pillows  - midline wound vac CDI  - abdominal hemovac drains x2 on R side  - abdominal HAYDEE drain on R side   - abdominal blisters/small skin tears, open; some with serous drainage  - pannus redness (nystatin)   - BLE mottled, cool with delayed cap refill; calloused, dry feet, boggy heels-heel float boots in place B/L

## 2020-08-13 NOTE — PROGRESS NOTES
"Trauma / Surgical Daily Progress Note    Date of Service  8/12/2020    Chief Complaint  74 y.o. female admitted 7/31/2020 with Perforation bowel (HCC)    Interval Events  CRITICAL CARE DECISION MAKING:    Patient examined and discussed with with team at bedside.    Extubated in the a.m. during rounds.  Patient tenuous but improved throughout the day.  Aggressive pulmonary hygiene seems to be helping and she is on nasal cannula.  She is high risk for deterioration so watch closely    Wound VAC changes discussed with wound care team: Procedure will be done tomorrow morning at the bedside.  Fascia is closed so this should not be an issue.    Need to watch white count as the antibiotics are expiring in the next 48 hours.    Currently on heparin but will transition to Lovenox if her renal function is improving    PT/OT need to be reordered    Continuing TPN but will try to start diet in the next 24 hours.    Labs reviewed, electrolytes addressed  Addressed GI prophylaxis and bowel frequency  Assessed/discussed/titrated analgesics and need for sedatives  Addressed line days, narvaez catheter days, access needs  Addressed family and discharge concerns      Review of Systems  Review of Systems   Constitutional: Positive for fatigue.   Respiratory: Negative for cough, shortness of breath, wheezing and stridor.    Cardiovascular: Negative.    Gastrointestinal: Positive for abdominal pain, constipation and nausea. Negative for vomiting.   Musculoskeletal: Positive for arthralgias, back pain and myalgias. Negative for neck pain.   Neurological: Negative.         Vital Signs for last 24 hours  Temp:  [36.4 °C (97.5 °F)-37.1 °C (98.8 °F)] 37.1 °C (98.8 °F)  Pulse:  [] 82  Resp:  [13-26] 19  BP: (121-162)/(56-89) 150/72  SpO2:  [96 %-100 %] 97 %    Hemodynamic parameters for last 24 hours  CVP:  [5 MM HG-11 MM HG] 11 MM HG  /72   Pulse 82   Temp 37.1 °C (98.8 °F) (Temporal)   Resp 19   Ht 1.753 m (5' 9\")   Wt 118.9 " kg (262 lb 2 oz)   SpO2 97%   BMI 38.71 kg/m²     Respiratory Data     Respiration: 19, Pulse Oximetry: 97 %     Work Of Breathing / Effort: Shallow;Mild  RUL Breath Sounds: Clear;Diminished, RML Breath Sounds: Diminished, RLL Breath Sounds: Diminished, ELISSA Breath Sounds: Clear;Diminished, LLL Breath Sounds: Diminished    Physical Exam  Physical Exam  Constitutional:       Interventions: She is restrained. Nasal cannula in place.   HENT:      Head: Normocephalic and atraumatic.      Mouth/Throat:      Mouth: Mucous membranes are moist.      Pharynx: Oropharynx is clear.   Eyes:      Extraocular Movements: Extraocular movements intact.      Conjunctiva/sclera: Conjunctivae normal.      Pupils: Pupils are equal, round, and reactive to light.   Cardiovascular:      Rate and Rhythm: Normal rate. Rhythm irregular. Occasional extrasystoles are present.     Pulses: Normal pulses.   Pulmonary:      Breath sounds: Examination of the right-lower field reveals decreased breath sounds. Examination of the left-lower field reveals decreased breath sounds. Decreased breath sounds present. No wheezing or rhonchi.   Abdominal:      General: There is no distension.      Palpations: Abdomen is soft.      Tenderness: There is abdominal tenderness.      Comments: VAC intact  Drain serosanguineous   Genitourinary:     Comments: Orellana  Musculoskeletal: Normal range of motion.      Right lower leg: Edema present.      Left lower leg: Edema present.   Skin:     General: Skin is warm and dry.      Coloration: Skin is pale.   Neurological:      General: No focal deficit present.      Mental Status: She is alert.      GCS: GCS eye subscore is 4. GCS verbal subscore is 5. GCS motor subscore is 6.   Psychiatric:         Behavior: Behavior is cooperative.         Laboratory  Recent Results (from the past 24 hour(s))   ACCU-CHEK GLUCOSE    Collection Time: 08/11/20 11:58 PM   Result Value Ref Range    Glucose - Accu-Ck 182 (H) 65 - 99 mg/dL    CBC WITH DIFFERENTIAL    Collection Time: 08/12/20  6:00 AM   Result Value Ref Range    WBC 23.1 (H) 4.8 - 10.8 K/uL    RBC 2.74 (L) 4.20 - 5.40 M/uL    Hemoglobin 8.8 (L) 12.0 - 16.0 g/dL    Hematocrit 28.5 (L) 37.0 - 47.0 %    .0 (H) 81.4 - 97.8 fL    MCH 32.1 27.0 - 33.0 pg    MCHC 30.9 (L) 33.6 - 35.0 g/dL    RDW 68.7 (H) 35.9 - 50.0 fL    Platelet Count 366 164 - 446 K/uL    MPV 10.6 9.0 - 12.9 fL    Neutrophils-Polys 84.00 (H) 44.00 - 72.00 %    Lymphocytes 4.60 (L) 22.00 - 41.00 %    Monocytes 6.50 0.00 - 13.40 %    Eosinophils 0.20 0.00 - 6.90 %    Basophils 0.20 0.00 - 1.80 %    Immature Granulocytes 4.50 (H) 0.00 - 0.90 %    Nucleated RBC 0.30 /100 WBC    Neutrophils (Absolute) 19.46 (H) 2.00 - 7.15 K/uL    Lymphs (Absolute) 1.06 1.00 - 4.80 K/uL    Monos (Absolute) 1.51 (H) 0.00 - 0.85 K/uL    Eos (Absolute) 0.04 0.00 - 0.51 K/uL    Baso (Absolute) 0.04 0.00 - 0.12 K/uL    Immature Granulocytes (abs) 1.03 (H) 0.00 - 0.11 K/uL    NRBC (Absolute) 0.06 K/uL   ACCU-CHEK GLUCOSE    Collection Time: 08/12/20  6:06 AM   Result Value Ref Range    Glucose - Accu-Ck 211 (H) 65 - 99 mg/dL   ACCU-CHEK GLUCOSE    Collection Time: 08/12/20 11:17 AM   Result Value Ref Range    Glucose - Accu-Ck 195 (H) 65 - 99 mg/dL   ACCU-CHEK GLUCOSE    Collection Time: 08/12/20  5:09 PM   Result Value Ref Range    Glucose - Accu-Ck 211 (H) 65 - 99 mg/dL       Fluids    Intake/Output Summary (Last 24 hours) at 8/12/2020 1928  Last data filed at 8/12/2020 1800  Gross per 24 hour   Intake 1191.93 ml   Output 3370 ml   Net -2178.07 ml       Core Measures & Quality Metrics  Labs reviewed, Medications reviewed and Radiology images reviewed  Orellana catheter: Critically Ill - Requiring Accurate Measurement of Urinary Output  Central line in place: Concentrated IV drugs and Need for access    DVT Prophylaxis: Heparin  DVT prophylaxis - mechanical: SCDs  Ulcer prophylaxis: Yes  Antibiotics: Treating active infection/contamination  beyond 24 hours perioperative coverage  Assessed for rehab: Patient unable to tolerate rehabilitation therapeutic regimen    CAITLIN Score  ETOH Screening    Assessment/Plan  Anemia  Assessment & Plan  Critical anemia  8/6 Hg 5.9 - Transfused 1 unit PRBC  8/7 Hg 5.4 - Transfuse 2 unit PRBC  8/8 Hg 6.8 - Transfuse 1 unit PRBC - check iron studies  8/9 Hg 6.7 - Transfuse 1 unit PRBC  Transfuse for hg < 7    Acute renal insufficiency  Assessment & Plan  Admission Cr elevated associated with infection  Improved with resuscitation after initial surgery  8/4 oliguria progressed to anuria despite crystalloid resuscitation  8/5 Cr markedly elevated, electrolytes ok. 24 hours of albumin resuscitation.   8/6 Cr trending down with improved UO  8/7 BUN climbing but creatinine and UO cont to improve   8/8 Indices up again - cont fluids  8/11 sodium up but creatinine improved: Continue fluid resuscitation  Trend renal indices, avoid nephrotoxins    Respiratory failure following trauma and surgery (HCC)- (present on admission)  Assessment & Plan  Full ventilatory support. Ensure optimal oxygenation and mitigate secondary injury.  8/2 Extubated  8/4 Remained intubated after 2nd surgery  8/10 self extubation: Did well on oxygen mask  8/11 return from operating room on ventilator  8/12 awake with good parameters in a.m.: Extubated  Aggressive pulmonary hygiene regimen  Remains high risk for deterioration requiring reintubation      Perforated appendicitis- (present on admission)  Assessment & Plan  7/31 perforated appendix with retrocolic abscess.  Small bowel and right colon resection.  RLQ drain.  8/4 Returned to OR for anastamotic leak - left in discontinuity  8/7 Washout and drain placement - still in discontinuity  8/9 Ex lap, primary anastamosis. Abd still open  8/11 abdomen closed and drains placed.  Midline wound VAC  8/12 wound VAC change in a.m.  Zosyn day 13 of 14: May need to continue  [facility]    [place]      Paroxysmal atrial fibrillation (CMS-HCC)- (present on admission)  Assessment & Plan  Chronic afib complicated by RVR  8/1 Digoxin load and metoprolol  8/3 digoxin level appropriate  8/7 Recurrent A fib with RVR - amio gtt  8/10 plan to resume Lopressor once able to tolerate p.o.  8/11 A. fib briefly recurred postop then spontaneously resolved.    Continue amiodarone drip    Large bowel perforation (HCC)  Assessment & Plan  8/4 recurrent sepsis. OR for exploration - anastomosis (side-side) intact but end of colon perforated with feculent peritonitits  Resection with discontinuity, ABthera  8/7 Washout  8/9 Primary anastamosis but still open  8/11 abdomen closed  Karthikeyan Llanos MD - [facility]    Adrenal insufficiency (HCC)  Assessment & Plan  Failure to fully wean from vasopressors despite adequate resuscitation  8/3 empiric steroids started with liberation from vasopressors  8/6 Started weaning off steroids  8/11 continue steroid taper    Septic shock (HCC)- (present on admission)  Assessment & Plan  7/31 high lactate, oliguric and hypotensive post op  8/1 lactate and urine output improved following 7 liter resuscitation. Levophed to support MAP.  8/2-8/3 weaning levophed. UOP  Adequate. Added steroids 8/3  8/4 Recurrent septic shock associated with leak. Resuscitation ongoing.  8/6 Stabilized off pressors  Cont fluids, low dose steroids  On zosyn    No contraindication to anticoagulation therapy- (present on admission)  Assessment & Plan  8/1 Begin Lovenox  8/5 Held for bleeding  8/8 Initiated heparin SQ    GLORIA (obstructive sleep apnea)- (present on admission)  Assessment & Plan  Refusing CPAP until she gets formal sleep study.     HTN (hypertension)- (present on admission)  Assessment & Plan  Takes multiple agents at home for hypertension  Held in acute setting with septic shock  Restart when appropriate        Discussed patient condition with RN, RT, Pharmacy, Dietary, Case  manager and Patient.  CRITICAL CARE TIME EXCLUDING PROCEDURES: 39   minutes

## 2020-08-13 NOTE — WOUND TEAM
[facility]  Inpatient Services  Initial Wound and Skin Care Evaluation    Admission Date: 7/31/2020     Last order of IP CONSULT TO WOUND CARE was found on 8/11/2020 from Hospital Encounter on 7/31/2020     HPI, PMH, SH: Reviewed    Unit where seen by Wound Team: S117/00     WOUND CONSULT/FOLLOW UP RELATED TO:  NPWT dressing change to abdomen      Self Report / Pain Level:  Pre-medicated, pain 5/10       OBJECTIVE:  NPWT intact. On ICU regular redistribution bed.    WOUND TYPE, LOCATION, CHARACTERISTICS (Pressure Injuries: location, stage, POA or date identified)  Wound 08/11/20 Incision Abdomen Bilateral WOUND VAC (Active)   Wound Image      Site Assessment Pink;Yellow;Painful;Red    Periwound Assessment Pink;Edema;Fragile    Margins Attached edges    Closure Secondary intention    Drainage Amount Moderate    Drainage Description Serosanguineous    Treatments Cleansed;Site care;Tape change    Wound Cleansing Approved Wound Cleanser    Periwound Protectant Skin Protectant Wipes to Periwound;Drape    Dressing Cleansing/Solutions Not Applicable    Dressing Options Wound Vac    Dressing Changed Changed    Dressing Status Clean;Dry;Intact    Dressing Change/Treatment Frequency Tuesday, Thursday, Saturday, and As Needed    NEXT Dressing Change/Treatment Date 08/15/20    NEXT Weekly Photo (Inpatient Only) 08/20/20    Number of Staples Removed 8    Non-staged Wound Description Full thickness    Wound Length (cm) 27 cm    Wound Width (cm) 6 cm    Wound Depth (cm) 6.5 cm    Wound Surface Area (cm^2) 162 cm^2    Wound Volume (cm^3) 1053 cm^3    Tunneling (cm) 0.6 cm    Tunneling Clock Position of Wound 12    Number of days: 2         Negative Pressure Wound Therapy 08/05/20 Surgical (Active)   NPWT Pump Mode / Pressure Setting Continuous;125 mmHg    Dressing Type Medium;Black Foam (Regular)    Number of Foam Pieces Used 4    Canister Changed No    Output (mL) 100 mL    WOUND NURSE ONLY - Time Spent with  Patient (mins) 60                   Vascular:    DOROTHEA:   No results found.    Lab Values:    Lab Results   Component Value Date/Time    WBC 20.5 (H) 08/13/2020 08:02 AM    RBC 2.14 (L) 08/13/2020 08:02 AM    HEMOGLOBIN 7.0 (L) 08/13/2020 08:02 AM    HEMATOCRIT 22.8 (L) 08/13/2020 08:02 AM    CREACTPROT 2.45 (H) 08/11/2020 04:11 AM    HBA1C 7.0 (H) 04/15/2019 07:53 AM        Culture Results show:  No results found for this or any previous visit (from the past 720 hour(s)).    INTERVENTIONS BY WOUND TEAM:  Patient and chart reviewed. In to change NPWT to patient abdomen, in with wound RN priyank and wound tech Snow. Had to remove 8 staples that were stapled to the foam and patient, Removed current vac dressing from abdomen, no retained foam. Cleansed wound and periwound with NS and gauze. Picture and measurement taken, very small tunnel at 1200. Prepped periwound with no-sting skin prep and drape. One piece of spiral regular black foam placed into wound bed, One small piece at proximal and one small piece at distal end to help fill in gaps. Secured in place with drape. Hole cut and button and trak pad applied, with tubing going downward and to the side. Suction obtained at 125mmhg, no leaks present. Bedside RN Updated    Interdisciplinary consultation: Patient, Bedside RN    EVALUATION: Patient admitted with a perforated appendix, peritonitis, and sepsis. Had gone to the OR and had Abthera in place to abdomen on 8/9, was switched to NPWT in the OR on 8/11, and dressing changes to begin 8/13. Patient abdomen with full thickness surgical wound, will benefit from NPWT to manage drainage, increase oxygenation and granulation, and close the wound by secondary intention. Wound team to continue to follow up 3x/week for dressing changes.     Goals: Steady decrease in wound area and depth weekly.    NURSING PLAN OF CARE ORDERS (X):    Dressing changes: See Dressing Care orders: X  Skin care: See Skin Care orders:   Rectal  tube care: See Rectal Tube Care orders:   Other orders:    RSKIN:   CURRENTLY IN PLACE (X), APPLIED THIS VISIT (A), ORDERED (O):   Q shift Brenden:  X  Q shift pressure point assessments:  X  Pressure redistribution mattress    X        Low Airloss          Bariatric RADHA         Bariatric foam           Heel float boots     Heel Silicone dressing        Float Heels off Bed with Pillows               Barrier wipes         Barrier Cream         Barrier paste          Sacral silicone dressing         Silicone O2 tubing         Anchorfast         Cannula fixation Device (Tender )          Gray Foam Ear protectors     High flow offloading Clip    Elastic head band offloading device                                                      Trach with Optifoam split foam       Z Satish Pillow                             Waffle cushion        Waffle Overlay         Rectal tube or BMS    Purwick/Condom Cath          Antifungal tx      Interdry          Reposition q 2 hours      TAPs Turning system                 Up to chair        Ambulate      PT/OT        Dietician        Diabetes Education      PO     TF     TPN     NPO   # days   Other        WOUND TEAM PLAN OF CARE:   Dressing changes by wound team:                   Follow up 3 times weekly:                NPWT change 3 times weekly:   X  Follow up 1-2 times weekly:      Follow up Bi-Monthly:                   Follow up as needed:       Other (explain):     Anticipated discharge plans: tbd  LTACH:        SNF/Rehab:                  Home Health Care:           Outpatient Wound Center:            Self Care:

## 2020-08-13 NOTE — PROGRESS NOTES
This RN to call lab and follow up on morning labs sent at 0515.  states there are no lab tubes present for this pt. Will have AM RN redraw labs.

## 2020-08-13 NOTE — DIETARY
"Nutrition Support Assessment   Day 13 of admit.  Yvette Dailey is a 74 y.o. female with admitting DX of perforated bowel.      Current problem list:  1. Anemia  2. Acute renal insufficiency   3. Perforated appendicitis  4. Respiratory failure following trauma surgery  5. Hx A-fib, HTN  6. Large bowel perforation  7. Septic shock      Assessment:  Estimated Nutritional Needs: based on:   Height: 175.3 cm (5' 9\")  Weight: 118.4 kg (261 lb 0.4 oz)  Weight to Use in Calculations: 111.3 kg (245 lb 6 oz) - Admit bed scale weight, per I/O's pt + 11 L since admit.   Ideal Body Weight: 65.8 kg (145 lb)  Percent Ideal Body Weight: 169.2  Body mass index is 38.55 kg/m²., BMI classification: Obese Class II.     Calculation/Equation: REE per MSJ x 1 = 1684.   Total Calories / day: 1550 - 1800 (Calories / k - 16)  Total Grams Protein / day: 112 - 166 (Grams Protein / k - 1.5 ABW; 1.7 - 2.5 IBW)     Evaluation:   1. Extubated .   2. TPN started on , at 50% of goal today. per surgeon note: liberation from TPN, place cortrak for tube feeds d/t suspected poor PO intake. Gastric cortrak placed and verified.   3. Started on diabetic, clear liquid diet  with PO < 25% - 50% x 3 meals.   4. Labs: Na 153, K 3.2, Glucose 250, BUN 68, Creatinine 1.42, Phos 2.1, Mg 1.6.   5. Meds: bowel regimen, folic acid, SSI, MVI, magnesium sulfate, TPN, potassium bicarbonate, potassium phosphate, thiamine.   6. GI: Last BM .   7. Edema: abdominal (pitting, weeping), perineal, flank (pitting), RUE (dependent, generalized, weeping), LUE (dependent, generalized, weeping).   8. Given poor PO on calorie restricted diet, pt would benefit from nocturnal feeds to meet 75-80% of needs to optimize nutrition and encourage PO intake during waking hours.   9. Previously on TFs but never advanced > 10 mL/hr. Will monitor tolerance given high volume needed for nocturnal feeds.   10. A low-calorie, low-CHO, high-protein formula is " appropriate to meet pt's needs.      Malnutrition Risk: Pt at risk given prolonged hospitalization, but does not meet criteria at this time.      Recommendations/Plan:  1. Initiate nocturnal feeds of Peptamen Intense VHP @ 105 mL/hr x 12 hours (2000 - 0800) to provide 1260 kcal (75% of REE), 117 gm protein/kg, and 1058 mL free water.   2. Fluids per MD.   3. RD will adjust TF based on PO intake.   4. D/c TPN per MD.   5. Continue CLD, advance as medically able.      RD Following.

## 2020-08-13 NOTE — THERAPY
Physical Therapy   Initial Evaluation     Patient Name: Yvette Dailey  Age:  74 y.o., Sex:  female  Medical Record #: [id number]  Today's Date: 8/13/2020     Precautions: Fall Risk, Swallow Precautions ( See Comments)(several abdominal sx)    Assessment  Patient is 74 y.o. female that presented to acute with perforated appendix with retrocolic abscess, she is s/p several abdominal surgeries with closure on 8/11. She presented to PT with pain, impaired balance and coordination, functional weakness, and decreased activity tolerance which are limiting her ability to safely perform mobility at OF. She required total A x2 for bed mobility and max A to maintain upright posture at EOB with left lateral and posterior lean in sitting. She reported pain throughout session. Will follow.    Plan  Recommend Physical Therapy 3 times per week until therapy goals are met for the following treatments:  Bed Mobility, Community Re-integration, Equipment, Gait Training, Manual Therapy, Neuro Re-Education / Balance, Self Care/Home Evaluation, Stair Training, Therapeutic Activities and Therapeutic Exercises  DC Equipment Recommendations: Unable to determine at this time  Discharge Recommendations: Recommend post-acute placement for additional physical therapy services prior to discharge home    Subjective  agreeable     Objective   08/13/20 1046   Prior Living Situation   Prior Services None   Housing / Facility 1 Story Apartment / Condo   Steps Into Home 3   Steps In Home 0   Rail Right Rail (Steps into Home)   Equipment Owned Front-Wheel Walker   Lives with - Patient's Self Care Capacity Spouse   Comments need to verify as prior information in chart is somewhat conflicting and patient may be unreliable historian   Prior Level of Functional Mobility   Bed Mobility Independent   Transfer Status Independent   Ambulation Independent   Distance Ambulation (Feet)   (community)   Assistive Devices Used Front-Wheel Walker   Stairs  "Independent   Comments need to verify   Cognition    Cognition / Consciousness X   Speech/ Communication Dysarthric;Slurred   Orientation Level Not Oriented to Month  (stated the month was 2020)   Level of Consciousness Confused   Ability To Follow Commands 1 Step  (< 50% of the time)   Safety Awareness Impaired   New Learning Impaired   Attention Impaired   Comments required encouragement, saying \"please\" and \"no\" due to pain during session   Balance Assessment   Sitting Balance (Static) Trace +   Sitting Balance (Dynamic) Trace   Gait Analysis   Gait Level Of Assist Unable to Participate   Bed Mobility    Supine to Sit Total Assist   Sit to Supine Total Assist   Scooting Total Assist   Functional Mobility   Sit to Stand Unable to Participate   Patient / Family Goals    Patient / Family Goal #1 none stated   Short Term Goals    Short Term Goal # 1 Patient will move supine<>sitting EOB with mod A x1 within 6tx in order to get in/out of bed   Short Term Goal # 2 Patient will move sitting<>standing with mod A x1 within 6tx in order eto initiate gait and transfers   Short Term Goal # 3 Patient will ambulate 15ft with min A within 6tx in order to access environment         "

## 2020-08-13 NOTE — ANESTHESIA POSTPROCEDURE EVALUATION
Patient: Yvette Dailey    Procedure Summary     Date: 08/11/20 Room / Location: [facility] 09 / SURGERY [facility]    Anesthesia Start: 1033 Anesthesia Stop: 1133    Procedure: LAPAROTOMY, EXPLORATORY-ABDOMINAL CLOSURE (N/A Abdomen) Diagnosis: (PERFORATED APPENDICITIS)    Surgeon: Mane Barber M.D. Responsible Provider: Otis Lizama M.D.    Anesthesia Type: general ASA Status: 4          Final Anesthesia Type: general  Last vitals  BP   Blood Pressure : 130/58    Temp   35.8 °C (96.5 °F)    Pulse   Pulse: 81   Resp   (!) 9    SpO2   100 %      Anesthesia Post Evaluation    Patient location during evaluation: PACU  Patient participation: complete - patient participated  Level of consciousness: awake and alert    Airway patency: patent  Anesthetic complications: no  Cardiovascular status: hemodynamically stable  Respiratory status: acceptable  Hydration status: euvolemic    PONV: none           Nurse Pain Score: 4 (NPRS)

## 2020-08-13 NOTE — THERAPY
Occupational Therapy   Initial Evaluation     Patient Name: Yvette Dailey  Age:  74 y.o., Sex:  female  Medical Record #: [id number]  Today's Date: 8/13/2020     Precautions  Precautions: Fall Risk, Swallow Precautions ( See Comments)(several abdominal sx)  Comments: multiple abdominal sx this admission    Assessment  Patient is 74 y.o. female presents to OT services following multiple abdominal surgeries this admission and abdominal closure yesterday d/t appendix abscess. Pt currently is limited by pain, BUE edema, limited knowledge of post op precautions, decreased cognition, impaired balance and activity tolerance. Pt will benefit from acute skilled OT services while in house and post acute placement recommended.     Plan    Recommend Occupational Therapy 3 times per week until therapy goals are met for the following treatments:  Adaptive Equipment, Cognitive Skill Development, Neuro Re-Education / Balance, Self Care/Activities of Daily Living, Therapeutic Activities and Therapeutic Exercises.    DC Equipment Recommendations: Unable to determine at this time  Discharge Recommendations: Recommend post-acute placement for additional occupational therapy services prior to discharge home     Objective       08/13/20 1028   Prior Living Situation   Prior Services None   Housing / Facility 1 Story Apartment / Condo   Steps Into Home 3   Bathroom Set up Walk In Shower   Equipment Owned Front-Wheel Walker   Lives with - Patient's Self Care Capacity Spouse   Comments Reports I with ADLs and spouse drove her around and assisted with IADLs. Per CM note, pt lives with spouse in [place] and I with ADLs and medication management   Prior Level of ADL Function   Self Feeding Independent   Grooming / Hygiene Independent   Bathing Independent   Dressing Independent   Toileting Independent   Prior Level of IADL Function   Medication Management Independent   Laundry Independent   Kitchen Mobility Independent   Finances Independent    Home Management Independent   Shopping Requires Assist   Prior Level Of Mobility Independent With Device in Community   Driving / Transportation Relatives / Others Provide Transportation   Cognition    Cognition / Consciousness X   Level of Consciousness Alert   Ability To Follow Commands 1 Step   Safety Awareness Impaired   Attention Impaired   Sequencing Impaired   Comments distracted by pain and appears to be under medication   Balance Assessment   Sitting Balance (Static) Trace +   Sitting Balance (Dynamic) Trace   Weight Shift Sitting Absent   Comments limited by pain and attention deficits to follow through with 1-step direction to correct balance   ADL Assessment   Eating   (NT)   Grooming Moderate Assist;Seated   Bathing Maximal Assist   Upper Body Dressing Maximal Assist   Lower Body Dressing Total Assist   Toileting Total Assist   Functional Mobility   Sit to Stand Unable to Participate   Toilet Transfers Unable to Participate   Edema / Skin Assessment   Comments BUE edema noted, L>R   Short Term Goals   Short Term Goal # 1 Pt will perform UB ADLs with supervision   Short Term Goal # 2 Pt will demonstrate F sitting balance [id number] in prep for eob/oob ADLs   Short Term Goal # 3 Pt will perform functional t/f's with mod a   Problem List   Problem List Unable To Determine At This Time

## 2020-08-13 NOTE — PROGRESS NOTES
Cortrak Placement    Tube Team verified patient name and medical record number prior to tube placement.  Cortrak tube (55 inches, 10 Martiniquais) placed at 97 cm in right nare.  Per Cortrak picture, tube appears to be in the small bowel.  Nursing Instructions: Awaiting KUB to confirm placement before use for medications or feeding. Once placement confirmed, flush tube with 30 ml of water, and then remove and save stylet, in patient medication drawer.

## 2020-08-13 NOTE — CARE PLAN
Problem: Knowledge Deficit  Goal: Knowledge of the prescribed therapeutic regimen will improve  Outcome: NOT MET  Note: Pt updated on plan of care. Qs addressed.      Problem: Respiratory:  Goal: Respiratory status will improve  Outcome: NOT MET  Intervention: Educate and encourage incentive spirometry usage  Note: 02 saturations are wnl on 1 lpm N/C. Shallow respirations post extubation, no respiratory distress noted. IS use encouraged and assisting pt with use. Poor effort noted pt reaches 500 cc when used correctly.

## 2020-08-13 NOTE — PROGRESS NOTES
"    DATE: 8/13/2020 7/31 Perforated appendix with retrocolic abscess.  Small bowel and right colon resection.  RLQ drain.  8/4 Returned to OR for anastamotic leak, AbThera - left in discontinuity  8/7 Washout, drain placement, AbThera - still in discontinuity  8/9 Washout, drain placement, ileocolic anastomosis, AbThera  8/11 Abdominal closure    Interval Events:  Stable overnight  Successfully extubated  Tolerated clears    -Rec Cortrak with tube feeds, suspect PO intake will not be sufficient for post-op healing, would also allow liberation from TPN  -Wound care to change midline vac today    PHYSICAL EXAMINATION:  Vital Signs: /63   Pulse 77   Temp 36.4 °C (97.5 °F) (Axillary)   Resp 12   Ht 1.753 m (5' 9\")   Wt 118.4 kg (261 lb 0.4 oz)   SpO2 92%     GENERAL:  No acute distress  HEENT: Pupils equal, round and reactive to light bilaterally.  Sclera are clear bilaterally.  No otorrhea.  No rhinorrhea.  Mucus membranes are moist.  CHEST:  Lungs are clear to auscultation bilaterally, mechanically ventilated  CARDIOVASCULAR:  Regular rate and rhythm  ABDOMEN: Soft, protuberant, appropriately tender, non-distended, multiple RLQ drains with serosang output  GENITOURINARY:  No narvaez in place, voiding without issues  EXTREMITIES:  Good range of motion through out  NEUROLOGIC:  Cranial Nerves II through XII are grossly intact, no focal deficits appreciated  PSYCHIATRIC:  Normal affect and mood appropriate for age and condition  SKIN:  Warm and well perfused, no rashes    ASSESSMENT AND PLAN:   As outlined above    Appreciate ICU and consulting physician care.     ____________________________________     Mane Barber M.D.    DD: 8/10/2020  8:25 AM        " Form complete and in my outbox. Please  a copy of his 2/7/20 note to this form. Thanks.

## 2020-08-14 ENCOUNTER — ANESTHESIA (OUTPATIENT)
Dept: SURGERY | Facility: MEDICAL CENTER | Age: 75
DRG: 853 | End: 2020-08-14
Payer: MEDICARE

## 2020-08-14 ENCOUNTER — ANESTHESIA EVENT (OUTPATIENT)
Dept: SURGERY | Facility: MEDICAL CENTER | Age: 75
DRG: 853 | End: 2020-08-14
Payer: MEDICARE

## 2020-08-14 ENCOUNTER — APPOINTMENT (OUTPATIENT)
Dept: RADIOLOGY | Facility: MEDICAL CENTER | Age: 75
DRG: 853 | End: 2020-08-14
Attending: SURGERY
Payer: MEDICARE

## 2020-08-14 PROBLEM — R65.21 SEPTIC SHOCK (HCC): Status: RESOLVED | Noted: 2020-08-01 | Resolved: 2020-08-14

## 2020-08-14 PROBLEM — K63.1 LARGE BOWEL PERFORATION (HCC): Status: RESOLVED | Noted: 2020-08-05 | Resolved: 2020-08-14

## 2020-08-14 PROBLEM — A41.9 SEPTIC SHOCK (HCC): Status: RESOLVED | Noted: 2020-08-01 | Resolved: 2020-08-14

## 2020-08-14 LAB
ABO GROUP BLD: ABNORMAL
ALBUMIN SERPL BCP-MCNC: 2.2 G/DL (ref 3.2–4.9)
ALBUMIN/GLOB SERPL: 1 G/DL
ALP SERPL-CCNC: 100 U/L (ref 30–99)
ALT SERPL-CCNC: 36 U/L (ref 2–50)
ANION GAP SERPL CALC-SCNC: 11 MMOL/L (ref 7–16)
ANISOCYTOSIS BLD QL SMEAR: ABNORMAL
AST SERPL-CCNC: 33 U/L (ref 12–45)
BASO STIPL BLD QL SMEAR: NORMAL
BASOPHILS # BLD AUTO: 0 % (ref 0–1.8)
BASOPHILS # BLD: 0 K/UL (ref 0–0.12)
BILIRUB SERPL-MCNC: 1 MG/DL (ref 0.1–1.5)
BLD GP AB SCN SERPL QL: ABNORMAL
BUN SERPL-MCNC: 63 MG/DL (ref 8–22)
CALCIUM SERPL-MCNC: 7.4 MG/DL (ref 8.5–10.5)
CHLORIDE SERPL-SCNC: 123 MMOL/L (ref 96–112)
CO2 SERPL-SCNC: 25 MMOL/L (ref 20–33)
CREAT SERPL-MCNC: 1.35 MG/DL (ref 0.5–1.4)
CRP SERPL HS-MCNC: 2.75 MG/DL (ref 0–0.75)
EOSINOPHIL # BLD AUTO: 0 K/UL (ref 0–0.51)
EOSINOPHIL NFR BLD: 0 % (ref 0–6.9)
ERYTHROCYTE [DISTWIDTH] IN BLOOD BY AUTOMATED COUNT: 87.7 FL (ref 35.9–50)
GLOBULIN SER CALC-MCNC: 2.3 G/DL (ref 1.9–3.5)
GLUCOSE BLD-MCNC: 119 MG/DL (ref 65–99)
GLUCOSE BLD-MCNC: 138 MG/DL (ref 65–99)
GLUCOSE BLD-MCNC: 143 MG/DL (ref 65–99)
GLUCOSE BLD-MCNC: 157 MG/DL (ref 65–99)
GLUCOSE SERPL-MCNC: 178 MG/DL (ref 65–99)
GRAM STN SPEC: NORMAL
HCT VFR BLD AUTO: 21.8 % (ref 37–47)
HGB BLD-MCNC: 6.4 G/DL (ref 12–16)
LYMPHOCYTES # BLD AUTO: 0.99 K/UL (ref 1–4.8)
LYMPHOCYTES NFR BLD: 4.3 % (ref 22–41)
MAGNESIUM SERPL-MCNC: 1.8 MG/DL (ref 1.5–2.5)
MANUAL DIFF BLD: NORMAL
MCH RBC QN AUTO: 32.3 PG (ref 27–33)
MCHC RBC AUTO-ENTMCNC: 29.4 G/DL (ref 33.6–35)
MCV RBC AUTO: 110.1 FL (ref 81.4–97.8)
METAMYELOCYTES NFR BLD MANUAL: 3.5 %
MICROCYTES BLD QL SMEAR: ABNORMAL
MONOCYTES # BLD AUTO: 0.81 K/UL (ref 0–0.85)
MONOCYTES NFR BLD AUTO: 3.5 % (ref 0–13.4)
MORPHOLOGY BLD-IMP: NORMAL
MYELOCYTES NFR BLD MANUAL: 1.7 %
NEUTROPHILS # BLD AUTO: 20.1 K/UL (ref 2–7.15)
NEUTROPHILS NFR BLD: 87 % (ref 44–72)
NRBC # BLD AUTO: 0.18 K/UL
NRBC BLD-RTO: 0.8 /100 WBC
OVALOCYTES BLD QL SMEAR: NORMAL
PATHOLOGY CONSULT NOTE: NORMAL
PHOSPHATE SERPL-MCNC: 3 MG/DL (ref 2.5–4.5)
PLATELET # BLD AUTO: 401 K/UL (ref 164–446)
PLATELET BLD QL SMEAR: NORMAL
PMV BLD AUTO: 11.2 FL (ref 9–12.9)
POIKILOCYTOSIS BLD QL SMEAR: NORMAL
POLYCHROMASIA BLD QL SMEAR: NORMAL
POTASSIUM SERPL-SCNC: 3.5 MMOL/L (ref 3.6–5.5)
PREALB SERPL-MCNC: 21.2 MG/DL (ref 18–38)
PROT SERPL-MCNC: 4.5 G/DL (ref 6–8.2)
RBC # BLD AUTO: 1.98 M/UL (ref 4.2–5.4)
RBC BLD AUTO: PRESENT
RH BLD: ABNORMAL
SIGNIFICANT IND 70042: NORMAL
SITE SITE: NORMAL
SODIUM SERPL-SCNC: 159 MMOL/L (ref 135–145)
SOURCE SOURCE: NORMAL
STOMATOCYTES BLD QL SMEAR: NORMAL
WBC # BLD AUTO: 23.1 K/UL (ref 4.8–10.8)

## 2020-08-14 PROCEDURE — 700111 HCHG RX REV CODE 636 W/ 250 OVERRIDE (IP): Performed by: SURGERY

## 2020-08-14 PROCEDURE — 87070 CULTURE OTHR SPECIMN AEROBIC: CPT

## 2020-08-14 PROCEDURE — 700105 HCHG RX REV CODE 258: Performed by: SURGERY

## 2020-08-14 PROCEDURE — 86900 BLOOD TYPING SEROLOGIC ABO: CPT

## 2020-08-14 PROCEDURE — 700111 HCHG RX REV CODE 636 W/ 250 OVERRIDE (IP): Performed by: STUDENT IN AN ORGANIZED HEALTH CARE EDUCATION/TRAINING PROGRAM

## 2020-08-14 PROCEDURE — 160048 HCHG OR STATISTICAL LEVEL 1-5: Performed by: SURGERY

## 2020-08-14 PROCEDURE — 501448 HCHG STAPLER, TA 45 4.8: Performed by: SURGERY

## 2020-08-14 PROCEDURE — 85007 BL SMEAR W/DIFF WBC COUNT: CPT

## 2020-08-14 PROCEDURE — 160035 HCHG PACU - 1ST 60 MINS PHASE I: Performed by: SURGERY

## 2020-08-14 PROCEDURE — P9016 RBC LEUKOCYTES REDUCED: HCPCS

## 2020-08-14 PROCEDURE — 83735 ASSAY OF MAGNESIUM: CPT

## 2020-08-14 PROCEDURE — 86850 RBC ANTIBODY SCREEN: CPT

## 2020-08-14 PROCEDURE — 80053 COMPREHEN METABOLIC PANEL: CPT

## 2020-08-14 PROCEDURE — 36430 TRANSFUSION BLD/BLD COMPNT: CPT

## 2020-08-14 PROCEDURE — 0DBU0ZZ EXCISION OF OMENTUM, OPEN APPROACH: ICD-10-PCS | Performed by: SURGERY

## 2020-08-14 PROCEDURE — 99291 CRITICAL CARE FIRST HOUR: CPT | Mod: 25 | Performed by: SURGERY

## 2020-08-14 PROCEDURE — 71045 X-RAY EXAM CHEST 1 VIEW: CPT

## 2020-08-14 PROCEDURE — 700101 HCHG RX REV CODE 250: Performed by: STUDENT IN AN ORGANIZED HEALTH CARE EDUCATION/TRAINING PROGRAM

## 2020-08-14 PROCEDURE — 87205 SMEAR GRAM STAIN: CPT

## 2020-08-14 PROCEDURE — 700111 HCHG RX REV CODE 636 W/ 250 OVERRIDE (IP)

## 2020-08-14 PROCEDURE — 82962 GLUCOSE BLOOD TEST: CPT

## 2020-08-14 PROCEDURE — 160002 HCHG RECOVERY MINUTES (STAT): Performed by: SURGERY

## 2020-08-14 PROCEDURE — 700105 HCHG RX REV CODE 258: Performed by: STUDENT IN AN ORGANIZED HEALTH CARE EDUCATION/TRAINING PROGRAM

## 2020-08-14 PROCEDURE — 87075 CULTR BACTERIA EXCEPT BLOOD: CPT

## 2020-08-14 PROCEDURE — 84100 ASSAY OF PHOSPHORUS: CPT

## 2020-08-14 PROCEDURE — A9270 NON-COVERED ITEM OR SERVICE: HCPCS | Performed by: SURGERY

## 2020-08-14 PROCEDURE — 501838 HCHG SUTURE GENERAL: Performed by: SURGERY

## 2020-08-14 PROCEDURE — 502704 HCHG DEVICE, LIGASURE IMPACT: Performed by: SURGERY

## 2020-08-14 PROCEDURE — 700112 HCHG RX REV CODE 229: Performed by: SURGERY

## 2020-08-14 PROCEDURE — 88307 TISSUE EXAM BY PATHOLOGIST: CPT

## 2020-08-14 PROCEDURE — 86923 COMPATIBILITY TEST ELECTRIC: CPT

## 2020-08-14 PROCEDURE — 86140 C-REACTIVE PROTEIN: CPT

## 2020-08-14 PROCEDURE — 85027 COMPLETE CBC AUTOMATED: CPT

## 2020-08-14 PROCEDURE — 160009 HCHG ANES TIME/MIN: Performed by: SURGERY

## 2020-08-14 PROCEDURE — 0D1B0ZH BYPASS ILEUM TO CECUM, OPEN APPROACH: ICD-10-PCS | Performed by: SURGERY

## 2020-08-14 PROCEDURE — 86901 BLOOD TYPING SEROLOGIC RH(D): CPT

## 2020-08-14 PROCEDURE — 0W9F0ZZ DRAINAGE OF ABDOMINAL WALL, OPEN APPROACH: ICD-10-PCS | Performed by: SURGERY

## 2020-08-14 PROCEDURE — 160031 HCHG SURGERY MINUTES - 1ST 30 MINS LEVEL 5: Performed by: SURGERY

## 2020-08-14 PROCEDURE — 700102 HCHG RX REV CODE 250 W/ 637 OVERRIDE(OP): Performed by: SURGERY

## 2020-08-14 PROCEDURE — 770022 HCHG ROOM/CARE - ICU (200)

## 2020-08-14 PROCEDURE — 160042 HCHG SURGERY MINUTES - EA ADDL 1 MIN LEVEL 5: Performed by: SURGERY

## 2020-08-14 PROCEDURE — 88305 TISSUE EXAM BY PATHOLOGIST: CPT

## 2020-08-14 PROCEDURE — 84134 ASSAY OF PREALBUMIN: CPT

## 2020-08-14 PROCEDURE — 94669 MECHANICAL CHEST WALL OSCILL: CPT

## 2020-08-14 RX ORDER — SODIUM CHLORIDE, SODIUM LACTATE, POTASSIUM CHLORIDE, CALCIUM CHLORIDE 600; 310; 30; 20 MG/100ML; MG/100ML; MG/100ML; MG/100ML
INJECTION, SOLUTION INTRAVENOUS CONTINUOUS
Status: DISCONTINUED | OUTPATIENT
Start: 2020-08-14 | End: 2020-08-16

## 2020-08-14 RX ORDER — MAGNESIUM SULFATE HEPTAHYDRATE 40 MG/ML
2 INJECTION, SOLUTION INTRAVENOUS ONCE
Status: COMPLETED | OUTPATIENT
Start: 2020-08-14 | End: 2020-08-14

## 2020-08-14 RX ORDER — SODIUM CHLORIDE 9 MG/ML
INJECTION, SOLUTION INTRAVENOUS CONTINUOUS
Status: DISCONTINUED | OUTPATIENT
Start: 2020-08-14 | End: 2020-08-14

## 2020-08-14 RX ORDER — LIDOCAINE HYDROCHLORIDE 20 MG/ML
INJECTION, SOLUTION EPIDURAL; INFILTRATION; INTRACAUDAL; PERINEURAL PRN
Status: DISCONTINUED | OUTPATIENT
Start: 2020-08-14 | End: 2020-08-14 | Stop reason: SURG

## 2020-08-14 RX ORDER — HYDROMORPHONE HYDROCHLORIDE 2 MG/ML
INJECTION, SOLUTION INTRAMUSCULAR; INTRAVENOUS; SUBCUTANEOUS PRN
Status: DISCONTINUED | OUTPATIENT
Start: 2020-08-14 | End: 2020-08-14 | Stop reason: SURG

## 2020-08-14 RX ORDER — DEXTROSE MONOHYDRATE 25 G/50ML
50 INJECTION, SOLUTION INTRAVENOUS
Status: DISCONTINUED | OUTPATIENT
Start: 2020-08-14 | End: 2020-08-14 | Stop reason: HOSPADM

## 2020-08-14 RX ORDER — HYDROMORPHONE HYDROCHLORIDE 1 MG/ML
0.4 INJECTION, SOLUTION INTRAMUSCULAR; INTRAVENOUS; SUBCUTANEOUS
Status: DISCONTINUED | OUTPATIENT
Start: 2020-08-14 | End: 2020-08-14 | Stop reason: HOSPADM

## 2020-08-14 RX ORDER — HYDROMORPHONE HYDROCHLORIDE 1 MG/ML
0.2 INJECTION, SOLUTION INTRAMUSCULAR; INTRAVENOUS; SUBCUTANEOUS
Status: DISCONTINUED | OUTPATIENT
Start: 2020-08-14 | End: 2020-08-14 | Stop reason: HOSPADM

## 2020-08-14 RX ORDER — POTASSIUM CHLORIDE 29.8 MG/ML
40 INJECTION INTRAVENOUS ONCE
Status: COMPLETED | OUTPATIENT
Start: 2020-08-14 | End: 2020-08-14

## 2020-08-14 RX ORDER — ROCURONIUM BROMIDE 10 MG/ML
INJECTION, SOLUTION INTRAVENOUS PRN
Status: DISCONTINUED | OUTPATIENT
Start: 2020-08-14 | End: 2020-08-14 | Stop reason: SURG

## 2020-08-14 RX ORDER — CEFOTETAN DISODIUM 1 G/10ML
INJECTION, POWDER, FOR SOLUTION INTRAMUSCULAR; INTRAVENOUS PRN
Status: DISCONTINUED | OUTPATIENT
Start: 2020-08-14 | End: 2020-08-14 | Stop reason: SURG

## 2020-08-14 RX ORDER — HYDROMORPHONE HYDROCHLORIDE 1 MG/ML
0.1 INJECTION, SOLUTION INTRAMUSCULAR; INTRAVENOUS; SUBCUTANEOUS
Status: DISCONTINUED | OUTPATIENT
Start: 2020-08-14 | End: 2020-08-14 | Stop reason: HOSPADM

## 2020-08-14 RX ORDER — PHENYLEPHRINE HCL IN 0.9% NACL 0.5 MG/5ML
SYRINGE (ML) INTRAVENOUS PRN
Status: DISCONTINUED | OUTPATIENT
Start: 2020-08-14 | End: 2020-08-14 | Stop reason: SURG

## 2020-08-14 RX ORDER — ONDANSETRON 2 MG/ML
4 INJECTION INTRAMUSCULAR; INTRAVENOUS
Status: DISCONTINUED | OUTPATIENT
Start: 2020-08-14 | End: 2020-08-14 | Stop reason: HOSPADM

## 2020-08-14 RX ADMIN — INSULIN HUMAN 1 UNITS: 100 INJECTION, SOLUTION PARENTERAL at 05:07

## 2020-08-14 RX ADMIN — HYDROCHLOROTHIAZIDE 25 MG: 25 TABLET ORAL at 09:17

## 2020-08-14 RX ADMIN — NYSTATIN: 100000 POWDER TOPICAL at 05:07

## 2020-08-14 RX ADMIN — DOCUSATE SODIUM 100 MG: 50 LIQUID ORAL at 17:18

## 2020-08-14 RX ADMIN — SUGAMMADEX 200 MG: 100 INJECTION, SOLUTION INTRAVENOUS at 14:47

## 2020-08-14 RX ADMIN — Medication 200 MCG: at 13:27

## 2020-08-14 RX ADMIN — PIPERACILLIN SODIUM, TAZOBACTAM SODIUM 4.5 G: 4; .5 INJECTION, POWDER, LYOPHILIZED, FOR SOLUTION INTRAVENOUS at 21:19

## 2020-08-14 RX ADMIN — SODIUM CHLORIDE: 9 INJECTION, SOLUTION INTRAVENOUS at 13:03

## 2020-08-14 RX ADMIN — NYSTATIN: 100000 POWDER TOPICAL at 17:18

## 2020-08-14 RX ADMIN — LIDOCAINE HYDROCHLORIDE 100 MG: 20 INJECTION, SOLUTION EPIDURAL; INFILTRATION; INTRACAUDAL at 13:10

## 2020-08-14 RX ADMIN — METOPROLOL TARTRATE 100 MG: 50 TABLET, FILM COATED ORAL at 17:18

## 2020-08-14 RX ADMIN — Medication 200 MCG: at 13:23

## 2020-08-14 RX ADMIN — ROCURONIUM BROMIDE 80 MG: 10 INJECTION, SOLUTION INTRAVENOUS at 13:10

## 2020-08-14 RX ADMIN — SIMVASTATIN 20 MG: 20 TABLET, FILM COATED ORAL at 21:20

## 2020-08-14 RX ADMIN — LOSARTAN POTASSIUM 100 MG: 50 TABLET, FILM COATED ORAL at 09:54

## 2020-08-14 RX ADMIN — FENTANYL CITRATE 100 MCG: 50 INJECTION INTRAMUSCULAR; INTRAVENOUS at 13:10

## 2020-08-14 RX ADMIN — AMIODARONE HYDROCHLORIDE 400 MG: 200 TABLET ORAL at 09:17

## 2020-08-14 RX ADMIN — FENTANYL CITRATE 50 MCG: 50 INJECTION INTRAMUSCULAR; INTRAVENOUS at 15:33

## 2020-08-14 RX ADMIN — PIPERACILLIN SODIUM, TAZOBACTAM SODIUM 4.5 G: 4; .5 INJECTION, POWDER, LYOPHILIZED, FOR SOLUTION INTRAVENOUS at 05:08

## 2020-08-14 RX ADMIN — AMIODARONE HYDROCHLORIDE 400 MG: 200 TABLET ORAL at 21:20

## 2020-08-14 RX ADMIN — METOPROLOL TARTRATE 100 MG: 50 TABLET, FILM COATED ORAL at 09:17

## 2020-08-14 RX ADMIN — SODIUM CHLORIDE, POTASSIUM CHLORIDE, SODIUM LACTATE AND CALCIUM CHLORIDE: 600; 310; 30; 20 INJECTION, SOLUTION INTRAVENOUS at 18:39

## 2020-08-14 RX ADMIN — HYDROMORPHONE HYDROCHLORIDE 0.4 MG: 2 INJECTION, SOLUTION INTRAMUSCULAR; INTRAVENOUS; SUBCUTANEOUS at 14:48

## 2020-08-14 RX ADMIN — HYDROCORTISONE SODIUM SUCCINATE 25 MG: 100 INJECTION, POWDER, FOR SOLUTION INTRAMUSCULAR; INTRAVENOUS at 05:08

## 2020-08-14 RX ADMIN — Medication 100 MCG: at 14:30

## 2020-08-14 RX ADMIN — ONDANSETRON 4 MG: 2 INJECTION INTRAMUSCULAR; INTRAVENOUS at 14:25

## 2020-08-14 RX ADMIN — ENOXAPARIN SODIUM 40 MG: 40 INJECTION SUBCUTANEOUS at 05:08

## 2020-08-14 RX ADMIN — SODIUM CHLORIDE, POTASSIUM CHLORIDE, SODIUM LACTATE AND CALCIUM CHLORIDE: 600; 310; 30; 20 INJECTION, SOLUTION INTRAVENOUS at 21:20

## 2020-08-14 RX ADMIN — CEFOTETAN 2 G: 1 INJECTION, POWDER, FOR SOLUTION INTRAMUSCULAR; INTRAVENOUS at 13:13

## 2020-08-14 RX ADMIN — HYDROMORPHONE HYDROCHLORIDE 0.4 MG: 2 INJECTION, SOLUTION INTRAMUSCULAR; INTRAVENOUS; SUBCUTANEOUS at 13:38

## 2020-08-14 RX ADMIN — PROPOFOL 120 MG: 10 INJECTION, EMULSION INTRAVENOUS at 13:10

## 2020-08-14 RX ADMIN — HYDROCORTISONE SODIUM SUCCINATE 25 MG: 100 INJECTION, POWDER, FOR SOLUTION INTRAMUSCULAR; INTRAVENOUS at 17:19

## 2020-08-14 RX ADMIN — FENTANYL CITRATE 50 MCG: 50 INJECTION INTRAMUSCULAR; INTRAVENOUS at 15:11

## 2020-08-14 RX ADMIN — Medication 200 MCG: at 13:20

## 2020-08-14 RX ADMIN — POTASSIUM CHLORIDE 40 MEQ: 29.8 INJECTION, SOLUTION INTRAVENOUS at 08:50

## 2020-08-14 RX ADMIN — MAGNESIUM SULFATE 2 G: 2 INJECTION INTRAVENOUS at 08:49

## 2020-08-14 ASSESSMENT — PAIN SCALES - GENERAL: PAIN_LEVEL: 0

## 2020-08-14 ASSESSMENT — FIBROSIS 4 INDEX: FIB4 SCORE: 0.86

## 2020-08-14 NOTE — PROGRESS NOTES
2 RN skin check completed with Alan RN:        Skin assessed under devices in place:  Equipment: BP cuff, pulse ox, EKG leads, SCDs, heel float boots, restraints  Lines: narvaez, central line, wound vac, hemovac drain x2, HAYDEE drain        Areas to note / Skin observations:  - generalized edema, bruising throughout  - nasal abrasion  - B/L inframammary folds pink (nystatin)  - BUE bruising, edema, weeping (L > R)  - abdominal midline wound vac  - abdominal hemovac drains x2  - abdominal HAYDEE drain   - abdominal blisters, open; some with serosanguinous drainage (adhesive foam applied)  - pannus redness (nystatin)   - BLE mottled; calloused, dry feet; boggy heels; flaky shins: reddness slow to carina  -small amount of excoriation to sacrum     Preventative measures / Interventions in place, but not limited to:  - turns and repositioning of medical devices / equipment q2h and PRN  - bony prominences padded with and elevated on pillows  - keeping skin free from moisture  - mepilex   - barrier cream/paste/wipes  - heel float boots

## 2020-08-14 NOTE — DISCHARGE PLANNING
Anticipated Discharge Disposition: TBD    Action: Pt discussed in IDT rounds. No SW or dc planning needs were addressed. TX's have been working with pt and at this time they're recommending post acute placement prior to dc home. No orders/referrals at this time.     Barriers to Discharge: medical clearance (on going medical management) & unknown dc needs at this time.     Plan: Follow up with care team

## 2020-08-14 NOTE — PROGRESS NOTES
"Trauma / Surgical Daily Progress Note    Date of Service  8/13/2020    Chief Complaint  74 y.o. female admitted 7/31/2020 with Perforation bowel (HCC)    Interval Events  Remains extubated: Continue aggressive hygiene  VAC change tomorrow  Afebrile, completing antibiotics next 48 hours  Difficult to mobilize: BMI/debility.  Not participating in care much.  Core TRAC placed to initiate feeding  Stop TPN  Hold off on transfer  Restart beta-blocker, convert amiodarone to oral    Review of Systems  Review of Systems   Unable to perform ROS: Acuity of condition   Constitutional: Positive for fatigue.   Gastrointestinal: Positive for abdominal pain.      Vital Signs for last 24 hours  Temp:  [35.8 °C (96.5 °F)-36.9 °C (98.5 °F)] 36.7 °C (98 °F)  Pulse:  [] 79  Resp:  [8-23] 23  BP: (118-161)/(56-73) 122/69  SpO2:  [91 %-100 %] 96 %    Hemodynamic parameters for last 24 hours    /69   Pulse 79   Temp 36.7 °C (98 °F) (Temporal)   Resp (!) 23   Ht 1.753 m (5' 9\")   Wt 118.4 kg (261 lb 0.4 oz)   SpO2 96%   BMI 38.55 kg/m²     Respiratory Data     Respiration: (!) 23, Pulse Oximetry: 96 %     Work Of Breathing / Effort: Mild;Shallow  RUL Breath Sounds: Diminished, RML Breath Sounds: Diminished, RLL Breath Sounds: Diminished, ELISSA Breath Sounds: Diminished, LLL Breath Sounds: Diminished    Physical Exam  Physical Exam  Constitutional:       General: She is awake.      Appearance: She is obese. She is ill-appearing.      Interventions: Nasal cannula in place.   HENT:      Head: Normocephalic and atraumatic.      Mouth/Throat:      Mouth: Mucous membranes are moist.      Pharynx: Oropharynx is clear.   Eyes:      Extraocular Movements: Extraocular movements intact.      Conjunctiva/sclera: Conjunctivae normal.      Pupils: Pupils are equal, round, and reactive to light.   Neck:      Musculoskeletal: Neck supple.   Cardiovascular:      Rate and Rhythm: Normal rate and regular rhythm.      Pulses: Normal pulses. "   Pulmonary:      Effort: No respiratory distress.      Breath sounds: Examination of the right-lower field reveals decreased breath sounds. Examination of the left-lower field reveals decreased breath sounds. Decreased breath sounds present. No wheezing or rhonchi.   Abdominal:      Palpations: Abdomen is soft.      Tenderness: There is abdominal tenderness.      Comments: VAC intact  Drains serosanguineous   Genitourinary:     Comments: Orellana  Skin:     General: Skin is warm and dry.      Coloration: Skin is pale.   Neurological:      General: No focal deficit present.      GCS: GCS eye subscore is 4. GCS verbal subscore is 4. GCS motor subscore is 6.   Psychiatric:         Behavior: Behavior is cooperative.         Laboratory  Recent Results (from the past 24 hour(s))   ACCU-CHEK GLUCOSE    Collection Time: 08/13/20 12:10 AM   Result Value Ref Range    Glucose - Accu-Ck 192 (H) 65 - 99 mg/dL   ACCU-CHEK GLUCOSE    Collection Time: 08/13/20  5:01 AM   Result Value Ref Range    Glucose - Accu-Ck 214 (H) 65 - 99 mg/dL   Comp Metabolic Panel    Collection Time: 08/13/20  8:02 AM   Result Value Ref Range    Sodium 153 (H) 135 - 145 mmol/L    Potassium 3.2 (L) 3.6 - 5.5 mmol/L    Chloride 118 (H) 96 - 112 mmol/L    Co2 23 20 - 33 mmol/L    Anion Gap 12.0 7.0 - 16.0    Glucose 250 (H) 65 - 99 mg/dL    Bun 68 (H) 8 - 22 mg/dL    Creatinine 1.42 (H) 0.50 - 1.40 mg/dL    Calcium 7.4 (L) 8.5 - 10.5 mg/dL    AST(SGOT) 29 12 - 45 U/L    ALT(SGPT) 38 2 - 50 U/L    Alkaline Phosphatase 90 30 - 99 U/L    Total Bilirubin 0.9 0.1 - 1.5 mg/dL    Albumin 2.2 (L) 3.2 - 4.9 g/dL    Total Protein 4.4 (L) 6.0 - 8.2 g/dL    Globulin 2.2 1.9 - 3.5 g/dL    A-G Ratio 1.0 g/dL   CBC WITH DIFFERENTIAL    Collection Time: 08/13/20  8:02 AM   Result Value Ref Range    WBC 20.5 (H) 4.8 - 10.8 K/uL    RBC 2.14 (L) 4.20 - 5.40 M/uL    Hemoglobin 7.0 (L) 12.0 - 16.0 g/dL    Hematocrit 22.8 (L) 37.0 - 47.0 %    .5 (H) 81.4 - 97.8 fL    MCH  32.7 27.0 - 33.0 pg    MCHC 30.7 (L) 33.6 - 35.0 g/dL    RDW 81.2 (H) 35.9 - 50.0 fL    Platelet Count 361 164 - 446 K/uL    MPV 11.1 9.0 - 12.9 fL    Neutrophils-Polys 88.70 (H) 44.00 - 72.00 %    Lymphocytes 5.20 (L) 22.00 - 41.00 %    Monocytes 3.50 0.00 - 13.40 %    Eosinophils 0.00 0.00 - 6.90 %    Basophils 0.00 0.00 - 1.80 %    Nucleated RBC 0.20 /100 WBC    Neutrophils (Absolute) 18.37 (H) 2.00 - 7.15 K/uL    Lymphs (Absolute) 1.07 1.00 - 4.80 K/uL    Monos (Absolute) 0.72 0.00 - 0.85 K/uL    Eos (Absolute) 0.00 0.00 - 0.51 K/uL    Baso (Absolute) 0.00 0.00 - 0.12 K/uL    NRBC (Absolute) 0.05 K/uL    Anisocytosis 1+     Microcytosis 1+    Magnesium    Collection Time: 08/13/20  8:02 AM   Result Value Ref Range    Magnesium 1.6 1.5 - 2.5 mg/dL   Phosphorus: Every Monday and Thursday AM    Collection Time: 08/13/20  8:02 AM   Result Value Ref Range    Phosphorus 2.1 (L) 2.5 - 4.5 mg/dL   ESTIMATED GFR    Collection Time: 08/13/20  8:02 AM   Result Value Ref Range    GFR If  44 (A) >60 mL/min/1.73 m 2    GFR If Non  36 (A) >60 mL/min/1.73 m 2   DIFFERENTIAL MANUAL    Collection Time: 08/13/20  8:02 AM   Result Value Ref Range    Bands-Stabs 0.90 0.00 - 10.00 %    Metamyelocytes 1.70 %    Manual Diff Status PERFORMED    PERIPHERAL SMEAR REVIEW    Collection Time: 08/13/20  8:02 AM   Result Value Ref Range    Peripheral Smear Review see below    PLATELET ESTIMATE    Collection Time: 08/13/20  8:02 AM   Result Value Ref Range    Plt Estimation Normal    MORPHOLOGY    Collection Time: 08/13/20  8:02 AM   Result Value Ref Range    RBC Morphology Present     Polychromia 1+    ACCU-CHEK GLUCOSE    Collection Time: 08/13/20 12:41 PM   Result Value Ref Range    Glucose - Accu-Ck 199 (H) 65 - 99 mg/dL   ACCU-CHEK GLUCOSE    Collection Time: 08/13/20  5:28 PM   Result Value Ref Range    Glucose - Accu-Ck 224 (H) 65 - 99 mg/dL       Fluids    Intake/Output Summary (Last 24 hours) at  8/13/2020 1955  Last data filed at 8/13/2020 1800  Gross per 24 hour   Intake 558 ml   Output 3090 ml   Net -2532 ml       Core Measures & Quality Metrics  Labs reviewed, Medications reviewed and Radiology images reviewed  Orellana catheter: Critically Ill - Requiring Accurate Measurement of Urinary Output  Central line in place: Concentrated IV drugs and Need for access    DVT Prophylaxis: Heparin  DVT prophylaxis - mechanical: SCDs  Ulcer prophylaxis: Yes  Antibiotics: Treating active infection/contamination beyond 24 hours perioperative coverage  Assessed for rehab: Patient unable to tolerate rehabilitation therapeutic regimen    CAITLIN Score  ETOH Screening    Assessment/Plan  Anemia  Assessment & Plan  Critical anemia  8/6 Hg 5.9 - Transfused 1 unit PRBC  8/7 Hg 5.4 - Transfuse 2 unit PRBC  8/8 Hg 6.8 - Transfuse 1 unit PRBC - check iron studies  8/9 Hg 6.7 - Transfuse 1 unit PRBC  Transfuse for hg < 7    Acute renal insufficiency  Assessment & Plan  Admission Cr elevated associated with infection  Improved with resuscitation after initial surgery  8/4 oliguria progressed to anuria despite crystalloid resuscitation  8/5 Cr markedly elevated, electrolytes ok. 24 hours of albumin resuscitation.   8/6 Cr trending down with improved UO  8/7 BUN climbing but creatinine and UO cont to improve   8/8 Indices up again - cont fluids  8/11 sodium up but creatinine improved: Continue fluid resuscitation  Trend renal indices, avoid nephrotoxins    Respiratory failure following trauma and surgery (HCC)- (present on admission)  Assessment & Plan  Full ventilatory support. Ensure optimal oxygenation and mitigate secondary injury.  8/2 Extubated  8/4 Remained intubated after 2nd surgery  8/10 self extubation: Did well on oxygen mask  8/11 return from operating room on ventilator  8/12 awake with good parameters in a.m.: Extubated  Aggressive pulmonary hygiene regimen  Remains high risk for deterioration requiring  reintubation      Perforated appendicitis- (present on admission)  Assessment & Plan  7/31 perforated appendix with retrocolic abscess.  Small bowel and right colon resection.  RLQ drain.  8/4 Returned to OR for anastamotic leak - left in discontinuity  8/7 Washout and drain placement - still in discontinuity  8/9 Ex lap, primary anastamosis. Abd still open  8/11 abdomen closed and drains placed.  Midline wound VAC  8/12 wound VAC change in a.m.  Zosyn day 13 of 14: May need to continue  [facility]  Dr. Harris      Paroxysmal atrial fibrillation (CMS-HCC)- (present on admission)  Assessment & Plan  Chronic afib complicated by RVR  8/1 Digoxin load and metoprolol  8/3 digoxin level appropriate  8/7 Recurrent A fib with RVR - amio gtt  8/10 plan to resume Lopressor once able to tolerate p.o.  8/11 A. fib briefly recurred postop then spontaneously resolved.    Continue amiodarone drip    Large bowel perforation (HCC)  Assessment & Plan  8/4 recurrent sepsis. OR for exploration - anastomosis (side-side) intact but end of colon perforated with feculent peritonitits  Resection with discontinuity, ABthera  8/7 Washout  8/9 Primary anastamosis but still open  8/11 abdomen closed  Karthikeyan Llanos MD - [facility]    Adrenal insufficiency (HCC)  Assessment & Plan  Failure to fully wean from vasopressors despite adequate resuscitation  8/3 empiric steroids started with liberation from vasopressors  8/6 Started weaning off steroids  8/11 continue steroid taper    Septic shock (HCC)- (present on admission)  Assessment & Plan  7/31 high lactate, oliguric and hypotensive post op  8/1 lactate and urine output improved following 7 liter resuscitation. Levophed to support MAP.  8/2-8/3 weaning levophed. UOP  Adequate. Added steroids 8/3  8/4 Recurrent septic shock associated with leak. Resuscitation ongoing.  8/6 Stabilized off pressors  Cont fluids, low dose steroids  On zosyn    No contraindication to  anticoagulation therapy- (present on admission)  Assessment & Plan  8/1 Begin Lovenox  8/5 Held for bleeding  8/8 Initiated heparin SQ    GLORIA (obstructive sleep apnea)- (present on admission)  Assessment & Plan  Refusing CPAP until she gets formal sleep study.     HTN (hypertension)- (present on admission)  Assessment & Plan  Takes multiple agents at home for hypertension  Held in acute setting with septic shock  Restart when appropriate        Discussed patient condition with RN, RT, Pharmacy, Dietary,  and Patient.

## 2020-08-14 NOTE — PROGRESS NOTES
"    DATE: 8/14/2020 7/31 Perforated appendix with retrocolic abscess.  Small bowel and right colon resection.  RLQ drain.  8/4 Returned to OR for anastamotic leak, AbThera - left in discontinuity  8/7 Washout, drain placement, AbThera - still in discontinuity  8/9 Washout, drain placement, ileocolic anastomosis, AbThera  8/11 Abdominal closure  8/14 continued leukocytosis with bilious output from drains    Interval Events:  Complaining of worsening abdominal pain      PHYSICAL EXAMINATION:  Vital Signs: /66   Pulse 98   Temp 36.7 °C (98 °F) (Axillary)   Resp 19   Ht 1.753 m (5' 9\")   Wt 118.7 kg (261 lb 11 oz)   SpO2 98%     GENERAL:  No acute distress  HEENT: Pupils equal, round and reactive to light bilaterally.  Sclera are clear bilaterally.  No otorrhea.  No rhinorrhea.  Mucus membranes are moist.  CHEST:  Lungs are clear to auscultation bilaterally, mechanically ventilated  CARDIOVASCULAR:  Regular rate and rhythm  ABDOMEN: Soft, protuberant, appropriately tender, non-distended, multiple RLQ drains with several 100 mL of bilious output  GENITOURINARY:  No narvaez in place, voiding without issues  EXTREMITIES:  Good range of motion through out  NEUROLOGIC: Noncommunicative, no focal deficits appreciated  PSYCHIATRIC:  Normal affect and mood appropriate for age and condition  SKIN:  Warm and well perfused, no rashes    ASSESSMENT AND PLAN:   Patient has not lucid enough to have a conversation about her current status so I spoke with her  on the telephone and explained my concerns for biliary output from the drains consistent with an enterotomy or another leak.  I explained that the patient could worsen quickly if this is an enteric leak and that we should proceed to the operating room for exploration for possible enterotomy and inspect the anastomosis and it is found to be leaking again create an ileostomy.  I discussed with the patient's  the high likelihood for the need for washout " with plan take back for additional washouts as needed and that the patient would likely have long-term wound issues from the Wound contamination from the biliary leakage as well as from her habitus and that an ostomy given her BMI greater than 35 would likely also be accompanied with chronic long-term issues.  We discussed the possibility of the need for repeat or additional procedures.  I discussed with the patient's  that I am very concerned that her current status is tenuous given that she is unable to heal from her previous surgical procedures and has had multiple peritoneal contamination's and the risk for iatrogenic injury is very high.  All the patient's 's questions were answered and he consents to surgery.     ____________________________________     Figueroa Peng M.D.    DD: 8/10/2020  8:25 AM

## 2020-08-14 NOTE — ANESTHESIA TIME REPORT
Anesthesia Start and Stop Event Times     Date Time Event    8/14/2020 1303 Anesthesia Start     1505 Anesthesia Stop        Responsible Staff  08/14/20    Name Role Begin End    Min TOOTIE Silver M.D. Anesth 1303 1505        Preop Diagnosis (Free Text):  Pre-op Diagnosis     Anastomosis Leak         Preop Diagnosis (Codes):    Post op Diagnosis  Leak of anastomosis between gastrointestinal structures      Premium Reason  A. 3PM - 7AM    Comments:

## 2020-08-14 NOTE — ANESTHESIA PREPROCEDURE EVALUATION
75 yo F w/ hx of AFib, HTN, GLORIA, presented w/ perforated bowel on 7/31 s/p multiple ex laps, hospital course c/b JULIO, septic shock, adrenal insufficiency, resp failure. Currently not on any pressors. Has R IJ CVL. Hb 6.4 this AM s/p 1U PRBC. Na 159 today. Pt remains in AFib.    Pt is aox2 (name and place). Anesthesia consent obtained from .    Relevant Problems   ANESTHESIA   (+) GLORIA (obstructive sleep apnea)      CARDIAC   (+) HTN (hypertension)   (+) Paroxysmal atrial fibrillation (CMS-HCC)         (+) Acute renal insufficiency       Physical Exam    Airway   TM distance: >3 FB    Comments: Pt not following commands   Cardiovascular   Rhythm: irregular     Dental   (+) upper dentures           Pulmonary   Breath sounds clear to auscultation     Abdominal   (+) obese     Neurological              Anesthesia Plan    ASA 4   ASA physical status 4 criteria: sepsis    Plan - general       Airway plan will be ETT        Induction: intravenous and rapid sequence          Informed Consent:    Anesthetic plan and risks discussed with spouse.    Use of blood products discussed with: spouse whom consented to blood products.

## 2020-08-14 NOTE — OP REPORT
Operative Report    Date:8/14/2020    PreOp Diagnosis:   1.  Anemia.    2.  Ileocolic anastomotic leak,   3.  Intraabdominal abscess  4.  Morbid obesity     PostOp Diagnosis:   1.  Anemia.    2.  Ileocolic anastomotic leak,   3.  Intraabdominal abscess  4.  Morbid obesity     Procedure(s):  1.  LAPAROTOMY, EXPLORATORY  2.  WASHOUT, Drainage of abdominal wall abscess  3.  Bowel anastomosis (ileocolic)   4.  Omentectomy   Wound Class: Dirty or Infected     Surgeon(s):  Figueroa Peng MD PhD  Assistant: Kareem Cazares MD    Anesthesiologist: Dr Silver /Type of Anesthesia: General     Specimens removed if any:  Omentum  Ileocolic anastomosis  Peritoneal fluid for Gram stain with cultures and sensitivities     Estimated Blood Loss: 25mL     Drains:  1.  24 [place]: right flank/abdominal wall   2.  24 [place] Francisco: Right lower quadrant, intraperitoneal  3.  19 [place] Francisco: right lower quadrant abdominal wall  4.  Wound VAC     Findings: Large abscess cavity in the right lower quadrant/right flank abdominal wall.  The colon and small intestine were healthy and viable appearing.  No evidence of ongoing intra-abdominal bleeding.  Defect at the anastomotic staple line.  Anastomosis resected and a side-to-side functional end-to-end stapled anastomosis between the distal ileum and mid transverse colon re-created.     Complications: None noted    Indications:  74-year-old female with history of perforated appendicitis requiring ileocecectomy, who then suffered anastomotic leak requiring completion right hemicolectomy, washout, left in discontinuity who was then closed and then developed biliary drainage.  She has been washed out multiple times.  Patient was scheduled to return to the operating room for possible enterotomy versus anastomotic leak.  This procedure was discussed with the patient's  who is getting consent.  He understands risk, benefits, alternatives and wishes to proceed.    Procedure in detail:   Patient  taken the operating room placed on the operating table in supine position.  She was already intubated from the ICU.  The wound VAC sponge was removed and discarded.  The abdomen was then prepped and draped in usual sterile fashion with Betadine.  An appropriate drape was placed.  The sutures closing the fascia were removed.  A wound protector was used.    The peritoneal cavity was explored in all 4 quadrants.  There was an obvious anastomotic leak with bilious contamination.  The colon and small intestine appeared viable and quite healthy appearing.  Copious saline irrigation was then performed.  I resected the ileocolic anastomosis with a COLLEEN stapler using a bowel load.  This was passed off the table.  I then cultured the enteric contents.  I then considered making an ileostomy however given the patient's obesity combined with the shortened mesentery I felt that this would be an unacceptable risk and there was no contraindications apart from the previous failed anastomosis to proceeding with a re-anastomosis.  I removed the shortened and thickened omentum so that I could visualize the distal colon more easily.    The decision was then made to proceed with bowel anastomosis.  A side-to-side functional end and anastomosis was performed using a 75 mm COLLEEN stapler between the stapled end of the ileum and the transverse colon.  3-0 Vicryl suture was used to approximate the bowel.  This came together well.  The peritoneal space was then re-irrigated with warm saline and the patient's omentum was placed over the viscera.     The operating room team then changed gloves and gowns and a separate closing tray was used.  I closed the fascia with two #1 running PDS sutures.  The ABThera VAC sponge was then replaced and connected to the suction device, which functioned well.  The 3 drains were placed on bulb suction.  This concluded the procedure.  The patient was then taken back to the ICU in critical but stable  condition.    Sponge and needle counts were correct at the end of the case.       Figueroa Peng MD PhD  [facility]  Colon and Rectal Surgeon  [phone]

## 2020-08-15 ENCOUNTER — APPOINTMENT (OUTPATIENT)
Dept: RADIOLOGY | Facility: MEDICAL CENTER | Age: 75
DRG: 853 | End: 2020-08-15
Attending: SURGERY
Payer: MEDICARE

## 2020-08-15 PROBLEM — E87.6 HYPOKALEMIA: Status: ACTIVE | Noted: 2020-08-15

## 2020-08-15 LAB
ALBUMIN SERPL BCP-MCNC: 1.8 G/DL (ref 3.2–4.9)
ALBUMIN SERPL BCP-MCNC: 1.8 G/DL (ref 3.2–4.9)
ALBUMIN/GLOB SERPL: 0.8 G/DL
ALBUMIN/GLOB SERPL: 0.9 G/DL
ALP SERPL-CCNC: 88 U/L (ref 30–99)
ALP SERPL-CCNC: 89 U/L (ref 30–99)
ALT SERPL-CCNC: 34 U/L (ref 2–50)
ALT SERPL-CCNC: 35 U/L (ref 2–50)
ANION GAP SERPL CALC-SCNC: 11 MMOL/L (ref 7–16)
ANION GAP SERPL CALC-SCNC: 13 MMOL/L (ref 7–16)
ANISOCYTOSIS BLD QL SMEAR: ABNORMAL
AST SERPL-CCNC: 28 U/L (ref 12–45)
AST SERPL-CCNC: 28 U/L (ref 12–45)
BASO STIPL BLD QL SMEAR: NORMAL
BASOPHILS # BLD AUTO: 0 % (ref 0–1.8)
BASOPHILS # BLD: 0 K/UL (ref 0–0.12)
BILIRUB SERPL-MCNC: 0.9 MG/DL (ref 0.1–1.5)
BILIRUB SERPL-MCNC: 0.9 MG/DL (ref 0.1–1.5)
BUN SERPL-MCNC: 56 MG/DL (ref 8–22)
BUN SERPL-MCNC: 60 MG/DL (ref 8–22)
CALCIUM SERPL-MCNC: 7.3 MG/DL (ref 8.5–10.5)
CALCIUM SERPL-MCNC: 7.4 MG/DL (ref 8.5–10.5)
CHLORIDE SERPL-SCNC: 122 MMOL/L (ref 96–112)
CHLORIDE SERPL-SCNC: 123 MMOL/L (ref 96–112)
CHOLEST SERPL-MCNC: 140 MG/DL (ref 100–199)
CO2 SERPL-SCNC: 22 MMOL/L (ref 20–33)
CO2 SERPL-SCNC: 23 MMOL/L (ref 20–33)
CREAT SERPL-MCNC: 1.2 MG/DL (ref 0.5–1.4)
CREAT SERPL-MCNC: 1.26 MG/DL (ref 0.5–1.4)
EOSINOPHIL # BLD AUTO: 0.47 K/UL (ref 0–0.51)
EOSINOPHIL NFR BLD: 1.8 % (ref 0–6.9)
ERYTHROCYTE [DISTWIDTH] IN BLOOD BY AUTOMATED COUNT: 84 FL (ref 35.9–50)
GLOBULIN SER CALC-MCNC: 2 G/DL (ref 1.9–3.5)
GLOBULIN SER CALC-MCNC: 2.3 G/DL (ref 1.9–3.5)
GLUCOSE BLD-MCNC: 121 MG/DL (ref 65–99)
GLUCOSE BLD-MCNC: 131 MG/DL (ref 65–99)
GLUCOSE BLD-MCNC: 134 MG/DL (ref 65–99)
GLUCOSE BLD-MCNC: 137 MG/DL (ref 65–99)
GLUCOSE BLD-MCNC: 144 MG/DL (ref 65–99)
GLUCOSE SERPL-MCNC: 147 MG/DL (ref 65–99)
GLUCOSE SERPL-MCNC: 154 MG/DL (ref 65–99)
HCT VFR BLD AUTO: 24.2 % (ref 37–47)
HGB BLD-MCNC: 7.4 G/DL (ref 12–16)
HYPOCHROMIA BLD QL SMEAR: ABNORMAL
LYMPHOCYTES # BLD AUTO: 0.47 K/UL (ref 1–4.8)
LYMPHOCYTES NFR BLD: 1.8 % (ref 22–41)
MACROCYTES BLD QL SMEAR: ABNORMAL
MAGNESIUM SERPL-MCNC: 1.9 MG/DL (ref 1.5–2.5)
MANUAL DIFF BLD: NORMAL
MCH RBC QN AUTO: 32.3 PG (ref 27–33)
MCHC RBC AUTO-ENTMCNC: 30.6 G/DL (ref 33.6–35)
MCV RBC AUTO: 105.7 FL (ref 81.4–97.8)
METAMYELOCYTES NFR BLD MANUAL: 0.9 %
MICROCYTES BLD QL SMEAR: ABNORMAL
MONOCYTES # BLD AUTO: 0.91 K/UL (ref 0–0.85)
MONOCYTES NFR BLD AUTO: 3.5 % (ref 0–13.4)
MORPHOLOGY BLD-IMP: NORMAL
MYELOCYTES NFR BLD MANUAL: 1.8 %
NEUTROPHILS # BLD AUTO: 23.5 K/UL (ref 2–7.15)
NEUTROPHILS NFR BLD: 90.4 % (ref 44–72)
NRBC # BLD AUTO: 0.34 K/UL
NRBC BLD-RTO: 1.3 /100 WBC
OVALOCYTES BLD QL SMEAR: NORMAL
PHOSPHATE SERPL-MCNC: 3.6 MG/DL (ref 2.5–4.5)
PLATELET # BLD AUTO: 449 K/UL (ref 164–446)
PLATELET BLD QL SMEAR: NORMAL
PMV BLD AUTO: 11.2 FL (ref 9–12.9)
POIKILOCYTOSIS BLD QL SMEAR: NORMAL
POLYCHROMASIA BLD QL SMEAR: NORMAL
POTASSIUM SERPL-SCNC: 3.5 MMOL/L (ref 3.6–5.5)
POTASSIUM SERPL-SCNC: 3.9 MMOL/L (ref 3.6–5.5)
PROT SERPL-MCNC: 3.8 G/DL (ref 6–8.2)
PROT SERPL-MCNC: 4.1 G/DL (ref 6–8.2)
RBC # BLD AUTO: 2.29 M/UL (ref 4.2–5.4)
RBC BLD AUTO: PRESENT
SODIUM SERPL-SCNC: 156 MMOL/L (ref 135–145)
SODIUM SERPL-SCNC: 158 MMOL/L (ref 135–145)
TRIGL SERPL-MCNC: 313 MG/DL (ref 0–149)
WBC # BLD AUTO: 26 K/UL (ref 4.8–10.8)

## 2020-08-15 PROCEDURE — 82465 ASSAY BLD/SERUM CHOLESTEROL: CPT

## 2020-08-15 PROCEDURE — 85027 COMPLETE CBC AUTOMATED: CPT

## 2020-08-15 PROCEDURE — 700111 HCHG RX REV CODE 636 W/ 250 OVERRIDE (IP): Performed by: SURGERY

## 2020-08-15 PROCEDURE — 36620 INSERTION CATHETER ARTERY: CPT

## 2020-08-15 PROCEDURE — 700102 HCHG RX REV CODE 250 W/ 637 OVERRIDE(OP): Performed by: SURGERY

## 2020-08-15 PROCEDURE — A9270 NON-COVERED ITEM OR SERVICE: HCPCS | Performed by: SURGERY

## 2020-08-15 PROCEDURE — 80053 COMPREHEN METABOLIC PANEL: CPT

## 2020-08-15 PROCEDURE — 770022 HCHG ROOM/CARE - ICU (200)

## 2020-08-15 PROCEDURE — 71045 X-RAY EXAM CHEST 1 VIEW: CPT

## 2020-08-15 PROCEDURE — 84478 ASSAY OF TRIGLYCERIDES: CPT

## 2020-08-15 PROCEDURE — 85007 BL SMEAR W/DIFF WBC COUNT: CPT

## 2020-08-15 PROCEDURE — 700105 HCHG RX REV CODE 258: Performed by: SURGERY

## 2020-08-15 PROCEDURE — 99291 CRITICAL CARE FIRST HOUR: CPT | Mod: 25 | Performed by: SURGERY

## 2020-08-15 PROCEDURE — 83735 ASSAY OF MAGNESIUM: CPT

## 2020-08-15 PROCEDURE — 03HY32Z INSERTION OF MONITORING DEVICE INTO UPPER ARTERY, PERCUTANEOUS APPROACH: ICD-10-PCS | Performed by: SURGERY

## 2020-08-15 PROCEDURE — 700101 HCHG RX REV CODE 250: Performed by: SURGERY

## 2020-08-15 PROCEDURE — 82962 GLUCOSE BLOOD TEST: CPT | Mod: 91

## 2020-08-15 PROCEDURE — 36620 INSERTION CATHETER ARTERY: CPT | Performed by: SURGERY

## 2020-08-15 PROCEDURE — 84100 ASSAY OF PHOSPHORUS: CPT

## 2020-08-15 RX ORDER — POTASSIUM CHLORIDE 29.8 MG/ML
40 INJECTION INTRAVENOUS ONCE
Status: COMPLETED | OUTPATIENT
Start: 2020-08-15 | End: 2020-08-15

## 2020-08-15 RX ORDER — NOREPINEPHRINE BITARTRATE 0.03 MG/ML
0-30 INJECTION, SOLUTION INTRAVENOUS CONTINUOUS
Status: DISCONTINUED | OUTPATIENT
Start: 2020-08-15 | End: 2020-08-17

## 2020-08-15 RX ADMIN — POTASSIUM CHLORIDE 40 MEQ: 29.8 INJECTION, SOLUTION INTRAVENOUS at 08:39

## 2020-08-15 RX ADMIN — AMIODARONE HYDROCHLORIDE 400 MG: 200 TABLET ORAL at 20:12

## 2020-08-15 RX ADMIN — INSULIN HUMAN 1 UNITS: 100 INJECTION, SOLUTION PARENTERAL at 23:46

## 2020-08-15 RX ADMIN — SODIUM CHLORIDE, POTASSIUM CHLORIDE, SODIUM LACTATE AND CALCIUM CHLORIDE: 600; 310; 30; 20 INJECTION, SOLUTION INTRAVENOUS at 05:33

## 2020-08-15 RX ADMIN — NYSTATIN: 100000 POWDER TOPICAL at 18:19

## 2020-08-15 RX ADMIN — METOPROLOL TARTRATE 100 MG: 50 TABLET, FILM COATED ORAL at 18:00

## 2020-08-15 RX ADMIN — FOLIC ACID 1 MG: 1 TABLET ORAL at 05:33

## 2020-08-15 RX ADMIN — POTASSIUM BICARBONATE 25 MEQ: 978 TABLET, EFFERVESCENT ORAL at 06:09

## 2020-08-15 RX ADMIN — THERA TABS 1 TABLET: TAB at 05:33

## 2020-08-15 RX ADMIN — NOREPINEPHRINE BITARTRATE 4 MCG/MIN: 1 INJECTION INTRAVENOUS at 11:56

## 2020-08-15 RX ADMIN — PIPERACILLIN SODIUM, TAZOBACTAM SODIUM 4.5 G: 4; .5 INJECTION, POWDER, LYOPHILIZED, FOR SOLUTION INTRAVENOUS at 06:13

## 2020-08-15 RX ADMIN — PIPERACILLIN SODIUM, TAZOBACTAM SODIUM 4.5 G: 4; .5 INJECTION, POWDER, LYOPHILIZED, FOR SOLUTION INTRAVENOUS at 21:48

## 2020-08-15 RX ADMIN — Medication 100 MG: at 05:33

## 2020-08-15 RX ADMIN — PIPERACILLIN SODIUM, TAZOBACTAM SODIUM 4.5 G: 4; .5 INJECTION, POWDER, LYOPHILIZED, FOR SOLUTION INTRAVENOUS at 14:00

## 2020-08-15 RX ADMIN — NYSTATIN: 100000 POWDER TOPICAL at 05:33

## 2020-08-15 RX ADMIN — HYDROMORPHONE HYDROCHLORIDE 0.5 MG: 1 INJECTION, SOLUTION INTRAMUSCULAR; INTRAVENOUS; SUBCUTANEOUS at 14:44

## 2020-08-15 RX ADMIN — HYDROCORTISONE SODIUM SUCCINATE 25 MG: 100 INJECTION, POWDER, FOR SOLUTION INTRAMUSCULAR; INTRAVENOUS at 05:33

## 2020-08-15 RX ADMIN — CALCIUM GLUCONATE: 98 INJECTION, SOLUTION INTRAVENOUS at 19:54

## 2020-08-15 RX ADMIN — ENOXAPARIN SODIUM 40 MG: 40 INJECTION SUBCUTANEOUS at 05:33

## 2020-08-15 RX ADMIN — HYDROMORPHONE HYDROCHLORIDE 0.5 MG: 1 INJECTION, SOLUTION INTRAMUSCULAR; INTRAVENOUS; SUBCUTANEOUS at 21:11

## 2020-08-15 RX ADMIN — SIMVASTATIN 20 MG: 20 TABLET, FILM COATED ORAL at 20:11

## 2020-08-15 ASSESSMENT — FIBROSIS 4 INDEX: FIB4 SCORE: 0.89

## 2020-08-15 NOTE — PROGRESS NOTES
Pharmacy TPN Day # 1 (restart)       8/15/2020    Dosing Weight   84 kg (adjBW based on admit weight 111 kg)    TPN currently providing 50% of goal  TPN goal: 2000 kcal/day including 1-1.2 gm/kg/day Protein  TPN indication: perforated appendicitis    Pertinent PMH: Patient presented with RLQ abdominal pain with N/V.  Found to have perforated appendicitis with retro-colic abscess and involvement of the terminal ileum.  Patient is currently intubated.  She has developed renal failure.  The patient is currently receiving hydrocortisone.  There was concern for possible GIB on  related to coffee ground emesis.  The patient has been either NPO or Clear Liquid Diet since admit.  It is unclear when the patient will be able to tolerate enteral nutrition to meet nutritional goals.  Thus TPN is warranted.  Temp (24hrs), Av.2 °C (97.1 °F), Min:35.3 °C (95.6 °F), Max:36.7 °C (98.1 °F)  .  Recent Labs     20  0802 20  0300 20  0330 08/15/20  0415   SODIUM 153* 159*  --  158*   POTASSIUM 3.2* 3.5*  --  3.5*   CHLORIDE 118* 123*  --  123*   CO2 23 25  --  22   BUN 68* 63*  --  60*   CREATININE 1.42* 1.35  --  1.26   GLUCOSE 250* 178*  --  147*   CALCIUM 7.4* 7.4*  --  7.3*   ASTSGOT 29 33  --  28   ALTSGPT 38 36  --  34   ALBUMIN 2.2* 2.2*  --  1.8*   TBILIRUBIN 0.9 1.0  --  0.9   PHOSPHORUS 2.1* 3.0  --   --    MAGNESIUM 1.6 1.8  --   --    PREALBUMIN  --   --  21.2  --      Accu-Checks  Recent Labs     08/15/20  0022 08/15/20  0419 08/15/20  0539   POCGLUCOSE 144* 134* 137*       Vitals:    08/15/20 0800 08/15/20 0830 08/15/20 0900 08/15/20 0930   BP: (!) 96/46 (!) 90/54 (!) 79/40 (!) 97/45   Weight:       Height:           Intake/Output Summary (Last 24 hours) at 8/15/2020 1136  Last data filed at 8/15/2020 0900  Gross per 24 hour   Intake 4797.5 ml   Output 2385 ml   Net 2412.5 ml       No orders of the defined types were placed in this encounter.        TPN for past 72 hours (Show up to 3 orders;  newest on the left. Changes between the two most recent orders are indicated.)     Start date and time   08/15/2020 2000 08/11/2020 2000      TPN Central Line Formulation [[id number]] TPN Central Line Formulation [[id number]]    Order Status  Active Completed    Last Admin   Rate Change at 08/13/2020 1811 by Bettie Calero R.N.       Base    Clinisol 15%  42 g 40 g    dextrose 70%  185 g 180 g    fat emulsions 20%  20 g 20 g       Additives    potassium chloride  40 mEq 40 mEq    sodium acetate  75 mEq 75 mEq    sodium chloride  75 mEq 75 mEq    calcium GLUConate  4.65 mEq 4.65 mEq    M.T.E. -5 Adult  1 mL 1 mL    M.V.I. Adult  10 mL 10 mL    famotidine  20 mg 20 mg       QS Base    sterile water for inj(pf)  1,248.45 mL 1,268.98 mL       Energy Contribution    Proteins  -- --    Dextrose  -- --    Lipids  -- --    Total  -- --       Electrolyte Ion Calculated Amount    Sodium  150 mEq 150 mEq    Potassium  40 mEq 40 mEq    Calcium  4.65 mEq 4.65 mEq    Magnesium  -- --    Aluminum  -- --    Phosphate  -- --    Chloride  115 mEq 115 mEq    Acetate  110.56 mEq 108.87 mEq       Other    Total Protein  42 g 40 g    Total Protein/kg  0.35 g/kg 0.32 g/kg    Total Amino Acid  -- --    Total Amino Acid/kg  -- --    Glucose Infusion Rate  1.08 mg/kg/min 1.06 mg/kg/min    Osmolarity (Estimated)  -- --    Volume  1,992 mL 1,992 mL    Rate  83 mL/hr 83 mL/hr    Dosing Weight  118.8 kg 126.5 kg    Infusion Site  [facility]          This formula provides:  % kcal as lipids = 20  Grams protein/kg = 0.5  Non-protein calories = 829  Kcals/kg = 11.9  Total daily calories = 997    Plan and Assessment:  · Overall Assessment:  · Patient taken to OR yesterday and found to have an anastomotic leak with takedown of anastomosis and ileocolic anastomosis. Given OR finding tube feeds now trophic and TPN to be resumed. Starting TPN at 50% goal calories as patient has not had adequate nutrition in two weeks. Concerns for refeeding given  prolonged NPO/inadequate caloric intake.   · Macronutrients: Restarting TPN at 50% goal calories as above  ? Dextrose: BG stable. GIR appropriate at 1.08 mg/kg/min  ? Lipids: TG mildly elevated, reduced lipids provision in TPN given body habitus.  ? Protein:  Hx of renal insufficiency, improving and close to baseline. Will consider increasing over the next couple days to help promote healing given recent surgeries.  · Micronutrients/Electrolytes:  ? K: low, providing 40 meq outside of TPN. TPN to provide additional 40 meq  ? Na: elevated, 1/2 NS equivalents in TPN. Patient started on free water flushes  ? P: WNL, no additional supplementation   ? Mag: WNL, no additional supplementation   ? Ca: low, 1 gram equivalent provided in TPN  ? MTE/MVI: standard MTE/MVI to be provided in TPN  ? Famotidine: supplemented in TPN, given recent surgeries and abdominal closure  ? Thiamine/Folic Acid: Thiamine and folic acid being supplemented outside of TPN.   · Glucose:   ? FSBG < 150. Patient has low SSI ordered outside of TPN and has not required any doses since 0500 yesterday.  · Fluids:   ? TPN to run at 83 ml/hr, no additional MIVF at this time.     Chilo Whitfield, PharmD

## 2020-08-15 NOTE — DIETARY
Nutrition support note:  Received consult for TPN.  Per RN, ok per surgery for trickle TF but need to confirm with primary team.   TF order still in place, will d/c for now as it was a nocturnal TF order (pt had been on a po diet).     Estimated needs for TPN = 1220 - 1550 kcals and 99 - 132 gm pro/day.  TPN is providing 997 kcals and 0.5 gm pro/kg/day to start (dosing wt 84 kg).    If ok with MD to start TF, would run Peptamen Intense VHP at 10 mL/hr, advance per MD only.    RD following.

## 2020-08-15 NOTE — PROGRESS NOTES
Trauma / Surgical Daily Progress Note    Date of Service  8/14/2020    Chief Complaint  74 y.o. female admitted 7/31/2020 with Perforation bowel (HCC)    Interval Events  Anastomotic leak. Exploratory laparotomy, washout, and anastomotic revision.  The patient remains critically ill with multisystem organ failure.  The patient was seen and examined on rounds and discussed with the multidisciplinary critical care team and consulting physicians. Critically evaluated laboratory tests, culture data, medications, imaging, and other diagnostic tests.    The patient has impairment of one or more vital organ systems and a high probability of imminent or life-threatening deterioration in condition. Provided high complexity decision making to assess, manipulate, and support vital system functions to treat vital organ system failure and/or to prevent further life-threatening deterioration of the patient's condition. Requires continued ICU and hospital admission.    Critical care interventions include: integration of multiple data points and associated complex medical decision making, evaluation and direction of antimicrobial therapy for life threatening infection, blood component transfusion therapy, management of cardiac arrhythmias and management of critical electrolyte abnormalities.    Review of Systems  Review of Systems   Unable to perform ROS: Acuity of condition        Vital Signs for last 24 hours  Temp:  [36 °C (96.8 °F)-36.7 °C (98.1 °F)] 36.4 °C (97.5 °F)  Pulse:  [] 75  Resp:  [10-23] 10  BP: ()/(40-92) 95/51  SpO2:  [92 %-100 %] 97 %    Hemodynamic parameters for last 24 hours       Respiratory Data     Respiration: (!) 10, Pulse Oximetry: 97 %     Work Of Breathing / Effort: Mild;Shallow  RUL Breath Sounds: Diminished, RML Breath Sounds: Diminished, RLL Breath Sounds: Diminished, ELISSA Breath Sounds: Diminished, LLL Breath Sounds: Diminished    Physical Exam  Physical Exam  Vitals signs and nursing  note reviewed.   Constitutional:       General: She is awake.      Appearance: She is obese. She is ill-appearing.      Interventions: Nasal cannula in place.   HENT:      Head: Normocephalic and atraumatic.      Nose: Nose normal.      Mouth/Throat:      Mouth: Mucous membranes are moist.      Pharynx: Oropharynx is clear.   Eyes:      Extraocular Movements: Extraocular movements intact.      Conjunctiva/sclera: Conjunctivae normal.      Pupils: Pupils are equal, round, and reactive to light.   Neck:      Musculoskeletal: Normal range of motion and neck supple. No muscular tenderness.   Cardiovascular:      Rate and Rhythm: Regular rhythm. Tachycardia present.      Pulses: Normal pulses.   Pulmonary:      Effort: No respiratory distress.      Breath sounds: Examination of the right-lower field reveals decreased breath sounds. Examination of the left-lower field reveals decreased breath sounds. Decreased breath sounds present. No wheezing or rhonchi.   Abdominal:      General: There is distension.      Palpations: Abdomen is soft.      Tenderness: There is abdominal tenderness.      Comments: VAC intact  Drains with enteric contents   Genitourinary:     Comments: Orellana catheter with concentrated urine  Musculoskeletal: Normal range of motion.         General: No tenderness or deformity.   Skin:     General: Skin is warm and dry.      Coloration: Skin is pale.   Neurological:      General: No focal deficit present.      Mental Status: She is oriented to person, place, and time.      GCS: GCS eye subscore is 4. GCS verbal subscore is 4. GCS motor subscore is 6.      Cranial Nerves: No cranial nerve deficit.      Sensory: No sensory deficit.      Motor: No weakness.      Coordination: Coordination normal.   Psychiatric:         Mood and Affect: Mood normal.         Behavior: Behavior normal. Behavior is cooperative.         Laboratory  Recent Results (from the past 24 hour(s))   ACCU-CHEK GLUCOSE    Collection Time:  08/13/20 11:57 PM   Result Value Ref Range    Glucose - Accu-Ck 157 (H) 65 - 99 mg/dL   Comp Metabolic Panel    Collection Time: 08/14/20  3:00 AM   Result Value Ref Range    Sodium 159 (HH) 135 - 145 mmol/L    Potassium 3.5 (L) 3.6 - 5.5 mmol/L    Chloride 123 (H) 96 - 112 mmol/L    Co2 25 20 - 33 mmol/L    Anion Gap 11.0 7.0 - 16.0    Glucose 178 (H) 65 - 99 mg/dL    Bun 63 (H) 8 - 22 mg/dL    Creatinine 1.35 0.50 - 1.40 mg/dL    Calcium 7.4 (L) 8.5 - 10.5 mg/dL    AST(SGOT) 33 12 - 45 U/L    ALT(SGPT) 36 2 - 50 U/L    Alkaline Phosphatase 100 (H) 30 - 99 U/L    Total Bilirubin 1.0 0.1 - 1.5 mg/dL    Albumin 2.2 (L) 3.2 - 4.9 g/dL    Total Protein 4.5 (L) 6.0 - 8.2 g/dL    Globulin 2.3 1.9 - 3.5 g/dL    A-G Ratio 1.0 g/dL   CBC WITH DIFFERENTIAL    Collection Time: 08/14/20  3:00 AM   Result Value Ref Range    WBC 23.1 (H) 4.8 - 10.8 K/uL    RBC 1.98 (L) 4.20 - 5.40 M/uL    Hemoglobin 6.4 (L) 12.0 - 16.0 g/dL    Hematocrit 21.8 (L) 37.0 - 47.0 %    .1 (H) 81.4 - 97.8 fL    MCH 32.3 27.0 - 33.0 pg    MCHC 29.4 (L) 33.6 - 35.0 g/dL    RDW 87.7 (H) 35.9 - 50.0 fL    Platelet Count 401 164 - 446 K/uL    MPV 11.2 9.0 - 12.9 fL    Neutrophils-Polys 87.00 (H) 44.00 - 72.00 %    Lymphocytes 4.30 (L) 22.00 - 41.00 %    Monocytes 3.50 0.00 - 13.40 %    Eosinophils 0.00 0.00 - 6.90 %    Basophils 0.00 0.00 - 1.80 %    Nucleated RBC 0.80 /100 WBC    Neutrophils (Absolute) 20.10 (H) 2.00 - 7.15 K/uL    Lymphs (Absolute) 0.99 (L) 1.00 - 4.80 K/uL    Monos (Absolute) 0.81 0.00 - 0.85 K/uL    Eos (Absolute) 0.00 0.00 - 0.51 K/uL    Baso (Absolute) 0.00 0.00 - 0.12 K/uL    NRBC (Absolute) 0.18 K/uL    Anisocytosis 2+     Microcytosis 2+    MAGNESIUM    Collection Time: 08/14/20  3:00 AM   Result Value Ref Range    Magnesium 1.8 1.5 - 2.5 mg/dL   PHOSPHORUS    Collection Time: 08/14/20  3:00 AM   Result Value Ref Range    Phosphorus 3.0 2.5 - 4.5 mg/dL   CRP QUANTITIVE (NON-CARDIAC)    Collection Time: 08/14/20  3:00 AM    Result Value Ref Range    Stat C-Reactive Protein 2.75 (H) 0.00 - 0.75 mg/dL   ESTIMATED GFR    Collection Time: 08/14/20  3:00 AM   Result Value Ref Range    GFR If  46 (A) >60 mL/min/1.73 m 2    GFR If Non  38 (A) >60 mL/min/1.73 m 2   DIFFERENTIAL MANUAL    Collection Time: 08/14/20  3:00 AM   Result Value Ref Range    Metamyelocytes 3.50 %    Myelocytes 1.70 %    Manual Diff Status PERFORMED    PERIPHERAL SMEAR REVIEW    Collection Time: 08/14/20  3:00 AM   Result Value Ref Range    Peripheral Smear Review see below    PLATELET ESTIMATE    Collection Time: 08/14/20  3:00 AM   Result Value Ref Range    Plt Estimation Normal    MORPHOLOGY    Collection Time: 08/14/20  3:00 AM   Result Value Ref Range    RBC Morphology Present     Polychromia 1+     Poikilocytosis 1+     Ovalocytes 1+     Stomatocytes 1+     Basophilic Stippling Few    Prealbumin    Collection Time: 08/14/20  3:30 AM   Result Value Ref Range    Pre-Albumin 21.2 18.0 - 38.0 mg/dL   COD - Adult (Type and Screen)    Collection Time: 08/14/20  4:33 AM   Result Value Ref Range    ABO Grouping Only A (A)     Rh Grouping Only POS (A)     Antibody Screen-Cod NEG    COMPONENT CELLULAR    Collection Time: 08/14/20  4:33 AM   Result Value Ref Range    Component R       R99                 Red Cells, LR       [id number]   transfused   08/14/20   05:43      Product Type R99     Dispense Status transfused     Unit Number (Barcoded) [id number]     Product Code (Barcoded) [id number]     Blood Type (Barcoded) 6200    ACCU-CHEK GLUCOSE    Collection Time: 08/14/20 11:55 AM   Result Value Ref Range    Glucose - Accu-Ck 143 (H) 65 - 99 mg/dL   GRAM STAIN    Collection Time: 08/14/20  2:36 PM    Specimen: Wound   Result Value Ref Range    Significant Indicator .     Source WND     Site Peritoneal Abscess     Gram Stain Result Rare WBCs.  No organisms seen.      ACCU-CHEK GLUCOSE    Collection Time: 08/14/20  3:18 PM   Result Value  Ref Range    Glucose - Accu-Ck 119 (H) 65 - 99 mg/dL   Histology Request    Collection Time: 08/14/20  4:16 PM   Result Value Ref Range    Pathology Request Sent to Histo    ACCU-CHEK GLUCOSE    Collection Time: 08/14/20  6:26 PM   Result Value Ref Range    Glucose - Accu-Ck 138 (H) 65 - 99 mg/dL       Fluids    Intake/Output Summary (Last 24 hours) at 8/14/2020 2111  Last data filed at 8/14/2020 2000  Gross per 24 hour   Intake 2578.75 ml   Output 2785 ml   Net -206.25 ml       Core Measures & Quality Metrics  Labs reviewed, Medications reviewed and Radiology images reviewed  Orellana catheter: Critically Ill - Requiring Accurate Measurement of Urinary Output  Central line in place: Concentrated IV drugs and Need for access    DVT Prophylaxis: Heparin  DVT prophylaxis - mechanical: SCDs  Ulcer prophylaxis: Yes  Antibiotics: Treating active infection/contamination beyond 24 hours perioperative coverage  Assessed for rehab: Patient unable to tolerate rehabilitation therapeutic regimen    CAITLIN Score  ETOH Screening    Assessment/Plan  Anemia  Assessment & Plan  Persistent critical anemia associated with critical illness and multiple surgeries.Critical anemia  8/6 Transfused 1 unit of packed red blood cells.  8/7 Transfused 2 units of packed red blood cells.  8/8 Transfused 1 unit of packed red blood cells.  8/9 Transfused 1 unit of packed red blood cells.  Continue to trend.  Transfuse 1 unit PRBC's for hemoglobin less than 7.    Acute renal insufficiency  Assessment & Plan  Resolving non oliguric renal insufficiency secondary to shock and ATN.  Trend renal indices. Medications adjusted for renal function. Avoid nephrotoxins.    Respiratory failure following trauma and surgery (HCC)- (present on admission)  Assessment & Plan  Full ventilatory support following initial surgery. Ensure optimal oxygenation and mitigate secondary injury.  8/2 Extubated.  8/11 Return from operating room on ventilator.  8/12  Extubated.  Continue aggressive pulmonary care and hygiene.    Perforated appendicitis- (present on admission)  Assessment & Plan  7/31 Perforated appendix with retrocolic abscess. Small bowel and right colon resection.   8/4 Returned to surgery for anastamotic leak. Left in discontinuity.  8/7 Washout and drain placement.  8/9 Abdominal washout with primary ileocolic anastamosis.   8/11 Delayed primary fascial closure with drain placement.  Midline wound VAC.  8/14 Second anastomotic leak. Exploratory laparotomy, washout, ileocolic anastomotic revision, and fascial closure. Wound VAC.  Zosyn day 14 of undetermined treatment course duration.  [facility].    Paroxysmal atrial fibrillation (CMS-HCC)- (present on admission)  Assessment & Plan  Chronic afib complicated by RVR  8/1 Digoxin load and metoprolol.  8/7 Recurrent A fib with RVR. Amiodarone infusion started.  8/13 Transitioned to oral amiodarone.    Adrenal insufficiency (HCC)  Assessment & Plan  Failure to fully wean from vasopressors despite adequate resuscitation.  8/3 empiric steroids started with liberation from vasopressors.  8/6 Started weaning off steroids.    No contraindication to anticoagulation therapy- (present on admission)  Assessment & Plan  8/1 Begin Lovenox  8/5 Held for bleeding  8/8 Initiated heparin SQ    GLORIA (obstructive sleep apnea)- (present on admission)  Assessment & Plan  Refusing CPAP until she gets formal sleep study.     HTN (hypertension)- (present on admission)  Assessment & Plan  Chronic condition treated with hydrochlorothiazide, losartan, and metoprolol.  8/13 Resumed maintenance medication.      Discussed patient condition with RN, RT, Pharmacy, Dietary,  and general surgery.  CRITICAL CARE TIME EXCLUDING PROCEDURES: 40 minutes

## 2020-08-15 NOTE — PROGRESS NOTES
Discussed pt with Dr. Cazares. Orders received to give 500mL LR bolus for hypotension. If pt goes into AFib with RVR during the night, orders received to restart IV amio at 0.5 mg/min without bolus dose.

## 2020-08-15 NOTE — PROGRESS NOTES
1600 Pt arrived back from OR with PACU RN. VSS. 10L oxymask.     Hold TF per Sage BORRERO. LR at 125 ml/hr.

## 2020-08-15 NOTE — PROGRESS NOTES
"    DATE: 8/15/2020    7/31 Perforated appendix with retrocolic abscess.  Small bowel and right colon resection.  RLQ drain.  8/4 Returned to OR for anastamotic leak, AbThera - left in discontinuity  8/7 Washout, drain placement, AbThera - still in discontinuity  8/9 Washout, drain placement, ileocolic anastomosis, AbThera  8/11 Abdominal closure  8/14 continued leukocytosis with bilious output from drains  8/15 taken to the operating room and found to have an anastomotic leak with takedown of anastomosis and ileocolic anastomosis    Interval Events:  Worsening hypernatremia  Continued delirium      PHYSICAL EXAMINATION:  Vital Signs: BP (!) 97/45   Pulse 81   Temp (!) 35.3 °C (95.6 °F) (Axillary)   Resp 17   Ht 1.753 m (5' 9\")   Wt 118.8 kg (261 lb 14.5 oz)   SpO2 100%     GENERAL:  No acute distress  HEENT: Pupils equal, round and reactive to light bilaterally.  Sclera are clear bilaterally.  No otorrhea.  No rhinorrhea.  Mucus membranes are moist.  CHEST:  Lungs are clear to auscultation bilaterally, mechanically ventilated  CARDIOVASCULAR:  Regular rate and rhythm  ABDOMEN: Soft, protuberant, appropriately tender, non-distended, multiple RLQ drains with several 100 mL of serosanguineous output  EXTREMITIES:  Good range of motion through out  NEUROLOGIC: Noncommunicative, no focal deficits appreciated  PSYCHIATRIC: Laughing inappropriately  SKIN:  Warm and well perfused, no rashes    ASSESSMENT AND PLAN:   -Okay with trophic feeds per nasoenteric tube  -Continue local wound care  -Okay with DVT prophylaxis     Appreciate intensivist care   ____________________________________     Figueroa Peng M.D.    DD: 8/10/2020  8:25 AM        "

## 2020-08-15 NOTE — PROGRESS NOTES
Trauma / Surgical Daily Progress Note    Date of Service  8/15/2020    Chief Complaint  74 y.o. female admitted 7/31/2020 with Perforation bowel (HCC)    Interval Events  TPN reinitiated.  Free water supplementation for persistent hypernatremia.  Low-dose vasopressor requirement.  New onset delirium.  The patient remains critically ill with multisystem organ failure.  The patient was seen and examined on rounds and discussed with the multidisciplinary critical care team and consulting physicians. Critically evaluated laboratory tests, culture data, medications, imaging, and other diagnostic tests.    The patient has impairment of one or more vital organ systems and a high probability of imminent or life-threatening deterioration in condition. Provided high complexity decision making to assess, manipulate, and support vital system functions to treat vital organ system failure and/or to prevent further life-threatening deterioration of the patient's condition. Requires continued ICU and hospital admission.    Critical care interventions include: integration of multiple data points and associated complex medical decision making, ventilator management, titration of vasopressors, evaluation and direction of antimicrobial therapy for life threatening infection and management of cardiac arrhythmias.    Review of Systems  Review of Systems   Unable to perform ROS: Mental status change        Vital Signs for last 24 hours  Temp:  [35.3 °C (95.6 °F)-36.4 °C (97.6 °F)] 35.3 °C (95.6 °F)  Pulse:  [67-91] 78  Resp:  [8-29] 13  BP: ()/(40-92) 84/43  SpO2:  [92 %-100 %] 100 %    Hemodynamic parameters for last 24 hours  CVP:  [7 MM HG-13 MM HG] 9 MM HG    Respiratory Data     Respiration: 13, Pulse Oximetry: 100 %     Work Of Breathing / Effort: Mild;Shallow  RUL Breath Sounds: Diminished, RML Breath Sounds: Diminished, RLL Breath Sounds: Diminished, ELISSA Breath Sounds: Diminished, LLL Breath Sounds: Diminished    Physical  Exam  Physical Exam  Vitals signs and nursing note reviewed.   Constitutional:       General: She is awake.      Appearance: She is obese. She is ill-appearing.      Interventions: Nasal cannula in place.   HENT:      Head: Normocephalic and atraumatic.      Nose: Nose normal.      Mouth/Throat:      Mouth: Mucous membranes are moist.      Pharynx: Oropharynx is clear.   Eyes:      Extraocular Movements: Extraocular movements intact.      Conjunctiva/sclera: Conjunctivae normal.      Pupils: Pupils are equal, round, and reactive to light.   Neck:      Musculoskeletal: Normal range of motion and neck supple. No muscular tenderness.   Cardiovascular:      Rate and Rhythm: Regular rhythm. Tachycardia present.      Pulses: Normal pulses.   Pulmonary:      Effort: No respiratory distress.      Breath sounds: Examination of the right-lower field reveals decreased breath sounds. Examination of the left-lower field reveals decreased breath sounds. Decreased breath sounds present. No wheezing or rhonchi.   Abdominal:      General: There is distension.      Palpations: Abdomen is soft.      Tenderness: There is abdominal tenderness.      Comments: VAC intact  Drains with enteric contents   Genitourinary:     Comments: Orellana catheter with concentrated urine  Musculoskeletal: Normal range of motion.         General: No tenderness or deformity.   Skin:     General: Skin is warm and dry.      Coloration: Skin is pale.   Neurological:      General: No focal deficit present.      Mental Status: She is oriented to person, place, and time.      GCS: GCS eye subscore is 4. GCS verbal subscore is 4. GCS motor subscore is 6.      Cranial Nerves: No cranial nerve deficit.      Sensory: No sensory deficit.      Motor: No weakness.      Coordination: Coordination normal.   Psychiatric:         Attention and Perception: She is inattentive.         Mood and Affect: Mood is depressed.         Speech: Speech is slurred.         Behavior:  Behavior is withdrawn.         Cognition and Memory: Cognition is impaired.         Laboratory  Recent Results (from the past 24 hour(s))   GRAM STAIN    Collection Time: 08/14/20  2:36 PM    Specimen: Wound   Result Value Ref Range    Significant Indicator .     Source WND     Site Peritoneal Abscess     Gram Stain Result Rare WBCs.  No organisms seen.      ACCU-CHEK GLUCOSE    Collection Time: 08/14/20  3:18 PM   Result Value Ref Range    Glucose - Accu-Ck 119 (H) 65 - 99 mg/dL   Histology Request    Collection Time: 08/14/20  4:16 PM   Result Value Ref Range    Pathology Request Sent to Histo    ACCU-CHEK GLUCOSE    Collection Time: 08/14/20  6:26 PM   Result Value Ref Range    Glucose - Accu-Ck 138 (H) 65 - 99 mg/dL   ACCU-CHEK GLUCOSE    Collection Time: 08/15/20 12:22 AM   Result Value Ref Range    Glucose - Accu-Ck 144 (H) 65 - 99 mg/dL   Comp Metabolic Panel    Collection Time: 08/15/20  4:15 AM   Result Value Ref Range    Sodium 158 (HH) 135 - 145 mmol/L    Potassium 3.5 (L) 3.6 - 5.5 mmol/L    Chloride 123 (H) 96 - 112 mmol/L    Co2 22 20 - 33 mmol/L    Anion Gap 13.0 7.0 - 16.0    Glucose 147 (H) 65 - 99 mg/dL    Bun 60 (H) 8 - 22 mg/dL    Creatinine 1.26 0.50 - 1.40 mg/dL    Calcium 7.3 (L) 8.5 - 10.5 mg/dL    AST(SGOT) 28 12 - 45 U/L    ALT(SGPT) 34 2 - 50 U/L    Alkaline Phosphatase 89 30 - 99 U/L    Total Bilirubin 0.9 0.1 - 1.5 mg/dL    Albumin 1.8 (L) 3.2 - 4.9 g/dL    Total Protein 3.8 (L) 6.0 - 8.2 g/dL    Globulin 2.0 1.9 - 3.5 g/dL    A-G Ratio 0.9 g/dL   CBC WITH DIFFERENTIAL    Collection Time: 08/15/20  4:15 AM   Result Value Ref Range    WBC 26.0 (H) 4.8 - 10.8 K/uL    RBC 2.29 (L) 4.20 - 5.40 M/uL    Hemoglobin 7.4 (L) 12.0 - 16.0 g/dL    Hematocrit 24.2 (L) 37.0 - 47.0 %    .7 (H) 81.4 - 97.8 fL    MCH 32.3 27.0 - 33.0 pg    MCHC 30.6 (L) 33.6 - 35.0 g/dL    RDW 84.0 (H) 35.9 - 50.0 fL    Platelet Count 449 (H) 164 - 446 K/uL    MPV 11.2 9.0 - 12.9 fL    Neutrophils-Polys 90.40 (H)  44.00 - 72.00 %    Lymphocytes 1.80 (L) 22.00 - 41.00 %    Monocytes 3.50 0.00 - 13.40 %    Eosinophils 1.80 0.00 - 6.90 %    Basophils 0.00 0.00 - 1.80 %    Nucleated RBC 1.30 /100 WBC    Neutrophils (Absolute) 23.50 (H) 2.00 - 7.15 K/uL    Lymphs (Absolute) 0.47 (L) 1.00 - 4.80 K/uL    Monos (Absolute) 0.91 (H) 0.00 - 0.85 K/uL    Eos (Absolute) 0.47 0.00 - 0.51 K/uL    Baso (Absolute) 0.00 0.00 - 0.12 K/uL    NRBC (Absolute) 0.34 K/uL    Hypochromia 1+     Anisocytosis 1+     Macrocytosis 1+     Microcytosis 1+    DIFFERENTIAL MANUAL    Collection Time: 08/15/20  4:15 AM   Result Value Ref Range    Metamyelocytes 0.90 %    Myelocytes 1.80 %    Manual Diff Status PERFORMED    PERIPHERAL SMEAR REVIEW    Collection Time: 08/15/20  4:15 AM   Result Value Ref Range    Peripheral Smear Review see below    PLATELET ESTIMATE    Collection Time: 08/15/20  4:15 AM   Result Value Ref Range    Plt Estimation Increased    MORPHOLOGY    Collection Time: 08/15/20  4:15 AM   Result Value Ref Range    RBC Morphology Present     Polychromia 1+     Poikilocytosis 1+     Ovalocytes 1+     Basophilic Stippling Few    ESTIMATED GFR    Collection Time: 08/15/20  4:15 AM   Result Value Ref Range    GFR If African American 50 (A) >60 mL/min/1.73 m 2    GFR If Non  42 (A) >60 mL/min/1.73 m 2   ACCU-CHEK GLUCOSE    Collection Time: 08/15/20  4:19 AM   Result Value Ref Range    Glucose - Accu-Ck 134 (H) 65 - 99 mg/dL   ACCU-CHEK GLUCOSE    Collection Time: 08/15/20  5:39 AM   Result Value Ref Range    Glucose - Accu-Ck 137 (H) 65 - 99 mg/dL   MAGNESIUM    Collection Time: 08/15/20 10:45 AM   Result Value Ref Range    Magnesium 1.9 1.5 - 2.5 mg/dL   PHOSPHORUS    Collection Time: 08/15/20 10:45 AM   Result Value Ref Range    Phosphorus 3.6 2.5 - 4.5 mg/dL   Comp Metabolic Panel    Collection Time: 08/15/20 10:45 AM   Result Value Ref Range    Sodium 156 (HH) 135 - 145 mmol/L    Potassium 3.9 3.6 - 5.5 mmol/L    Chloride 122  (H) 96 - 112 mmol/L    Co2 23 20 - 33 mmol/L    Anion Gap 11.0 7.0 - 16.0    Glucose 154 (H) 65 - 99 mg/dL    Bun 56 (H) 8 - 22 mg/dL    Creatinine 1.20 0.50 - 1.40 mg/dL    Calcium 7.4 (L) 8.5 - 10.5 mg/dL    AST(SGOT) 28 12 - 45 U/L    ALT(SGPT) 35 2 - 50 U/L    Alkaline Phosphatase 88 30 - 99 U/L    Total Bilirubin 0.9 0.1 - 1.5 mg/dL    Albumin 1.8 (L) 3.2 - 4.9 g/dL    Total Protein 4.1 (L) 6.0 - 8.2 g/dL    Globulin 2.3 1.9 - 3.5 g/dL    A-G Ratio 0.8 g/dL   Cholesterol    Collection Time: 08/15/20 10:45 AM   Result Value Ref Range    Cholesterol,Tot 140 100 - 199 mg/dL   Triglyceride    Collection Time: 08/15/20 10:45 AM   Result Value Ref Range    Triglycerides 313 (H) 0 - 149 mg/dL   ESTIMATED GFR    Collection Time: 08/15/20 10:45 AM   Result Value Ref Range    GFR If  53 (A) >60 mL/min/1.73 m 2    GFR If Non  44 (A) >60 mL/min/1.73 m 2   ACCU-CHEK GLUCOSE    Collection Time: 08/15/20 11:38 AM   Result Value Ref Range    Glucose - Accu-Ck 131 (H) 65 - 99 mg/dL       Fluids    Intake/Output Summary (Last 24 hours) at 8/15/2020 1318  Last data filed at 8/15/2020 0900  Gross per 24 hour   Intake 4797.5 ml   Output 1990 ml   Net 2807.5 ml       Core Measures & Quality Metrics  Labs reviewed, Medications reviewed and Radiology images reviewed  Orellana catheter: Critically Ill - Requiring Accurate Measurement of Urinary Output  Central line in place: Concentrated IV drugs, Need for access and Vasopressors    DVT Prophylaxis: Heparin  DVT prophylaxis - mechanical: SCDs  Ulcer prophylaxis: Yes  Antibiotics: Treating active infection/contamination beyond 24 hours perioperative coverage  Assessed for rehab: Patient unable to tolerate rehabilitation therapeutic regimen    CAITLIN Score  ETOH Screening    Assessment/Plan  Anemia  Assessment & Plan  Persistent critical anemia associated with critical illness and multiple surgeries.Critical anemia  8/6 Transfused 1 unit of packed red blood  cells.  8/7 Transfused 2 units of packed red blood cells.  8/8 Transfused 1 unit of packed red blood cells.  8/9 Transfused 1 unit of packed red blood cells.  Continue to trend.  Transfuse 1 unit PRBC's for hemoglobin less than 7.    Acute renal insufficiency  Assessment & Plan  Resolving non oliguric renal insufficiency secondary to shock and ATN.  Trend renal indices. Medications adjusted for renal function.  Avoid nephrotoxins.    Respiratory failure following trauma and surgery (HCC)- (present on admission)  Assessment & Plan  Full ventilatory support following initial surgery. Ensure optimal oxygenation and mitigate secondary injury.  8/2 Extubated.  8/11 Return from operating room on ventilator.  8/12 Extubated.  Continue aggressive pulmonary care and hygiene.    Perforated appendicitis- (present on admission)  Assessment & Plan  7/31 Perforated appendix with retrocolic abscess. Small bowel and right colon resection.   8/4 Returned to surgery for anastamotic leak. Left in discontinuity.  8/7 Washout and drain placement.  8/9 Abdominal washout with primary ileocolic anastamosis.   8/11 Delayed primary fascial closure with drain placement.  Midline wound VAC.  8/14 Second anastomotic leak. Exploratory laparotomy, washout, ileocolic anastomotic revision, and fascial closure. Wound VAC.  Zosyn day 14 of undetermined treatment course duration.  [facility].    Paroxysmal atrial fibrillation (CMS-HCC)- (present on admission)  Assessment & Plan  Chronic afib complicated by RVR  8/1 Digoxin load and metoprolol.  8/7 Recurrent A fib with RVR. Amiodarone infusion started.  8/13 Transitioned to oral amiodarone.    Hypokalemia  Assessment & Plan  Replace with 40 mEq KCl and trend.    Adrenal insufficiency (HCC)  Assessment & Plan  Failure to fully wean from vasopressors despite adequate resuscitation.  8/3 Empiric steroids started with liberation from vasopressors.  8/14 Steroids weaned off.    No  contraindication to anticoagulation therapy- (present on admission)  Assessment & Plan  8/1 Begin Lovenox  8/5 Held for bleeding  8/8 Initiated heparin SQ    GLORIA (obstructive sleep apnea)- (present on admission)  Assessment & Plan  Refusing CPAP until she gets formal sleep study.     HTN (hypertension)- (present on admission)  Assessment & Plan  Chronic condition treated with hydrochlorothiazide, losartan, and metoprolol.  8/13 Resumed maintenance medication.    8/15 Holding maintenance medication with vasopressor requirement.      Discussed patient condition with RN, RT, Pharmacy and general surgery.  CRITICAL CARE TIME EXCLUDING PROCEDURES: 40 minutes

## 2020-08-15 NOTE — PROGRESS NOTES
Pt's  Florencio Dailey bought in copy of Power of  paperwork for health care decisions, copy forwarded to SEJAL Salcedo to upload.

## 2020-08-15 NOTE — ANESTHESIA POSTPROCEDURE EVALUATION
Patient: Yvette Dailey    Procedure Summary     Date: 08/14/20 Room / Location: [facility] 09 / SURGERY [facility]    Anesthesia Start: 1303 Anesthesia Stop: 1505    Procedure: LAPAROTOMY, EXPLORATORY, REMOVAL OF ANASTOMOSIS (Abdomen) Diagnosis: (Anastomosis Leak )    Surgeon: Figueroa Peng M.D. Responsible Provider: Ciro Silver M.D.    Anesthesia Type: general ASA Status: 4          Final Anesthesia Type: general  Last vitals  BP   Blood Pressure : 125/69    Temp   36.1 °C (97 °F)    Pulse   Pulse: 76   Resp   14    SpO2   100 %      Anesthesia Post Evaluation    Patient location during evaluation: PACU  Patient participation: complete - patient participated  Level of consciousness: awake and alert  Pain score: 0    Airway patency: patent  Anesthetic complications: no  Cardiovascular status: hemodynamically stable  Respiratory status: acceptable  Hydration status: euvolemic    PONV: none           Nurse Pain Score: 0 (NPRS)

## 2020-08-15 NOTE — ANESTHESIA QCDR
2019 [facility] Clinical Data Registry (for Quality Improvement)     Postoperative nausea/vomiting risk protocol (Adult = 18 yrs and Pediatric 3-17 yrs)- (430 and 463)  General inhalation anesthetic (NOT TIVA) with PONV risk factors: Yes  Provision of anti-emetic therapy with at least 2 different classes of agents: Yes   Patient DID NOT receive anti-emetic therapy and reason is documented in Medical Record:  N/A    Multimodal Pain Management- (477)  Non-emergent surgery AND patient age >= 18: No  Use of Multimodal Pain Management, two or more drugs and/or interventions, NOT including systemic opioids:   Exception: Documented allergy to multiple classes of analgesics:     Smoking Abstinence (404)  Patient is current smoker (cigarette, pipe, e-cig, marijuanna): No  Elective Surgery:   Abstinence instructions provided prior to day of surgery:   Patient abstained from smoking on day of surgery:     Pre-Op Beta-Blocker in Isolated CABG (44)  Isolated CABG AND patient age >= 18: No  Beta-blocker admin within 24 hours of surgical incision:   Exception:of medical reason(s) for not administering beta blocker within 24 hours prior to surgical incision (e.g., not  indicated,other medical reason):     PACU assessment of acute postoperative pain prior to Anesthesia Care End- Applies to Patients Age = 18- (ABG7)  Initial PACU pain score is which of the following: < 7/10  Patient unable to report pain score: N/A    Post-anesthetic transfer of care checklist/protocol to PACU/ICU- (426 and 427)  Upon conclusion of case, patient transferred to which of the following locations: PACU/Non-ICU  Use of transfer checklist/protocol: Yes  Exclusion: Service Performed in Patient Hospital Room (and thus did not require transfer): N/A  Unplanned admission to ICU related to anesthesia service up through end of PACU care- (MD51)  Unplanned admission to ICU (not initially anticipated at anesthesia start time): No

## 2020-08-16 ENCOUNTER — APPOINTMENT (OUTPATIENT)
Dept: RADIOLOGY | Facility: MEDICAL CENTER | Age: 75
DRG: 853 | End: 2020-08-16
Attending: SURGERY
Payer: MEDICARE

## 2020-08-16 PROBLEM — E87.0 CHRONIC HYPERNATREMIA: Status: ACTIVE | Noted: 2020-08-16

## 2020-08-16 PROBLEM — E87.1 CHRONIC HYPONATREMIA: Status: ACTIVE | Noted: 2020-08-16

## 2020-08-16 LAB
ALBUMIN SERPL BCP-MCNC: 1.8 G/DL (ref 3.2–4.9)
ALBUMIN SERPL BCP-MCNC: 1.8 G/DL (ref 3.2–4.9)
ALBUMIN/GLOB SERPL: 0.8 G/DL
ALBUMIN/GLOB SERPL: 0.8 G/DL
ALP SERPL-CCNC: 80 U/L (ref 30–99)
ALP SERPL-CCNC: 82 U/L (ref 30–99)
ALT SERPL-CCNC: 32 U/L (ref 2–50)
ALT SERPL-CCNC: 32 U/L (ref 2–50)
ANION GAP SERPL CALC-SCNC: 10 MMOL/L (ref 7–16)
ANION GAP SERPL CALC-SCNC: 12 MMOL/L (ref 7–16)
ANION GAP SERPL CALC-SCNC: 12 MMOL/L (ref 7–16)
ANION GAP SERPL CALC-SCNC: 9 MMOL/L (ref 7–16)
AST SERPL-CCNC: 24 U/L (ref 12–45)
AST SERPL-CCNC: 25 U/L (ref 12–45)
BARCODED ABORH UBTYP: 6200
BARCODED ABORH UBTYP: 6200
BARCODED PRD CODE UBPRD: NORMAL
BARCODED PRD CODE UBPRD: NORMAL
BARCODED UNIT NUM UBUNT: NORMAL
BARCODED UNIT NUM UBUNT: NORMAL
BASOPHILS # BLD AUTO: 0.3 % (ref 0–1.8)
BASOPHILS # BLD: 0.06 K/UL (ref 0–0.12)
BILIRUB SERPL-MCNC: 0.6 MG/DL (ref 0.1–1.5)
BILIRUB SERPL-MCNC: 0.6 MG/DL (ref 0.1–1.5)
BUN SERPL-MCNC: 46 MG/DL (ref 8–22)
BUN SERPL-MCNC: 46 MG/DL (ref 8–22)
BUN SERPL-MCNC: 49 MG/DL (ref 8–22)
BUN SERPL-MCNC: 51 MG/DL (ref 8–22)
CALCIUM SERPL-MCNC: 7.4 MG/DL (ref 8.5–10.5)
CALCIUM SERPL-MCNC: 7.4 MG/DL (ref 8.5–10.5)
CALCIUM SERPL-MCNC: 7.5 MG/DL (ref 8.5–10.5)
CALCIUM SERPL-MCNC: 7.5 MG/DL (ref 8.5–10.5)
CHLORIDE SERPL-SCNC: 120 MMOL/L (ref 96–112)
CHLORIDE SERPL-SCNC: 128 MMOL/L (ref 96–112)
CHLORIDE SERPL-SCNC: 129 MMOL/L (ref 96–112)
CHLORIDE SERPL-SCNC: 130 MMOL/L (ref 96–112)
CHLORIDE UR-SCNC: 58 MMOL/L
CO2 SERPL-SCNC: 21 MMOL/L (ref 20–33)
CO2 SERPL-SCNC: 22 MMOL/L (ref 20–33)
CO2 SERPL-SCNC: 25 MMOL/L (ref 20–33)
CO2 SERPL-SCNC: 25 MMOL/L (ref 20–33)
COMMENT 1642: NORMAL
COMPONENT R 8504R: NORMAL
COMPONENT R 8504R: NORMAL
CREAT SERPL-MCNC: 1.2 MG/DL (ref 0.5–1.4)
CREAT SERPL-MCNC: 1.23 MG/DL (ref 0.5–1.4)
CREAT SERPL-MCNC: 1.27 MG/DL (ref 0.5–1.4)
CREAT SERPL-MCNC: 1.29 MG/DL (ref 0.5–1.4)
CREAT UR-MCNC: 37.3 MG/DL
EOSINOPHIL # BLD AUTO: 0.18 K/UL (ref 0–0.51)
EOSINOPHIL NFR BLD: 0.8 % (ref 0–6.9)
ERYTHROCYTE [DISTWIDTH] IN BLOOD BY AUTOMATED COUNT: 90.7 FL (ref 35.9–50)
GLOBULIN SER CALC-MCNC: 2.2 G/DL (ref 1.9–3.5)
GLOBULIN SER CALC-MCNC: 2.2 G/DL (ref 1.9–3.5)
GLUCOSE BLD-MCNC: 147 MG/DL (ref 65–99)
GLUCOSE BLD-MCNC: 151 MG/DL (ref 65–99)
GLUCOSE BLD-MCNC: 175 MG/DL (ref 65–99)
GLUCOSE BLD-MCNC: 178 MG/DL (ref 65–99)
GLUCOSE BLD-MCNC: 183 MG/DL (ref 65–99)
GLUCOSE SERPL-MCNC: 186 MG/DL (ref 65–99)
GLUCOSE SERPL-MCNC: 200 MG/DL (ref 65–99)
GLUCOSE SERPL-MCNC: 201 MG/DL (ref 65–99)
GLUCOSE SERPL-MCNC: 212 MG/DL (ref 65–99)
HCT VFR BLD AUTO: 22.4 % (ref 37–47)
HGB BLD-MCNC: 6.7 G/DL (ref 12–16)
IMM GRANULOCYTES # BLD AUTO: 1.03 K/UL (ref 0–0.11)
IMM GRANULOCYTES NFR BLD AUTO: 4.7 % (ref 0–0.9)
LYMPHOCYTES # BLD AUTO: 1.08 K/UL (ref 1–4.8)
LYMPHOCYTES NFR BLD: 5 % (ref 22–41)
MAGNESIUM SERPL-MCNC: 1.7 MG/DL (ref 1.5–2.5)
MCH RBC QN AUTO: 32.7 PG (ref 27–33)
MCHC RBC AUTO-ENTMCNC: 29.9 G/DL (ref 33.6–35)
MCV RBC AUTO: 109.3 FL (ref 81.4–97.8)
MONOCYTES # BLD AUTO: 1.4 K/UL (ref 0–0.85)
MONOCYTES NFR BLD AUTO: 6.4 % (ref 0–13.4)
MORPHOLOGY BLD-IMP: NORMAL
NEUTROPHILS # BLD AUTO: 18.04 K/UL (ref 2–7.15)
NEUTROPHILS NFR BLD: 82.8 % (ref 44–72)
NRBC # BLD AUTO: 0.25 K/UL
NRBC BLD-RTO: 1.1 /100 WBC
OSMOLALITY UR: 494 MOSM/KG H2O (ref 300–900)
PHOSPHATE SERPL-MCNC: 2.7 MG/DL (ref 2.5–4.5)
PLATELET # BLD AUTO: 458 K/UL (ref 164–446)
PMV BLD AUTO: 11 FL (ref 9–12.9)
POTASSIUM SERPL-SCNC: 3.4 MMOL/L (ref 3.6–5.5)
POTASSIUM SERPL-SCNC: 3.7 MMOL/L (ref 3.6–5.5)
POTASSIUM SERPL-SCNC: 3.7 MMOL/L (ref 3.6–5.5)
POTASSIUM SERPL-SCNC: 3.9 MMOL/L (ref 3.6–5.5)
POTASSIUM UR-SCNC: 48 MMOL/L
PRODUCT TYPE UPROD: NORMAL
PRODUCT TYPE UPROD: NORMAL
PROT SERPL-MCNC: 4 G/DL (ref 6–8.2)
PROT SERPL-MCNC: 4 G/DL (ref 6–8.2)
RBC # BLD AUTO: 2.05 M/UL (ref 4.2–5.4)
SODIUM SERPL-SCNC: 154 MMOL/L (ref 135–145)
SODIUM SERPL-SCNC: 163 MMOL/L (ref 135–145)
SODIUM UR-SCNC: 55 MMOL/L
UNIT STATUS USTAT: NORMAL
UNIT STATUS USTAT: NORMAL
WBC # BLD AUTO: 21.8 K/UL (ref 4.8–10.8)

## 2020-08-16 PROCEDURE — 36430 TRANSFUSION BLD/BLD COMPNT: CPT

## 2020-08-16 PROCEDURE — 82962 GLUCOSE BLOOD TEST: CPT | Mod: 91

## 2020-08-16 PROCEDURE — 82570 ASSAY OF URINE CREATININE: CPT

## 2020-08-16 PROCEDURE — 700102 HCHG RX REV CODE 250 W/ 637 OVERRIDE(OP): Performed by: SURGERY

## 2020-08-16 PROCEDURE — 700111 HCHG RX REV CODE 636 W/ 250 OVERRIDE (IP): Performed by: SURGERY

## 2020-08-16 PROCEDURE — A9270 NON-COVERED ITEM OR SERVICE: HCPCS | Performed by: SURGERY

## 2020-08-16 PROCEDURE — 83935 ASSAY OF URINE OSMOLALITY: CPT

## 2020-08-16 PROCEDURE — P9016 RBC LEUKOCYTES REDUCED: HCPCS

## 2020-08-16 PROCEDURE — 86923 COMPATIBILITY TEST ELECTRIC: CPT

## 2020-08-16 PROCEDURE — 84300 ASSAY OF URINE SODIUM: CPT

## 2020-08-16 PROCEDURE — 80053 COMPREHEN METABOLIC PANEL: CPT

## 2020-08-16 PROCEDURE — 84100 ASSAY OF PHOSPHORUS: CPT

## 2020-08-16 PROCEDURE — 700105 HCHG RX REV CODE 258: Performed by: SURGERY

## 2020-08-16 PROCEDURE — 700101 HCHG RX REV CODE 250: Performed by: SURGERY

## 2020-08-16 PROCEDURE — 99291 CRITICAL CARE FIRST HOUR: CPT | Performed by: SURGERY

## 2020-08-16 PROCEDURE — 82436 ASSAY OF URINE CHLORIDE: CPT

## 2020-08-16 PROCEDURE — 83735 ASSAY OF MAGNESIUM: CPT

## 2020-08-16 PROCEDURE — 770022 HCHG ROOM/CARE - ICU (200)

## 2020-08-16 PROCEDURE — 85025 COMPLETE CBC W/AUTO DIFF WBC: CPT

## 2020-08-16 PROCEDURE — 80048 BASIC METABOLIC PNL TOTAL CA: CPT | Mod: 91

## 2020-08-16 PROCEDURE — 71045 X-RAY EXAM CHEST 1 VIEW: CPT

## 2020-08-16 PROCEDURE — 84133 ASSAY OF URINE POTASSIUM: CPT

## 2020-08-16 RX ORDER — DEXTROSE MONOHYDRATE 50 MG/ML
INJECTION, SOLUTION INTRAVENOUS CONTINUOUS
Status: DISCONTINUED | OUTPATIENT
Start: 2020-08-16 | End: 2020-08-16

## 2020-08-16 RX ORDER — DEXTROSE MONOHYDRATE 25 G/50ML
50 INJECTION, SOLUTION INTRAVENOUS
Status: DISCONTINUED | OUTPATIENT
Start: 2020-08-16 | End: 2020-08-18

## 2020-08-16 RX ADMIN — PIPERACILLIN SODIUM, TAZOBACTAM SODIUM 4.5 G: 4; .5 INJECTION, POWDER, LYOPHILIZED, FOR SOLUTION INTRAVENOUS at 22:02

## 2020-08-16 RX ADMIN — AMIODARONE HYDROCHLORIDE 400 MG: 200 TABLET ORAL at 10:11

## 2020-08-16 RX ADMIN — PIPERACILLIN SODIUM, TAZOBACTAM SODIUM 4.5 G: 4; .5 INJECTION, POWDER, LYOPHILIZED, FOR SOLUTION INTRAVENOUS at 13:24

## 2020-08-16 RX ADMIN — OXYCODONE 5 MG: 5 TABLET ORAL at 23:22

## 2020-08-16 RX ADMIN — POTASSIUM BICARBONATE 25 MEQ: 978 TABLET, EFFERVESCENT ORAL at 05:22

## 2020-08-16 RX ADMIN — OXYCODONE 5 MG: 5 TABLET ORAL at 00:54

## 2020-08-16 RX ADMIN — DEXTROSE MONOHYDRATE: 50 INJECTION, SOLUTION INTRAVENOUS at 10:15

## 2020-08-16 RX ADMIN — Medication 100 MG: at 05:18

## 2020-08-16 RX ADMIN — METOPROLOL TARTRATE 100 MG: 50 TABLET, FILM COATED ORAL at 17:12

## 2020-08-16 RX ADMIN — NYSTATIN: 100000 POWDER TOPICAL at 05:21

## 2020-08-16 RX ADMIN — INSULIN HUMAN 2 UNITS: 100 INJECTION, SOLUTION PARENTERAL at 17:09

## 2020-08-16 RX ADMIN — INSULIN HUMAN 2 UNITS: 100 INJECTION, SOLUTION PARENTERAL at 12:36

## 2020-08-16 RX ADMIN — INSULIN HUMAN 1 UNITS: 100 INJECTION, SOLUTION PARENTERAL at 05:22

## 2020-08-16 RX ADMIN — CALCIUM GLUCONATE: 98 INJECTION, SOLUTION INTRAVENOUS at 20:39

## 2020-08-16 RX ADMIN — METOPROLOL TARTRATE 100 MG: 50 TABLET, FILM COATED ORAL at 05:17

## 2020-08-16 RX ADMIN — FOLIC ACID 1 MG: 1 TABLET ORAL at 05:18

## 2020-08-16 RX ADMIN — SIMVASTATIN 20 MG: 20 TABLET, FILM COATED ORAL at 22:02

## 2020-08-16 RX ADMIN — AMIODARONE HYDROCHLORIDE 400 MG: 200 TABLET ORAL at 22:02

## 2020-08-16 RX ADMIN — NYSTATIN: 100000 POWDER TOPICAL at 17:11

## 2020-08-16 RX ADMIN — ENOXAPARIN SODIUM 40 MG: 40 INJECTION SUBCUTANEOUS at 05:17

## 2020-08-16 RX ADMIN — THERA TABS 1 TABLET: TAB at 05:17

## 2020-08-16 RX ADMIN — PIPERACILLIN SODIUM, TAZOBACTAM SODIUM 4.5 G: 4; .5 INJECTION, POWDER, LYOPHILIZED, FOR SOLUTION INTRAVENOUS at 06:22

## 2020-08-16 ASSESSMENT — FIBROSIS 4 INDEX: FIB4 SCORE: 0.77

## 2020-08-16 NOTE — PROGRESS NOTES
Lab called with critical result of Na of 163 at 0600. Critical lab result read back to Lab.   Dr. Cazares notified of critical lab result at 0630.  Critical lab result read back by Dr. Cazares.  cc flushes added.     Dr. Cazares also updated on Hgb drop, will continue to monitor.

## 2020-08-16 NOTE — CARE PLAN
Problem: Communication  Goal: The ability to communicate needs accurately and effectively will improve  8/16/2020 1305 by Delia Kapoor R.N.  Outcome: PROGRESSING SLOWER THAN EXPECTED       Problem: Venous Thromboembolism (VTW)/Deep Vein Thrombosis (DVT) Prevention:  Goal: Patient will participate in Venous Thrombosis (VTE)/Deep Vein Thrombosis (DVT)Prevention Measures  Outcome: PROGRESSING AS EXPECTED

## 2020-08-16 NOTE — CARE PLAN
Problem: Infection  Goal: Will remain free from infection  Intervention: Assess signs and symptoms of infection  Note: RN monitors Pt VS and lab values to observe for S/S of infection.  Hand hygiene implemented before and after Pt care.       Problem: Skin Integrity  Goal: Risk for impaired skin integrity will decrease  Intervention: Assess risk factors for impaired skin integrity and/or pressure ulcers  Note: Brenden scale being used to assess skin break down risks.  Providing assistance with repositioning.  Collaborating and communicating with health care team to prevent pressure ulcer.  Q2 hr turns & RADHA in place.

## 2020-08-16 NOTE — PROGRESS NOTES
Pharmacy TPN Day # 2 (restart)       8/15/2020    Dosing Weight   84 kg (adjBW based on admit weight 111 kg)    TPN currently providing 50% of goal  TPN goal: 2000 kcal/day including 1-1.2 gm/kg/day Protein  TPN indication: perforated appendicitis    Pertinent PMH: Patient presented with RLQ abdominal pain with N/V.  Found to have perforated appendicitis with retro-colic abscess and involvement of the terminal ileum.  Patient is currently intubated.  She has developed renal failure.  The patient is currently receiving hydrocortisone.  There was concern for possible GIB on  related to coffee ground emesis.  The patient has been either NPO or Clear Liquid Diet since admit.  It is unclear when the patient will be able to tolerate enteral nutrition to meet nutritional goals.  Thus TPN is warranted.  Temp (24hrs), Av.2 °C (97.1 °F), Min:35.3 °C (95.6 °F), Max:36.7 °C (98.1 °F)  .  Recent Labs     20  0300 20  0330  08/15/20  1045 20  0507 20  0815 20  1330   SODIUM 159*  --    < > 156* 163* 163* 163*   POTASSIUM 3.5*  --    < > 3.9 3.7 3.9 3.7   CHLORIDE 123*  --    < > 122* 130* 129* 128*   CO2 25  --    < > 23 21 25 25   BUN 63*  --    < > 56* 51* 49* 46*   CREATININE 1.35  --    < > 1.20 1.27 1.29 1.20   GLUCOSE 178*  --    < > 154* 212* 201* 200*   CALCIUM 7.4*  --    < > 7.4* 7.4* 7.4* 7.5*   ASTSGOT 33  --    < > 28 25 24  --    ALTSGPT 36  --    < > 35 32 32  --    ALBUMIN 2.2*  --    < > 1.8* 1.8* 1.8*  --    TBILIRUBIN 1.0  --    < > 0.9 0.6 0.6  --    PHOSPHORUS 3.0  --   --  3.6 2.7  --   --    MAGNESIUM 1.8  --   --  1.9 1.7  --   --    PREALBUMIN  --  21.2  --   --   --   --   --     < > = values in this interval not displayed.     Accu-Checks  Recent Labs     08/15/20  2345 20  0506 20  1235   POCGLUCOSE 151* 175* 178*       Vitals:    20 1200 20 1230 20 1300 20 1330   BP: 113/64 100/52 116/57 120/55   Weight:       Height:            Intake/Output Summary (Last 24 hours) at 8/16/2020 1512  Last data filed at 8/16/2020 1200  Gross per 24 hour   Intake 3402.53 ml   Output 1690 ml   Net 1712.53 ml       Orders Placed This Encounter   Procedures   • Diet NPO     Standing Status:   Standing     Number of Occurrences:   1     Order Specific Question:   Restrict to:     Answer:   Strict [1]         TPN for past 72 hours (Show up to 3 orders; newest on the left. Changes between the two most recent orders are indicated.)     Start date and time   08/16/2020 2000 08/15/2020 2000 08/11/2020 2000      TPN Central Line Formulation [[id number]] TPN Central Line Formulation [[id number]] TPN Central Line Formulation [[id number]]    Order Status  Active Last Dose in Progress Completed    Last Admin   New Bag at 08/15/2020 1954 by Dori Mckinnon R.N. Rate Change at 08/13/2020 1811 by Bettie Calero R.N.       Base    Clinisol 15%  42 g 42 g 40 g    dextrose 70%  185 g 185 g 180 g    fat emulsions 20%  -- 20 g 20 g       Additives    potassium phosphate  15 mmol -- --    potassium chloride  20 mEq 40 mEq 40 mEq    sodium acetate  -- 75 mEq 75 mEq    sodium chloride  -- 75 mEq 75 mEq    magnesium sulfate  8.1 mEq -- --    calcium GLUConate  4.65 mEq 4.65 mEq 4.65 mEq    M.T.E. -5 Adult  1 mL 1 mL 1 mL    M.V.I. Adult  10 mL 10 mL 10 mL    famotidine  20 mg 20 mg 20 mg       QS Base    sterile water for inj(pf)  1,407.7 mL 1,248.45 mL 1,268.98 mL       Energy Contribution    Proteins  -- -- --    Dextrose  -- -- --    Lipids  -- -- --    Total  -- -- --       Electrolyte Ion Calculated Amount    Sodium  -- 150 mEq 150 mEq    Potassium  42 mEq 40 mEq 40 mEq    Calcium  4.65 mEq 4.65 mEq 4.65 mEq    Magnesium  8.12 mEq -- --    Aluminum  -- -- --    Phosphate  15 mmol -- --    Chloride  20 mEq 115 mEq 115 mEq    Acetate  35.56 mEq 110.56 mEq 108.87 mEq       Other    Total Protein  42 g 42 g 40 g    Total Protein/kg  0.38 g/kg 0.35 g/kg 0.32 g/kg    Total  Amino Acid  -- -- --    Total Amino Acid/kg  -- -- --    Glucose Infusion Rate  1.17 mg/kg/min 1.17 mg/kg/min 1.06 mg/kg/min    Osmolarity (Estimated)  -- -- --    Volume  1,992 mL 1,992 mL 1,992 mL    Rate  83 mL/hr 83 mL/hr 83 mL/hr    Dosing Weight  109.8 kg 118.8 kg 126.5 kg    Infusion Site  [facility]        This formula provides:  % kcal as lipids = 20 (supplemented outside TPN)  Grams protein/kg = 0.5  Non-protein calories = 829 (including IVFE)  Kcals/kg = 11.9 (including IVFE)  Total daily calories = 997 (including IVFE)    Plan and Assessment:  · Overall Assessment:  · Patient had an increase in Na level to 163. D5W started plus increase in free water flushes.  Noted worsening mentation. Slight electrolyte shifts in potassium, phos, and mag concerning for refeeding. Continuing TPN at 50% goal for today given aforementioned concerns. Trickle feeds to start today.  · Macronutrients: Continuing TPN at 50% due to concerns for refeeding as above. Removing lipid and supplementing outside to prevent stability issues. Add back to formulation if increasing provisions to 100%.  ? Dextrose: BG stable. GIR appropriate at 1.17 mg/kg/min  ? Lipids: Removing and supplementing outside as above.  ? Protein:  Hx of renal insufficiency. Scr improving will consider increasing over the next couple days to help promote healing given recent surgeries.  · Micronutrients/Electrolytes:  ? K: stable, TPN to provide additional 40 meq  ? Na: increased over night, removing all sodium from tonight's TPN. Continuing free water flushes and started on D5W  ? P: WNL but dropped with initiation of TPN possible refeeding. Adding 15 mmol Kphos  ? Mag: WNL but also dropped with TPN initiation. Adding 1 gram equivalents   ? Ca: low, increasing to 2 gram equivalent   ? MTE/MVI: standard MTE/MVI to be provided in TPN  ? Famotidine: supplemented in TPN, given recent surgeries and abdominal closure  ? Thiamine/Folic Acid: Thiamine and  folic acid being supplemented outside of TPN.   · Glucose:   ? FSBG < 180. Increasing SSI outside TPN to moderate due to added D5W infusion.  · Fluids:   ? TPN to run at 83 ml/hr, D5W started at 40 cc/hr for hypernatremia.     Chilo Whitfield, PharmD

## 2020-08-16 NOTE — PROGRESS NOTES
2 RN skin check complete with Miracle  · Devices in place: pulse ox, BP cuff, cardiac monitor leads, cortrak, silicone oxygen tubing, A line, SCDs, narvaez catheter, rectal Temp , central line  · Skin assessed under devices: Yes.  · Confirmed pressure ulcers found on: N/A  · New potential pressure ulcers noted on: N/A  · Skin breakdown noted on:   · Lips & mouth pink/cracked/dry- RN swabbing Q2 hrs & moisturizer applied   · Generalized bruising throughout   · Pannus & breast fold pink/red/blanching- nystatin powder in place   · Healing blisters to L anterior abdomen & open blister to L lateral abdomen- CHADWICK   · BLEs- mottled/boggy/blanching- heel float boots in place  · Sacrum pink & blanching- Mepilex & Taps dry flow in place   · RLQ with drains x3- CDI   · The following interventions in place: Q2H turns and repositioning, rotate pulse ox. Heels floated on pillows. Q2 turns in place. Ensure patient is not laying on tubing/lines. No redness or indications of pressure from devices or bony prominences. Mepilex in place. Low air loss mattress in use and mattress pressure appropriate for patient. Silicone oxygen tubing in place. No s/s of skin breakdown to back of head and L nostril from cortrak. Ears pink/blanching. Taps dry flow pads & wedges in place.

## 2020-08-16 NOTE — ASSESSMENT & PLAN NOTE
Progressive, chronic hypernatremia.  Check urine electrolytes.  8/16 Prompt correction with hypotonic fluids. TPN adjusted.  8/17 Hypotonic fluids stopped.  Trend BMP every 6 hours.

## 2020-08-16 NOTE — PROGRESS NOTES
Trauma / Surgical Daily Progress Note    Date of Service  8/16/2020    Chief Complaint  74 y.o. female admitted 7/31/2020 with Perforation bowel (HCC)    Interval Events  Progressive hypernatremia.  Mental status changes.  Trickle feeding initiated.  Blood transfusion.  The patient remains critically ill with multisystem organ failure.  The patient was seen and examined on rounds and discussed with the multidisciplinary critical care team and consulting physicians. Critically evaluated laboratory tests, culture data, medications, imaging, and other diagnostic tests.    The patient has impairment of one or more vital organ systems and a high probability of imminent or life-threatening deterioration in condition. Provided high complexity decision making to assess, manipulate, and support vital system functions to treat vital organ system failure and/or to prevent further life-threatening deterioration of the patient's condition. Requires continued ICU and hospital admission.    Critical care interventions include: integration of multiple data points and associated complex medical decision making, ventilator management, titration of vasopressors, evaluation and direction of antimicrobial therapy for life threatening infection, blood component transfusion therapy, management of cardiac arrhythmias and management of critical electrolyte abnormalities.    Review of Systems  Review of Systems   Unable to perform ROS: Mental status change        Vital Signs for last 24 hours  Temp:  [37.2 °C (99 °F)] 37.2 °C (99 °F)  Pulse:  [] 78  Resp:  [12-31] 16  BP: ()/(43-76) 117/58  SpO2:  [91 %-100 %] 96 %    Hemodynamic parameters for last 24 hours  CVP:  [8 MM HG-16 MM HG] 16 MM HG    Respiratory Data     Respiration: 16, Pulse Oximetry: 96 %     Work Of Breathing / Effort: Mild;Shallow  RUL Breath Sounds: Diminished;Clear, RML Breath Sounds: Diminished, RLL Breath Sounds: Diminished, ELISSA Breath Sounds: Diminished, LLL  Breath Sounds: Diminished    Physical Exam  Physical Exam  Vitals signs and nursing note reviewed.   Constitutional:       General: She is awake.      Appearance: She is obese. She is ill-appearing.      Interventions: Nasal cannula in place.   HENT:      Head: Normocephalic and atraumatic.      Nose: Nose normal.      Mouth/Throat:      Mouth: Mucous membranes are moist.      Pharynx: Oropharynx is clear.   Eyes:      Extraocular Movements: Extraocular movements intact.      Conjunctiva/sclera: Conjunctivae normal.      Pupils: Pupils are equal, round, and reactive to light.   Neck:      Musculoskeletal: Normal range of motion and neck supple. No muscular tenderness.   Cardiovascular:      Rate and Rhythm: Regular rhythm. Tachycardia present.      Pulses: Normal pulses.   Pulmonary:      Effort: No respiratory distress.      Breath sounds: Examination of the right-lower field reveals decreased breath sounds. Examination of the left-lower field reveals decreased breath sounds. Decreased breath sounds present. No wheezing or rhonchi.   Abdominal:      General: There is distension.      Palpations: Abdomen is soft.      Tenderness: There is abdominal tenderness.      Comments: VAC intact  Drains with enteric contents   Genitourinary:     Comments: Orellana catheter with concentrated urine  Musculoskeletal: Normal range of motion.         General: No tenderness or deformity.   Skin:     General: Skin is warm and dry.      Coloration: Skin is pale.   Neurological:      General: No focal deficit present.      Mental Status: She is oriented to person, place, and time.      GCS: GCS eye subscore is 4. GCS verbal subscore is 4. GCS motor subscore is 6.      Cranial Nerves: No cranial nerve deficit.      Sensory: No sensory deficit.      Motor: No weakness.      Coordination: Coordination normal.   Psychiatric:         Attention and Perception: She is inattentive.         Mood and Affect: Mood is depressed.         Speech:  Speech is slurred.         Behavior: Behavior is withdrawn.         Cognition and Memory: Cognition is impaired.         Laboratory  Recent Results (from the past 24 hour(s))   MAGNESIUM    Collection Time: 08/15/20 10:45 AM   Result Value Ref Range    Magnesium 1.9 1.5 - 2.5 mg/dL   PHOSPHORUS    Collection Time: 08/15/20 10:45 AM   Result Value Ref Range    Phosphorus 3.6 2.5 - 4.5 mg/dL   Comp Metabolic Panel    Collection Time: 08/15/20 10:45 AM   Result Value Ref Range    Sodium 156 (HH) 135 - 145 mmol/L    Potassium 3.9 3.6 - 5.5 mmol/L    Chloride 122 (H) 96 - 112 mmol/L    Co2 23 20 - 33 mmol/L    Anion Gap 11.0 7.0 - 16.0    Glucose 154 (H) 65 - 99 mg/dL    Bun 56 (H) 8 - 22 mg/dL    Creatinine 1.20 0.50 - 1.40 mg/dL    Calcium 7.4 (L) 8.5 - 10.5 mg/dL    AST(SGOT) 28 12 - 45 U/L    ALT(SGPT) 35 2 - 50 U/L    Alkaline Phosphatase 88 30 - 99 U/L    Total Bilirubin 0.9 0.1 - 1.5 mg/dL    Albumin 1.8 (L) 3.2 - 4.9 g/dL    Total Protein 4.1 (L) 6.0 - 8.2 g/dL    Globulin 2.3 1.9 - 3.5 g/dL    A-G Ratio 0.8 g/dL   Cholesterol    Collection Time: 08/15/20 10:45 AM   Result Value Ref Range    Cholesterol,Tot 140 100 - 199 mg/dL   Triglyceride    Collection Time: 08/15/20 10:45 AM   Result Value Ref Range    Triglycerides 313 (H) 0 - 149 mg/dL   ESTIMATED GFR    Collection Time: 08/15/20 10:45 AM   Result Value Ref Range    GFR If  53 (A) >60 mL/min/1.73 m 2    GFR If Non  44 (A) >60 mL/min/1.73 m 2   ACCU-CHEK GLUCOSE    Collection Time: 08/15/20 11:38 AM   Result Value Ref Range    Glucose - Accu-Ck 131 (H) 65 - 99 mg/dL   ACCU-CHEK GLUCOSE    Collection Time: 08/15/20  6:27 PM   Result Value Ref Range    Glucose - Accu-Ck 121 (H) 65 - 99 mg/dL   ACCU-CHEK GLUCOSE    Collection Time: 08/15/20 11:45 PM   Result Value Ref Range    Glucose - Accu-Ck 151 (H) 65 - 99 mg/dL   ACCU-CHEK GLUCOSE    Collection Time: 08/16/20  5:06 AM   Result Value Ref Range    Glucose - Accu-Ck 175 (H) 65  - 99 mg/dL   PHOSPHORUS    Collection Time: 08/16/20  5:07 AM   Result Value Ref Range    Phosphorus 2.7 2.5 - 4.5 mg/dL   MAGNESIUM    Collection Time: 08/16/20  5:07 AM   Result Value Ref Range    Magnesium 1.7 1.5 - 2.5 mg/dL   Comp Metabolic Panel    Collection Time: 08/16/20  5:07 AM   Result Value Ref Range    Sodium 163 (HH) 135 - 145 mmol/L    Potassium 3.7 3.6 - 5.5 mmol/L    Chloride 130 (H) 96 - 112 mmol/L    Co2 21 20 - 33 mmol/L    Anion Gap 12.0 7.0 - 16.0    Glucose 212 (H) 65 - 99 mg/dL    Bun 51 (H) 8 - 22 mg/dL    Creatinine 1.27 0.50 - 1.40 mg/dL    Calcium 7.4 (L) 8.5 - 10.5 mg/dL    AST(SGOT) 25 12 - 45 U/L    ALT(SGPT) 32 2 - 50 U/L    Alkaline Phosphatase 82 30 - 99 U/L    Total Bilirubin 0.6 0.1 - 1.5 mg/dL    Albumin 1.8 (L) 3.2 - 4.9 g/dL    Total Protein 4.0 (L) 6.0 - 8.2 g/dL    Globulin 2.2 1.9 - 3.5 g/dL    A-G Ratio 0.8 g/dL   CBC WITH DIFFERENTIAL    Collection Time: 08/16/20  5:07 AM   Result Value Ref Range    WBC 21.8 (H) 4.8 - 10.8 K/uL    RBC 2.05 (L) 4.20 - 5.40 M/uL    Hemoglobin 6.7 (L) 12.0 - 16.0 g/dL    Hematocrit 22.4 (L) 37.0 - 47.0 %    .3 (H) 81.4 - 97.8 fL    MCH 32.7 27.0 - 33.0 pg    MCHC 29.9 (L) 33.6 - 35.0 g/dL    RDW 90.7 (H) 35.9 - 50.0 fL    Platelet Count 458 (H) 164 - 446 K/uL    MPV 11.0 9.0 - 12.9 fL    Neutrophils-Polys 82.80 (H) 44.00 - 72.00 %    Lymphocytes 5.00 (L) 22.00 - 41.00 %    Monocytes 6.40 0.00 - 13.40 %    Eosinophils 0.80 0.00 - 6.90 %    Basophils 0.30 0.00 - 1.80 %    Immature Granulocytes 4.70 (H) 0.00 - 0.90 %    Nucleated RBC 1.10 /100 WBC    Neutrophils (Absolute) 18.04 (H) 2.00 - 7.15 K/uL    Lymphs (Absolute) 1.08 1.00 - 4.80 K/uL    Monos (Absolute) 1.40 (H) 0.00 - 0.85 K/uL    Eos (Absolute) 0.18 0.00 - 0.51 K/uL    Baso (Absolute) 0.06 0.00 - 0.12 K/uL    Immature Granulocytes (abs) 1.03 (H) 0.00 - 0.11 K/uL    NRBC (Absolute) 0.25 K/uL   ESTIMATED GFR    Collection Time: 08/16/20  5:07 AM   Result Value Ref Range    GFR If   50 (A) >60 mL/min/1.73 m 2    GFR If Non  41 (A) >60 mL/min/1.73 m 2   PERIPHERAL SMEAR REVIEW    Collection Time: 08/16/20  5:07 AM   Result Value Ref Range    Peripheral Smear Review see below    DIFFERENTIAL COMMENT    Collection Time: 08/16/20  5:07 AM   Result Value Ref Range    Comments-Diff see below    Comp Metabolic Panel    Collection Time: 08/16/20  8:15 AM   Result Value Ref Range    Sodium 163 (HH) 135 - 145 mmol/L    Potassium 3.9 3.6 - 5.5 mmol/L    Chloride 129 (H) 96 - 112 mmol/L    Co2 25 20 - 33 mmol/L    Anion Gap 9.0 7.0 - 16.0    Glucose 201 (H) 65 - 99 mg/dL    Bun 49 (H) 8 - 22 mg/dL    Creatinine 1.29 0.50 - 1.40 mg/dL    Calcium 7.4 (L) 8.5 - 10.5 mg/dL    AST(SGOT) 24 12 - 45 U/L    ALT(SGPT) 32 2 - 50 U/L    Alkaline Phosphatase 80 30 - 99 U/L    Total Bilirubin 0.6 0.1 - 1.5 mg/dL    Albumin 1.8 (L) 3.2 - 4.9 g/dL    Total Protein 4.0 (L) 6.0 - 8.2 g/dL    Globulin 2.2 1.9 - 3.5 g/dL    A-G Ratio 0.8 g/dL   OSMOLALITY URINE    Collection Time: 08/16/20  8:15 AM   Result Value Ref Range    Osmolality Urine 494 300 - 900 mOsm/kg H2O   URINE SODIUM RANDOM    Collection Time: 08/16/20  8:15 AM   Result Value Ref Range    Sodium, Urine -per volume 55 mmol/L   URINE POTASSIUM RANDOM    Collection Time: 08/16/20  8:15 AM   Result Value Ref Range    Potassium 48.0 mmol/L   URINE CHLORIDE RANDOM    Collection Time: 08/16/20  8:15 AM   Result Value Ref Range    Chloride, Urine-per volume 58 mmol/L   URINE CREATININE RANDOM    Collection Time: 08/16/20  8:15 AM   Result Value Ref Range    Creatinine, Random Urine 37.30 mg/dL   ESTIMATED GFR    Collection Time: 08/16/20  8:15 AM   Result Value Ref Range    GFR If  49 (A) >60 mL/min/1.73 m 2    GFR If Non African American 40 (A) >60 mL/min/1.73 m 2       Fluids    Intake/Output Summary (Last 24 hours) at 8/16/2020 1004  Last data filed at 8/16/2020 0600  Gross per 24 hour   Intake 3060.58 ml   Output  1370 ml   Net 1690.58 ml       Core Measures & Quality Metrics  Labs reviewed, Medications reviewed and Radiology images reviewed  Orellana catheter: Critically Ill - Requiring Accurate Measurement of Urinary Output  Central line in place: Concentrated IV drugs, Need for access and Vasopressors    DVT Prophylaxis: Heparin  DVT prophylaxis - mechanical: SCDs  Ulcer prophylaxis: Yes  Antibiotics: Treating active infection/contamination beyond 24 hours perioperative coverage  Assessed for rehab: Patient unable to tolerate rehabilitation therapeutic regimen    CAITLIN Score  ETOH Screening    Assessment/Plan  Chronic hypernatremia  Assessment & Plan  Progressive, chronic hypernatremia.  Check urine electrolytes.  Gradual correction with hypotonic fluids.  TPN adjusted.  Trend BMP every 6 hours.    Anemia  Assessment & Plan  Persistent critical anemia associated with critical illness and multiple surgeries.Critical anemia  8/6 Transfused 1 unit of packed red blood cells.  8/7 Transfused 2 units of packed red blood cells.  8/8 Transfused 1 unit of packed red blood cells.  8/9 Transfused 1 unit of packed red blood cells.  8/16 Transfused 1 unit of packed red blood cells.  Continue to trend closely.  Transfuse 1 unit PRBC's for hemoglobin less than 7.    Acute renal insufficiency  Assessment & Plan  Resolving non oliguric renal insufficiency secondary to shock and ATN.  Trend renal indices. Medications adjusted for renal function.  Avoid nephrotoxins.    Respiratory failure following trauma and surgery (HCC)- (present on admission)  Assessment & Plan  Full ventilatory support following initial surgery. Ensure optimal oxygenation and mitigate secondary injury.  8/2 Extubated.  8/11 Return from operating room on ventilator.  8/12 Extubated.  Continue aggressive pulmonary care and hygiene.    Perforated appendicitis- (present on admission)  Assessment & Plan  7/31 Perforated appendix with retrocolic abscess. Small bowel and right  colon resection.   8/4 Returned to surgery for anastamotic leak. Left in discontinuity.  8/7 Washout and drain placement.  8/9 Abdominal washout with primary ileocolic anastamosis.   8/11 Delayed primary fascial closure with drain placement.  Midline wound VAC.  8/14 Second anastomotic leak. Exploratory laparotomy, washout, ileocolic anastomotic revision, and fascial closure. Wound VAC.  8/16 Zosyn day 17 of undetermined treatment course duration.  [facility].    Paroxysmal atrial fibrillation (CMS-HCC)- (present on admission)  Assessment & Plan  Chronic afib complicated by RVR  8/1 Digoxin load and metoprolol.  8/7 Recurrent A fib with RVR. Amiodarone infusion started.  8/13 Transitioned to oral amiodarone.    Hypokalemia  Assessment & Plan  Replace with 40 mEq KCl and trend.    Adrenal insufficiency (HCC)  Assessment & Plan  Failure to fully wean from vasopressors despite adequate resuscitation.  8/3 Empiric steroids started with liberation from vasopressors.  8/14 Steroids weaned off.    No contraindication to anticoagulation therapy- (present on admission)  Assessment & Plan  8/1 Lovenox started.  8/5 Held for bleeding.  8/8 Initiated prophylactic subcutaneous heparin.    GLORIA (obstructive sleep apnea)- (present on admission)  Assessment & Plan  Refusing CPAP until she gets formal sleep study.     HTN (hypertension)- (present on admission)  Assessment & Plan  Chronic condition treated with hydrochlorothiazide, losartan, and metoprolol.  8/13 Resumed maintenance medication.    8/15 Holding maintenance medication with vasopressor requirement.      Discussed patient condition with RN, RT, Pharmacy and .  CRITICAL CARE TIME EXCLUDING PROCEDURES: 46 minutes

## 2020-08-16 NOTE — PROGRESS NOTES
"    DATE: 8/16/2020 7/31 Perforated appendix with retrocolic abscess.  Small bowel and right colon resection.  RLQ drain.  8/4 Returned to OR for anastamotic leak, AbThera - left in discontinuity  8/7 Washout, drain placement, AbThera - still in discontinuity  8/9 Washout, drain placement, ileocolic anastomosis, AbThera  8/11 Abdominal closure  8/14 continued leukocytosis with bilious output from drains  8/15 taken to the operating room and found to have an anastomotic leak with takedown of anastomosis and ileocolic anastomosis  8/16 continues with severe hypernatremia, leukocytosis and anemia    Interval Events:  Continuing free water boluses and trophic feeds but with sustained hyponatremia.  Having bowel movements with serous output from her drains.      PHYSICAL EXAMINATION:  Vital Signs: /52   Pulse 74   Temp (!) 35.3 °C (95.6 °F) (Axillary)   Resp (!) 24   Ht 1.753 m (5' 9\")   Wt 109.8 kg (242 lb 1 oz)   SpO2 97%     GENERAL:  No acute distress  HEENT: Pupils equal, round and reactive to light bilaterally.  Sclera are clear bilaterally.  No otorrhea.  No rhinorrhea.  Mucus membranes are moist.  CHEST:  Lungs are clear to auscultation bilaterally, mechanically ventilated  CARDIOVASCULAR:  Regular rate and rhythm  ABDOMEN: Soft, protuberant, appropriately tender, non-distended, multiple RLQ drains with several 100 mL of serosanguineous output  EXTREMITIES:  Good range of motion through out  NEUROLOGIC: Noncommunicative, no focal deficits appreciated  PSYCHIATRIC: Laughing inappropriately  SKIN:  Warm and well perfused, no rashes    ASSESSMENT AND PLAN:   -Okay with trophic feeds per nasoenteric tube  -Continue local wound care  -Okay with DVT prophylaxis  -overall poor prognosis especially if most recent anastomosis fails     Appreciate intensivist care   ____________________________________     Figueroa Peng M.D.    DD: 8/10/2020  8:25 AM        "

## 2020-08-17 ENCOUNTER — APPOINTMENT (OUTPATIENT)
Dept: RADIOLOGY | Facility: MEDICAL CENTER | Age: 75
DRG: 853 | End: 2020-08-17
Attending: SURGERY
Payer: MEDICARE

## 2020-08-17 PROBLEM — E83.39 HYPOPHOSPHATEMIA: Status: ACTIVE | Noted: 2020-08-17

## 2020-08-17 PROBLEM — K91.89 ILEOCOLIC ANASTOMOTIC LEAK: Status: ACTIVE | Noted: 2020-07-31

## 2020-08-17 LAB
ALBUMIN SERPL BCP-MCNC: 2 G/DL (ref 3.2–4.9)
ALBUMIN/GLOB SERPL: 0.9 G/DL
ALP SERPL-CCNC: 89 U/L (ref 30–99)
ALT SERPL-CCNC: 34 U/L (ref 2–50)
ANION GAP SERPL CALC-SCNC: 10 MMOL/L (ref 7–16)
ANION GAP SERPL CALC-SCNC: 11 MMOL/L (ref 7–16)
ANION GAP SERPL CALC-SCNC: 13 MMOL/L (ref 7–16)
ANION GAP SERPL CALC-SCNC: 13 MMOL/L (ref 7–16)
AST SERPL-CCNC: 29 U/L (ref 12–45)
BACTERIA WND AEROBE CULT: NORMAL
BASOPHILS # BLD AUTO: 0.3 % (ref 0–1.8)
BASOPHILS # BLD: 0.07 K/UL (ref 0–0.12)
BILIRUB SERPL-MCNC: 0.7 MG/DL (ref 0.1–1.5)
BUN SERPL-MCNC: 42 MG/DL (ref 8–22)
BUN SERPL-MCNC: 43 MG/DL (ref 8–22)
BUN SERPL-MCNC: 45 MG/DL (ref 8–22)
BUN SERPL-MCNC: 45 MG/DL (ref 8–22)
CALCIUM SERPL-MCNC: 7.3 MG/DL (ref 8.5–10.5)
CALCIUM SERPL-MCNC: 7.4 MG/DL (ref 8.5–10.5)
CALCIUM SERPL-MCNC: 7.5 MG/DL (ref 8.5–10.5)
CALCIUM SERPL-MCNC: 7.5 MG/DL (ref 8.5–10.5)
CHLORIDE SERPL-SCNC: 119 MMOL/L (ref 96–112)
CHLORIDE SERPL-SCNC: 120 MMOL/L (ref 96–112)
CHLORIDE SERPL-SCNC: 120 MMOL/L (ref 96–112)
CHLORIDE SERPL-SCNC: 121 MMOL/L (ref 96–112)
CO2 SERPL-SCNC: 21 MMOL/L (ref 20–33)
CO2 SERPL-SCNC: 22 MMOL/L (ref 20–33)
CO2 SERPL-SCNC: 23 MMOL/L (ref 20–33)
CO2 SERPL-SCNC: 23 MMOL/L (ref 20–33)
CREAT SERPL-MCNC: 1.18 MG/DL (ref 0.5–1.4)
CREAT SERPL-MCNC: 1.21 MG/DL (ref 0.5–1.4)
CREAT SERPL-MCNC: 1.28 MG/DL (ref 0.5–1.4)
CREAT SERPL-MCNC: 1.33 MG/DL (ref 0.5–1.4)
CRP SERPL HS-MCNC: 5.83 MG/DL (ref 0–0.75)
EOSINOPHIL # BLD AUTO: 0.46 K/UL (ref 0–0.51)
EOSINOPHIL NFR BLD: 2.1 % (ref 0–6.9)
ERYTHROCYTE [DISTWIDTH] IN BLOOD BY AUTOMATED COUNT: 82.7 FL (ref 35.9–50)
GLOBULIN SER CALC-MCNC: 2.3 G/DL (ref 1.9–3.5)
GLUCOSE BLD-MCNC: 139 MG/DL (ref 65–99)
GLUCOSE BLD-MCNC: 153 MG/DL (ref 65–99)
GLUCOSE BLD-MCNC: 161 MG/DL (ref 65–99)
GLUCOSE SERPL-MCNC: 155 MG/DL (ref 65–99)
GLUCOSE SERPL-MCNC: 164 MG/DL (ref 65–99)
GLUCOSE SERPL-MCNC: 170 MG/DL (ref 65–99)
GLUCOSE SERPL-MCNC: 172 MG/DL (ref 65–99)
GRAM STN SPEC: NORMAL
HCT VFR BLD AUTO: 25.5 % (ref 37–47)
HGB BLD-MCNC: 7.6 G/DL (ref 12–16)
IMM GRANULOCYTES # BLD AUTO: 0.95 K/UL (ref 0–0.11)
IMM GRANULOCYTES NFR BLD AUTO: 4.3 % (ref 0–0.9)
LYMPHOCYTES # BLD AUTO: 1.41 K/UL (ref 1–4.8)
LYMPHOCYTES NFR BLD: 6.4 % (ref 22–41)
MAGNESIUM SERPL-MCNC: 1.6 MG/DL (ref 1.5–2.5)
MCH RBC QN AUTO: 31.9 PG (ref 27–33)
MCHC RBC AUTO-ENTMCNC: 29.8 G/DL (ref 33.6–35)
MCV RBC AUTO: 107.1 FL (ref 81.4–97.8)
MONOCYTES # BLD AUTO: 1.54 K/UL (ref 0–0.85)
MONOCYTES NFR BLD AUTO: 7 % (ref 0–13.4)
NEUTROPHILS # BLD AUTO: 17.65 K/UL (ref 2–7.15)
NEUTROPHILS NFR BLD: 79.9 % (ref 44–72)
NRBC # BLD AUTO: 0.21 K/UL
NRBC BLD-RTO: 1 /100 WBC
PHOSPHATE SERPL-MCNC: 2.4 MG/DL (ref 2.5–4.5)
PLATELET # BLD AUTO: 479 K/UL (ref 164–446)
PMV BLD AUTO: 10.8 FL (ref 9–12.9)
POTASSIUM SERPL-SCNC: 3.3 MMOL/L (ref 3.6–5.5)
POTASSIUM SERPL-SCNC: 3.4 MMOL/L (ref 3.6–5.5)
POTASSIUM SERPL-SCNC: 3.9 MMOL/L (ref 3.6–5.5)
POTASSIUM SERPL-SCNC: 4 MMOL/L (ref 3.6–5.5)
PROT SERPL-MCNC: 4.3 G/DL (ref 6–8.2)
RBC # BLD AUTO: 2.38 M/UL (ref 4.2–5.4)
SIGNIFICANT IND 70042: NORMAL
SITE SITE: NORMAL
SODIUM SERPL-SCNC: 153 MMOL/L (ref 135–145)
SODIUM SERPL-SCNC: 154 MMOL/L (ref 135–145)
SODIUM SERPL-SCNC: 154 MMOL/L (ref 135–145)
SODIUM SERPL-SCNC: 155 MMOL/L (ref 135–145)
SOURCE SOURCE: NORMAL
WBC # BLD AUTO: 22.1 K/UL (ref 4.8–10.8)

## 2020-08-17 PROCEDURE — 700101 HCHG RX REV CODE 250: Performed by: SURGERY

## 2020-08-17 PROCEDURE — 86140 C-REACTIVE PROTEIN: CPT

## 2020-08-17 PROCEDURE — A9270 NON-COVERED ITEM OR SERVICE: HCPCS | Performed by: SURGERY

## 2020-08-17 PROCEDURE — 700111 HCHG RX REV CODE 636 W/ 250 OVERRIDE (IP): Performed by: SURGERY

## 2020-08-17 PROCEDURE — 700105 HCHG RX REV CODE 258: Performed by: SURGERY

## 2020-08-17 PROCEDURE — 83735 ASSAY OF MAGNESIUM: CPT

## 2020-08-17 PROCEDURE — 80053 COMPREHEN METABOLIC PANEL: CPT

## 2020-08-17 PROCEDURE — 84100 ASSAY OF PHOSPHORUS: CPT

## 2020-08-17 PROCEDURE — 99291 CRITICAL CARE FIRST HOUR: CPT | Performed by: SURGERY

## 2020-08-17 PROCEDURE — 700102 HCHG RX REV CODE 250 W/ 637 OVERRIDE(OP): Performed by: SURGERY

## 2020-08-17 PROCEDURE — 85025 COMPLETE CBC W/AUTO DIFF WBC: CPT

## 2020-08-17 PROCEDURE — 82962 GLUCOSE BLOOD TEST: CPT | Mod: 91

## 2020-08-17 PROCEDURE — 80048 BASIC METABOLIC PNL TOTAL CA: CPT | Mod: 91

## 2020-08-17 PROCEDURE — 770022 HCHG ROOM/CARE - ICU (200)

## 2020-08-17 PROCEDURE — 97606 NEG PRS WND THER DME>50 SQCM: CPT

## 2020-08-17 PROCEDURE — 71045 X-RAY EXAM CHEST 1 VIEW: CPT

## 2020-08-17 RX ORDER — MAGNESIUM SULFATE HEPTAHYDRATE 40 MG/ML
2 INJECTION, SOLUTION INTRAVENOUS ONCE
Status: COMPLETED | OUTPATIENT
Start: 2020-08-17 | End: 2020-08-17

## 2020-08-17 RX ADMIN — METOPROLOL TARTRATE 100 MG: 50 TABLET, FILM COATED ORAL at 05:00

## 2020-08-17 RX ADMIN — LOSARTAN POTASSIUM 100 MG: 50 TABLET, FILM COATED ORAL at 05:00

## 2020-08-17 RX ADMIN — DOCUSATE SODIUM 50 MG AND SENNOSIDES 8.6 MG 1 TABLET: 8.6; 5 TABLET, FILM COATED ORAL at 21:42

## 2020-08-17 RX ADMIN — HYDROCHLOROTHIAZIDE 25 MG: 25 TABLET ORAL at 05:08

## 2020-08-17 RX ADMIN — NYSTATIN: 100000 POWDER TOPICAL at 05:10

## 2020-08-17 RX ADMIN — ENOXAPARIN SODIUM 40 MG: 40 INJECTION SUBCUTANEOUS at 05:00

## 2020-08-17 RX ADMIN — PIPERACILLIN SODIUM, TAZOBACTAM SODIUM 4.5 G: 4; .5 INJECTION, POWDER, LYOPHILIZED, FOR SOLUTION INTRAVENOUS at 05:00

## 2020-08-17 RX ADMIN — POTASSIUM PHOSPHATE, MONOBASIC AND POTASSIUM PHOSPHATE, DIBASIC 30 MMOL: 224; 236 INJECTION, SOLUTION, CONCENTRATE INTRAVENOUS at 08:52

## 2020-08-17 RX ADMIN — HYDROMORPHONE HYDROCHLORIDE 0.5 MG: 1 INJECTION, SOLUTION INTRAMUSCULAR; INTRAVENOUS; SUBCUTANEOUS at 10:45

## 2020-08-17 RX ADMIN — PIPERACILLIN SODIUM, TAZOBACTAM SODIUM 4.5 G: 4; .5 INJECTION, POWDER, LYOPHILIZED, FOR SOLUTION INTRAVENOUS at 21:44

## 2020-08-17 RX ADMIN — NYSTATIN: 100000 POWDER TOPICAL at 17:04

## 2020-08-17 RX ADMIN — POTASSIUM BICARBONATE 25 MEQ: 978 TABLET, EFFERVESCENT ORAL at 05:09

## 2020-08-17 RX ADMIN — PIPERACILLIN SODIUM, TAZOBACTAM SODIUM 4.5 G: 4; .5 INJECTION, POWDER, LYOPHILIZED, FOR SOLUTION INTRAVENOUS at 13:02

## 2020-08-17 RX ADMIN — FOLIC ACID 1 MG: 1 TABLET ORAL at 05:00

## 2020-08-17 RX ADMIN — OXYCODONE 5 MG: 5 TABLET ORAL at 17:04

## 2020-08-17 RX ADMIN — CALCIUM GLUCONATE: 98 INJECTION, SOLUTION INTRAVENOUS at 20:07

## 2020-08-17 RX ADMIN — AMIODARONE HYDROCHLORIDE 400 MG: 200 TABLET ORAL at 21:42

## 2020-08-17 RX ADMIN — I.V. FAT EMULSION 100 ML: 20 EMULSION INTRAVENOUS at 08:45

## 2020-08-17 RX ADMIN — INSULIN HUMAN 2 UNITS: 100 INJECTION, SOLUTION PARENTERAL at 12:07

## 2020-08-17 RX ADMIN — Medication 100 MG: at 05:00

## 2020-08-17 RX ADMIN — THERA TABS 1 TABLET: TAB at 05:00

## 2020-08-17 RX ADMIN — AMIODARONE HYDROCHLORIDE 400 MG: 200 TABLET ORAL at 08:46

## 2020-08-17 RX ADMIN — MAGNESIUM SULFATE 2 G: 2 INJECTION INTRAVENOUS at 08:43

## 2020-08-17 RX ADMIN — SIMVASTATIN 20 MG: 20 TABLET, FILM COATED ORAL at 21:42

## 2020-08-17 RX ADMIN — METOPROLOL TARTRATE 100 MG: 50 TABLET, FILM COATED ORAL at 17:04

## 2020-08-17 RX ADMIN — INSULIN HUMAN 2 UNITS: 100 INJECTION, SOLUTION PARENTERAL at 05:00

## 2020-08-17 ASSESSMENT — FIBROSIS 4 INDEX: FIB4 SCORE: 0.76

## 2020-08-17 NOTE — CARE PLAN
Problem: Pain Management  Goal: Pain level will decrease to patient's comfort goal  Outcome: PROGRESSING AS EXPECTED  Intervention: Follow pain managment plan developed in collaboration with patient and Interdisciplinary Team  Note: Educate patient on use of 1-10 pain scale as well as importance of pain descriptors. Administer pain medication as needed and as ordered throughout shift. Ensure all non pharmacological methods of pain control are in place and that environment is calm and conducive to sleep and healing.        Problem: Skin Integrity  Goal: Risk for impaired skin integrity will decrease  Outcome: PROGRESSING AS EXPECTED  Intervention: Assess risk factors for impaired skin integrity and/or pressure ulcers  Note: Assess patient skin throughout shift. Ensure patient is repositioned q 2 hour throughout shift. Ensure all dressings are in place and redressed per orders. Educate patient and available family members on importance of prevention of skin breakdown.

## 2020-08-17 NOTE — DISCHARGE PLANNING
Anticipated Discharge Disposition: TBD    Action: LSW was informed by bedside RN that pt's  is wanting to pursue hospice. Dr. Cazares placed palliative care consult to assist with GOC discussion. Pt has an advance directive, which is in chart. Per RN, palliative care wont be able to consult today. Anticipate tomorrow. LSW to follow up with PC.     Barriers to Discharge: medical clearance, unknown dc needs     Plan: Continue to follow and assist with social/dc needs

## 2020-08-17 NOTE — CARE PLAN
Problem: Safety  Goal: Will remain free from injury  Outcome: PROGRESSING AS EXPECTED     Problem: Safety - Medical Restraint  Goal: Remains free of injury from restraints (Restraint for Interference with Medical Device)  Description: INTERVENTIONS:  1. Determine that other, less restrictive measures have been tried or would not be effective before applying the restraint  2. Evaluate the patient's condition at the time of restraint application  3. Inform patient/family regarding the reason for restraint  4. Q2H: Monitor safety, psychosocial status, comfort, nutrition and hydration  Outcome: PROGRESSING AS EXPECTED

## 2020-08-17 NOTE — PROGRESS NOTES
"    DATE: 8/17/2020 7/31 Perforated appendix with retrocolic abscess.  Small bowel and right colon resection.  RLQ drain.  8/4 Returned to OR for anastamotic leak, AbThera - left in discontinuity  8/7 Washout, drain placement, AbThera - still in discontinuity  8/9 Washout, drain placement, ileocolic anastomosis, AbThera  8/11 Abdominal closure  8/14 Continued leukocytosis with bilious output from drains  8/15 Returned to operating room and found to have an anastomotic leak with takedown of anastomosis and ileocolic anastomosis      Interval Events:  Still having delirium. Extubated but tenuous pulm status. Tolerating TF and stooling from anus. Drains remain serosanguinous. Leukocytosis persists. Electrolyte dyscrasias improving.       PHYSICAL EXAMINATION:  Vital Signs: /67   Pulse 74   Temp 36.7 °C (98 °F) (Temporal)   Resp (!) 21   Ht 1.753 m (5' 9\")   Wt 109.9 kg (242 lb 4.6 oz)   SpO2 96%     GENERAL:  Awake but not verbal/following.  ABDOMEN: Soft, protuberant, appropriately tender, non-distended, multiple RLQ drains with serosanguineous output  EXTREMITIES:  Good range of motion through out Edematous      ASSESSMENT AND PLAN:   -Advance TF as tolerated.   -Okay with DVT prophylaxis  -On Zosyn   -Overall poor prognosis especially if most recent anastomosis fails. Apparently  wants to pursue palliative care.  -  Appreciate intensivist assistance with patient's care   ____________________________________     María Llanos M.D.    DD: 8/10/2020  8:25 AM        "

## 2020-08-17 NOTE — PROGRESS NOTES
Phone conversation with  of patient,  had many questions regarding patient and expressing desire to make patient hospice care. Dr. Cazares notified, palliative care consult entered.

## 2020-08-17 NOTE — WOUND TEAM
[facility]  Inpatient Services  Wound and Skin Care Progress Note    Admission Date: 7/31/2020     Last order of IP CONSULT TO WOUND CARE was found on 8/17/2020 from Hospital Encounter on 7/31/2020     HPI, PMH, SH: Reviewed    Unit where seen by Wound Team: [id number]     WOUND CONSULT/FOLLOW UP RELATED TO:  Abdomen NPWT dressing change. BMS placement (see below)     Self Report / Pain Level:  Patient constantly moaning, per bedside RN this is baseline. No apparent spikes of pain.        OBJECTIVE:  NPWT intact to abdomen. Q2hr turns in place. Orellana in place. On ICU regular redistribution bed    WOUND TYPE, LOCATION, CHARACTERISTICS (Pressure Injuries: location, stage, POA or date identified)       Negative Pressure Wound Therapy 08/14/20 Surgical (Active)   Vacuum Serial Number [id number]    NPWT Pump Mode / Pressure Setting 125 mmHg    Dressing Type Black Foam (Regular);Medium    Number of Foam Pieces Used 3    Canister Changed No    Output (mL) 100 mL    NEXT Dressing Change/Treatment Date 08/19/20    WOUND NURSE ONLY - Time Spent with Patient (mins) 90            Wound 08/14/20 Incision Abdomen wound vac change (Active)   Wound Image      Site Assessment Pink;Red;Tunneling    Periwound Assessment Satellite lesions;Pink;Edema;Clean    Margins Attached edges;Undefined edges    Closure Secondary intention    Drainage Amount Small    Drainage Description Serosanguineous    Treatments Cleansed;Site care;Tape change    Wound Cleansing Approved Wound Cleanser    Periwound Protectant Benzoin;Drape    Dressing Cleansing/Solutions Not Applicable    Dressing Options Wound Vac    Dressing Changed Changed    Dressing Status Clean;Dry;Intact    Dressing Change/Treatment Frequency Monday, Wednesday, Friday, and As Needed    NEXT Dressing Change/Treatment Date 08/19/20    NEXT Weekly Photo (Inpatient Only) 08/24/20    Number of Staples Removed 30    Non-staged Wound Description Full thickness    Wound Length (cm)  25 cm    Wound Width (cm) 4 cm    Wound Depth (cm) 6.4 cm    Wound Surface Area (cm^2) 100 cm^2    Wound Volume (cm^3) 640 cm^3    Tunneling (cm) 4.7 cm    Tunneling Clock Position of Wound 12    Shape linear           Vascular:    DOROTHEA:   No results found.    Lab Values:    Lab Results   Component Value Date/Time    WBC 22.1 (H) 08/17/2020 05:02 AM    RBC 2.38 (L) 08/17/2020 05:02 AM    HEMOGLOBIN 7.6 (L) 08/17/2020 05:02 AM    HEMATOCRIT 25.5 (L) 08/17/2020 05:02 AM    CREACTPROT 5.83 (H) 08/17/2020 11:39 AM    HBA1C 7.0 (H) 04/15/2019 07:53 AM        Culture Results show:  Recent Results (from the past 720 hour(s))   CULTURE WOUND W/ GRAM STAIN    Collection Time: 08/14/20  2:36 PM    Specimen: Wound   Result Value Ref Range    Significant Indicator NEG     Source WND     Site Peritoneal Abscess     Culture Result No growth at 72 hours.     Gram Stain Result Rare WBCs.  No organisms seen.          INTERVENTIONS BY WOUND TEAM:  Patient and chart reviewed. In to change NPWT to patient abdomen, in with wound tech Snow. Had to remove 30 staples that were stapled to the foam and patient. Removed current dressing, no retained foam or staples. Sutures visible. Cleansed wound and periwound with NS and gauze. Picture and measurement taken, small tunnel at 1200. Prepped periwound with benzoin and drape. Two pieces of regular black foam placed into wound bed, secured in place with drape. Hole cut and button and trak pad applied, tubing going downward and towards right side. Suction maintained at 125mmhg, no leaks present. Bedside RN Updated (in to assist with BMS, see BMS note at bottom)    Interdisciplinary consultation: Patient, Bedside RN (Karson), wound tech Snow    EVALUATION: Patient admitted with a perforated appendix, peritonitis, and sepsis. Had gone to the OR and had Abthera in place to abdomen on 8/9, was switched to NPWT in the OR on 8/11, first change was on 8/13, back to OR for Ex Lap on 8/14 with NPWT  replaced. Wound team to continue changing NPWT to abdomen. Patient abdomen with full thickness surgical wound, will benefit from NPWT to manage drainage, increase oxygenation and granulation, and close the wound by secondary intention. Wound team to continue to follow up 3x/week for dressing changes.     Goals: Steady decrease in wound area and depth weekly.    NURSING PLAN OF CARE ORDERS (X):    Dressing changes: See Dressing Care orders: X  Skin care: See Skin Care orders: X  Rectal tube care: See Rectal Tube Care orders:   Other orders:      WOUND TEAM PLAN OF CARE:   Dressing changes by wound team:                   Follow up 3 times weekly:                NPWT change 3 times weekly:   X  Follow up 1-2 times weekly:      Follow up Bi-Monthly:                   Follow up as needed:       Other (explain):     Anticipated discharge plans: tbd  LTACH:        SNF/Rehab:                  Home Health Care:           Outpatient Wound Center:            Self Care:                BMS NOTE:   Wound team note:   Pt seen for placement of BMS. MD order verified. Turned patient to left side. Pt assessed, noted to be incontinent of loose brown stool. Sacrum/buttocks with some MASD related to incontinence. Sphincter tone determined to be very adequate. BMS placed without difficulty, 40 mL of tap water placed in retention balloon, irrigated with 60 mL warm tap water. Confirmed irrigant/stool output in tube. Nursing to irrigate q shift with 60 mL-120 mL warm tap water or until patent. Bedside Christo Ty at bedside and updated.

## 2020-08-17 NOTE — PROGRESS NOTES
Pharmacy TPN Day # 3 (restart)    2020    Dosing Weight   84 kg (AdjBW)   TPN currently providing 50% of goal  TPN goal: 2000 kcal/day including 1-1.5 gm/kg/day Protein  TPN indication: Perforated appendicitis    Pertinent PMH: Patient presented with RLQ abdominal pain with N/V. She was found to have perforated appendicitis with retro-colic abscess and involvement of the terminal ileum. Small bowel and R colon resection with placement of a RLQ drain was performed ; she returned to the OR on  due to anastomotic leak and remained in discontinuity. Patient returned to OR for washout and drain placement  and  and her abdomen was closed on . On 8/15 patient returned to the OR for leukocytosis and bilious output from drains; sh was found to have an anastomatic leak;  takedown of anastomosis and ileocolic anastomosis was performed. TPN was restarted on 8/15 for presumed prolonged NPO status.     Temp (24hrs), Av.6 °C (97.9 °F), Min:36.1 °C (97 °F), Max:36.9 °C (98.5 °F)  .  Recent Labs     08/15/20  1045 20  0507 20  0815  20  2333 20  0502 20  1139   SODIUM 156* 163* 163*   < > 155* 153* 154*   POTASSIUM 3.9 3.7 3.9   < > 3.3* 3.4* 4.0   CHLORIDE 122* 130* 129*   < > 121* 120* 119*   CO2 23 21 25   < > 23 23 22   BUN 56* 51* 49*   < > 45* 45* 42*   CREATININE 1.20 1.27 1.29   < > 1.18 1.21 1.28   GLUCOSE 154* 212* 201*   < > 164* 172* 170*   CALCIUM 7.4* 7.4* 7.4*   < > 7.5* 7.5* 7.4*   ASTSGOT 28 25 24  --   --  29  --    ALTSGPT 35 32 32  --   --  34  --    ALBUMIN 1.8* 1.8* 1.8*  --   --  2.0*  --    TBILIRUBIN 0.9 0.6 0.6  --   --  0.7  --    PHOSPHORUS 3.6 2.7  --   --   --  2.4*  --    MAGNESIUM 1.9 1.7  --   --   --  1.6  --     < > = values in this interval not displayed.     Accu-Checks  Recent Labs     20  2332 20  0459 20  1205   POCGLUCOSE 147* 153* 161*       Vitals:    20 0900 20 1000 20 1045 20 1100   BP:  119/73 122/69 122/69 104/54   Weight:       Height:           Intake/Output Summary (Last 24 hours) at 8/17/2020 1233  Last data filed at 8/17/2020 1000  Gross per 24 hour   Intake 1625 ml   Output 2375 ml   Net -750 ml       Orders Placed This Encounter   Procedures   • Diet NPO     Standing Status:   Standing     Number of Occurrences:   1     Order Specific Question:   Restrict to:     Answer:   Strict [1]         TPN for past 72 hours (Show up to 3 orders; newest on the left. Changes between the two most recent orders are indicated.)     Start date and time   08/16/2020 2000 08/15/2020 2000 08/11/2020 2000      TPN Central Line Formulation [[id number]] TPN Central Line Formulation [[id number]] TPN Central Line Formulation [[id number]]    Order Status  Active Completed Completed    Last Admin  Given at 08/16/2020 2039 by Ramon Hunt R.N. New Bag at 08/15/2020 1954 by Dori Mckinnon R.N. Rate Change at 08/13/2020 1811 by Bettie Calero R.N.       Base    Clinisol 15%  42 g 42 g 40 g    dextrose 70%  185 g 185 g 180 g    fat emulsions 20%  -- 20 g 20 g       Additives    potassium phosphate  15 mmol -- --    potassium chloride  20 mEq 40 mEq 40 mEq    sodium acetate  -- 75 mEq 75 mEq    sodium chloride  -- 75 mEq 75 mEq    magnesium sulfate  8.1 mEq -- --    calcium GLUConate  4.65 mEq 4.65 mEq 4.65 mEq    M.T.E. -5 Adult  1 mL 1 mL 1 mL    M.V.I. Adult  10 mL 10 mL 10 mL    famotidine  20 mg 20 mg 20 mg       QS Base    sterile water for inj(pf)  1,407.7 mL 1,248.45 mL 1,268.98 mL       Energy Contribution    Proteins  -- -- --    Dextrose  -- -- --    Lipids  -- -- --    Total  -- -- --       Electrolyte Ion Calculated Amount    Sodium  -- 150 mEq 150 mEq    Potassium  42 mEq 40 mEq 40 mEq    Calcium  4.65 mEq 4.65 mEq 4.65 mEq    Magnesium  8.12 mEq -- --    Aluminum  -- -- --    Phosphate  15 mmol -- --    Chloride  20 mEq 115 mEq 115 mEq    Acetate  35.56 mEq 110.56 mEq 108.87 mEq       Other     Total Protein  42 g 42 g 40 g    Total Protein/kg  0.38 g/kg 0.35 g/kg 0.32 g/kg    Total Amino Acid  -- -- --    Total Amino Acid/kg  -- -- --    Glucose Infusion Rate  1.17 mg/kg/min 1.17 mg/kg/min 1.06 mg/kg/min    Osmolarity (Estimated)  -- -- --    Volume  1,992 mL 1,992 mL 1,992 mL    Rate  83 mL/hr 83 mL/hr 83 mL/hr    Dosing Weight  109.8 kg 118.8 kg 126.5 kg    Infusion Site  [facility]            This formula provides:  % kcal as lipids = 20 (supplemented outside TPN)  Grams protein/kg = 0.5  Non-protein calories = 829 (including IVFE)  Kcals/kg = 11.9 (including IVFE)  Total daily calories = 997 (including IVFE)    Comments:  1. Interval Events: Patient remains on empiric antibiotics following extensive abdominal surgery. Tube feeds advanced to 20mL/hr, appears to be tolerating.   2. Macronutrients: TPN currently providing 50% of goal macronutrients. Tube feeds advanced this morning. Possible re-feeding syndrome with decrease potassium, phosphorous and magnesium with AM labs. Per discussion with MD, will continue TPN at 50% of goal. Note, acute renal failure appears to have improved/resolved and renal function appears to be at/near baseline. No adjustments to protein content necessary, as current formulation providing 50% of 1.3g/kg of IBW.   3. Micronutrients: Sodium remains elevated, although decreased from yesterday. All sodium removed from TPN yesterday, no changes. Potassium, phosphorous, and magnesium low with AM labs. Phosphorous and magnesium contents increased yesterday; will replace externally and continue to trend before making further adjustments.   4. Blood glucose remains within goal (< 180 mg/dL); patient has received eight units of ISS in the past 24 hours.   5. Famotidine remains in TPN, per surgery request.   6. Will continue current TPN at 50% of goal, and monitor for tolerability of tube feed advancement. Pharmacy will continue to follow.       Lore Parr,  PharmD

## 2020-08-17 NOTE — PROGRESS NOTES
Trauma / Surgical Daily Progress Note    Date of Service  8/17/2020    Chief Complaint  74 y.o. female admitted 7/31/2020 with Perforation bowel (HCC)    Interval Events  Correction of severe hypernatremia.  Ongoing antibiotic therapy.  Palliative care consultation requested to address family member concerns and questions.  The patient remains critically ill with multisystem organ failure.  The patient was seen and examined on rounds and discussed with the multidisciplinary critical care team and consulting physicians. Critically evaluated laboratory tests, culture data, medications, imaging, and other diagnostic tests.    The patient has impairment of one or more vital organ systems and a high probability of imminent or life-threatening deterioration in condition. Provided high complexity decision making to assess, manipulate, and support vital system functions to treat vital organ system failure and/or to prevent further life-threatening deterioration of the patient's condition. Requires continued ICU and hospital admission.    Critical care interventions include: integration of multiple data points and associated complex medical decision making, evaluation and direction of antimicrobial therapy for life threatening infection, management of cardiac arrhythmias and management of critical electrolyte abnormalities.    Review of Systems  Review of Systems   Unable to perform ROS: Mental status change        Vital Signs for last 24 hours  Temp:  [36.1 °C (97 °F)-36.9 °C (98.5 °F)] 36.9 °C (98.4 °F)  Pulse:  [63-87] 73  Resp:  [10-29] 19  BP: (101-152)/(49-82) 124/72  SpO2:  [91 %-99 %] 99 %    Hemodynamic parameters for last 24 hours  CVP:  [10 MM HG-16 MM HG] 10 MM HG    Respiratory Data     Respiration: 19, Pulse Oximetry: 99 %     Work Of Breathing / Effort: Mild;Shallow  RUL Breath Sounds: Diminished;Clear, RML Breath Sounds: Diminished, RLL Breath Sounds: Diminished, ELISSA Breath Sounds: Diminished, LLL Breath  Sounds: Diminished    Physical Exam  Physical Exam  Vitals signs and nursing note reviewed.   Constitutional:       General: She is awake.      Appearance: She is obese. She is ill-appearing.      Interventions: Nasal cannula in place.   HENT:      Head: Normocephalic and atraumatic.      Nose: Nose normal.      Mouth/Throat:      Mouth: Mucous membranes are moist.      Pharynx: Oropharynx is clear.   Eyes:      Extraocular Movements: Extraocular movements intact.      Conjunctiva/sclera: Conjunctivae normal.      Pupils: Pupils are equal, round, and reactive to light.   Neck:      Musculoskeletal: Normal range of motion and neck supple. No muscular tenderness.   Cardiovascular:      Rate and Rhythm: Regular rhythm. Tachycardia present.      Pulses: Normal pulses.   Pulmonary:      Breath sounds: Examination of the right-lower field reveals decreased breath sounds. Examination of the left-lower field reveals decreased breath sounds. Decreased breath sounds present.   Abdominal:      General: There is distension.      Palpations: Abdomen is soft.      Tenderness: There is abdominal tenderness.      Comments: VAC intact  Drains with enteric contents   Genitourinary:     Comments: Orellana catheter with concentrated urine  Musculoskeletal: Normal range of motion.         General: No tenderness or deformity.   Skin:     General: Skin is warm and dry.      Coloration: Skin is pale.   Neurological:      General: No focal deficit present.      Mental Status: She is oriented to person, place, and time.      GCS: GCS eye subscore is 4. GCS verbal subscore is 4. GCS motor subscore is 6.      Cranial Nerves: No cranial nerve deficit.      Sensory: No sensory deficit.      Motor: No weakness.      Coordination: Coordination normal.   Psychiatric:         Attention and Perception: She is inattentive.         Mood and Affect: Mood is depressed.         Speech: Speech is slurred.         Behavior: Behavior is withdrawn.          Cognition and Memory: Cognition is impaired.         Laboratory  Recent Results (from the past 24 hour(s))   ACCU-CHEK GLUCOSE    Collection Time: 08/16/20  5:08 PM   Result Value Ref Range    Glucose - Accu-Ck 183 (H) 65 - 99 mg/dL   Basic Metabolic Panel    Collection Time: 08/16/20  6:10 PM   Result Value Ref Range    Sodium 154 (H) 135 - 145 mmol/L    Potassium 3.4 (L) 3.6 - 5.5 mmol/L    Chloride 120 (H) 96 - 112 mmol/L    Co2 22 20 - 33 mmol/L    Glucose 186 (H) 65 - 99 mg/dL    Bun 46 (H) 8 - 22 mg/dL    Creatinine 1.23 0.50 - 1.40 mg/dL    Calcium 7.5 (L) 8.5 - 10.5 mg/dL    Anion Gap 12.0 7.0 - 16.0   ESTIMATED GFR    Collection Time: 08/16/20  6:10 PM   Result Value Ref Range    GFR If  52 (A) >60 mL/min/1.73 m 2    GFR If Non  43 (A) >60 mL/min/1.73 m 2   ACCU-CHEK GLUCOSE    Collection Time: 08/16/20 11:32 PM   Result Value Ref Range    Glucose - Accu-Ck 147 (H) 65 - 99 mg/dL   Basic Metabolic Panel    Collection Time: 08/16/20 11:33 PM   Result Value Ref Range    Sodium 155 (H) 135 - 145 mmol/L    Potassium 3.3 (L) 3.6 - 5.5 mmol/L    Chloride 121 (H) 96 - 112 mmol/L    Co2 23 20 - 33 mmol/L    Glucose 164 (H) 65 - 99 mg/dL    Bun 45 (H) 8 - 22 mg/dL    Creatinine 1.18 0.50 - 1.40 mg/dL    Calcium 7.5 (L) 8.5 - 10.5 mg/dL    Anion Gap 11.0 7.0 - 16.0   ESTIMATED GFR    Collection Time: 08/16/20 11:33 PM   Result Value Ref Range    GFR If  54 (A) >60 mL/min/1.73 m 2    GFR If Non African American 45 (A) >60 mL/min/1.73 m 2   ACCU-CHEK GLUCOSE    Collection Time: 08/17/20  4:59 AM   Result Value Ref Range    Glucose - Accu-Ck 153 (H) 65 - 99 mg/dL   Comp Metabolic Panel    Collection Time: 08/17/20  5:02 AM   Result Value Ref Range    Sodium 153 (H) 135 - 145 mmol/L    Potassium 3.4 (L) 3.6 - 5.5 mmol/L    Chloride 120 (H) 96 - 112 mmol/L    Co2 23 20 - 33 mmol/L    Anion Gap 10.0 7.0 - 16.0    Glucose 172 (H) 65 - 99 mg/dL    Bun 45 (H) 8 - 22 mg/dL     Creatinine 1.21 0.50 - 1.40 mg/dL    Calcium 7.5 (L) 8.5 - 10.5 mg/dL    AST(SGOT) 29 12 - 45 U/L    ALT(SGPT) 34 2 - 50 U/L    Alkaline Phosphatase 89 30 - 99 U/L    Total Bilirubin 0.7 0.1 - 1.5 mg/dL    Albumin 2.0 (L) 3.2 - 4.9 g/dL    Total Protein 4.3 (L) 6.0 - 8.2 g/dL    Globulin 2.3 1.9 - 3.5 g/dL    A-G Ratio 0.9 g/dL   CBC WITH DIFFERENTIAL    Collection Time: 08/17/20  5:02 AM   Result Value Ref Range    WBC 22.1 (H) 4.8 - 10.8 K/uL    RBC 2.38 (L) 4.20 - 5.40 M/uL    Hemoglobin 7.6 (L) 12.0 - 16.0 g/dL    Hematocrit 25.5 (L) 37.0 - 47.0 %    .1 (H) 81.4 - 97.8 fL    MCH 31.9 27.0 - 33.0 pg    MCHC 29.8 (L) 33.6 - 35.0 g/dL    RDW 82.7 (H) 35.9 - 50.0 fL    Platelet Count 479 (H) 164 - 446 K/uL    MPV 10.8 9.0 - 12.9 fL    Neutrophils-Polys 79.90 (H) 44.00 - 72.00 %    Lymphocytes 6.40 (L) 22.00 - 41.00 %    Monocytes 7.00 0.00 - 13.40 %    Eosinophils 2.10 0.00 - 6.90 %    Basophils 0.30 0.00 - 1.80 %    Immature Granulocytes 4.30 (H) 0.00 - 0.90 %    Nucleated RBC 1.00 /100 WBC    Neutrophils (Absolute) 17.65 (H) 2.00 - 7.15 K/uL    Lymphs (Absolute) 1.41 1.00 - 4.80 K/uL    Monos (Absolute) 1.54 (H) 0.00 - 0.85 K/uL    Eos (Absolute) 0.46 0.00 - 0.51 K/uL    Baso (Absolute) 0.07 0.00 - 0.12 K/uL    Immature Granulocytes (abs) 0.95 (H) 0.00 - 0.11 K/uL    NRBC (Absolute) 0.21 K/uL   MAGNESIUM    Collection Time: 08/17/20  5:02 AM   Result Value Ref Range    Magnesium 1.6 1.5 - 2.5 mg/dL   PHOSPHORUS    Collection Time: 08/17/20  5:02 AM   Result Value Ref Range    Phosphorus 2.4 (L) 2.5 - 4.5 mg/dL   ESTIMATED GFR    Collection Time: 08/17/20  5:02 AM   Result Value Ref Range    GFR If  53 (A) >60 mL/min/1.73 m 2    GFR If Non  43 (A) >60 mL/min/1.73 m 2   Basic Metabolic Panel    Collection Time: 08/17/20 11:39 AM   Result Value Ref Range    Sodium 154 (H) 135 - 145 mmol/L    Potassium 4.0 3.6 - 5.5 mmol/L    Chloride 119 (H) 96 - 112 mmol/L    Co2 22 20 - 33  mmol/L    Glucose 170 (H) 65 - 99 mg/dL    Bun 42 (H) 8 - 22 mg/dL    Creatinine 1.28 0.50 - 1.40 mg/dL    Calcium 7.4 (L) 8.5 - 10.5 mg/dL    Anion Gap 13.0 7.0 - 16.0   CRP QUANTITIVE (NON-CARDIAC)    Collection Time: 08/17/20 11:39 AM   Result Value Ref Range    Stat C-Reactive Protein 5.83 (H) 0.00 - 0.75 mg/dL   ESTIMATED GFR    Collection Time: 08/17/20 11:39 AM   Result Value Ref Range    GFR If  49 (A) >60 mL/min/1.73 m 2    GFR If Non  41 (A) >60 mL/min/1.73 m 2   ACCU-CHEK GLUCOSE    Collection Time: 08/17/20 12:05 PM   Result Value Ref Range    Glucose - Accu-Ck 161 (H) 65 - 99 mg/dL       Fluids    Intake/Output Summary (Last 24 hours) at 8/17/2020 1506  Last data filed at 8/17/2020 1400  Gross per 24 hour   Intake 1655 ml   Output 2625 ml   Net -970 ml       Core Measures & Quality Metrics  Labs reviewed, Medications reviewed and Radiology images reviewed  Orellana catheter: Critically Ill - Requiring Accurate Measurement of Urinary Output  Central line in place: Concentrated IV drugs, Need for access and Vasopressors    DVT Prophylaxis: Heparin  DVT prophylaxis - mechanical: SCDs  Ulcer prophylaxis: Yes  Antibiotics: Treating active infection/contamination beyond 24 hours perioperative coverage  Assessed for rehab: Patient unable to tolerate rehabilitation therapeutic regimen    CAITLIN Score  ETOH Screening    Assessment/Plan  Chronic hypernatremia  Assessment & Plan  Progressive, chronic hypernatremia.  Check urine electrolytes.  8/16 Prompt correction with hypotonic fluids. TPN adjusted.  8/17 Hypotonic fluids stopped.  Trend BMP every 6 hours.    Respiratory failure following trauma and surgery (HCC)- (present on admission)  Assessment & Plan  Full ventilatory support following initial surgery. Ensure optimal oxygenation and mitigate secondary injury.  8/2 Extubated.  8/11 Return from operating room on ventilator.  8/12 Extubated.  Continue aggressive pulmonary care and  hygiene.    Ileocolic anastomotic leak- (present on admission)  Assessment & Plan  7/31 Perforated appendix with retrocolic abscess. Small bowel and right colon resection.   8/4 Returned to surgery for anastamotic leak. Left in discontinuity.  8/7 Washout and drain placement.  8/9 Abdominal washout with primary ileocolic anastamosis.   8/11 Delayed primary fascial closure with drain placement.  Midline wound VAC.  8/14 Second anastomotic leak. Exploratory laparotomy, washout, ileocolic anastomotic revision, and fascial closure. Wound VAC.  8/17 Zosyn day 18 of 21 day treatment course duration.  [facility].    Paroxysmal atrial fibrillation (CMS-HCC)- (present on admission)  Assessment & Plan  Chronic afib complicated by RVR  8/1 Digoxin load and metoprolol.  8/7 Recurrent A fib with RVR. Amiodarone infusion started.  8/13 Transitioned to oral amiodarone.    Hypophosphatemia  Assessment & Plan  8/17 Replace with 30 mmol KPhos and trend.    Hypokalemia  Assessment & Plan  8/17 Continue to replace and trend.  Empiric magnesium replacement.    Anemia  Assessment & Plan  Persistent critical anemia associated with critical illness and multiple surgeries.Critical anemia  8/6 Transfused 1 unit of packed red blood cells.  8/7 Transfused 2 units of packed red blood cells.  8/8 Transfused 1 unit of packed red blood cells.  8/9 Transfused 1 unit of packed red blood cells.  8/16 Transfused 1 unit of packed red blood cells.  Continue to trend closely.  Transfuse 1 unit PRBC's for hemoglobin less than 7.    Acute renal insufficiency  Assessment & Plan  Resolving non oliguric renal insufficiency secondary to shock and ATN.  Trend renal indices. Medications adjusted for renal function.  Avoid nephrotoxins.    HTN (hypertension)- (present on admission)  Assessment & Plan  Chronic condition treated with hydrochlorothiazide, losartan, and metoprolol.  8/13 Resumed maintenance medication.    8/15 Holding maintenance  medication with vasopressor requirement.  8/17 Resumed metoprolol, losartan, and hydrochlorothiazide.    Adrenal insufficiency (HCC)  Assessment & Plan  Failure to fully wean from vasopressors despite adequate resuscitation.  8/3 Empiric steroids started with liberation from vasopressors.  8/14 Steroids weaned off.    No contraindication to anticoagulation therapy- (present on admission)  Assessment & Plan  8/1 Lovenox started.  8/5 Held for bleeding.  8/8 Initiated prophylactic subcutaneous heparin.    Discussed patient condition with RN, RT, Pharmacy and .  CRITICAL CARE TIME EXCLUDING PROCEDURES: 36 minutes

## 2020-08-17 NOTE — CARE PLAN
Problem: Nutritional:  Goal: Nutrition support tolerated and meeting greater than 85% of estimated needs  8/17/2020 1433 by Eden Astudillo R.D.  Outcome: DISCHARGED-GOAL NOT MET  Duplicate care plan

## 2020-08-17 NOTE — CARE PLAN
Problem: Nutritional:  Goal: Nutrition support tolerated and meeting greater than 85% of estimated needs  Outcome: PROGRESSING AS EXPECTED     Tube feeding advancing slowly with MD orders    TPN continues

## 2020-08-18 ENCOUNTER — APPOINTMENT (OUTPATIENT)
Dept: RADIOLOGY | Facility: MEDICAL CENTER | Age: 75
DRG: 853 | End: 2020-08-18
Attending: SURGERY
Payer: MEDICARE

## 2020-08-18 ENCOUNTER — HOSPITAL ENCOUNTER (OUTPATIENT)
Dept: RADIOLOGY | Facility: MEDICAL CENTER | Age: 75
End: 2020-08-18
Attending: SURGERY
Payer: MEDICARE

## 2020-08-18 LAB
ACTION RANGE TRIGGERED IACRT: NO
ALBUMIN SERPL BCP-MCNC: 2 G/DL (ref 3.2–4.9)
ALBUMIN/GLOB SERPL: 0.8 G/DL
ALP SERPL-CCNC: 94 U/L (ref 30–99)
ALT SERPL-CCNC: 34 U/L (ref 2–50)
ANION GAP SERPL CALC-SCNC: 12 MMOL/L (ref 7–16)
ANION GAP SERPL CALC-SCNC: 12 MMOL/L (ref 7–16)
AST SERPL-CCNC: 29 U/L (ref 12–45)
BASE EXCESS BLDA CALC-SCNC: -5 MMOL/L (ref -4–3)
BASOPHILS # BLD AUTO: 0.4 % (ref 0–1.8)
BASOPHILS # BLD: 0.08 K/UL (ref 0–0.12)
BILIRUB SERPL-MCNC: 0.6 MG/DL (ref 0.1–1.5)
BODY TEMPERATURE: ABNORMAL DEGREES
BUN SERPL-MCNC: 42 MG/DL (ref 8–22)
BUN SERPL-MCNC: 44 MG/DL (ref 8–22)
CALCIUM SERPL-MCNC: 7.1 MG/DL (ref 8.5–10.5)
CALCIUM SERPL-MCNC: 7.4 MG/DL (ref 8.5–10.5)
CHLORIDE SERPL-SCNC: 116 MMOL/L (ref 96–112)
CHLORIDE SERPL-SCNC: 119 MMOL/L (ref 96–112)
CO2 BLDA-SCNC: 22 MMOL/L (ref 20–33)
CO2 SERPL-SCNC: 21 MMOL/L (ref 20–33)
CO2 SERPL-SCNC: 22 MMOL/L (ref 20–33)
CREAT SERPL-MCNC: 1.34 MG/DL (ref 0.5–1.4)
CREAT SERPL-MCNC: 1.42 MG/DL (ref 0.5–1.4)
EOSINOPHIL # BLD AUTO: 0.34 K/UL (ref 0–0.51)
EOSINOPHIL NFR BLD: 1.5 % (ref 0–6.9)
ERYTHROCYTE [DISTWIDTH] IN BLOOD BY AUTOMATED COUNT: 88.4 FL (ref 35.9–50)
GLOBULIN SER CALC-MCNC: 2.6 G/DL (ref 1.9–3.5)
GLUCOSE BLD-MCNC: 145 MG/DL (ref 65–99)
GLUCOSE BLD-MCNC: 176 MG/DL (ref 65–99)
GLUCOSE SERPL-MCNC: 171 MG/DL (ref 65–99)
GLUCOSE SERPL-MCNC: 202 MG/DL (ref 65–99)
HCO3 BLDA-SCNC: 21 MMOL/L (ref 17–25)
HCT VFR BLD AUTO: 25.3 % (ref 37–47)
HGB BLD-MCNC: 7.4 G/DL (ref 12–16)
HOROWITZ INDEX BLDA+IHG-RTO: 404 MM[HG]
IMM GRANULOCYTES # BLD AUTO: 1.04 K/UL (ref 0–0.11)
IMM GRANULOCYTES NFR BLD AUTO: 4.6 % (ref 0–0.9)
INST. QUALIFIED PATIENT IIQPT: YES
LYMPHOCYTES # BLD AUTO: 1.04 K/UL (ref 1–4.8)
LYMPHOCYTES NFR BLD: 4.6 % (ref 22–41)
MAGNESIUM SERPL-MCNC: 1.8 MG/DL (ref 1.5–2.5)
MAGNESIUM SERPL-MCNC: 1.8 MG/DL (ref 1.5–2.5)
MCH RBC QN AUTO: 32.2 PG (ref 27–33)
MCHC RBC AUTO-ENTMCNC: 29.2 G/DL (ref 33.6–35)
MCV RBC AUTO: 110 FL (ref 81.4–97.8)
MONOCYTES # BLD AUTO: 1.64 K/UL (ref 0–0.85)
MONOCYTES NFR BLD AUTO: 7.3 % (ref 0–13.4)
NEUTROPHILS # BLD AUTO: 18.26 K/UL (ref 2–7.15)
NEUTROPHILS NFR BLD: 81.6 % (ref 44–72)
NRBC # BLD AUTO: 0.2 K/UL
NRBC BLD-RTO: 0.9 /100 WBC
O2/TOTAL GAS SETTING VFR VENT: 24 %
PCO2 BLDA: 42.4 MMHG (ref 26–37)
PCO2 TEMP ADJ BLDA: 41.4 MMHG (ref 26–37)
PH BLDA: 7.3 [PH] (ref 7.4–7.5)
PH TEMP ADJ BLDA: 7.31 [PH] (ref 7.4–7.5)
PHOSPHATE SERPL-MCNC: 4.1 MG/DL (ref 2.5–4.5)
PHOSPHATE SERPL-MCNC: 4.2 MG/DL (ref 2.5–4.5)
PLATELET # BLD AUTO: 472 K/UL (ref 164–446)
PMV BLD AUTO: 10.7 FL (ref 9–12.9)
PO2 BLDA: 97 MMHG (ref 64–87)
PO2 TEMP ADJ BLDA: 94 MMHG (ref 64–87)
POTASSIUM SERPL-SCNC: 3.9 MMOL/L (ref 3.6–5.5)
POTASSIUM SERPL-SCNC: 4 MMOL/L (ref 3.6–5.5)
PROT SERPL-MCNC: 4.6 G/DL (ref 6–8.2)
RBC # BLD AUTO: 2.3 M/UL (ref 4.2–5.4)
SAO2 % BLDA: 97 % (ref 93–99)
SODIUM SERPL-SCNC: 150 MMOL/L (ref 135–145)
SODIUM SERPL-SCNC: 152 MMOL/L (ref 135–145)
SPECIMEN DRAWN FROM PATIENT: ABNORMAL
WBC # BLD AUTO: 22.4 K/UL (ref 4.8–10.8)

## 2020-08-18 PROCEDURE — 700111 HCHG RX REV CODE 636 W/ 250 OVERRIDE (IP): Performed by: SURGERY

## 2020-08-18 PROCEDURE — 770020 HCHG ROOM/CARE - TELE (206)

## 2020-08-18 PROCEDURE — 36600 WITHDRAWAL OF ARTERIAL BLOOD: CPT

## 2020-08-18 PROCEDURE — 700105 HCHG RX REV CODE 258: Performed by: SURGERY

## 2020-08-18 PROCEDURE — 82803 BLOOD GASES ANY COMBINATION: CPT

## 2020-08-18 PROCEDURE — 84100 ASSAY OF PHOSPHORUS: CPT

## 2020-08-18 PROCEDURE — 85025 COMPLETE CBC W/AUTO DIFF WBC: CPT

## 2020-08-18 PROCEDURE — 99291 CRITICAL CARE FIRST HOUR: CPT | Performed by: SURGERY

## 2020-08-18 PROCEDURE — 700101 HCHG RX REV CODE 250: Performed by: SURGERY

## 2020-08-18 PROCEDURE — 82962 GLUCOSE BLOOD TEST: CPT

## 2020-08-18 PROCEDURE — 80053 COMPREHEN METABOLIC PANEL: CPT

## 2020-08-18 PROCEDURE — 700111 HCHG RX REV CODE 636 W/ 250 OVERRIDE (IP)

## 2020-08-18 PROCEDURE — 700102 HCHG RX REV CODE 250 W/ 637 OVERRIDE(OP): Performed by: SURGERY

## 2020-08-18 PROCEDURE — 71045 X-RAY EXAM CHEST 1 VIEW: CPT

## 2020-08-18 PROCEDURE — A9270 NON-COVERED ITEM OR SERVICE: HCPCS | Performed by: SURGERY

## 2020-08-18 PROCEDURE — 80048 BASIC METABOLIC PNL TOTAL CA: CPT

## 2020-08-18 PROCEDURE — 83735 ASSAY OF MAGNESIUM: CPT

## 2020-08-18 RX ORDER — MORPHINE SULFATE 10 MG/ML
5 INJECTION, SOLUTION INTRAMUSCULAR; INTRAVENOUS
Status: DISCONTINUED | OUTPATIENT
Start: 2020-08-18 | End: 2020-08-20 | Stop reason: HOSPADM

## 2020-08-18 RX ORDER — MORPHINE SULFATE 4 MG/ML
INJECTION, SOLUTION INTRAMUSCULAR; INTRAVENOUS
Status: COMPLETED
Start: 2020-08-18 | End: 2020-08-18

## 2020-08-18 RX ORDER — NALOXONE HYDROCHLORIDE 0.4 MG/ML
INJECTION, SOLUTION INTRAMUSCULAR; INTRAVENOUS; SUBCUTANEOUS
Status: COMPLETED
Start: 2020-08-18 | End: 2020-08-18

## 2020-08-18 RX ORDER — SODIUM CHLORIDE 9 MG/ML
500 INJECTION, SOLUTION INTRAVENOUS ONCE
Status: COMPLETED | OUTPATIENT
Start: 2020-08-18 | End: 2020-08-18

## 2020-08-18 RX ORDER — ONDANSETRON 2 MG/ML
8 INJECTION INTRAMUSCULAR; INTRAVENOUS EVERY 8 HOURS PRN
Status: DISCONTINUED | OUTPATIENT
Start: 2020-08-18 | End: 2020-08-20 | Stop reason: HOSPADM

## 2020-08-18 RX ORDER — LORAZEPAM 2 MG/ML
1-2 INJECTION INTRAMUSCULAR
Status: DISCONTINUED | OUTPATIENT
Start: 2020-08-18 | End: 2020-08-20 | Stop reason: HOSPADM

## 2020-08-18 RX ORDER — GLYCOPYRROLATE 0.2 MG/ML
0.2 INJECTION INTRAMUSCULAR; INTRAVENOUS 3 TIMES DAILY PRN
Status: DISCONTINUED | OUTPATIENT
Start: 2020-08-18 | End: 2020-08-20 | Stop reason: HOSPADM

## 2020-08-18 RX ORDER — NALOXONE HYDROCHLORIDE 0.4 MG/ML
0.4 INJECTION, SOLUTION INTRAMUSCULAR; INTRAVENOUS; SUBCUTANEOUS ONCE
Status: COMPLETED | OUTPATIENT
Start: 2020-08-18 | End: 2020-08-18

## 2020-08-18 RX ORDER — MORPHINE SULFATE 10 MG/ML
10 INJECTION, SOLUTION INTRAMUSCULAR; INTRAVENOUS
Status: DISCONTINUED | OUTPATIENT
Start: 2020-08-18 | End: 2020-08-20 | Stop reason: HOSPADM

## 2020-08-18 RX ORDER — ATROPINE SULFATE 10 MG/ML
2 SOLUTION/ DROPS OPHTHALMIC EVERY 4 HOURS PRN
Status: DISCONTINUED | OUTPATIENT
Start: 2020-08-18 | End: 2020-08-20 | Stop reason: HOSPADM

## 2020-08-18 RX ORDER — ONDANSETRON 4 MG/1
8 TABLET, ORALLY DISINTEGRATING ORAL EVERY 8 HOURS PRN
Status: DISCONTINUED | OUTPATIENT
Start: 2020-08-18 | End: 2020-08-20 | Stop reason: HOSPADM

## 2020-08-18 RX ADMIN — NYSTATIN: 100000 POWDER TOPICAL at 05:44

## 2020-08-18 RX ADMIN — THERA TABS 1 TABLET: TAB at 05:42

## 2020-08-18 RX ADMIN — NALOXONE HYDROCHLORIDE 0.4 MG: 0.4 INJECTION, SOLUTION INTRAMUSCULAR; INTRAVENOUS; SUBCUTANEOUS at 01:29

## 2020-08-18 RX ADMIN — MORPHINE SULFATE 2 MG: 4 INJECTION INTRAVENOUS at 11:43

## 2020-08-18 RX ADMIN — FOLIC ACID 1 MG: 1 TABLET ORAL at 05:42

## 2020-08-18 RX ADMIN — ENOXAPARIN SODIUM 40 MG: 40 INJECTION SUBCUTANEOUS at 05:41

## 2020-08-18 RX ADMIN — SODIUM CHLORIDE 500 ML: 9 INJECTION, SOLUTION INTRAVENOUS at 07:39

## 2020-08-18 RX ADMIN — SODIUM CHLORIDE 500 ML: 9 INJECTION, SOLUTION INTRAVENOUS at 09:05

## 2020-08-18 RX ADMIN — HYDROCHLOROTHIAZIDE 25 MG: 25 TABLET ORAL at 05:59

## 2020-08-18 RX ADMIN — INSULIN HUMAN 2 UNITS: 100 INJECTION, SOLUTION PARENTERAL at 06:00

## 2020-08-18 RX ADMIN — LOSARTAN POTASSIUM 100 MG: 50 TABLET, FILM COATED ORAL at 05:42

## 2020-08-18 RX ADMIN — METOPROLOL TARTRATE 100 MG: 50 TABLET, FILM COATED ORAL at 05:42

## 2020-08-18 RX ADMIN — Medication 100 MG: at 05:42

## 2020-08-18 RX ADMIN — I.V. FAT EMULSION 100 ML: 20 EMULSION INTRAVENOUS at 05:59

## 2020-08-18 RX ADMIN — PIPERACILLIN SODIUM, TAZOBACTAM SODIUM 4.5 G: 4; .5 INJECTION, POWDER, LYOPHILIZED, FOR SOLUTION INTRAVENOUS at 05:44

## 2020-08-18 RX ADMIN — MORPHINE SULFATE 10 MG: 10 INJECTION INTRAVENOUS at 13:40

## 2020-08-18 ASSESSMENT — FIBROSIS 4 INDEX: FIB4 SCORE: 0.77

## 2020-08-18 NOTE — CONSULTS
"Reason for PC Consult: Advance Care Planning    Consulted by: Dr. Cazares    Assessment:  General:   74 y.o. patient who presented 7/31 with perforated appendix with retrocolic abscess. Small bowel and right colon resection done emergently. Pt has had several surgeries since. Pt was delirious with tenuous respiratory status and required intubation intermittently throughout hospitalization. Family elected for comfort focused care today per MD notes.     Dyspnea: Yes  Last BM: 08/17/20  Pain: Unable to determine   Depression: Unable to determine  Dementia: No    Spiritual:  Is Protestant or spirituality important for coping with this illness? No  Has a  or spiritual provider visit been requested? No    Palliative Performance Scale: 30%    Advance Directive: Advance Directive  DPOA: Yes- Florencio Dailey  POLST: No-      Code Status: Comfort Measures    Outcome:  Introduced self and role of Palliative Care to pt's  Florencio at bedside. PC RN sat with Florencio in conference room to discuss comfort care and provide support. Florencio states that he \"knew what I had to do\" based on his conversations with the healthcare team. Florencio feels that focusing on comfort is what pt would choose at this time. PC RN explained the use of medications for symptoms such as pain or shortness of breath. Florencio queried next steps after pt dies, PC RN explained mortuary choice and offered to provide list of mortuaries at this time. Florencio declines and states that he has already begun looking into mortuaries. PC RN assesses if any other family members would like to say goodbye to pt, Florencio declines. PC RN also explains that video conference calling is available. Florencio declines spiritual visit.    Active listening, reflection, reminiscing, validation & normalization, and empathic support utilized throughout this encounter.  All questions answered.  PC contact information given.         Updated: ASHLYN Ty    Plan: PC RN will follow and support. "       Thank you for allowing Palliative Care to participate in this patient's care. Please feel free to call [phone] with any questions or concerns.

## 2020-08-18 NOTE — DISCHARGE PLANNING
Per spouse's decision, pt has transitioned to comfort care. LSW will provide support if needed.

## 2020-08-18 NOTE — THERAPY
Missed Therapy     Patient Name: Yvette Dailey  Age:  74 y.o., Sex:  female  Medical Record #: [id number]  Today's Date: 8/18/2020    Discussed missed therapy with Rn        08/18/20 1107   Treatment Variance   Reason For Missed Therapy Medical - Patient Is Not Medically Stable   Interdisciplinary Plan of Care Collaboration   Collaboration Comments d/c OT pt is now comfort are    Session Information   Date / Session Number  8/18 d/c OT    Priority 0

## 2020-08-18 NOTE — PROGRESS NOTES
Trauma / Surgical Daily Progress Note    Date of Service  8/18/2020    Interval Events  Progressive decline in neurologic function overnight  PCO2 normal on most recent ABG  Worsening pulmonary status with hypoxia  Long discussion held with Florencio, the patient's   Ongoing care is not in line with the patient's overall wishes and expected chance of recovery  Transitioned to Comfort Care per Florencio's direction  Day nursing staff updated    Vital Signs for last 24 hours  Temp:  [36.3 °C (97.4 °F)-36.9 °C (98.4 °F)] 36.8 °C (98.2 °F)  Pulse:  [61-83] 69  Resp:  [11-24] 13  BP: ()/(49-99) 84/51  SpO2:  [80 %-100 %] 100 %    Hemodynamic parameters for last 24 hours  CVP:  [7 MM HG-15 MM HG] 15 MM HG    Respiratory Data     Respiration: 13, Pulse Oximetry: 100 %     Work Of Breathing / Effort: Shallow;Moderate;Tachypnea  RUL Breath Sounds: Diminished, RML Breath Sounds: Diminished, RLL Breath Sounds: Diminished, ELISSA Breath Sounds: Diminished, LLL Breath Sounds: Diminished    Physical Exam  Physical Exam  Vitals signs and nursing note reviewed.   Constitutional:       Appearance: She is obese. She is ill-appearing. She is not toxic-appearing.      Interventions: Face mask in place.   HENT:      Head: Normocephalic and atraumatic.      Nose: Nose normal.      Mouth/Throat:      Mouth: Mucous membranes are moist.      Pharynx: Oropharynx is clear.   Eyes:      Extraocular Movements: Extraocular movements intact.      Conjunctiva/sclera: Conjunctivae normal.      Pupils: Pupils are equal, round, and reactive to light.   Neck:      Musculoskeletal: Normal range of motion and neck supple. No muscular tenderness.   Cardiovascular:      Rate and Rhythm: Regular rhythm. Tachycardia present.      Pulses: Normal pulses.   Pulmonary:      Breath sounds: Examination of the right-lower field reveals decreased breath sounds. Examination of the left-lower field reveals decreased breath sounds. Decreased breath sounds, rhonchi  and rales present.   Abdominal:      General: There is distension.      Palpations: Abdomen is soft.      Tenderness: There is abdominal tenderness.      Comments: VAC intact  Drains with enteric contents   Genitourinary:     Comments: Orellana catheter with concentrated urine  Musculoskeletal: Normal range of motion.         General: No tenderness or deformity.   Skin:     General: Skin is warm and dry.      Coloration: Skin is pale.   Neurological:      Mental Status: She is oriented to person, place, and time. She is lethargic.      GCS: GCS eye subscore is 2. GCS verbal subscore is 1. GCS motor subscore is 6.   Psychiatric:         Attention and Perception: She is inattentive.         Mood and Affect: Mood is depressed.         Speech: Speech is slurred.         Behavior: Behavior is withdrawn.         Cognition and Memory: Cognition is impaired.         Laboratory  Recent Results (from the past 24 hour(s))   Basic Metabolic Panel    Collection Time: 08/17/20 11:39 AM   Result Value Ref Range    Sodium 154 (H) 135 - 145 mmol/L    Potassium 4.0 3.6 - 5.5 mmol/L    Chloride 119 (H) 96 - 112 mmol/L    Co2 22 20 - 33 mmol/L    Glucose 170 (H) 65 - 99 mg/dL    Bun 42 (H) 8 - 22 mg/dL    Creatinine 1.28 0.50 - 1.40 mg/dL    Calcium 7.4 (L) 8.5 - 10.5 mg/dL    Anion Gap 13.0 7.0 - 16.0   CRP QUANTITIVE (NON-CARDIAC)    Collection Time: 08/17/20 11:39 AM   Result Value Ref Range    Stat C-Reactive Protein 5.83 (H) 0.00 - 0.75 mg/dL   ESTIMATED GFR    Collection Time: 08/17/20 11:39 AM   Result Value Ref Range    GFR If  49 (A) >60 mL/min/1.73 m 2    GFR If Non  41 (A) >60 mL/min/1.73 m 2   ACCU-CHEK GLUCOSE    Collection Time: 08/17/20 12:05 PM   Result Value Ref Range    Glucose - Accu-Ck 161 (H) 65 - 99 mg/dL   ACCU-CHEK GLUCOSE    Collection Time: 08/17/20  5:14 PM   Result Value Ref Range    Glucose - Accu-Ck 139 (H) 65 - 99 mg/dL   Basic Metabolic Panel    Collection Time: 08/17/20   7:47 PM   Result Value Ref Range    Sodium 154 (H) 135 - 145 mmol/L    Potassium 3.9 3.6 - 5.5 mmol/L    Chloride 120 (H) 96 - 112 mmol/L    Co2 21 20 - 33 mmol/L    Glucose 155 (H) 65 - 99 mg/dL    Bun 43 (H) 8 - 22 mg/dL    Creatinine 1.33 0.50 - 1.40 mg/dL    Calcium 7.3 (L) 8.5 - 10.5 mg/dL    Anion Gap 13.0 7.0 - 16.0   ESTIMATED GFR    Collection Time: 08/17/20  7:47 PM   Result Value Ref Range    GFR If  47 (A) >60 mL/min/1.73 m 2    GFR If Non  39 (A) >60 mL/min/1.73 m 2   ACCU-CHEK GLUCOSE    Collection Time: 08/18/20 12:37 AM   Result Value Ref Range    Glucose - Accu-Ck 145 (H) 65 - 99 mg/dL   Basic Metabolic Panel    Collection Time: 08/18/20 12:38 AM   Result Value Ref Range    Sodium 152 (H) 135 - 145 mmol/L    Potassium 4.0 3.6 - 5.5 mmol/L    Chloride 119 (H) 96 - 112 mmol/L    Co2 21 20 - 33 mmol/L    Glucose 171 (H) 65 - 99 mg/dL    Bun 42 (H) 8 - 22 mg/dL    Creatinine 1.34 0.50 - 1.40 mg/dL    Calcium 7.1 (L) 8.5 - 10.5 mg/dL    Anion Gap 12.0 7.0 - 16.0   MAGNESIUM    Collection Time: 08/18/20 12:38 AM   Result Value Ref Range    Magnesium 1.8 1.5 - 2.5 mg/dL   PHOSPHORUS    Collection Time: 08/18/20 12:38 AM   Result Value Ref Range    Phosphorus 4.1 2.5 - 4.5 mg/dL   ESTIMATED GFR    Collection Time: 08/18/20 12:38 AM   Result Value Ref Range    GFR If  47 (A) >60 mL/min/1.73 m 2    GFR If Non  39 (A) >60 mL/min/1.73 m 2   ISTAT ARTERIAL BLOOD GAS    Collection Time: 08/18/20  1:12 AM   Result Value Ref Range    Ph 7.303 (L) 7.400 - 7.500    Pco2 42.4 (H) 26.0 - 37.0 mmHg    Po2 97 (H) 64 - 87 mmHg    Tco2 22 20 - 33 mmol/L    S02 97 93 - 99 %    Hco3 21.0 17.0 - 25.0 mmol/L    BE -5 (L) -4 - 3 mmol/L    Body Temp 97.6 F degrees    O2 Therapy 24 %    iPF Ratio 404     Ph Temp Mike 7.310 (L) 7.400 - 7.500    Pco2 Temp Co 41.4 (H) 26.0 - 37.0 mmHg    Po2 Temp Cor 94 (H) 64 - 87 mmHg    Specimen Arterial     Action Range Triggered NO      Inst. Qualified Patient YES    Comp Metabolic Panel    Collection Time: 08/18/20  5:30 AM   Result Value Ref Range    Sodium 150 (H) 135 - 145 mmol/L    Potassium 3.9 3.6 - 5.5 mmol/L    Chloride 116 (H) 96 - 112 mmol/L    Co2 22 20 - 33 mmol/L    Anion Gap 12.0 7.0 - 16.0    Glucose 202 (H) 65 - 99 mg/dL    Bun 44 (H) 8 - 22 mg/dL    Creatinine 1.42 (H) 0.50 - 1.40 mg/dL    Calcium 7.4 (L) 8.5 - 10.5 mg/dL    AST(SGOT) 29 12 - 45 U/L    ALT(SGPT) 34 2 - 50 U/L    Alkaline Phosphatase 94 30 - 99 U/L    Total Bilirubin 0.6 0.1 - 1.5 mg/dL    Albumin 2.0 (L) 3.2 - 4.9 g/dL    Total Protein 4.6 (L) 6.0 - 8.2 g/dL    Globulin 2.6 1.9 - 3.5 g/dL    A-G Ratio 0.8 g/dL   CBC WITH DIFFERENTIAL    Collection Time: 08/18/20  5:30 AM   Result Value Ref Range    WBC 22.4 (H) 4.8 - 10.8 K/uL    RBC 2.30 (L) 4.20 - 5.40 M/uL    Hemoglobin 7.4 (L) 12.0 - 16.0 g/dL    Hematocrit 25.3 (L) 37.0 - 47.0 %    .0 (H) 81.4 - 97.8 fL    MCH 32.2 27.0 - 33.0 pg    MCHC 29.2 (L) 33.6 - 35.0 g/dL    RDW 88.4 (H) 35.9 - 50.0 fL    Platelet Count 472 (H) 164 - 446 K/uL    MPV 10.7 9.0 - 12.9 fL    Neutrophils-Polys 81.60 (H) 44.00 - 72.00 %    Lymphocytes 4.60 (L) 22.00 - 41.00 %    Monocytes 7.30 0.00 - 13.40 %    Eosinophils 1.50 0.00 - 6.90 %    Basophils 0.40 0.00 - 1.80 %    Immature Granulocytes 4.60 (H) 0.00 - 0.90 %    Nucleated RBC 0.90 /100 WBC    Neutrophils (Absolute) 18.26 (H) 2.00 - 7.15 K/uL    Lymphs (Absolute) 1.04 1.00 - 4.80 K/uL    Monos (Absolute) 1.64 (H) 0.00 - 0.85 K/uL    Eos (Absolute) 0.34 0.00 - 0.51 K/uL    Baso (Absolute) 0.08 0.00 - 0.12 K/uL    Immature Granulocytes (abs) 1.04 (H) 0.00 - 0.11 K/uL    NRBC (Absolute) 0.20 K/uL   MAGNESIUM    Collection Time: 08/18/20  5:30 AM   Result Value Ref Range    Magnesium 1.8 1.5 - 2.5 mg/dL   PHOSPHORUS    Collection Time: 08/18/20  5:30 AM   Result Value Ref Range    Phosphorus 4.2 2.5 - 4.5 mg/dL   ESTIMATED GFR    Collection Time: 08/18/20  5:30 AM   Result  Value Ref Range    GFR If  44 (A) >60 mL/min/1.73 m 2    GFR If Non  36 (A) >60 mL/min/1.73 m 2   ACCU-CHEK GLUCOSE    Collection Time: 08/18/20  5:56 AM   Result Value Ref Range    Glucose - Accu-Ck 176 (H) 65 - 99 mg/dL       Fluids    Intake/Output Summary (Last 24 hours) at 8/18/2020 1103  Last data filed at 8/18/2020 0800  Gross per 24 hour   Intake 2078.33 ml   Output 1915 ml   Net 163.33 ml       Core Measures & Quality Metrics  Labs reviewed, Medications reviewed and Radiology images reviewed  Orellana catheter: Critically Ill - Requiring Accurate Measurement of Urinary Output  Central line in place: Concentrated IV drugs, Need for access and Vasopressors    DVT Prophylaxis: Heparin  DVT prophylaxis - mechanical: SCDs  Ulcer prophylaxis: Yes  Antibiotics: Treating active infection/contamination beyond 24 hours perioperative coverage  Assessed for rehab: Patient unable to tolerate rehabilitation therapeutic regimen    CAITLIN Score  ETOH Screening    Assessment/Plan  Chronic hypernatremia  Assessment & Plan  Progressive, chronic hypernatremia.  Check urine electrolytes.  8/16 Prompt correction with hypotonic fluids. TPN adjusted.  8/17 Hypotonic fluids stopped.  Trend BMP every 6 hours.    Respiratory failure following trauma and surgery (HCC)- (present on admission)  Assessment & Plan  Full ventilatory support following initial surgery. Ensure optimal oxygenation and mitigate secondary injury.  8/2 Extubated.  8/11 Return from operating room on ventilator.  8/12 Extubated.  Continue aggressive pulmonary care and hygiene.    Ileocolic anastomotic leak- (present on admission)  Assessment & Plan  7/31 Perforated appendix with retrocolic abscess. Small bowel and right colon resection.   8/4 Returned to surgery for anastamotic leak. Left in discontinuity.  8/7 Washout and drain placement.  8/9 Abdominal washout with primary ileocolic anastamosis.   8/11 Delayed primary fascial closure  with drain placement.  Midline wound VAC.  8/14 Second anastomotic leak. Exploratory laparotomy, washout, ileocolic anastomotic revision, and fascial closure. Wound VAC.  8/17 Zosyn day 18 of 21 day treatment course duration.  [facility] [place] [facility].    Paroxysmal atrial fibrillation (CMS-HCC)- (present on admission)  Assessment & Plan  Chronic afib complicated by RVR  8/1 Digoxin load and metoprolol.  8/7 Recurrent A fib with RVR. Amiodarone infusion started.  8/13 Transitioned to oral amiodarone.    Hypophosphatemia  Assessment & Plan  8/17 Replace with 30 mmol KPhos and trend.    Hypokalemia  Assessment & Plan  8/17 Continue to replace and trend.  Empiric magnesium replacement.    Anemia  Assessment & Plan  Persistent critical anemia associated with critical illness and multiple surgeries.Critical anemia  8/6 Transfused 1 unit of packed red blood cells.  8/7 Transfused 2 units of packed red blood cells.  8/8 Transfused 1 unit of packed red blood cells.  8/9 Transfused 1 unit of packed red blood cells.  8/16 Transfused 1 unit of packed red blood cells.  Continue to trend closely.  Transfuse 1 unit PRBC's for hemoglobin less than 7.    Acute renal insufficiency  Assessment & Plan  Resolving non oliguric renal insufficiency secondary to shock and ATN.  Trend renal indices. Medications adjusted for renal function.  Avoid nephrotoxins.    HTN (hypertension)- (present on admission)  Assessment & Plan  Chronic condition treated with hydrochlorothiazide, losartan, and metoprolol.  8/13 Resumed maintenance medication.    8/15 Holding maintenance medication with vasopressor requirement.  8/17 Resumed metoprolol, losartan, and hydrochlorothiazide.    Adrenal insufficiency (HCC)  Assessment & Plan  Failure to fully wean from vasopressors despite adequate resuscitation.  8/3 Empiric steroids started with liberation from vasopressors.  8/14 Steroids weaned off.    No contraindication to anticoagulation therapy- (present on  admission)  Assessment & Plan  8/1 Lovenox started.  8/5 Held for bleeding.  8/8 Initiated prophylactic subcutaneous heparin.    I independently reviewed pertinent clinical lab tests from the last 48 hours and ordered additional follow up clinical lab tests.  I independently reviewed pertinent radiographic images and the radiologist's reports from the last 48 hours and ordered additional follow up radiographic studies.  The details of the available patient records in [facility] (including laboratory tests, culture data, medications, imaging, and other pertinent diagnostic tests) and that information was utilized as warranted in today's medical decision making for this patient.  I personally evaluated the patient condition at bedside, discussed the daily plan(s) with available nursing staff, dieticians, social workers, pharmacists on multi-disciplinary rounds, and performed frequent reassessments through out the day as clinically warranted.    The patient remains critically ill with acute respiratory insufficiency, altered mental status, and hypotension.  This patient requires continued ICU management and hospital admission.  The patient has impairment of one or more vital organ systems and a high probability of imminent or life-threatening deterioration in condition.     Critical care interventions include: integration of multiple data points and associated complex medical decision making and complex end of life decision making and planning with a transition to Comfort Care..    Aggregated critical care time spent evaluating, reassessing, reviewing documentation, providing care, and managing this patient exclusive of procedures: 55 minutes    Jesse Barber MD

## 2020-08-18 NOTE — CARE PLAN
Problem: Safety  Goal: Will remain free from falls  Note: Fall precautions implemented.       Problem: Pain Management  Goal: Pain level will decrease to patient's comfort goal  Note: Pain assessed q2h and PRN. Pt medicated per MAR. Non-pharmacologic measures utilized.

## 2020-08-18 NOTE — PROGRESS NOTES
"    DATE: 8/18/2020    Interval Events:    Ongoing resuscitative efforts.  Worrisome deterioration in neurologic examination.  Worsening pulmonary function.    PHYSICAL EXAMINATION:  Vital Signs: BP (!) 85/50   Pulse 69   Temp 36.8 °C (98.2 °F) (Temporal)   Resp 14   Ht 1.753 m (5' 9\")   Wt 120 kg (264 lb 8.8 oz)   SpO2 100%     The patient is a critically ill-appearing 74-year-old woman.  Obtunded.  Increased work of breathing with shallow respirations.  Diminished breath sounds in the bases bilaterally.  The abdomen is soft, obese, and distended.  VAC dressing intact.  Generalized anasarca.    ASSESSMENT AND PLAN:     Progressive multisystem failure following multiple abdominal surgeries for repetitive anastomotic leaks.  Palliative care consultation requested.  Transition to comfort care in accordance with the  and patient's previously expressed wishes seems entirely reasonable at this point..    Appreciate ICU and consulting physician care.       ____________________________________     Kareem Cazares M.D.    DD: 8/18/2020  11:18 AM      "

## 2020-08-18 NOTE — THERAPY
Missed Therapy     Patient Name: Yvette Dailey  Age:  74 y.o., Sex:  female  Medical Record #: [id number]  Today's Date: 8/18/2020    Spoke with OT to coordinate care; per notes and OT discussion with RN, pt has had a decline in medical status and is not appropriate for PT today. More obtunded and not able to follow commands. Family discussion planned for plan of care moving forward. Will f/u at a later time after plan determined.     PM f/u: pt has transitioned to comfort care; will discontinue PT order    Alyce Kim PT

## 2020-08-18 NOTE — PROGRESS NOTES
I certify that the patient requires continued medically necessary hospital services for the treatment of her recent abdominal surgery and will likely remain in the hospital for 3 to 5 additional days.  She has been transition to comfort care.  Discharge date cannot be definitively predicted at this time.

## 2020-08-18 NOTE — PROGRESS NOTES
At start of shift, pt opened eyes and moved all four extremities but did not track or follow commands. Pt continued to wake up more and moan as night progressed. A little after midnight pt became significantly more obtunded and unarousable. Pt's blood pressure also became labile. A one time dose of narcan was given at 129AM. Pt became more arousable and BP increased to 130's/90's. Pt moved the upper left extremity (2/5) and trace contraction in R upper and bilateral lowers. Facial droop on left side, and unequal pupils (L smaller than R), round and reactive.     Dr Cazares was called at 135 and notified of pt's condition. No new orders received.     200AM pt's O2 sats dropped to the 80's. Pt is now on a nonrebreather at 15L sating at 85%. Spouse Florencio Dailey was called and notified of pt's status. Florencio Dailey reiterated that he did not want her to be intubated if she stopped breathing.

## 2020-08-19 VITALS
HEIGHT: 69 IN | OXYGEN SATURATION: 92 % | SYSTOLIC BLOOD PRESSURE: 96 MMHG | DIASTOLIC BLOOD PRESSURE: 54 MMHG | BODY MASS INDEX: 39.18 KG/M2 | RESPIRATION RATE: 17 BRPM | WEIGHT: 264.55 LBS | TEMPERATURE: 96.9 F | HEART RATE: 96 BPM

## 2020-08-19 LAB
BACTERIA SPEC ANAEROBE CULT: NORMAL
SIGNIFICANT IND 70042: NORMAL
SITE SITE: NORMAL
SOURCE SOURCE: NORMAL

## 2020-08-19 PROCEDURE — 770020 HCHG ROOM/CARE - TELE (206)

## 2020-08-19 PROCEDURE — 700101 HCHG RX REV CODE 250: Performed by: SURGERY

## 2020-08-19 PROCEDURE — 700111 HCHG RX REV CODE 636 W/ 250 OVERRIDE (IP): Performed by: SURGERY

## 2020-08-19 RX ADMIN — MORPHINE SULFATE 5 MG: 10 INJECTION INTRAVENOUS at 13:08

## 2020-08-19 RX ADMIN — MORPHINE SULFATE 5 MG: 10 INJECTION INTRAVENOUS at 10:07

## 2020-08-19 RX ADMIN — GLYCOPYRROLATE 0.2 MG: 0.2 INJECTION INTRAMUSCULAR; INTRAVENOUS at 09:02

## 2020-08-19 RX ADMIN — LORAZEPAM 1 MG: 2 INJECTION INTRAMUSCULAR; INTRAVENOUS at 16:23

## 2020-08-19 RX ADMIN — LORAZEPAM 1 MG: 2 INJECTION INTRAMUSCULAR; INTRAVENOUS at 09:02

## 2020-08-19 RX ADMIN — LORAZEPAM 1 MG: 2 INJECTION INTRAMUSCULAR; INTRAVENOUS at 13:07

## 2020-08-19 RX ADMIN — MORPHINE SULFATE 5 MG: 10 INJECTION INTRAVENOUS at 16:23

## 2020-08-19 NOTE — PROGRESS NOTES
"    DATE: 8/19/2020    Interval Events:    Comfort care.  Transferred T7    PHYSICAL EXAMINATION:  Vital Signs: BP (!) 96/54   Pulse 96   Temp 36.1 °C (96.9 °F) (Temporal)   Resp 17   Ht 1.753 m (5' 9\")   Wt 120 kg (264 lb 8.8 oz)   SpO2 92%     Resting comfortably    ASSESSMENT AND PLAN:     Comfort Care  Appreciate Palliative Care Consultation.       ____________________________________     Kareem Cazares M.D.    DD: 8/19/2020  1:23 PM      "

## 2020-08-19 NOTE — CARE PLAN
Problem: Pain Management  Goal: Pain level will decrease to patient's comfort goal  Outcome: PROGRESSING AS EXPECTED  Intervention: Follow pain managment plan developed in collaboration with patient and Interdisciplinary Team  Note: Pt medicated according to MAR to achieve comfort. Pt is resting comfortably in bed, lights dimmed, with a quiet environment.     Problem: Psychosocial Needs:  Goal: Level of anxiety will decrease  Outcome: PROGRESSING AS EXPECTED  Note: Pt medicated for anxiety according to MAR to achieve pt comfort. Pt is in a quiet, calm, dimmed environment to allow for rest and comfort.

## 2020-08-19 NOTE — PROGRESS NOTES
Pt transferred to tele 7 in the care of TREVON Recinos. Pt transported with 2 RNs, all belongings and chart with pt. Please call with any questions at [phone]!

## 2020-08-19 NOTE — PROGRESS NOTES
Assumed care of patient at 0715, received bedside report from night shift RN. Bed is locked and in lowest position with call light within reach. Treaded socks in place. Pt is resting comfortably with comfort care measures in place. Patient's breathing pattern is unlabored. All needs met at this time.

## 2020-08-19 NOTE — PROGRESS NOTES
2 RN Skin check with Harsh RN     - gen edema and bruising throughout  - nasal abrasion   B/L breast folds pink (nystatin applied)  BUE bruising, edema and weeping, L>R  Abdominal midline wound vac  abdominal hemovac drains x2  Abdominal ceci drain  Abdominal blisters, open, some with serosanguinous drainage (adhesive foam applied)   Pannus redness (nystatin)   BLE mottled, calloused, dry feet, boggy heels, flaky shins, redness slow to carina  Small amount of excoriation to sacrum    Q2 turns, free of medical devices, bony prominences padded, arms and heels elevated on pillows, float boots in use, heel mepilex, mepilex to sacrum, BMS in place, wrapped in pillow case to protect skin, barrier cream applied

## 2020-08-19 NOTE — CARE PLAN
Problem: Bowel/Gastric:  Goal: Normal bowel function is maintained or improved  Outcome: PROGRESSING SLOWER THAN EXPECTED  Note: BMS system in place     Problem: Pain Management  Goal: Pain level will decrease to patient's comfort goal  Outcome: PROGRESSING AS EXPECTED  Flowsheets (Taken 8/18/2020 2000)  Non Verbal Scale:   Calm   Unlabored Breathing   Sleeping  Note: Allow pt to remain comforatable

## 2020-08-20 NOTE — PROGRESS NOTES
This RN along with TAMIKA Dickson pulled all lines and drains and preformed all after care. Pt expiration paper work is done. Traction notified and in route to transport pt to [facility].

## 2020-08-21 NOTE — DISCHARGE SUMMARY
DATE OF ADMISSION:  07/31/2020    DATE OF DEATH:  08/19/2020    ADMITTING DIAGNOSIS:  Perforated appendicitis with peritonitis.    DISCHARGE DIAGNOSES:  Perforated appendicitis with peritonitis, atrial   fibrillation with rapid ventricular response, ileocolic anastomotic leak x2,   acute respiratory failure following trauma, acute renal insufficiency.    ADDITIONAL DIAGNOSES:  Include hypertension, hyperlipidemia, obesity, gout,   and sleep apnea.    PROCEDURES PERFORMED:  1.  On 07/31, exploratory laparotomy with appendectomy and resection of small   bowel and right colon.  2.  On 08/04, return to surgery for washout of anastomotic leak, left in   discontinuity.  3.  On 08/07, abdominal washout with drain placement.  4.  On 08/09, abdominal washout with primary ileocolic anastomosis.  5.  On 08/11, delayed primary fascial closure with drain placement and midline   wound VAC.  6.  On 08/14, exploratory laparotomy for second anastomotic leak with washout   and ileocolic anastomotic revision with fascial closure and wound VAC.    HISTORY OF PRESENT ILLNESS AND HOSPITAL COURSE:  The patient is a chronically   ill 74-year-old woman who presented to the emergency department with advanced   perforated appendicitis.  She was taken to the operating room for the   aforementioned procedures.  Her postoperative convalescence was complicated by   anastomotic leak on 2 separate occasions and clinical observation of little   to no wound healing in both her anastomosis and midline wounds.  Her ICU   course was complicated by transient septic episodes, atrial fibrillation,   acute renal injury, and respiratory failure.    After a lengthy surgical intensive care unit convalescence, her son and    expressed concern that she would not want these extraordinary means to   prolong her life.  Extensive discussions were held with family members and   consultation with palliative care.  Ultimately, the decision for comfort care   was  made.  The patient was transferred to the [facility] where comfort care measures   were instituted and she quickly and quietly .  Date of death was   2020.  Time of death, 1642 hours.       ____________________________________     MD EDY THRASHER / [facility]    DD:  2020 08:09:31  DT:  2020 18:09:00    D#:  [id number]  Job#:  [id number]    cc: MICHAEL STARK MD

## 2022-04-23 NOTE — CARE PLAN
Problem: Nutritional:  Goal: Nutrition support tolerated and meeting greater than 85% of estimated needs  Outcome: NOT MET    Tube feedings to start at trickle     Problem: Nutritional:  Goal: Achieve adequate nutritional intake  Description: Start diet or nutrition support  Outcome: MET    Pt has been NPO since 8/5 and tube feeding to start      Checked VasCath line    Blue with brisk blood return and flushes well. Red with no blood return but flushes well. Will attempt to start and run CRRT with lines reversed.

## 2022-06-19 NOTE — ANESTHESIA PROCEDURE NOTES
Airway    Date/Time: 8/14/2020 1:12 PM  Performed by: Ciro Silver M.D.  Authorized by: Ciro Silver M.D.     Location:  OR  Urgency:  Elective  Indications for Airway Management:  Anesthesia      Spontaneous Ventilation: absent    Sedation Level:  Deep  Preoxygenated: Yes    Patient Position:  Sniffing  Mask Difficulty Assessment:  0 - not attempted  Final Airway Type:  Endotracheal airway  Final Endotracheal Airway:  ETT  Cuffed: Yes    Technique Used for Successful ETT Placement:  Video laryngoscopy    Insertion Site:  Oral  Blade Type:  Glide  Laryngoscope Blade/Videolaryngoscope Blade Size:  3  ETT Size (mm):  7.0  Measured from:  Teeth  ETT to Teeth (cm):  21  Placement Verified by: capnometry    Cormack-Lehane Classification:  Grade I - full view of glottis  Number of Attempts at Approach:  1   RSI           No
